# Patient Record
Sex: FEMALE | Race: WHITE | NOT HISPANIC OR LATINO | Employment: OTHER | URBAN - NONMETROPOLITAN AREA
[De-identification: names, ages, dates, MRNs, and addresses within clinical notes are randomized per-mention and may not be internally consistent; named-entity substitution may affect disease eponyms.]

---

## 2017-04-07 ENCOUNTER — APPOINTMENT (OUTPATIENT)
Dept: ULTRASOUND IMAGING | Facility: HOSPITAL | Age: 32
End: 2017-04-07

## 2017-04-07 ENCOUNTER — HOSPITAL ENCOUNTER (EMERGENCY)
Facility: HOSPITAL | Age: 32
Discharge: HOME | End: 2017-04-07
Attending: EMERGENCY MEDICINE | Admitting: EMERGENCY MEDICINE

## 2017-04-07 VITALS
OXYGEN SATURATION: 96 % | BODY MASS INDEX: 39.55 KG/M2 | WEIGHT: 252 LBS | HEIGHT: 67 IN | TEMPERATURE: 98.1 F | DIASTOLIC BLOOD PRESSURE: 81 MMHG | SYSTOLIC BLOOD PRESSURE: 123 MMHG | RESPIRATION RATE: 18 BRPM | HEART RATE: 59 BPM

## 2017-04-07 DIAGNOSIS — S80.11XA CONTUSION OF RIGHT LOWER LEG, INITIAL ENCOUNTER: Primary | ICD-10-CM

## 2017-04-07 PROCEDURE — 93971 EXTREMITY STUDY: CPT

## 2017-04-07 PROCEDURE — 99283 EMERGENCY DEPT VISIT LOW MDM: CPT

## 2017-04-07 RX ORDER — HYDROCHLOROTHIAZIDE 12.5 MG/1
25 TABLET ORAL DAILY
COMMUNITY
End: 2018-05-01 | Stop reason: SDUPTHER

## 2017-04-07 RX ORDER — SERTRALINE HYDROCHLORIDE 25 MG/1
25 TABLET, FILM COATED ORAL DAILY
COMMUNITY
End: 2018-05-01 | Stop reason: HOSPADM

## 2017-04-07 NOTE — ED PROVIDER NOTES
Subjective   History of Present Illness  TRIAGE CHIEF COMPLAINT:   Chief Complaint   Patient presents with   • Leg Pain         HPI: Johny Hearn   is a 31 y.o. female   who presents to the emergency department complaining of R calf pain.  Patient reports right calf pain and an area of bruising that looks strange to her.  She is concerned for possible DVT.  He has no history of clotting disorder nor does she have risk factor for DVT however insists upon having an ultrasound.  On exam pain is obviously focal over the contusion.  No posterior tenderness or palpable cords.  No unilateral edema.           Review of Systems   All other systems reviewed and are negative.      Past Medical History:   Diagnosis Date   • Cardiomyopathy    • Hyperlipidemia    • Hypertension    • Short-term memory loss        Allergies   Allergen Reactions   • Erythromycin Other (See Comments)     Pt unsure.   • Macrobid [Nitrofurantoin Monohyd Macro] Other (See Comments)     Pt does not know       Past Surgical History:   Procedure Laterality Date   •  SECTION     • TUBAL ABDOMINAL LIGATION         History reviewed. No pertinent family history.    Social History     Social History   • Marital status:      Spouse name: N/A   • Number of children: N/A   • Years of education: N/A     Social History Main Topics   • Smoking status: Never Smoker   • Smokeless tobacco: None   • Alcohol use No   • Drug use: No   • Sexual activity: Not Asked     Other Topics Concern   • None     Social History Narrative   • None           Objective   Physical Exam    CONSTITUTIONAL: Awake, oriented, appears non-toxic. Anxious. Obese.  HENT: Atraumatic, normocephalic, oral mucosa pink and moist, airway patent. Nares patent without drainage. External ears normal.   EYES: Conjunctiva clear, EOMI, PERRL   NECK: Trachea midline, non-tender, supple   CARDIOVASCULAR: Normal heart rate, Normal rhythm, No murmurs, rubs, gallops   PULMONARY/CHEST:  Clear to auscultation, no rhonchi, wheezes, or rales. Symmetrical breath sounds. Non-tender.   ABDOMINAL: Non-distended, soft, non-tender - no rebound or guarding. BS normal.   NEUROLOGIC: Non-focal, moving all four extremities, no gross sensory or motor deficits.   EXTREMITIES: No clubbing, cyanosis, or edema. 3x4cm contusion posteromedial proximal calf on RLE.   SKIN: Warm, Dry, No erythema. Facial rash.     US Venous Doppler Lower Extremity Right (duplex)   Final Result   No evidence of deep venous thrombosis.       This report was finalized on 4/7/2017 2:29 PM by Agustin Mckeon MD.              EKG:         Procedures         ED Course  ED Course          ED COURSE / MEDICAL DECISION MAKING:   No DVT on ultrasound.    DECISION TO DISCHARGE/ADMIT: see ED care timeline       Electronically signed by: Ok Vickers MD, 4/7/2017 2:42 PM              [place]    Final diagnoses:   Contusion of right lower leg, initial encounter            Ok Vickers MD  04/07/17 1446

## 2017-08-21 ENCOUNTER — TRANSCRIBE ORDERS (OUTPATIENT)
Dept: ADMINISTRATIVE | Facility: HOSPITAL | Age: 32
End: 2017-08-21

## 2017-08-21 DIAGNOSIS — R52 PAIN: Primary | ICD-10-CM

## 2017-09-11 ENCOUNTER — OFFICE VISIT (OUTPATIENT)
Dept: SURGERY | Facility: CLINIC | Age: 32
End: 2017-09-11

## 2017-09-11 VITALS
RESPIRATION RATE: 18 BRPM | OXYGEN SATURATION: 98 % | HEART RATE: 70 BPM | BODY MASS INDEX: 38.61 KG/M2 | HEIGHT: 67 IN | SYSTOLIC BLOOD PRESSURE: 138 MMHG | WEIGHT: 246 LBS | TEMPERATURE: 98 F | DIASTOLIC BLOOD PRESSURE: 92 MMHG

## 2017-09-11 DIAGNOSIS — R59.0 LYMPHADENOPATHY, INGUINAL: Primary | ICD-10-CM

## 2017-09-11 DIAGNOSIS — R59.0 LYMPHADENOPATHY OF HEAD AND NECK REGION: Primary | ICD-10-CM

## 2017-09-11 PROCEDURE — 99204 OFFICE O/P NEW MOD 45 MIN: CPT | Performed by: SURGERY

## 2017-09-11 PROCEDURE — 10022 PR FINE NEEDLE ASP;W/IMAGING GUIDANCE: CPT | Performed by: SURGERY

## 2017-09-11 RX ORDER — METRONIDAZOLE 7.5 MG/G
GEL TOPICAL
COMMUNITY
Start: 2017-07-26 | End: 2018-10-23 | Stop reason: HOSPADM

## 2017-09-11 RX ORDER — IBUPROFEN 800 MG/1
800 TABLET ORAL EVERY 6 HOURS PRN
COMMUNITY
Start: 2017-06-28 | End: 2018-10-23 | Stop reason: HOSPADM

## 2017-09-11 RX ORDER — HYDROCHLOROTHIAZIDE 25 MG/1
25 TABLET ORAL DAILY
COMMUNITY
Start: 2017-07-26 | End: 2018-10-23 | Stop reason: HOSPADM

## 2017-09-11 RX ORDER — AMITRIPTYLINE HYDROCHLORIDE 10 MG/1
10 TABLET, FILM COATED ORAL NIGHTLY
COMMUNITY
Start: 2015-03-10 | End: 2018-10-23 | Stop reason: HOSPADM

## 2017-09-11 RX ORDER — SERTRALINE HYDROCHLORIDE 100 MG/1
100 TABLET, FILM COATED ORAL NIGHTLY
COMMUNITY
Start: 2017-08-15 | End: 2022-09-28 | Stop reason: SDUPTHER

## 2017-09-11 RX ORDER — FLUTICASONE PROPIONATE 50 MCG
1 SPRAY, SUSPENSION (ML) NASAL DAILY PRN
COMMUNITY
Start: 2017-08-15 | End: 2022-04-18

## 2017-09-11 NOTE — PROGRESS NOTES
"Patient: Johny Hearn    YOB: 1985    Date: 09/11/2017    Primary Care Provider: Laurel Macias MD    Reason for Consultation: Abnormal CT scan of the abdomen and pelvis.    Chief complaint:   Chief Complaint   Patient presents with   • Mass     retroperitoneal per CT scan of abdomen and pelvis.       Subjective .     History of present illness:  Pt has \"short term memory loss\" per her and her , she is being seen today for evaluation of abnormal CT scan of the abdomen and pelvis which was done on 09/07/2017, it showed a soft tissue mass in the left retroperitoneum, it was also found on recent MRI, please see attached document for complete report  Pt's  stated that she had initial MRI done because of back pain.Lesion suspicious for lymphoma or possible metastatic disease.  Ultrasound indicates a lymph node in the left groin that has access for FNA.    Review of Systems   Constitutional: Negative for chills, fever and unexpected weight change.   HENT: Negative for hearing loss, trouble swallowing and voice change.    Eyes: Negative for visual disturbance.   Respiratory: Negative for apnea, cough, chest tightness, shortness of breath and wheezing.    Cardiovascular: Negative for chest pain, palpitations and leg swelling.   Gastrointestinal: Negative for abdominal distention, abdominal pain, anal bleeding, blood in stool, constipation, diarrhea, nausea, rectal pain and vomiting.   Endocrine: Negative for cold intolerance and heat intolerance.   Genitourinary: Negative for difficulty urinating, dysuria and flank pain.   Musculoskeletal: Negative for back pain and gait problem.   Skin: Negative for color change, rash and wound.   Neurological: Negative for dizziness, syncope, speech difficulty, weakness, light-headedness, numbness and headaches.   Hematological: Negative for adenopathy. Does not bruise/bleed easily.   Psychiatric/Behavioral: Negative for confusion. The " "patient is not nervous/anxious.        Allergies:  Allergies   Allergen Reactions   • Erythromycin Other (See Comments)     Pt unsure.   • Macrobid [Nitrofurantoin Monohyd Macro] Other (See Comments)     Pt does not know       Medications:    Current Outpatient Prescriptions:   •  amitriptyline (ELAVIL) 10 MG tablet, Take  by mouth., Disp: , Rfl:   •  fluticasone (FLONASE) 50 MCG/ACT nasal spray, , Disp: , Rfl:   •  hydrochlorothiazide (HYDRODIURIL) 12.5 MG tablet, Take 12.5 mg by mouth Daily., Disp: , Rfl:   •  hydrochlorothiazide (HYDRODIURIL) 25 MG tablet, , Disp: , Rfl:   •  ibuprofen (ADVIL,MOTRIN) 800 MG tablet, , Disp: , Rfl:   •  metroNIDAZOLE (METROGEL) 0.75 % gel, , Disp: , Rfl:   •  sertraline (ZOLOFT) 100 MG tablet, , Disp: , Rfl:   •  sertraline (ZOLOFT) 25 MG tablet, Take 25 mg by mouth Daily., Disp: , Rfl:     History\"  Past Medical History:   Diagnosis Date   • Cardiomyopathy    • Hyperlipidemia    • Hypertension    • Short-term memory loss        Past Surgical History:   Procedure Laterality Date   •  SECTION     • ENDOMETRIAL ABLATION     • TUBAL ABDOMINAL LIGATION         Family History   Problem Relation Age of Onset   • Hypertension Father    • Diabetes Father    • Diabetes Paternal Grandmother        Social History   Substance Use Topics   • Smoking status: Never Smoker   • Smokeless tobacco: None   • Alcohol use No        Objective     Vital Signs:   /92  Pulse 70  Temp 98 °F (36.7 °C) (Temporal Artery )   Resp 18  Ht 66.5\" (168.9 cm)  Wt 246 lb (112 kg)  SpO2 98%  BMI 39.11 kg/m2    Physical Exam:   General Appearance:    Alert, cooperative, in no acute distress   Head:    Normocephalic, without obvious abnormality, atraumatic   Eyes:            Lids and lashes normal, conjunctivae and sclerae normal, no   icterus, no pallor, corneas clear, PERRLA   Ears:    Ears appear intact with no abnormalities noted   Throat:   No oral lesions, no thrush, oral mucosa moist   Neck:   " No adenopathy, supple, trachea midline, no thyromegaly, no   carotid bruit, no JVD   Lungs:     Clear to auscultation,respirations regular, even and                  unlabored    Heart:    Regular rhythm and normal rate, normal S1 and S2, no            murmur, no gallop, no rub, no click   Chest Wall:    No abnormalities observed   Abdomen:     Normal bowel sounds, no masses, no organomegaly, soft        non-tender, non-distended, no guarding, no rebound                tenderness   Extremities:   Moves all extremities well, no edema, no cyanosis, no             redness   Pulses:   Pulses palpable and equal bilaterally   Skin:   No bleeding, bruising or rash   Lymph nodes:   No palpable adenopathy   Neurologic:   Cranial nerves 2 - 12 grossly intact, sensation intact, DTR       present and equal bilaterally     Results Review:   I reviewed the patient's new clinical results.    Assessment/Plan     1. Lymphadenopathy of head and neck region        Procedure: FNA left pelvic lymph node with ultrasound guidance    I recommend a FNA of the left pelvic lymph node lesion. The procedure and risks were clearly explained including bleeding, infection and requirement for re-biopsy and the patient understood these and wishes to proceed.    The patient was brought to the procedure room. Consent and time out were performed. The area was prepped and draped in the usual fashion. 1% lidocaine with epinephrine was infused locally. A 20G needle was used to biopsy the lesion with ultrasound guidance.  Minimal blood loss had occurred and hemostasis had been well controlled with pressure.  The patient tolerated the procedure and there were no complications. We will make a f/u appointment to discuss results.      I discussed the patients findings and my recommendations with patient.  Follow-up 4 days for results of pathology    Review of Systems was reviewed and confirmed as accurate today.    Electronically signed by Shelley Brock,  MD  09/11/17      .Scribed for Shelley Brock MD by Ileana Donald. 9/11/2017  3:09 PM

## 2017-09-15 ENCOUNTER — OFFICE VISIT (OUTPATIENT)
Dept: SURGERY | Facility: CLINIC | Age: 32
End: 2017-09-15

## 2017-09-15 VITALS
SYSTOLIC BLOOD PRESSURE: 136 MMHG | HEART RATE: 78 BPM | DIASTOLIC BLOOD PRESSURE: 88 MMHG | HEIGHT: 67 IN | WEIGHT: 264 LBS | TEMPERATURE: 98.8 F | BODY MASS INDEX: 41.44 KG/M2 | OXYGEN SATURATION: 98 %

## 2017-09-15 DIAGNOSIS — R10.32 PAIN IN THE GROIN, LEFT: Primary | ICD-10-CM

## 2017-09-15 DIAGNOSIS — R59.0 LYMPHADENOPATHY OF HEAD AND NECK REGION: ICD-10-CM

## 2017-09-15 DIAGNOSIS — R10.32 LEFT LOWER QUADRANT PAIN: ICD-10-CM

## 2017-09-15 DIAGNOSIS — R59.0 LYMPHADENOPATHY, RETROPERITONEAL: ICD-10-CM

## 2017-09-15 PROCEDURE — 99213 OFFICE O/P EST LOW 20 MIN: CPT | Performed by: SURGERY

## 2017-09-15 NOTE — PROGRESS NOTES
"Patient: Johny Hearn    YOB: 1985    Date: 09/15/2017    Primary Care Provider: Laurel Macias MD    Reason for Consultation: follow up FNA left groin    Chief Complaint:   Chief Complaint   Patient presents with   • Follow-up     follow up FNA @ left groin       History: Patient is in the office today to follow up on a FNA @ left groin, pathology was negative for malignancy. Patient complins of tenderness @ left groin. Patient still has left lower quadrant pain.  Still concerned about a large lymph node in the retroperitoneum.  It is not accessible to ultrasound-guided FNA.    Review of Systems   Constitutional: Negative for chills, fatigue and fever.   Respiratory: Negative for cough.    Cardiovascular: Negative for chest pain.   Gastrointestinal: Negative for abdominal pain, diarrhea, nausea and vomiting.       Vital Signs  /88  Pulse 78  Temp 98.8 °F (37.1 °C) (Temporal Artery )   Ht 66.5\" (168.9 cm)  Wt 264 lb (120 kg)  SpO2 98%  BMI 41.97 kg/m2    Allergies:  Allergies   Allergen Reactions   • Erythromycin Other (See Comments)     Pt unsure.   • Macrobid [Nitrofurantoin Monohyd Macro] Other (See Comments)     Pt does not know       Medications:    Current Outpatient Prescriptions:   •  amitriptyline (ELAVIL) 10 MG tablet, Take  by mouth., Disp: , Rfl:   •  fluticasone (FLONASE) 50 MCG/ACT nasal spray, , Disp: , Rfl:   •  hydrochlorothiazide (HYDRODIURIL) 12.5 MG tablet, Take 12.5 mg by mouth Daily., Disp: , Rfl:   •  hydrochlorothiazide (HYDRODIURIL) 25 MG tablet, , Disp: , Rfl:   •  ibuprofen (ADVIL,MOTRIN) 800 MG tablet, , Disp: , Rfl:   •  metroNIDAZOLE (METROGEL) 0.75 % gel, , Disp: , Rfl:   •  sertraline (ZOLOFT) 100 MG tablet, , Disp: , Rfl:   •  sertraline (ZOLOFT) 25 MG tablet, Take 25 mg by mouth Daily., Disp: , Rfl:     Physical Exam:   General Appearance:    Alert, cooperative, in no acute distress   Head:    Normocephalic, without obvious " abnormality, atraumatic   Lungs:     Clear to auscultation,respirations regular, even and                  unlabored    Heart:    Regular rhythm and normal rate, normal S1 and S2, no            murmur, no gallop, no rub, no click   Abdomen:     Normal bowel sounds, no masses, no organomegaly, soft        non-tender, non-distended, no guarding, no rebound                tenderness   Extremities:   Moves all extremities well, no edema, no cyanosis, no             redness   Pulses:   Pulses palpable and equal bilaterally   Skin:   No bleeding, bruising or rash      Assessment/Plan     1. Pain in the groin, left    2. Lymphadenopathy of head and neck region    3. Left lower quadrant pain      Patient scheduled for CT-guided biopsy of larger to peroneal lymph node.  Follow-up 2 weeks.    Electronically signed by Shelley Brock MD  09/15/17  Scribed for Shelley Brock MD by Caty Tracey. 9/15/2017  2:39 PM.3

## 2017-09-19 ENCOUNTER — TELEPHONE (OUTPATIENT)
Dept: SURGERY | Facility: CLINIC | Age: 32
End: 2017-09-19

## 2017-09-19 NOTE — TELEPHONE ENCOUNTER
Per [facility] @  Radiology, the patient needs to  the disk of the CT she had at [facility].  They need this before scheduling.   I asked the patient to  the disk & bring it to [facility] and make sure it gets to [facility].  She said she would do that today.

## 2017-09-26 ENCOUNTER — TELEPHONE (OUTPATIENT)
Dept: SURGERY | Facility: CLINIC | Age: 32
End: 2017-09-26

## 2017-09-26 DIAGNOSIS — R59.1 LYMPHADENOPATHY: Primary | ICD-10-CM

## 2017-09-26 NOTE — TELEPHONE ENCOUNTER
I let the patient know she is scheduled for a CT scan guided biopsy at [facility] on 09-29-17 arrival at 830am. I also gave her a return appt to see Dr. Brock the following Wedneday

## 2017-09-26 NOTE — TELEPHONE ENCOUNTER
Patient [facility] Burnham was unable to schedule CT guided bx did not have a long enough needle to reach the area.  I called [facility]NOL[facility] Pope to get CT scheduled.  They will return my call after lunch.I informed the patient.

## 2017-09-26 NOTE — TELEPHONE ENCOUNTER
----- Message from Lubna Bee sent at 9/19/2017 11:47 AM EDT -----  When talking to Johny about picking up the disk of her CT, she wanted Dr Brock to know that she also had swollen lymph nodes in her neck.  Thanks.

## 2017-09-29 ENCOUNTER — HOSPITAL ENCOUNTER (OUTPATIENT)
Dept: CT IMAGING | Facility: HOSPITAL | Age: 32
Discharge: HOME | End: 2017-09-29
Admitting: SURGERY

## 2017-09-29 VITALS
TEMPERATURE: 97.8 F | HEIGHT: 67 IN | DIASTOLIC BLOOD PRESSURE: 87 MMHG | OXYGEN SATURATION: 97 % | HEART RATE: 73 BPM | WEIGHT: 245 LBS | RESPIRATION RATE: 18 BRPM | BODY MASS INDEX: 38.45 KG/M2 | SYSTOLIC BLOOD PRESSURE: 133 MMHG

## 2017-09-29 DIAGNOSIS — R10.32 LEFT LOWER QUADRANT PAIN: ICD-10-CM

## 2017-09-29 LAB
APTT PPP: 28.8 SECONDS (ref 24–31)
INR PPP: 0.98
PLATELET # BLD AUTO: 222 10*3/MM3 (ref 150–450)
PROTHROMBIN TIME: 10.7 SECONDS (ref 9.6–11.5)

## 2017-09-29 PROCEDURE — 85730 THROMBOPLASTIN TIME PARTIAL: CPT | Performed by: RADIOLOGY

## 2017-09-29 PROCEDURE — 77012 CT SCAN FOR NEEDLE BIOPSY: CPT

## 2017-09-29 PROCEDURE — 88305 TISSUE EXAM BY PATHOLOGIST: CPT | Performed by: SURGERY

## 2017-09-29 PROCEDURE — 85049 AUTOMATED PLATELET COUNT: CPT | Performed by: RADIOLOGY

## 2017-09-29 PROCEDURE — 25010000002 PROMETHAZINE PER 50 MG: Performed by: RADIOLOGY

## 2017-09-29 PROCEDURE — 88173 CYTOPATH EVAL FNA REPORT: CPT | Performed by: SURGERY

## 2017-09-29 PROCEDURE — 85610 PROTHROMBIN TIME: CPT | Performed by: RADIOLOGY

## 2017-09-29 PROCEDURE — 88185 FLOWCYTOMETRY/TC ADD-ON: CPT

## 2017-09-29 PROCEDURE — 88184 FLOWCYTOMETRY/ TC 1 MARKER: CPT

## 2017-09-29 RX ORDER — ALPRAZOLAM 0.5 MG/1
0.5 TABLET ORAL
Status: ACTIVE | OUTPATIENT
Start: 2017-09-29 | End: 2017-09-29

## 2017-09-29 RX ORDER — MEPERIDINE HYDROCHLORIDE 50 MG/ML
50 INJECTION INTRAMUSCULAR; INTRAVENOUS; SUBCUTANEOUS ONCE
Status: COMPLETED | OUTPATIENT
Start: 2017-09-29 | End: 2017-09-29

## 2017-09-29 RX ORDER — PROMETHAZINE HYDROCHLORIDE 25 MG/ML
12.5 INJECTION, SOLUTION INTRAMUSCULAR; INTRAVENOUS ONCE
Status: COMPLETED | OUTPATIENT
Start: 2017-09-29 | End: 2017-09-29

## 2017-09-29 RX ORDER — SODIUM CHLORIDE 0.9 % (FLUSH) 0.9 %
1-10 SYRINGE (ML) INJECTION AS NEEDED
Status: DISCONTINUED | OUTPATIENT
Start: 2017-09-29 | End: 2017-09-30 | Stop reason: HOSPADM

## 2017-09-29 RX ORDER — LIDOCAINE HYDROCHLORIDE 10 MG/ML
20 INJECTION, SOLUTION INFILTRATION; PERINEURAL ONCE
Status: COMPLETED | OUTPATIENT
Start: 2017-09-29 | End: 2017-09-29

## 2017-09-29 RX ADMIN — LIDOCAINE HYDROCHLORIDE 20 ML: 10 INJECTION, SOLUTION INFILTRATION; PERINEURAL at 11:30

## 2017-09-29 RX ADMIN — MEPERIDINE HYDROCHLORIDE 50 MG: 50 INJECTION, SOLUTION INTRAMUSCULAR; INTRAVENOUS; SUBCUTANEOUS at 10:35

## 2017-09-29 RX ADMIN — PROMETHAZINE HYDROCHLORIDE 12.5 MG: 25 INJECTION INTRAMUSCULAR; INTRAVENOUS at 10:35

## 2017-09-29 NOTE — PLAN OF CARE
Problem: Patient Care Overview (Adult)  Goal: Discharge Needs Assessment  Outcome: Ongoing (interventions implemented as appropriate)    09/29/17 0900   Discharge Needs Assessment   Concerns To Be Addressed no discharge needs identified   Readmission Within The Last 30 Days no previous admission in last 30 days   Equipment Needed After Discharge none   Current Health   Anticipated Changes Related to Illness none   Self-Care   Equipment Currently Used at Home none   Living Environment   Transportation Available family or friend will provide

## 2017-09-29 NOTE — PROCEDURES
Radiology Procedure    Pre-procedure: Lymphadenopathy of the retroperitoneum and left iliac chains     Procedure Performed: CT guided needle biopsy left external iliac lymph node     IV Sedation and/or Anesthesia:  No    Complications: None    Preliminary Findings: Bulky Lymphadenopathy    Specimen Removed: FNA x 2 with aspirate     Estimated Blood Loss:  0ml    Post-Procedure Diagnosis: Lymphadenopathy    Post-Procedure Plan: Await cytology and pathology results, return to referring physician orders    Standard Discharge Instructions Given:yes     Micky Haddad DO  09/29/17  12:26 PM

## 2017-09-29 NOTE — PLAN OF CARE
Problem: Perioperative Period (Adult)  Goal: Signs and Symptoms of Listed Potential Problems Will be Absent or Manageable (Perioperative Period)  Outcome: Ongoing (interventions implemented as appropriate)    09/29/17 0900   Perioperative Period   Problems Assessed (Perioperative Period) all   Problems Present (Perioperative Period) physiologic stress response;situational response

## 2017-09-29 NOTE — PLAN OF CARE
Problem: Patient Care Overview (Adult)  Goal: Plan of Care Review  Outcome: Ongoing (interventions implemented as appropriate)    09/29/17 0926   Coping/Psychosocial Response Interventions   Plan Of Care Reviewed With patient;family   Patient Care Overview   Progress no change

## 2017-09-29 NOTE — NURSING NOTE
Pt states this started with resp infection has been on antibiotics,then scanned for back pain and noted abnl lymph nodes. In abd/pelvis

## 2017-10-02 ENCOUNTER — TELEPHONE (OUTPATIENT)
Dept: INTERVENTIONAL RADIOLOGY/VASCULAR | Facility: HOSPITAL | Age: 32
End: 2017-10-02

## 2017-10-02 NOTE — TELEPHONE ENCOUNTER
@[id number])@    Other Comments:

## 2017-10-03 LAB
CYTO UR: NORMAL
LAB AP CASE REPORT: NORMAL
LAB AP CLINICAL INFORMATION: NORMAL
LAB AP FLOW CYTOMETRY SUMMARY: NORMAL
Lab: NORMAL
Lab: NORMAL
PATH REPORT.FINAL DX SPEC: NORMAL
PATH REPORT.GROSS SPEC: NORMAL

## 2017-10-04 ENCOUNTER — OFFICE VISIT (OUTPATIENT)
Dept: SURGERY | Facility: CLINIC | Age: 32
End: 2017-10-04

## 2017-10-04 VITALS
HEART RATE: 92 BPM | BODY MASS INDEX: 38.45 KG/M2 | WEIGHT: 245 LBS | OXYGEN SATURATION: 97 % | DIASTOLIC BLOOD PRESSURE: 84 MMHG | HEIGHT: 67 IN | SYSTOLIC BLOOD PRESSURE: 130 MMHG | TEMPERATURE: 97.5 F

## 2017-10-04 DIAGNOSIS — R59.0 LYMPHADENOPATHY OF HEAD AND NECK REGION: Primary | ICD-10-CM

## 2017-10-04 PROCEDURE — 99213 OFFICE O/P EST LOW 20 MIN: CPT | Performed by: SURGERY

## 2017-10-04 RX ORDER — MONTELUKAST SODIUM 10 MG/1
TABLET ORAL
Status: ON HOLD | COMMUNITY
Start: 2017-10-01 | End: 2019-01-31

## 2017-10-04 NOTE — PROGRESS NOTES
"Patient: Johny Hearn    YOB: 1985    Date: 10/04/2017    Primary Care Provider: Laurel Macias MD    Chief Complaint:   Chief Complaint   Patient presents with   • Follow-up     Follow up from CT guided biopsy       History: The patient is in the office today for a follow up from her recent CT guided biopsy of her left iliac lymph node.  The pathology shows scan benign-appearing lymphoid cell groups.  Flow cytometry shows no clonal B-cell popular or aberrant T-cell population. Patient doing better, less pain in the left groin.  No weight loss or night sweats.    Review of Systems   Constitutional: Negative for chills, fatigue and fever.   Respiratory: Negative for cough.    Cardiovascular: Negative for chest pain.   Gastrointestinal: Negative for abdominal pain, diarrhea, nausea and vomiting.       Vital Signs  /84  Pulse 92  Temp 97.5 °F (36.4 °C) (Temporal Artery )   Ht 67\" (170.2 cm)  Wt 245 lb (111 kg)  SpO2 97%  BMI 38.37 kg/m2    Allergies:  Allergies   Allergen Reactions   • Erythromycin Other (See Comments)     Pt unsure.   • Macrobid [Nitrofurantoin Monohyd Macro] Other (See Comments)     Pt does not know       Medications:    Current Outpatient Prescriptions:   •  amitriptyline (ELAVIL) 10 MG tablet, Take  by mouth., Disp: , Rfl:   •  fluticasone (FLONASE) 50 MCG/ACT nasal spray, , Disp: , Rfl:   •  hydrochlorothiazide (HYDRODIURIL) 12.5 MG tablet, Take 12.5 mg by mouth Daily., Disp: , Rfl:   •  hydrochlorothiazide (HYDRODIURIL) 25 MG tablet, , Disp: , Rfl:   •  ibuprofen (ADVIL,MOTRIN) 800 MG tablet, , Disp: , Rfl:   •  metroNIDAZOLE (METROGEL) 0.75 % gel, , Disp: , Rfl:   •  montelukast (SINGULAIR) 10 MG tablet, , Disp: , Rfl:   •  sertraline (ZOLOFT) 100 MG tablet, , Disp: , Rfl:   •  sertraline (ZOLOFT) 25 MG tablet, Take 25 mg by mouth Daily., Disp: , Rfl:     Physical Exam:   General Appearance:    Alert, cooperative, in no acute distress   Head:   "  Normocephalic, without obvious abnormality, atraumatic   Lungs:     Clear to auscultation,respirations regular, even and                  unlabored    Heart:    Regular rhythm and normal rate, normal S1 and S2, no            murmur, no gallop, no rub, no click   Abdomen:     Normal bowel sounds, no masses, no organomegaly, soft        non-tender, non-distended, no guarding, no rebound                tenderness   Extremities:   Moves all extremities well, no edema, no cyanosis, no             redness   Pulses:   Pulses palpable and equal bilaterally   Skin:   No bleeding, bruising or rash      Assessment/Plan     1. Lymphadenopathy of head and neck region      Lymphadenopathy left retroperitoneal area, FNA was benign for lymphoma or cancer.  Patient reassured, we'll repeat CT scan in 6 months to ensure stability.  Patient agreeable and understands plan.    Electronically signed by Shelley Brock MD  10/04/17    Scribed for Shelley Brock MD by Michelle Milligan. 10/4/2017  9:10 AM

## 2017-10-06 PROBLEM — G62.9 PERIPHERAL NEUROPATHY: Status: ACTIVE | Noted: 2017-10-06

## 2017-10-06 PROBLEM — G47.33 OSA (OBSTRUCTIVE SLEEP APNEA): Status: ACTIVE | Noted: 2017-10-06

## 2018-02-15 ENCOUNTER — TRANSCRIBE ORDERS (OUTPATIENT)
Dept: GENERAL RADIOLOGY | Facility: HOSPITAL | Age: 33
End: 2018-02-15

## 2018-02-15 DIAGNOSIS — M25.551 BILATERAL HIP PAIN: Primary | ICD-10-CM

## 2018-02-15 DIAGNOSIS — M25.552 BILATERAL HIP PAIN: Primary | ICD-10-CM

## 2018-02-16 ENCOUNTER — HOSPITAL ENCOUNTER (OUTPATIENT)
Dept: GENERAL RADIOLOGY | Facility: HOSPITAL | Age: 33
Discharge: HOME | End: 2018-02-16
Admitting: NURSE PRACTITIONER

## 2018-02-16 DIAGNOSIS — M25.552 BILATERAL HIP PAIN: ICD-10-CM

## 2018-02-16 DIAGNOSIS — M25.551 BILATERAL HIP PAIN: ICD-10-CM

## 2018-02-16 PROCEDURE — 73521 X-RAY EXAM HIPS BI 2 VIEWS: CPT

## 2018-03-14 ENCOUNTER — TRANSCRIBE ORDERS (OUTPATIENT)
Dept: CT IMAGING | Facility: HOSPITAL | Age: 33
End: 2018-03-14

## 2018-03-14 DIAGNOSIS — R59.9 ADENOPATHY: Primary | ICD-10-CM

## 2018-03-15 ENCOUNTER — HOSPITAL ENCOUNTER (OUTPATIENT)
Dept: CT IMAGING | Facility: HOSPITAL | Age: 33
Discharge: HOME | End: 2018-03-15
Admitting: NURSE PRACTITIONER

## 2018-03-15 DIAGNOSIS — R59.9 ADENOPATHY: ICD-10-CM

## 2018-03-15 PROCEDURE — 74177 CT ABD & PELVIS W/CONTRAST: CPT

## 2018-03-15 PROCEDURE — 0 IOPAMIDOL 61 % SOLUTION: Performed by: NURSE PRACTITIONER

## 2018-03-15 PROCEDURE — 0 DIATRIZOATE MEGLUMINE & SODIUM PER 1 ML: Performed by: NURSE PRACTITIONER

## 2018-03-15 RX ADMIN — IOPAMIDOL 100 ML: 612 INJECTION, SOLUTION INTRAVENOUS at 11:15

## 2018-03-15 RX ADMIN — DIATRIZOATE MEGLUMINE AND DIATRIZOATE SODIUM 30 ML: 660; 100 LIQUID ORAL; RECTAL at 11:15

## 2018-04-04 ENCOUNTER — TELEPHONE (OUTPATIENT)
Dept: SURGERY | Facility: CLINIC | Age: 33
End: 2018-04-04

## 2018-04-04 NOTE — TELEPHONE ENCOUNTER
Patient on 6 month recall for enlarge lymph node.Called patient and she stated that she is under Dr Rice care for enlarge lymph .Called PCP spoke with Miguelina she said patient had another  CT and they  had referred her to Dr Rice.

## 2018-04-05 ENCOUNTER — LAB (OUTPATIENT)
Dept: LAB | Facility: HOSPITAL | Age: 33
End: 2018-04-05

## 2018-04-05 ENCOUNTER — TRANSCRIBE ORDERS (OUTPATIENT)
Dept: ADMINISTRATIVE | Facility: HOSPITAL | Age: 33
End: 2018-04-05

## 2018-04-05 DIAGNOSIS — Z00.00 ROUTINE GENERAL MEDICAL EXAMINATION AT A HEALTH CARE FACILITY: ICD-10-CM

## 2018-04-05 DIAGNOSIS — L83 ACANTHOSIS NIGRICANS: ICD-10-CM

## 2018-04-05 DIAGNOSIS — R93.89 ABNORMAL MRI: ICD-10-CM

## 2018-04-05 DIAGNOSIS — R59.9 ADENOPATHY: ICD-10-CM

## 2018-04-05 DIAGNOSIS — R93.89 ABNORMAL RADIOLOGICAL FINDINGS IN SKIN AND SUBCUTANEOUS TISSUE: ICD-10-CM

## 2018-04-05 DIAGNOSIS — R93.89 ABNORMAL RADIOLOGICAL FINDINGS IN SKIN AND SUBCUTANEOUS TISSUE: Primary | ICD-10-CM

## 2018-04-05 LAB
ALBUMIN SERPL-MCNC: 4.4 G/DL (ref 3.5–5)
ALBUMIN/GLOB SERPL: 0.9 G/DL (ref 1–2)
ALP SERPL-CCNC: 66 U/L (ref 38–126)
ALT SERPL W P-5'-P-CCNC: 30 U/L (ref 13–69)
ANION GAP SERPL CALCULATED.3IONS-SCNC: 14.6 MMOL/L (ref 10–20)
AST SERPL-CCNC: 24 U/L (ref 15–46)
BILIRUB SERPL-MCNC: 0.6 MG/DL (ref 0.2–1.3)
BUN BLD-MCNC: 19 MG/DL (ref 7–20)
BUN/CREAT SERPL: 31.7 (ref 7.1–23.5)
CALCIUM SPEC-SCNC: 8.8 MG/DL (ref 8.4–10.2)
CHLORIDE SERPL-SCNC: 104 MMOL/L (ref 98–107)
CHOLEST SERPL-MCNC: 146 MG/DL (ref 0–199)
CO2 SERPL-SCNC: 26 MMOL/L (ref 26–30)
CREAT BLD-MCNC: 0.6 MG/DL (ref 0.6–1.3)
GFR SERPL CREATININE-BSD FRML MDRD: 116 ML/MIN/1.73
GLOBULIN UR ELPH-MCNC: 4.9 GM/DL
GLUCOSE BLD-MCNC: 109 MG/DL (ref 74–98)
HDLC SERPL-MCNC: 23 MG/DL (ref 40–60)
LDLC SERPL CALC-MCNC: 94 MG/DL (ref 0–99)
LDLC/HDLC SERPL: 4.1 {RATIO}
POTASSIUM BLD-SCNC: 4.6 MMOL/L (ref 3.5–5.1)
PROT SERPL-MCNC: 9.3 G/DL (ref 6.3–8.2)
SODIUM BLD-SCNC: 140 MMOL/L (ref 137–145)
TRIGL SERPL-MCNC: 144 MG/DL
TSH SERPL DL<=0.05 MIU/L-ACNC: 3.73 MIU/ML (ref 0.47–4.68)
VLDLC SERPL-MCNC: 28.8 MG/DL

## 2018-04-05 PROCEDURE — 82570 ASSAY OF URINE CREATININE: CPT

## 2018-04-05 PROCEDURE — 82043 UR ALBUMIN QUANTITATIVE: CPT

## 2018-04-05 PROCEDURE — 80061 LIPID PANEL: CPT

## 2018-04-05 PROCEDURE — 36415 COLL VENOUS BLD VENIPUNCTURE: CPT

## 2018-04-05 PROCEDURE — 84443 ASSAY THYROID STIM HORMONE: CPT

## 2018-04-05 PROCEDURE — 80053 COMPREHEN METABOLIC PANEL: CPT

## 2018-04-06 LAB
CREAT 24H UR-MCNC: 70.9 MG/DL
MICROALBUMIN UR-MCNC: <3 UG/ML
MICROALBUMIN/CREAT UR: <4.2 MG/G CREAT (ref 0–30)

## 2018-04-08 ENCOUNTER — HOSPITAL ENCOUNTER (EMERGENCY)
Facility: HOSPITAL | Age: 33
Discharge: HOME | End: 2018-04-08
Attending: EMERGENCY MEDICINE | Admitting: EMERGENCY MEDICINE

## 2018-04-08 VITALS
HEIGHT: 67 IN | RESPIRATION RATE: 18 BRPM | DIASTOLIC BLOOD PRESSURE: 85 MMHG | SYSTOLIC BLOOD PRESSURE: 131 MMHG | TEMPERATURE: 98.1 F | BODY MASS INDEX: 37.67 KG/M2 | WEIGHT: 240 LBS | OXYGEN SATURATION: 96 % | HEART RATE: 80 BPM

## 2018-04-08 DIAGNOSIS — R10.32 LLQ ABDOMINAL PAIN: Primary | ICD-10-CM

## 2018-04-08 LAB
ALBUMIN SERPL-MCNC: 4.5 G/DL (ref 3.5–5)
ALBUMIN/GLOB SERPL: 0.8 G/DL (ref 1–2)
ALP SERPL-CCNC: 76 U/L (ref 38–126)
ALT SERPL W P-5'-P-CCNC: 27 U/L (ref 13–69)
ANION GAP SERPL CALCULATED.3IONS-SCNC: 15.4 MMOL/L (ref 10–20)
AST SERPL-CCNC: 25 U/L (ref 15–46)
BASOPHILS # BLD AUTO: 0.02 10*3/MM3 (ref 0–0.2)
BASOPHILS NFR BLD AUTO: 0.3 % (ref 0–2.5)
BILIRUB SERPL-MCNC: 0.7 MG/DL (ref 0.2–1.3)
BILIRUB UR QL STRIP: NEGATIVE
BUN BLD-MCNC: 11 MG/DL (ref 7–20)
BUN/CREAT SERPL: 18.3 (ref 7.1–23.5)
CALCIUM SPEC-SCNC: 9 MG/DL (ref 8.4–10.2)
CHLORIDE SERPL-SCNC: 101 MMOL/L (ref 98–107)
CLARITY UR: ABNORMAL
CO2 SERPL-SCNC: 25 MMOL/L (ref 26–30)
COLOR UR: YELLOW
CREAT BLD-MCNC: 0.6 MG/DL (ref 0.6–1.3)
DEPRECATED RDW RBC AUTO: 48.1 FL (ref 37–54)
EOSINOPHIL # BLD AUTO: 0.04 10*3/MM3 (ref 0–0.7)
EOSINOPHIL NFR BLD AUTO: 0.7 % (ref 0–7)
ERYTHROCYTE [DISTWIDTH] IN BLOOD BY AUTOMATED COUNT: 15.9 % (ref 11.5–14.5)
GFR SERPL CREATININE-BSD FRML MDRD: 116 ML/MIN/1.73
GLOBULIN UR ELPH-MCNC: 5.3 GM/DL
GLUCOSE BLD-MCNC: 137 MG/DL (ref 74–98)
GLUCOSE UR STRIP-MCNC: NEGATIVE MG/DL
HCT VFR BLD AUTO: 33.6 % (ref 37–47)
HGB BLD-MCNC: 10.8 G/DL (ref 12–16)
HGB UR QL STRIP.AUTO: NEGATIVE
HOLD SPECIMEN: NORMAL
HOLD SPECIMEN: NORMAL
IMM GRANULOCYTES # BLD: 0.04 10*3/MM3 (ref 0–0.06)
IMM GRANULOCYTES NFR BLD: 0.7 % (ref 0–0.6)
KETONES UR QL STRIP: NEGATIVE
LEUKOCYTE ESTERASE UR QL STRIP.AUTO: NEGATIVE
LIPASE SERPL-CCNC: 46 U/L (ref 23–300)
LYMPHOCYTES # BLD AUTO: 1.48 10*3/MM3 (ref 0.6–3.4)
LYMPHOCYTES NFR BLD AUTO: 25.4 % (ref 10–50)
MCH RBC QN AUTO: 27.3 PG (ref 27–31)
MCHC RBC AUTO-ENTMCNC: 32.1 G/DL (ref 30–37)
MCV RBC AUTO: 84.8 FL (ref 81–99)
MONOCYTES # BLD AUTO: 0.48 10*3/MM3 (ref 0–0.9)
MONOCYTES NFR BLD AUTO: 8.2 % (ref 0–12)
NEUTROPHILS # BLD AUTO: 3.76 10*3/MM3 (ref 2–6.9)
NEUTROPHILS NFR BLD AUTO: 64.7 % (ref 37–80)
NITRITE UR QL STRIP: NEGATIVE
NRBC BLD MANUAL-RTO: 0 /100 WBC (ref 0–0)
PH UR STRIP.AUTO: 6 [PH] (ref 5–8)
PLATELET # BLD AUTO: 287 10*3/MM3 (ref 130–400)
PMV BLD AUTO: 8.1 FL (ref 6–12)
POTASSIUM BLD-SCNC: 3.4 MMOL/L (ref 3.5–5.1)
PROT SERPL-MCNC: 9.8 G/DL (ref 6.3–8.2)
PROT UR QL STRIP: NEGATIVE
RBC # BLD AUTO: 3.96 10*6/MM3 (ref 4.2–5.4)
SODIUM BLD-SCNC: 138 MMOL/L (ref 137–145)
SP GR UR STRIP: 1.02 (ref 1–1.03)
TROPONIN I SERPL-MCNC: <0.012 NG/ML (ref 0–0.03)
UROBILINOGEN UR QL STRIP: ABNORMAL
WBC NRBC COR # BLD: 5.82 10*3/MM3 (ref 4.8–10.8)
WHOLE BLOOD HOLD SPECIMEN: NORMAL
WHOLE BLOOD HOLD SPECIMEN: NORMAL

## 2018-04-08 PROCEDURE — 83690 ASSAY OF LIPASE: CPT | Performed by: EMERGENCY MEDICINE

## 2018-04-08 PROCEDURE — 85025 COMPLETE CBC W/AUTO DIFF WBC: CPT | Performed by: EMERGENCY MEDICINE

## 2018-04-08 PROCEDURE — 96375 TX/PRO/DX INJ NEW DRUG ADDON: CPT

## 2018-04-08 PROCEDURE — 84484 ASSAY OF TROPONIN QUANT: CPT | Performed by: EMERGENCY MEDICINE

## 2018-04-08 PROCEDURE — 81003 URINALYSIS AUTO W/O SCOPE: CPT | Performed by: EMERGENCY MEDICINE

## 2018-04-08 PROCEDURE — 96361 HYDRATE IV INFUSION ADD-ON: CPT

## 2018-04-08 PROCEDURE — 25010000002 PROMETHAZINE PER 50 MG: Performed by: EMERGENCY MEDICINE

## 2018-04-08 PROCEDURE — 80053 COMPREHEN METABOLIC PANEL: CPT | Performed by: EMERGENCY MEDICINE

## 2018-04-08 PROCEDURE — 99284 EMERGENCY DEPT VISIT MOD MDM: CPT

## 2018-04-08 PROCEDURE — 25010000002 HYDROMORPHONE PER 4 MG: Performed by: EMERGENCY MEDICINE

## 2018-04-08 PROCEDURE — 96374 THER/PROPH/DIAG INJ IV PUSH: CPT

## 2018-04-08 RX ORDER — ONDANSETRON 4 MG/1
4 TABLET, ORALLY DISINTEGRATING ORAL EVERY 6 HOURS PRN
Qty: 10 TABLET | Refills: 0 | Status: ON HOLD | OUTPATIENT
Start: 2018-04-08 | End: 2018-10-23

## 2018-04-08 RX ORDER — PROMETHAZINE HYDROCHLORIDE 25 MG/ML
12.5 INJECTION, SOLUTION INTRAMUSCULAR; INTRAVENOUS ONCE
Status: COMPLETED | OUTPATIENT
Start: 2018-04-08 | End: 2018-04-08

## 2018-04-08 RX ORDER — HYDROCODONE BITARTRATE AND ACETAMINOPHEN 5; 325 MG/1; MG/1
1 TABLET ORAL EVERY 6 HOURS PRN
Qty: 10 TABLET | Refills: 0 | Status: SHIPPED | OUTPATIENT
Start: 2018-04-08 | End: 2018-10-23 | Stop reason: HOSPADM

## 2018-04-08 RX ORDER — FAMOTIDINE 10 MG/ML
20 INJECTION, SOLUTION INTRAVENOUS ONCE
Status: COMPLETED | OUTPATIENT
Start: 2018-04-08 | End: 2018-04-08

## 2018-04-08 RX ADMIN — SODIUM CHLORIDE 1000 ML: 9 INJECTION, SOLUTION INTRAVENOUS at 04:27

## 2018-04-08 RX ADMIN — FAMOTIDINE 20 MG: 10 INJECTION INTRAVENOUS at 04:27

## 2018-04-08 RX ADMIN — PROMETHAZINE HYDROCHLORIDE 12.5 MG: 25 INJECTION INTRAMUSCULAR; INTRAVENOUS at 04:28

## 2018-04-08 RX ADMIN — HYDROMORPHONE HYDROCHLORIDE 1 MG: 1 INJECTION, SOLUTION INTRAMUSCULAR; INTRAVENOUS; SUBCUTANEOUS at 04:28

## 2018-04-08 NOTE — ED PROVIDER NOTES
Subjective   History of Present Illness  TRIAGE CHIEF COMPLAINT:   Chief Complaint   Patient presents with   • Abdominal Pain         HPI: Johny Hearn   is a 32 y.o. female   who presents to the emergency department complaining of Left lower quadrant abdominal pain.  Patient with a history of hypertension, postpartum cardiomyopathy, tubal ligation, anxiety, depression.  Patient reports ongoing left lower quadrant pain for the past 6 months.  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  This evening patient was unable to control her pain.  She also had associated nausea and vomiting with approximately 4 episodes of nonbloody nonbilious emesis.  She drank some Pepto-Bismol prior to arrival however states it did not help.  Denies fever, chills, chest pain, shortness of breath, diarrhea, medication, abdominal distention, urinary symptoms.           Review of Systems   All other systems reviewed and are negative.      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   • Macrobid [Nitrofurantoin Monohyd Macro] Other (See Comments)     Pt does not know       Past Surgical History:   Procedure Laterality Date   •  SECTION     • ENDOMETRIAL ABLATION     • TUBAL ABDOMINAL LIGATION         Family History   Problem Relation Age of Onset   • Hypertension Father    • Diabetes Father    • Diabetes Paternal Grandmother        Social History     Social History   • Marital status:      Social History Main Topics   • Smoking status: Never Smoker   • Alcohol use No   • Drug use: No   • Sexual activity: Defer     Other Topics Concern    • Not on file           Objective   Physical Exam    CONSTITUTIONAL: Awake, oriented, appears comfortable and non-toxic   HENT: Atraumatic, normocephalic, oral mucosa pink and moist, airway patent. Nares patent without drainage. External ears normal.   EYES: Conjunctiva clear, EOMI, PERRL   NECK: Trachea midline, non-tender, supple   CARDIOVASCULAR: Normal heart rate, Normal rhythm, No murmurs, rubs, gallops   PULMONARY/CHEST: Clear to auscultation, no rhonchi, wheezes, or rales. Symmetrical breath sounds. Non-tender.   ABDOMINAL: Non-distended, soft, mild left lower quadrant tenderness - no rebound or guarding. BS normal.   NEUROLOGIC: Non-focal, moving all four extremities, no gross sensory or motor deficits.   EXTREMITIES: No clubbing, cyanosis, or edema   SKIN: Warm, Dry, No erythema, No rash     No orders to display           EKG:         Procedures         ED Course  ED Course          ED COURSE / MEDICAL DECISION MAKING:   Nursing notes reviewed.    Patient is well-appearing and improved on reevaluation.  Workup unremarkable.  Pain is been ongoing for 6 months and she has been diagnosed with left iliac sarcoma and is scheduled for upcoming biopsy.  Plan for pain and nausea control with close outpatient follow-up versus return if worse.    DECISION TO DISCHARGE/ADMIT: see ED care timeline       Electronically signed by: Ok Vickers MD, 4/8/2018 6:13 AM              [facility]    Final diagnoses:   LLQ abdominal pain            Ok Vickers MD  04/08/18 0619

## 2018-04-13 ENCOUNTER — CONSULT (OUTPATIENT)
Dept: ONCOLOGY | Facility: CLINIC | Age: 33
End: 2018-04-13

## 2018-04-13 VITALS
DIASTOLIC BLOOD PRESSURE: 86 MMHG | BODY MASS INDEX: 38.14 KG/M2 | SYSTOLIC BLOOD PRESSURE: 156 MMHG | RESPIRATION RATE: 16 BRPM | HEIGHT: 67 IN | HEART RATE: 104 BPM | WEIGHT: 243 LBS | TEMPERATURE: 98.5 F

## 2018-04-13 DIAGNOSIS — R59.0 LYMPHADENOPATHY, RETROPERITONEAL: Primary | ICD-10-CM

## 2018-04-13 DIAGNOSIS — R16.1 SPLENOMEGALY: ICD-10-CM

## 2018-04-13 PROCEDURE — 99205 OFFICE O/P NEW HI 60 MIN: CPT | Performed by: INTERNAL MEDICINE

## 2018-04-13 RX ORDER — TRAMADOL HYDROCHLORIDE 50 MG/1
TABLET ORAL EVERY 6 HOURS PRN
COMMUNITY
Start: 2018-02-01 | End: 2018-10-23 | Stop reason: HOSPADM

## 2018-04-13 RX ORDER — TRAZODONE HYDROCHLORIDE 50 MG/1
50 TABLET ORAL NIGHTLY PRN
Status: ON HOLD | COMMUNITY
Start: 2018-03-26 | End: 2019-01-31

## 2018-04-13 RX ORDER — GABAPENTIN 300 MG/1
CAPSULE ORAL
COMMUNITY
Start: 2018-02-01 | End: 2018-05-01 | Stop reason: HOSPADM

## 2018-04-13 RX ORDER — CYCLOBENZAPRINE HCL 10 MG
10 TABLET ORAL 2 TIMES DAILY
COMMUNITY
Start: 2018-04-11 | End: 2018-10-23 | Stop reason: HOSPADM

## 2018-04-13 NOTE — PROGRESS NOTES
DATE OF CONSULTATION: 4/13/2018    REFERRING PHYSICIAN: DREW Gamez    Dear DREW Clark  Thank you for asking for my medical advice on this patient. I saw her in the  [place] office on 4/13/2018    REASON FOR CONSULTATION: Left iliac adenopathy     HISTORY OF PRESENT ILLNESS: The patient is a very pleasant 32 y.o.  female   who was in her usual state of health until approximately September 2017. The patient had complaints of left sided hip and low back discomfort. She had MRI done 9/5/2017 with incidental finding of soft tissue mass in the left retroperitoneum along the iliac region. Follow up CT scan confirmed left iliac adenopathy with mild splenomegaly. The patient was referred to Dr. Brock who completed CT guided FNA on 9/29/2017. Final pathology revealed benign lymphoid groups. The patient continued to have complaints of worsening pain in her left hip and lower abdomen. Six month follow up CT scan done 3/15/2018 revealed worsening adenopathy with worsening splenomegaly, concerning for lymphoproliferative process. The patient had CBC that revealed mild anemia. She has a history of abnormal uterine bleeding, status post ablation, with normal pap smears to date. Based on the patient's worsening lymphadenopathy she was referred to our office for evaluation.      SUBJECTIVE: When I saw the patient today she continues to complain of left hip and lower abdominal pain. She is using a heating pad, Flexeril and Norco as prescribed by her pain management clinic. She has had no fevers, chills, or night sweats. She denies cough or shortness of breath. She has had some intermittent nausea, but is eating and drinking well. She is accompanied to her visit today by her .     Review of Systems   Constitutional: Negative for activity change, appetite change, chills, fatigue, fever and unexpected weight change.   HENT: Negative for hearing loss, mouth sores, nosebleeds, sore throat and  trouble swallowing.    Eyes: Negative for visual disturbance.   Respiratory: Negative for cough, chest tightness, shortness of breath and wheezing.    Cardiovascular: Negative for chest pain, palpitations and leg swelling.   Gastrointestinal: Positive for nausea. Negative for abdominal distention, abdominal pain, blood in stool, constipation, diarrhea, rectal pain and vomiting.   Endocrine: Negative for cold intolerance and heat intolerance.   Genitourinary: Negative for difficulty urinating, dysuria, frequency and urgency.   Musculoskeletal: Positive for arthralgias and back pain. Negative for gait problem, joint swelling and myalgias.        Left hip pain   Skin: Negative for rash.   Neurological: Negative for dizziness, tremors, syncope, weakness, light-headedness, numbness and headaches.        Short term memory loss   Hematological: Negative for adenopathy. Does not bruise/bleed easily.   Psychiatric/Behavioral: Negative for confusion, sleep disturbance and suicidal ideas. The patient is not nervous/anxious.        Past Medical History:   Diagnosis Date   • Anxiety and depression    • Arthritis    • Brain injury     due to cardiac arrest   • Cardiac arrest     dionisio partum cardiomyopathy   • Cardiomyopathy    • Dermatitis     chronic on face   • Hyperlipidemia    • Hypertension    • Short-term memory loss        Social History     Social History   • Marital status:      Spouse name: N/A   • Number of children: N/A   • Years of education: N/A     Occupational History   • Not on file.     Social History Main Topics   • Smoking status: Never Smoker   • Smokeless tobacco: Never Used   • Alcohol use No   • Drug use: No   • Sexual activity: Defer     Other Topics Concern   • Not on file     Social History Narrative   • No narrative on file       Family History   Problem Relation Age of Onset   • Hypertension Father    • Diabetes Father    • Diabetes Paternal Grandmother        Past Surgical History:   Procedure  "Laterality Date   •  SECTION     • ENDOMETRIAL ABLATION     • TUBAL ABDOMINAL LIGATION         Allergies   Allergen Reactions   • Erythromycin Other (See Comments)     Pt unsure.   • Macrobid [Nitrofurantoin Monohyd Macro] Other (See Comments)     Pt does not know          Current Outpatient Prescriptions:   •  amitriptyline (ELAVIL) 10 MG tablet, Take  by mouth., Disp: , Rfl:   •  cyclobenzaprine (FLEXERIL) 10 MG tablet, , Disp: , Rfl:   •  fluticasone (FLONASE) 50 MCG/ACT nasal spray, , Disp: , Rfl:   •  gabapentin (NEURONTIN) 300 MG capsule, , Disp: , Rfl:   •  hydrochlorothiazide (HYDRODIURIL) 12.5 MG tablet, Take 12.5 mg by mouth Daily., Disp: , Rfl:   •  hydrochlorothiazide (HYDRODIURIL) 25 MG tablet, , Disp: , Rfl:   •  HYDROcodone-acetaminophen (NORCO) 5-325 MG per tablet, Take 1 tablet by mouth Every 6 (Six) Hours As Needed for Moderate Pain  or Severe Pain ., Disp: 10 tablet, Rfl: 0  •  ibuprofen (ADVIL,MOTRIN) 800 MG tablet, , Disp: , Rfl:   •  metroNIDAZOLE (METROGEL) 0.75 % gel, , Disp: , Rfl:   •  montelukast (SINGULAIR) 10 MG tablet, , Disp: , Rfl:   •  ondansetron ODT (ZOFRAN-ODT) 4 MG disintegrating tablet, Take 1 tablet by mouth Every 6 (Six) Hours As Needed for Nausea or Vomiting., Disp: 10 tablet, Rfl: 0  •  sertraline (ZOLOFT) 100 MG tablet, , Disp: , Rfl:   •  sertraline (ZOLOFT) 25 MG tablet, Take 25 mg by mouth Daily., Disp: , Rfl:   •  traMADol (ULTRAM) 50 MG tablet, , Disp: , Rfl:   •  traZODone (DESYREL) 50 MG tablet, , Disp: , Rfl:     PHYSICAL EXAMINATION:   Ht 170.2 cm (67\")    ECOG Performance Status: 1 - Symptomatic but completely ambulatory  General Appearance:  alert, cooperative, no apparent distress, appears stated age and obese   Neurologic/Psychiatric: A&O x 3, gait steady, appropriate affect, strength 5/5 in all muscle groups   HEENT:  Normocephalic, without obvious abnormality, mucous membranes moist   Neck: Supple, symmetrical, trachea midline, no adenopathy;  No " thyromegaly, masses, or tenderness   Lungs:   Clear to auscultation bilaterally; respirations regular, even, and unlabored bilaterally   Heart:  Regular rate and rhythm, no murmurs appreciated   Abdomen:   Soft, non-tender, non-distended and no organomegaly   Lymph nodes: No cervical, supraclavicular, inguinal or axillary adenopathy noted   Extremities: Normal, atraumatic; no clubbing, cyanosis, or edema    Skin: No rashes, ulcers, or suspicious lesions noted       Admission on 04/08/2018, Discharged on 04/08/2018   Component Date Value Ref Range Status   • Glucose 04/08/2018 137* 74 - 98 mg/dL Final   • BUN 04/08/2018 11  7 - 20 mg/dL Final   • Creatinine 04/08/2018 0.60  0.60 - 1.30 mg/dL Final   • Sodium 04/08/2018 138  137 - 145 mmol/L Final   • Potassium 04/08/2018 3.4* 3.5 - 5.1 mmol/L Final   • Chloride 04/08/2018 101  98 - 107 mmol/L Final   • CO2 04/08/2018 25.0* 26.0 - 30.0 mmol/L Final   • Calcium 04/08/2018 9.0  8.4 - 10.2 mg/dL Final   • Total Protein 04/08/2018 9.8* 6.3 - 8.2 g/dL Final   • Albumin 04/08/2018 4.50  3.50 - 5.00 g/dL Final   • ALT (SGPT) 04/08/2018 27  13 - 69 U/L Final   • AST (SGOT) 04/08/2018 25  15 - 46 U/L Final   • Alkaline Phosphatase 04/08/2018 76  38 - 126 U/L Final   • Total Bilirubin 04/08/2018 0.7  0.2 - 1.3 mg/dL Final   • eGFR Non  Amer 04/08/2018 116  >60 mL/min/1.73 Final   • Globulin 04/08/2018 5.3  gm/dL Final   • A/G Ratio 04/08/2018 0.8* 1.0 - 2.0 g/dL Final   • BUN/Creatinine Ratio 04/08/2018 18.3  7.1 - 23.5 Final   • Anion Gap 04/08/2018 15.4  10.0 - 20.0 mmol/L Final   • Lipase 04/08/2018 46  23 - 300 U/L Final   • Troponin I 04/08/2018 <0.012  0.000 - 0.034 ng/mL Final   • Color, UA 04/08/2018 Yellow  Yellow, Straw Final   • Appearance, UA 04/08/2018 Slightly Cloudy* Clear Final   • pH, UA 04/08/2018 6.0  5.0 - 8.0 Final   • Specific Gravity, UA 04/08/2018 1.020  1.005 - 1.030 Final   • Glucose, UA 04/08/2018 Negative  Negative Final   • Ketones, UA  04/08/2018 Negative  Negative Final   • Bilirubin, UA 04/08/2018 Negative  Negative Final   • Blood, UA 04/08/2018 Negative  Negative Final   • Protein, UA 04/08/2018 Negative  Negative Final   • Leuk Esterase, UA 04/08/2018 Negative  Negative Final   • Nitrite, UA 04/08/2018 Negative  Negative Final   • Urobilinogen, UA 04/08/2018 0.2 E.U./dL  0.2 - 1.0 E.U./dL Final   • Extra Tube 04/08/2018 hold for add-on   Final   • Extra Tube 04/08/2018 hold for add-on   Final   • Extra Tube 04/08/2018 Hold for add-ons.   Final   • Extra Tube 04/08/2018 Hold for add-ons.   Final   • WBC 04/08/2018 5.82  4.80 - 10.80 10*3/mm3 Final   • RBC 04/08/2018 3.96* 4.20 - 5.40 10*6/mm3 Final   • Hemoglobin 04/08/2018 10.8* 12.0 - 16.0 g/dL Final   • Hematocrit 04/08/2018 33.6* 37.0 - 47.0 % Final   • MCV 04/08/2018 84.8  81.0 - 99.0 fL Final   • MCH 04/08/2018 27.3  27.0 - 31.0 pg Final   • MCHC 04/08/2018 32.1  30.0 - 37.0 g/dL Final   • RDW 04/08/2018 15.9* 11.5 - 14.5 % Final   • RDW-SD 04/08/2018 48.1  37.0 - 54.0 fl Final   • MPV 04/08/2018 8.1  6.0 - 12.0 fL Final   • Platelets 04/08/2018 287  130 - 400 10*3/mm3 Final   • Neutrophil % 04/08/2018 64.7  37.0 - 80.0 % Final   • Lymphocyte % 04/08/2018 25.4  10.0 - 50.0 % Final   • Monocyte % 04/08/2018 8.2  0.0 - 12.0 % Final   • Eosinophil % 04/08/2018 0.7  0.0 - 7.0 % Final   • Basophil % 04/08/2018 0.3  0.0 - 2.5 % Final   • Immature Grans % 04/08/2018 0.7* 0.0 - 0.6 % Final   • Neutrophils, Absolute 04/08/2018 3.76  2.00 - 6.90 10*3/mm3 Final   • Lymphocytes, Absolute 04/08/2018 1.48  0.60 - 3.40 10*3/mm3 Final   • Monocytes, Absolute 04/08/2018 0.48  0.00 - 0.90 10*3/mm3 Final   • Eosinophils, Absolute 04/08/2018 0.04  0.00 - 0.70 10*3/mm3 Final   • Basophils, Absolute 04/08/2018 0.02  0.00 - 0.20 10*3/mm3 Final   • Immature Grans, Absolute 04/08/2018 0.04  0.00 - 0.06 10*3/mm3 Final   • nRBC 04/08/2018 0.0  0.0 - 0.0 /100 WBC Final   Lab on 04/05/2018   Component Date Value Ref  Range Status   • Glucose 04/05/2018 109* 74 - 98 mg/dL Final   • BUN 04/05/2018 19  7 - 20 mg/dL Final   • Creatinine 04/05/2018 0.60  0.60 - 1.30 mg/dL Final   • Sodium 04/05/2018 140  137 - 145 mmol/L Final   • Potassium 04/05/2018 4.6  3.5 - 5.1 mmol/L Final   • Chloride 04/05/2018 104  98 - 107 mmol/L Final   • CO2 04/05/2018 26.0  26.0 - 30.0 mmol/L Final   • Calcium 04/05/2018 8.8  8.4 - 10.2 mg/dL Final   • Total Protein 04/05/2018 9.3* 6.3 - 8.2 g/dL Final   • Albumin 04/05/2018 4.40  3.50 - 5.00 g/dL Final   • ALT (SGPT) 04/05/2018 30  13 - 69 U/L Final   • AST (SGOT) 04/05/2018 24  15 - 46 U/L Final   • Alkaline Phosphatase 04/05/2018 66  38 - 126 U/L Final   • Total Bilirubin 04/05/2018 0.6  0.2 - 1.3 mg/dL Final   • eGFR Non African Amer 04/05/2018 116  >60 mL/min/1.73 Final   • Globulin 04/05/2018 4.9  gm/dL Final   • A/G Ratio 04/05/2018 0.9* 1.0 - 2.0 g/dL Final   • BUN/Creatinine Ratio 04/05/2018 31.7* 7.1 - 23.5 Final   • Anion Gap 04/05/2018 14.6  10.0 - 20.0 mmol/L Final   • TSH 04/05/2018 3.730  0.470 - 4.680 mIU/mL Final   • Total Cholesterol 04/05/2018 146  0 - 199 mg/dL Final   • Triglycerides 04/05/2018 144  <150 mg/dL Final   • HDL Cholesterol 04/05/2018 23* 40 - 60 mg/dL Final   • LDL Cholesterol  04/05/2018 94  0 - 99 mg/dL Final   • VLDL Cholesterol 04/05/2018 28.8  mg/dL Final   • LDL/HDL Ratio 04/05/2018 4.10   Final   • Creatinine, Urine 04/06/2018 70.9  Not Estab. mg/dL Final   • Microalbumin, Urine 04/06/2018 <3.0  Not Estab. ug/mL Final   • Microalbumin/Creatinine Ratio 04/06/2018 <4.2  0.0 - 30.0 mg/g creat Final        Ct Abdomen Pelvis With Contrast    Result Date: 3/15/2018  Narrative: CT ABDOMEN AND PELVIS WITH CONTRAST-  TECHNIQUE: Oral and IV contrast enhanced exam.  HISTORY: R59.1; R59.1-Generalized enlarged lymph nodes.  COMPARISON: Outside image dated 09/07/2017.  FINDINGS: ABDOMEN: Mild splenomegaly is present which has mildly increased from prior exam. Spleen measures up  to 13.5 cm in greatest dimension, previously 12.4 cm. There is subtle vague hypodensity in the central spleen which is nonspecific although infiltrative process including lymphoproliferative disorder could have a similar appearance.  Liver, pancreas and adrenal glands are normal. Kidneys show no obstruction. Gallbladder is unremarkable.  Mild retroperitoneal adenopathy is seen just below the level of the renal vessels. A left periaortic lymph node has enlarged measuring 1.9 cm, previously 1.3 cm as measured on image 46.  PELVIS: Left iliac chain adenopathy is present. This has progressed. Confluent adenopathy is seen partially encasing the left common iliac artery. Lymph nodes measure 3.5 x 2.7 cm, previously 2.1 x 1.5 cm. Adenopathy continues along the iliac chain adjacent to the iliopsoas muscle. Left internal iliac adenopathy and external iliac adenopathy is noted. The largest left external iliac node is 4.1 x 2.5 cm, previously 3.2 x 1.7 cm.  Uterus and ovaries are unremarkable.  Multiple small inguinal lymph nodes are present, stable in size and not enlarged by imaging criteria.      Impression: 1. Progression adenopathy left retroperitoneum but most pronounced in the left pelvis. 2. Mild splenomegaly worse from prior exam. 3. Constellation of findings is suspicious for lymphoproliferative disorder. If tissue diagnosis is needed, left external iliac lymph nodes are amenable to biopsy.   This study was performed with techniques to keep radiation doses as low as reasonably achievable (ALARA). Individualized dose reduction techniques using automated exposure control or adjustment of vA and/or kV according to the patient size were employed.  This report was finalized on 3/15/2018 12:32 PM by Kanu Washington MD.        DIAGNOSTIC DATA:   1. Radiology: 9/7/2017 CT abdomen pelvis: Left pelvic adenopathy extending from the distal aortic region through the left iliac region. Differentials included lymphoma or metastatic  disease.  9/11/2017 Left groin ultrasound with multiple enlarged lymph nodes left groin measuring 3.4x2.6 cm. 3/13/2018 MRI bilateral hip: unremarkable hips, questionable mild edema in the sacral ala bilaterally, extensive fluid, edema, and apparent masses throughout the left retroperitoneum extending into the left iliac region.   3/15/2018 CT abdomen/pelvis: 1. Progression adenopathy left retroperitoneum but most pronounced in the left pelvis. 2. Mild splenomegaly worse from prior exam. 3. Constellation of findings is suspicious for lymphoproliferative  disorder. If tissue diagnosis is needed, left external iliac lymph nodes  are amenable to biopsy.   2. Dr. Brock's note from 10/4/2017 reviewed by me and documented in the  chart.   3. Pathology report: 9/29/2017 left iliac lymph node fine needle aspirate with divine block with scant benign appearing lymphoid cell groups, left iliac lymph node with scant groups of small benign appearing lymphoid cells, flow cytometry showing no clonal B-cell population or aberrant T-cell population.   4. Laboratory data: 4/8/2018 WBC 5.82, Hgb 10.8, Hct 33.6, MCV 84.8, Plt 287, Crt 0.6, BUN 11, Na+ 138, K+ 3.4, AST 27, ALT 27, Alk Phos 76, lipase 46.      ASSESSMENT: The patient is a very pleasant 32 y.o.  female  with left iliac adenopathy and mild splenomegaly.     PROBLEM LIST:   1. Left iliac adenopathy.   A. Incidentally found on MRI done for low back and hip pain done 9/5/2017.   B. Follow up CAT scan confirmed pelvic adenopathy extending from the distal aortic region through the left iliac region.   C. Status post FNA to left iliac lymph node done 9/29/2017.   D. Final pathology revealed benign appearing lymphoid cell groups.   E. Repeat CT done 3/15/2018 revealed increased size of left iliac adenopathy with mild splenomegaly.   2. Mild splenomegaly   A. Incidentally found 9/5/2017.  B. Progressive size on repeat imaging done 3/15/2018.  3. Left hip and low back pain  A. Under  care of pain management with use of Norco.   4. History of anoxic brain injury following cardiac arrest post-partum.   5. Postpartum cardiomyopathy.   6. Anxiety and depression    PLAN:   1. I had a long discussion today with the patient and her  about her  new diagnosis of left iliac adenopathy. I did go over the final pathology report in  detail with both of them. I reviewed the patient's documents including refereing provider's notes, lab results, imaging, and path report.  I reviewed her CT scan films myself.  2. I recommended the patient proceed repeat biopsy to evaluate the underlying cause of her lymphadenopathy.   3. I have discussed the case with Dr. Rivera from radiology to coordinate patient's care who feels the area is amenable to CT guided biopsy, which we will schedule for prior to return.   4. The patient will follow up in 2 weeks to go over the pathology results.  Further recommendations regarding workup as well as treatment would be based on the biopsy result.  Patient will need to have CT chest as well as omental biopsy if she has indeed lymphoproliferative disorder.   5.  Low back pain: The patient will continue use of Norco and Flexeril as needed for pain.   6.  Cardiomyopathy: We'll have to do a cardiac echo prior to initiation of chemotherapy if she needs it in order to assure adequate ejection fraction.    Scribed for Carlo Rice MD by Lalitha Lomas, DREW. 4/13/2018  9:37 AM  Carlo Rice MD  4/13/2018   I, Carlo Rice MD, personally performed the services described in this documentation as scribed by the above named individual in my presence, and it is both accurate and complete.  4/13/2018  9:58 AM

## 2018-04-19 ENCOUNTER — HOSPITAL ENCOUNTER (OUTPATIENT)
Dept: CT IMAGING | Facility: HOSPITAL | Age: 33
Discharge: HOME | End: 2018-04-19
Admitting: NURSE PRACTITIONER

## 2018-04-19 VITALS
RESPIRATION RATE: 20 BRPM | HEIGHT: 66 IN | TEMPERATURE: 98.6 F | DIASTOLIC BLOOD PRESSURE: 80 MMHG | HEART RATE: 110 BPM | OXYGEN SATURATION: 96 % | BODY MASS INDEX: 38.22 KG/M2 | SYSTOLIC BLOOD PRESSURE: 133 MMHG | WEIGHT: 237.8 LBS

## 2018-04-19 DIAGNOSIS — R59.0 LYMPHADENOPATHY, RETROPERITONEAL: ICD-10-CM

## 2018-04-19 DIAGNOSIS — R16.1 SPLENOMEGALY: ICD-10-CM

## 2018-04-19 LAB
APTT PPP: 26.9 SECONDS (ref 24–31)
INR PPP: 1.09 (ref 0.91–1.09)
PLATELET # BLD AUTO: 250 10*3/MM3 (ref 150–450)
PROTHROMBIN TIME: 11.4 SECONDS (ref 9.6–11.5)

## 2018-04-19 PROCEDURE — 85730 THROMBOPLASTIN TIME PARTIAL: CPT | Performed by: RADIOLOGY

## 2018-04-19 PROCEDURE — 25010000002 HYDROMORPHONE PER 4 MG: Performed by: RADIOLOGY

## 2018-04-19 PROCEDURE — 88173 CYTOPATH EVAL FNA REPORT: CPT | Performed by: NURSE PRACTITIONER

## 2018-04-19 PROCEDURE — 85049 AUTOMATED PLATELET COUNT: CPT | Performed by: RADIOLOGY

## 2018-04-19 PROCEDURE — 88341 IMHCHEM/IMCYTCHM EA ADD ANTB: CPT | Performed by: NURSE PRACTITIONER

## 2018-04-19 PROCEDURE — 88185 FLOWCYTOMETRY/TC ADD-ON: CPT

## 2018-04-19 PROCEDURE — 25010000002 PROMETHAZINE PER 50 MG: Performed by: RADIOLOGY

## 2018-04-19 PROCEDURE — 77012 CT SCAN FOR NEEDLE BIOPSY: CPT

## 2018-04-19 PROCEDURE — 88184 FLOWCYTOMETRY/ TC 1 MARKER: CPT

## 2018-04-19 PROCEDURE — 88342 IMHCHEM/IMCYTCHM 1ST ANTB: CPT | Performed by: NURSE PRACTITIONER

## 2018-04-19 PROCEDURE — 85610 PROTHROMBIN TIME: CPT | Performed by: RADIOLOGY

## 2018-04-19 RX ORDER — HYDROCODONE BITARTRATE AND ACETAMINOPHEN 5; 325 MG/1; MG/1
1 TABLET ORAL EVERY 6 HOURS PRN
Status: DISCONTINUED | OUTPATIENT
Start: 2018-04-19 | End: 2018-04-20 | Stop reason: HOSPADM

## 2018-04-19 RX ORDER — MEPERIDINE HYDROCHLORIDE 50 MG/ML
50 INJECTION INTRAMUSCULAR; INTRAVENOUS; SUBCUTANEOUS ONCE
Status: COMPLETED | OUTPATIENT
Start: 2018-04-19 | End: 2018-04-19

## 2018-04-19 RX ORDER — HYDROMORPHONE HYDROCHLORIDE 1 MG/ML
0.5 INJECTION, SOLUTION INTRAMUSCULAR; INTRAVENOUS; SUBCUTANEOUS
Status: DISCONTINUED | OUTPATIENT
Start: 2018-04-19 | End: 2018-04-20 | Stop reason: HOSPADM

## 2018-04-19 RX ORDER — LIDOCAINE HYDROCHLORIDE 10 MG/ML
10 INJECTION, SOLUTION INFILTRATION; PERINEURAL ONCE
Status: COMPLETED | OUTPATIENT
Start: 2018-04-19 | End: 2018-04-19

## 2018-04-19 RX ORDER — SODIUM CHLORIDE 0.9 % (FLUSH) 0.9 %
1-10 SYRINGE (ML) INJECTION AS NEEDED
Status: DISCONTINUED | OUTPATIENT
Start: 2018-04-19 | End: 2018-04-20 | Stop reason: HOSPADM

## 2018-04-19 RX ORDER — PROMETHAZINE HYDROCHLORIDE 25 MG/ML
6.25 INJECTION, SOLUTION INTRAMUSCULAR; INTRAVENOUS ONCE
Status: COMPLETED | OUTPATIENT
Start: 2018-04-19 | End: 2018-04-19

## 2018-04-19 RX ADMIN — MEPERIDINE HYDROCHLORIDE 50 MG: 50 INJECTION, SOLUTION INTRAMUSCULAR; INTRAVENOUS; SUBCUTANEOUS at 12:30

## 2018-04-19 RX ADMIN — LIDOCAINE HYDROCHLORIDE 10 ML: 10 INJECTION, SOLUTION INFILTRATION; PERINEURAL at 12:57

## 2018-04-19 RX ADMIN — HYDROMORPHONE HYDROCHLORIDE 0.5 MG: 1 INJECTION, SOLUTION INTRAMUSCULAR; INTRAVENOUS; SUBCUTANEOUS at 13:50

## 2018-04-19 RX ADMIN — PROMETHAZINE HYDROCHLORIDE 6.25 MG: 25 INJECTION INTRAMUSCULAR; INTRAVENOUS at 12:30

## 2018-04-23 ENCOUNTER — TELEPHONE (OUTPATIENT)
Dept: INTERVENTIONAL RADIOLOGY/VASCULAR | Facility: HOSPITAL | Age: 33
End: 2018-04-23

## 2018-04-23 LAB
CYTO UR: NORMAL
LAB AP CASE REPORT: NORMAL
LAB AP CLINICAL INFORMATION: NORMAL
LAB AP DIAGNOSIS COMMENT: NORMAL
LAB AP FLOW CYTOMETRY SUMMARY: NORMAL
Lab: NORMAL
Lab: NORMAL
PATH REPORT.FINAL DX SPEC: NORMAL
PATH REPORT.GROSS SPEC: NORMAL

## 2018-04-23 NOTE — TELEPHONE ENCOUNTER
@[id number])@    Other Comments:

## 2018-04-25 ENCOUNTER — TELEPHONE (OUTPATIENT)
Dept: SURGERY | Facility: CLINIC | Age: 33
End: 2018-04-25

## 2018-04-25 ENCOUNTER — OFFICE VISIT (OUTPATIENT)
Dept: ONCOLOGY | Facility: CLINIC | Age: 33
End: 2018-04-25

## 2018-04-25 VITALS
BODY MASS INDEX: 37.45 KG/M2 | DIASTOLIC BLOOD PRESSURE: 80 MMHG | RESPIRATION RATE: 16 BRPM | HEART RATE: 110 BPM | SYSTOLIC BLOOD PRESSURE: 143 MMHG | WEIGHT: 233 LBS | TEMPERATURE: 97 F | HEIGHT: 66 IN

## 2018-04-25 DIAGNOSIS — C85.90 LYMPHOMA, UNSPECIFIED BODY REGION, UNSPECIFIED LYMPHOMA TYPE (HCC): ICD-10-CM

## 2018-04-25 DIAGNOSIS — Z51.11 CHEMOTHERAPY MANAGEMENT, ENCOUNTER FOR: Primary | ICD-10-CM

## 2018-04-25 PROCEDURE — 99215 OFFICE O/P EST HI 40 MIN: CPT | Performed by: INTERNAL MEDICINE

## 2018-04-25 RX ORDER — PREDNISONE 50 MG/1
100 TABLET ORAL DAILY
Qty: 10 TABLET | Refills: 5 | Status: SHIPPED | OUTPATIENT
Start: 2018-04-25 | End: 2018-04-30

## 2018-04-25 RX ORDER — ONDANSETRON HYDROCHLORIDE 8 MG/1
8 TABLET, FILM COATED ORAL 3 TIMES DAILY PRN
Qty: 30 TABLET | Refills: 5 | Status: SHIPPED | OUTPATIENT
Start: 2018-04-25 | End: 2018-10-23 | Stop reason: HOSPADM

## 2018-04-25 RX ORDER — LORAZEPAM 2 MG/ML
1 INJECTION INTRAMUSCULAR
Status: CANCELLED | OUTPATIENT
Start: 2018-04-25

## 2018-04-25 RX ORDER — DIPHENHYDRAMINE HYDROCHLORIDE 50 MG/ML
25 INJECTION INTRAMUSCULAR; INTRAVENOUS EVERY 4 HOURS PRN
Status: CANCELLED | OUTPATIENT
Start: 2018-04-25

## 2018-04-25 RX ORDER — LIDOCAINE AND PRILOCAINE 25; 25 MG/G; MG/G
CREAM TOPICAL AS NEEDED
Qty: 30 G | Refills: 3 | Status: ON HOLD | OUTPATIENT
Start: 2018-04-25 | End: 2019-01-31

## 2018-04-25 NOTE — TELEPHONE ENCOUNTER
Castro with Dr Rice office called saying patient needs port placement if possible this coming Monday.

## 2018-04-25 NOTE — PROGRESS NOTES
DATE OF VISIT: 4/25/2018    REASON FOR VISIT: Followup for anaplastic large T-cell lymphoma, ALK positive     HISTORY OF PRESENT ILLNESS: The patient is a very pleasant 32 y.o. female  with past medical history significant for anaplastic large T-cell lymphoma, ALK positive diagnosed in April 19, 2018 after CT-guided core biopsy of left iliac mass. The patient is here today for scheduled follow-up visit.    SUBJECTIVE: The patient is here today with her .  She is anxious about the biopsy result.  She continue complaining of night sweats.    PAST MEDICAL HISTORY/SOCIAL HISTORY/FAMILY HISTORY: Reviewed by me and unchanged from my documentation done on 04/25/18.    Review of Systems   Constitutional: Positive for chills. Negative for activity change, appetite change, fatigue, fever and unexpected weight change.   HENT: Negative for hearing loss, mouth sores, nosebleeds, sore throat and trouble swallowing.    Eyes: Negative for visual disturbance.   Respiratory: Negative for cough, chest tightness, shortness of breath and wheezing.    Cardiovascular: Negative for chest pain, palpitations and leg swelling.   Gastrointestinal: Negative for abdominal distention, abdominal pain, blood in stool, constipation, diarrhea, nausea, rectal pain and vomiting.   Endocrine: Negative for cold intolerance and heat intolerance.   Genitourinary: Negative for difficulty urinating, dysuria, frequency and urgency.   Musculoskeletal: Negative for arthralgias, back pain, gait problem, joint swelling and myalgias.   Skin: Negative for rash.   Neurological: Negative for dizziness, tremors, syncope, weakness, light-headedness, numbness and headaches.   Hematological: Positive for adenopathy. Does not bruise/bleed easily.   Psychiatric/Behavioral: Negative for confusion, sleep disturbance and suicidal ideas. The patient is not nervous/anxious.          Current Outpatient Prescriptions:   •  amitriptyline (ELAVIL) 10 MG tablet, Take  by  "mouth., Disp: , Rfl:   •  cyclobenzaprine (FLEXERIL) 10 MG tablet, 10 mg 2 (Two) Times a Day., Disp: , Rfl:   •  fluticasone (FLONASE) 50 MCG/ACT nasal spray, , Disp: , Rfl:   •  gabapentin (NEURONTIN) 300 MG capsule, , Disp: , Rfl:   •  hydrochlorothiazide (HYDRODIURIL) 12.5 MG tablet, Take 25 mg by mouth Daily., Disp: , Rfl:   •  hydrochlorothiazide (HYDRODIURIL) 25 MG tablet, , Disp: , Rfl:   •  HYDROcodone-acetaminophen (NORCO) 5-325 MG per tablet, Take 1 tablet by mouth Every 6 (Six) Hours As Needed for Moderate Pain  or Severe Pain . (Patient taking differently: Take 1 tablet by mouth Every 8 (Eight) Hours As Needed for Moderate Pain  or Severe Pain .), Disp: 10 tablet, Rfl: 0  •  ibuprofen (ADVIL,MOTRIN) 800 MG tablet, , Disp: , Rfl:   •  lidocaine-prilocaine (EMLA) 2.5-2.5 % cream, Apply  topically As Needed (45-60 minutes prior to port access.  Cover with saran/plastic wrap.)., Disp: 30 g, Rfl: 3  •  metroNIDAZOLE (METROGEL) 0.75 % gel, , Disp: , Rfl:   •  montelukast (SINGULAIR) 10 MG tablet, , Disp: , Rfl:   •  ondansetron (ZOFRAN) 8 MG tablet, Take 1 tablet by mouth 3 (Three) Times a Day As Needed for Nausea or Vomiting., Disp: 30 tablet, Rfl: 5  •  ondansetron ODT (ZOFRAN-ODT) 4 MG disintegrating tablet, Take 1 tablet by mouth Every 6 (Six) Hours As Needed for Nausea or Vomiting., Disp: 10 tablet, Rfl: 0  •  predniSONE (DELTASONE) 50 MG tablet, Take 2 tablets by mouth Daily for 5 doses. Take with food.  Bring the first dose to chemotherapy appointment., Disp: 10 tablet, Rfl: 5  •  sertraline (ZOLOFT) 100 MG tablet, , Disp: , Rfl:   •  sertraline (ZOLOFT) 25 MG tablet, Take 25 mg by mouth Daily. Takes 1tablet 100mg, Disp: , Rfl:   •  traMADol (ULTRAM) 50 MG tablet, Every 6 (Six) Hours As Needed., Disp: , Rfl:   •  traZODone (DESYREL) 50 MG tablet, , Disp: , Rfl:     PHYSICAL EXAMINATION:   /80   Pulse 110   Temp 97 °F (36.1 °C) (Temporal Artery )   Resp 16   Ht 167.6 cm (66\")   Wt 106 kg (233 " lb)   BMI 37.61 kg/m²    ECOG Performance Status: 1 - Symptomatic but completely ambulatory  General Appearance:  alert, cooperative, no apparent distress and appears stated age   Neurologic/Psychiatric: A&O x 3, gait steady, appropriate affect, strength 5/5 in all muscle groups   HEENT:  Normocephalic, without obvious abnormality, mucous membranes moist   Neck: Supple, symmetrical, trachea midline, no adenopathy;  No thyromegaly, masses, or tenderness   Lungs:   Clear to auscultation bilaterally; respirations regular, even, and unlabored bilaterally   Heart:  Regular rate and rhythm, no murmurs appreciated   Abdomen:   Soft, non-tender, non-distended and no organomegaly   Lymph nodes: No cervical, supraclavicular, inguinal or axillary adenopathy noted   Extremities: Normal, atraumatic; no clubbing, cyanosis, or edema    Skin: No rashes, ulcers, or suspicious lesions noted     Hospital Outpatient Visit on 04/19/2018   Component Date Value Ref Range Status   • Platelets 04/19/2018 250  150 - 450 10*3/mm3 Final   • Protime 04/19/2018 11.4  9.6 - 11.5 Seconds Final   • INR 04/19/2018 1.09  0.91 - 1.09 Final   • PTT 04/19/2018 26.9  24.0 - 31.0 seconds Final   • Case Report 04/23/2018    Final                    Value:Surgical Pathology Report                         Case: [id number]                                  Authorizing Provider:  DREW Kelly       Collected:           04/19/2018 01:04 PM          [facility]   Received:            04/19/2018 01:42 PM                                 CT                                                                           Pathologist:           Jose Albright MD                                                         Intraop:               Lucas Morales MD                                                            Specimen:    Lymph Node                                                                                • Clinical  Information 04/23/2018    Final                    Value:This result contains rich text formatting which cannot be displayed here.   • Final Diagnosis 04/23/2018    Final                    Value:This result contains rich text formatting which cannot be displayed here.   • Comment 04/23/2018    Final                    Value:This result contains rich text formatting which cannot be displayed here.   • Intraoperative Consultation 04/23/2018    Final                    Value:This result contains rich text formatting which cannot be displayed here.   • Gross Description 04/23/2018    Final                    Value:This result contains rich text formatting which cannot be displayed here.   • Microscopic Description 04/23/2018    Final                    Value:This result contains rich text formatting which cannot be displayed here.   • Flow Cytometry Summary 04/23/2018    Final                    Value:This result contains rich text formatting which cannot be displayed here.        Ct Needle Biopsy Lymph Node    Result Date: 4/22/2018  Narrative: EXAMINATION: CT NEEDLE BIOPSY LYMPH NODE- 04/19/2018  INDICATION: lymphadenopathy; R59.0-Localized enlarged lymph nodes; R16.1-Splenomegaly, not elsewhere classified , CT-guided needle biopsy left lower internal pelvic lymph node, nicolasa mass for diagnosis.  TECHNIQUE:  Patient has several allergies including Macrobid, erythromycin, but none of her allergies pertain to this examination.  Clotting profile is within normal limits. The platelets are 250,000, PTT 26.9, PT 11.4, INR 1.09.  The radiation dose reduction device was turned on for each scan per the ALARA (As Low as Reasonably Achievable) protocol.  COMPARISON: NONE  FINDINGS: 1. This patient has had 2 biopsies previously without diagnosis. Please note this. If this procedure is not successful in yielding a diagnosis, a surgical excision of her lymph node will be necessary and will be the next appropriate consideration. 2.  The procedure, risks, complications and side effects of CT-guided needle biopsy of the left internal iliac nicolasa mass was discussed and reviewed in detail with the patient including possible risks and complications which include bleeding, infection, internal injury to the bowel or vessels, laceration of the external iliac artery, and other possible risks and complications.  The patient understood and consented. 3. Light sedation was administered in the form of 75 mg of Demerol and 6.25 mg of Phenergan IV as well as oral Xanax 0.5 mg. Patient tolerated this well. 4. With the patient in the supine position, under sterile technique, local anesthesia and meticulous CT guidance, a number 22-gauge needle was placed intrinsic to the left lateral pelvic nicolasa mass. A double aspiration biopsy was performed placing some of the aspirate into flow cytometry media and with the other 2 aspiration yields, touch prep slides were performed and the majority of the aspirated material was placed in formalin for preparation and permanent sectioning by pathology. 5. Pathologist was in attendance and documented that the cellular yield was adequate. 6. Post biopsy images reveal no evidence of bleeding or complication. Patient tolerated the procedure well.      Impression: 1. A double needle aspiration has been performed left mid to lower pelvic nicolasa mass and tolerated well without immediate complication or bleeding. 2. The tissue was carefully prepared under the direction of the pathologist who was in attendance. 3. This most pleasant patient tolerated the procedure well. There were no immediate complications and she was taken back to her room in satisfactory and stable condition.  D:  04/20/2018 E:  04/20/2018   This report was finalized on 4/22/2018 9:43 AM by Dr. Jeramy Colorado MD.        ASSESSMENT: The patient is a very pleasant 32 y.o. female  with anaplastic large T-cell lymphoma, ALK positive    PROBLEM LIST:  1. Anaplastic large  T-cell lymphoma, ALK positive:  A. Incidentally found LAD on MRI done for low back and hip pain done 9/5/2017.   B. Follow up CAT scan confirmed pelvic adenopathy extending from the distal aortic region through the left iliac region.   C. Status post FNA to left iliac lymph node done 9/29/2017. Pathology revealed benign lymphoid cell groups.   D. Repeat CT done 3/15/2018 revealed increased size of left iliac adenopathy with mild splenomegaly.   E. CT-guided biopsy done on April 19, 2018 showed anaplastic large T-cell lymphoma ALK+  2. Mild splenomegaly   A. Incidentally found 9/5/2017.  B. Progressive size on repeat imaging done 3/15/2018.  3. Left hip and low back pain  A. Under care of pain management with use of Norco.   4. History of anoxic brain injury following cardiac arrest post-partum.   5. Postpartum cardiomyopathy.   6. Anxiety and depression    PLAN:  1.  I had long discussion today with the patient and her  about the biopsy result.  I discussed the case with Dr. Albright from pathology to coordinate patient's care.  2.  The patient returns staging workup including whole body PET scan as well as bone marrow biopsy, I will order both.    3.  I will order a cardiac echo in preparation for chemotherapy.  4.  I discussed the case with Dr. Linda from neurosurgery who kindly agreed to put a port in for chemotherapy.  5.  The patient would be treated with IV multiagent chemotherapy using cyclophosphamide, Adriamycin, vincristine, and prednisone CHOP.  Most likely 6 cycles.  The addition of radiation would be based on the disease stage.  This is in compliance with NCCN guidelines.  6.  We discussed the potential risks and side effects of CHOP chemotherapy including neutropenia, alopecia, nausea and vomiting, fatigue, neuropathy, cardiomyopathy, constipation, infusion reaction, risk of myelodysplasia, infertility, and potential death.  The patient is not interested in having more children.  7.  The patient  will be started on treatment next week if all the testing is done.  She'll follow-up with me prior to start to go over the results.    Carlo Rice MD  4/25/2018

## 2018-04-26 ENCOUNTER — EDUCATION (OUTPATIENT)
Dept: ONCOLOGY | Facility: HOSPITAL | Age: 33
End: 2018-04-26

## 2018-04-26 ENCOUNTER — PREP FOR SURGERY (OUTPATIENT)
Dept: OTHER | Facility: HOSPITAL | Age: 33
End: 2018-04-26

## 2018-04-26 DIAGNOSIS — C86.6 PRIMARY CUTANEOUS CD30 ANTIGEN POSITIVE LARGE T-CELL LYMPHOMA (HCC): Primary | ICD-10-CM

## 2018-04-26 PROBLEM — C85.90 MALIGNANT LYMPHOMA, NON-HODGKIN'S: Status: ACTIVE | Noted: 2018-04-26

## 2018-04-26 RX ORDER — CLINDAMYCIN PHOSPHATE 900 MG/50ML
900 INJECTION, SOLUTION INTRAVENOUS ONCE
Status: CANCELLED | OUTPATIENT
Start: 2018-04-26 | End: 2018-04-26

## 2018-04-26 NOTE — PLAN OF CARE
Outpatient Infusion • [address] [facility] • [address] • [place] [id number] • [phone]      CHEMOTHERAPY EDUCATION SHEET    NAME:  Johny Hearn      : 1985           DATE: 18    Booklets Given: Chemotherapy and You []  Eating Hints []    Sexuality/Fertility Books []     Chemotherapy/Biotherapy Education Sheets: (list all that apply)  Phone Call                                                                                                                                                                 Chemotherapy Regimen: Doxorubicin + Vincristine + Cyclophosphamide every 21 days x6 + Prednisone for first 5 days of each cycle    TOPICS EDUCATION PROVIDED EDUCATION REINFORCED COMMENTS   ANEMIA:  role of RBC, cause, s/s, ways to manage, role of transfusion [x] [] Counseled patient on the method of action of anemia caused by cytotoxic chemotherapy. Counseled patient on the symptoms resulting from anemia. Explained to patient the process of blood tests performed at treatment visits.    THROMBOCYTOPENIA:  role of platelet, cause, s/s, ways to prevent bleeding, things to avoid, when to seek help [x] [] Counseled patient on the mechanism of thrombocytopenia caused by cytotoxic chemotherapy. Counseled patient on the symptoms associated with low platelet counts. Recommended patient to avoid unnecessary activities with increased risk of bleeding. Encouraged patient to call MD with severe bleeding.    NEUTROPENIA:  role of WBC, cause, infection precautions, s/s of infection, when to call MD [x] [] Counseled patient on the mechanism of neutropenia resulting from cytotoxic chemotherapy. Counseled patient on the issues that can arise from decreased immune function. Recommended patient to contact MD with a fever >100.4. Encouraged patient to take reasonable precautions to avoid infection.    NUTRITION & APPETITE CHANGES:  importance of maintaining healthy diet & weight, ways to manage to improve  intake, dietary consult, exercise regimen [] []    DIARRHEA:  causes, s/s of dehydration, ways to manage, dietary changes, when to call MD [] []    CONSTIPATION:  causes, ways to manage, dietary changes, when to call MD [x] [] Counseled patient on risk of constipation and OTC treatment.    NAUSEA & VOMITING:  cause, use of antiemetics, dietary changes, when to call MD [x] [] Counseled patient on the likelihood of nausea and vomiting with this regimen. Explained the mechanism and role of the antiemetic medications, both at home and during infusion. Counseled patient on the use of at-home antiemetic agents at first sign of nausea.    MOUTH SORES:  causes, oral care, ways to manage [x] [] Counseled patient on the likelihood of mucositis occurring with this regimen. Counseled patient on prevention of mouth sores using baking soda/salt//water mixture. Counseled patient to contact MD if sores do form for prescription treatment.    ALOPECIA:  cause, ways to manage, resources [x] [] Counseled patient on the likelihood of hair loss with this regimen. Counseled patient on the ability of the clinic to provide them with a prescription for a wig.    INFERTILITY & SEXUALITY:  causes, fertility preservation options, sexuality changes, ways to manage, importance of birth control [x] [] Counseled patient on the importance of safe sex practices for at least the first 48 hours following administration of chemotherapy.    NERVOUS SYSTEM CHANGES:  causes, s/s, neuropathies, cognitive changes, ways to manage [x] [] Counseled patient on the likelihood of peripheral neuropathy with this regimen. Explained the mechanism of neuropathy and the likely symptoms. Recommended patient to contact the MD if the neuropathy begins to impact quality of life.    PAIN:  causes, ways to manage [] [] ????   SKIN & NAIL CHANGES:  cause, s/s, ways to manage [x] [] Counseled patient on risk of rash and changes in skin pigmentation    ORGAN TOXICITIES:  cause,  s/s, need for diagnostic tests, labs, when to notify MD [] [] Counseled patient on the likelihood of cardiac, renal, hepatic, and pulmonary toxicity with this chemotherapy regimen. Explained to patient the purpose of blood tests to monitor organ function. Explained the patient the possibility of monitoring for reduced urine output at home.    SURVIVORSHIP:  distress, distress assessment, secondary malignancies, early/late effects, follow-up, social issues, social support [] []    HOME CARE:  use of spill kits, storing of PO chemo, how to manage bodily fluids [x] [] Counseled patient on the disposal and cleaning of soiled sheets. Explained the reasoning in preventing others from being exposed to the chemotherapy agents.    MISCELLANEOUS:  drug interactions, administration, vesicant, et [x] [] Counseled patient on low risk of hemorrhagic cystitis, diplopia, and steroid side effects     Referrals:        Notes: Patient had drug interaction questions, answered. Encouraged patient to reach out to myself or a pharmacist with questions or concerns.     Thanks,  Kelby Ortiz, PharmD Candidate  Ext. [phone]

## 2018-04-26 NOTE — TELEPHONE ENCOUNTER
I set patient up at  for a port placement on Monday arrival at 9am I informed the patient and told Castro

## 2018-04-27 ENCOUNTER — ANESTHESIA (OUTPATIENT)
Dept: PERIOP | Facility: HOSPITAL | Age: 33
End: 2018-04-27

## 2018-04-27 ENCOUNTER — HOSPITAL ENCOUNTER (OUTPATIENT)
Facility: HOSPITAL | Age: 33
Setting detail: HOSPITAL OUTPATIENT SURGERY
Discharge: HOME | End: 2018-04-27
Attending: SURGERY | Admitting: SURGERY

## 2018-04-27 ENCOUNTER — APPOINTMENT (OUTPATIENT)
Dept: GENERAL RADIOLOGY | Facility: HOSPITAL | Age: 33
End: 2018-04-27

## 2018-04-27 ENCOUNTER — APPOINTMENT (OUTPATIENT)
Dept: CARDIOLOGY | Facility: HOSPITAL | Age: 33
End: 2018-04-27
Attending: INTERNAL MEDICINE

## 2018-04-27 ENCOUNTER — ANESTHESIA EVENT (OUTPATIENT)
Dept: PERIOP | Facility: HOSPITAL | Age: 33
End: 2018-04-27

## 2018-04-27 VITALS
OXYGEN SATURATION: 100 % | WEIGHT: 233 LBS | HEART RATE: 90 BPM | DIASTOLIC BLOOD PRESSURE: 72 MMHG | BODY MASS INDEX: 37.45 KG/M2 | SYSTOLIC BLOOD PRESSURE: 117 MMHG | HEIGHT: 66 IN | RESPIRATION RATE: 18 BRPM | TEMPERATURE: 97.6 F

## 2018-04-27 DIAGNOSIS — C86.6 PRIMARY CUTANEOUS CD30 ANTIGEN POSITIVE LARGE T-CELL LYMPHOMA (HCC): ICD-10-CM

## 2018-04-27 LAB — HCG SERPL QL: NEGATIVE

## 2018-04-27 PROCEDURE — 25010000002 HEPARIN (PORCINE) PER 1000 UNITS: Performed by: SURGERY

## 2018-04-27 PROCEDURE — 36561 INSERT TUNNELED CV CATH: CPT | Performed by: SURGERY

## 2018-04-27 PROCEDURE — 76000 FLUOROSCOPY <1 HR PHYS/QHP: CPT

## 2018-04-27 PROCEDURE — 84703 CHORIONIC GONADOTROPIN ASSAY: CPT | Performed by: SURGERY

## 2018-04-27 PROCEDURE — S0260 H&P FOR SURGERY: HCPCS | Performed by: SURGERY

## 2018-04-27 PROCEDURE — 25010000002 PROPOFOL 10 MG/ML EMULSION: Performed by: NURSE ANESTHETIST, CERTIFIED REGISTERED

## 2018-04-27 PROCEDURE — 71045 X-RAY EXAM CHEST 1 VIEW: CPT

## 2018-04-27 PROCEDURE — C1788 PORT, INDWELLING, IMP: HCPCS | Performed by: SURGERY

## 2018-04-27 DEVICE — POWERPORT CLEARVUE IMPLANTABLE PORT WITH ATTACHABLE 8F POLYURETHANE OPEN-ENDED SINGLE-LUMEN VENOUS CATHETER INTERMEDIATE KIT
Type: IMPLANTABLE DEVICE | Site: SUBCLAVIAN | Status: FUNCTIONAL
Brand: POWERPORT CLEARVUE

## 2018-04-27 RX ORDER — HEPARIN SODIUM 1000 [USP'U]/ML
INJECTION, SOLUTION INTRAVENOUS; SUBCUTANEOUS
Status: DISCONTINUED
Start: 2018-04-27 | End: 2018-04-27 | Stop reason: HOSPADM

## 2018-04-27 RX ORDER — LIDOCAINE HYDROCHLORIDE 20 MG/ML
INJECTION, SOLUTION INFILTRATION; PERINEURAL AS NEEDED
Status: DISCONTINUED | OUTPATIENT
Start: 2018-04-27 | End: 2018-04-27 | Stop reason: SURG

## 2018-04-27 RX ORDER — ONDANSETRON 2 MG/ML
4 INJECTION INTRAMUSCULAR; INTRAVENOUS ONCE AS NEEDED
Status: CANCELLED | OUTPATIENT
Start: 2018-04-27 | End: 2018-04-27

## 2018-04-27 RX ORDER — HYDROCODONE BITARTRATE AND ACETAMINOPHEN 5; 325 MG/1; MG/1
1-2 TABLET ORAL EVERY 4 HOURS PRN
Qty: 16 TABLET | Refills: 0 | Status: SHIPPED | OUTPATIENT
Start: 2018-04-27 | End: 2018-10-23 | Stop reason: HOSPADM

## 2018-04-27 RX ORDER — LIDOCAINE HYDROCHLORIDE 10 MG/ML
INJECTION, SOLUTION INFILTRATION; PERINEURAL AS NEEDED
Status: DISCONTINUED | OUTPATIENT
Start: 2018-04-27 | End: 2018-04-27 | Stop reason: HOSPADM

## 2018-04-27 RX ORDER — HEPARIN SODIUM 1000 [USP'U]/ML
INJECTION, SOLUTION INTRAVENOUS; SUBCUTANEOUS AS NEEDED
Status: DISCONTINUED | OUTPATIENT
Start: 2018-04-27 | End: 2018-04-27 | Stop reason: HOSPADM

## 2018-04-27 RX ORDER — CLINDAMYCIN PHOSPHATE 900 MG/50ML
900 INJECTION, SOLUTION INTRAVENOUS ONCE
Status: COMPLETED | OUTPATIENT
Start: 2018-04-27 | End: 2018-04-27

## 2018-04-27 RX ORDER — PROPOFOL 10 MG/ML
VIAL (ML) INTRAVENOUS AS NEEDED
Status: DISCONTINUED | OUTPATIENT
Start: 2018-04-27 | End: 2018-04-27 | Stop reason: SURG

## 2018-04-27 RX ORDER — HYDROCODONE BITARTRATE AND ACETAMINOPHEN 5; 325 MG/1; MG/1
1 TABLET ORAL ONCE AS NEEDED
Status: CANCELLED | OUTPATIENT
Start: 2018-04-27 | End: 2018-05-07

## 2018-04-27 RX ORDER — LIDOCAINE HYDROCHLORIDE 10 MG/ML
INJECTION, SOLUTION INFILTRATION; PERINEURAL
Status: DISCONTINUED
Start: 2018-04-27 | End: 2018-04-27 | Stop reason: HOSPADM

## 2018-04-27 RX ORDER — SODIUM CHLORIDE, SODIUM LACTATE, POTASSIUM CHLORIDE, CALCIUM CHLORIDE 600; 310; 30; 20 MG/100ML; MG/100ML; MG/100ML; MG/100ML
1000 INJECTION, SOLUTION INTRAVENOUS CONTINUOUS
Status: DISCONTINUED | OUTPATIENT
Start: 2018-04-27 | End: 2018-04-27 | Stop reason: HOSPADM

## 2018-04-27 RX ADMIN — LIDOCAINE HYDROCHLORIDE 100 MG: 20 INJECTION, SOLUTION INFILTRATION; PERINEURAL at 11:14

## 2018-04-27 RX ADMIN — CLINDAMYCIN PHOSPHATE 900 MG: 900 INJECTION, SOLUTION INTRAVENOUS at 11:10

## 2018-04-27 RX ADMIN — PROPOFOL 370 MG: 10 INJECTION, EMULSION INTRAVENOUS at 11:32

## 2018-04-27 RX ADMIN — SODIUM CHLORIDE, POTASSIUM CHLORIDE, SODIUM LACTATE AND CALCIUM CHLORIDE: 600; 310; 30; 20 INJECTION, SOLUTION INTRAVENOUS at 11:10

## 2018-04-27 NOTE — ANESTHESIA POSTPROCEDURE EVALUATION
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 [C86.6])    Surgeon:  Shelley Brock MD Provider:  Johnny Farris CRNA    Anesthesia Type:  MAC ASA Status:  3          Anesthesia Type: MAC  Last vitals  BP   106/68 (04/27/18 1151)   Temp   97.6 °F (36.4 °C) (04/27/18 1151)   Pulse   91 (04/27/18 1151)   Resp   16 (04/27/18 1151)     SpO2   98 % (04/27/18 1151)     Post Anesthesia Care and Evaluation    Patient location during evaluation: PHASE II  Patient participation: complete - patient participated  Level of consciousness: awake  Pain score: 3  Pain management: adequate  Airway patency: patent  Anesthetic complications: No anesthetic complications  PONV Status: controlled  Cardiovascular status: acceptable and stable  Respiratory status: acceptable and room air  Hydration status: acceptable

## 2018-04-27 NOTE — ANESTHESIA PREPROCEDURE EVALUATION
Anesthesia Evaluation     Patient summary reviewed and Nursing notes reviewed   no history of anesthetic complications:  NPO Solid Status: > 8 hours  NPO Liquid Status: > 8 hours           Airway   Mallampati: III  TM distance: >3 FB  Neck ROM: full  no difficulty expected  Dental - normal exam     Pulmonary - normal exam   (+) sleep apnea,   Cardiovascular - normal exam    Rhythm: regular  Rate: normal    (+) hypertension, hyperlipidemia,       Neuro/Psych  (+) headaches,     GI/Hepatic/Renal/Endo    (+) obesity, morbid obesity,  liver disease,     Musculoskeletal     Abdominal    Substance History - negative use     OB/GYN negative ob/gyn ROS         Other   (+) arthritis   history of cancer    ROS/Med Hx Other: Current Lymphoma  Hx of cardiac event post pregnancy.                  Anesthesia Plan    ASA 3     MAC   (Pt told that intravenous sedation will be used as the primary anesthetic along with local anesthesia if necessary. Every effort will be made to make sure the patient is comfortable.     The patient was told they may or may not have recall for the procedure. It was further explained that if the MAC was not adequate that a general anesthetic with either an LMA or endotracheal tube would be required.     Will proceed with the plan of care.)  intravenous induction   Anesthetic plan and risks discussed with patient.

## 2018-05-01 ENCOUNTER — HOSPITAL ENCOUNTER (OUTPATIENT)
Dept: PET IMAGING | Facility: HOSPITAL | Age: 33
Discharge: HOME | End: 2018-05-01
Attending: INTERNAL MEDICINE

## 2018-05-01 ENCOUNTER — CLINICAL SUPPORT (OUTPATIENT)
Dept: ONCOLOGY | Facility: HOSPITAL | Age: 33
End: 2018-05-01

## 2018-05-01 ENCOUNTER — OFFICE VISIT (OUTPATIENT)
Dept: ONCOLOGY | Facility: CLINIC | Age: 33
End: 2018-05-01

## 2018-05-01 ENCOUNTER — HOSPITAL ENCOUNTER (OUTPATIENT)
Dept: PET IMAGING | Facility: HOSPITAL | Age: 33
Discharge: HOME | End: 2018-05-01
Attending: INTERNAL MEDICINE | Admitting: INTERNAL MEDICINE

## 2018-05-01 VITALS
BODY MASS INDEX: 36.88 KG/M2 | SYSTOLIC BLOOD PRESSURE: 123 MMHG | HEIGHT: 67 IN | TEMPERATURE: 97 F | WEIGHT: 235 LBS | DIASTOLIC BLOOD PRESSURE: 60 MMHG | RESPIRATION RATE: 18 BRPM | HEART RATE: 99 BPM | OXYGEN SATURATION: 98 %

## 2018-05-01 DIAGNOSIS — C82.00 FOLLICULAR LYMPHOMA GRADE I, UNSPECIFIED BODY REGION (HCC): Primary | ICD-10-CM

## 2018-05-01 DIAGNOSIS — C85.90 LYMPHOMA, UNSPECIFIED BODY REGION, UNSPECIFIED LYMPHOMA TYPE (HCC): ICD-10-CM

## 2018-05-01 DIAGNOSIS — C84.60 ANAPLASTIC ALK-POSITIVE LARGE CELL LYMPHOMA, UNSPECIFIED BODY REGION (HCC): ICD-10-CM

## 2018-05-01 DIAGNOSIS — Z51.11 CHEMOTHERAPY MANAGEMENT, ENCOUNTER FOR: ICD-10-CM

## 2018-05-01 LAB
BASOPHILS # BLD AUTO: 0.02 10*3/MM3 (ref 0–0.2)
BASOPHILS NFR BLD AUTO: 0.4 % (ref 0–1)
DEPRECATED RDW RBC AUTO: 50.1 FL (ref 37–54)
EOSINOPHIL # BLD AUTO: 0.04 10*3/MM3 (ref 0–0.3)
EOSINOPHIL NFR BLD AUTO: 0.8 % (ref 0–3)
ERYTHROCYTE [DISTWIDTH] IN BLOOD BY AUTOMATED COUNT: 16.7 % (ref 11.3–14.5)
GLUCOSE BLDC GLUCOMTR-MCNC: 82 MG/DL (ref 70–130)
HCT VFR BLD AUTO: 30.5 % (ref 34.5–44)
HGB BLD-MCNC: 9.6 G/DL (ref 11.5–15.5)
IMM GRANULOCYTES # BLD: 0.01 10*3/MM3 (ref 0–0.03)
IMM GRANULOCYTES NFR BLD: 0.2 % (ref 0–0.6)
LYMPHOCYTES # BLD AUTO: 1.43 10*3/MM3 (ref 0.6–4.8)
LYMPHOCYTES NFR BLD AUTO: 30.1 % (ref 24–44)
MCH RBC QN AUTO: 25.7 PG (ref 27–31)
MCHC RBC AUTO-ENTMCNC: 31.5 G/DL (ref 32–36)
MCV RBC AUTO: 81.6 FL (ref 80–99)
MONOCYTES # BLD AUTO: 0.66 10*3/MM3 (ref 0–1)
MONOCYTES NFR BLD AUTO: 13.9 % (ref 0–12)
NEUTROPHILS # BLD AUTO: 2.6 10*3/MM3 (ref 1.5–8.3)
NEUTROPHILS NFR BLD AUTO: 54.8 % (ref 41–71)
PLATELET # BLD AUTO: 184 10*3/MM3 (ref 150–450)
PMV BLD AUTO: 9.5 FL (ref 6–12)
RBC # BLD AUTO: 3.74 10*6/MM3 (ref 3.89–5.14)
WBC NRBC COR # BLD: 4.75 10*3/MM3 (ref 3.5–10.8)

## 2018-05-01 PROCEDURE — 88342 IMHCHEM/IMCYTCHM 1ST ANTB: CPT | Performed by: INTERNAL MEDICINE

## 2018-05-01 PROCEDURE — 85097 BONE MARROW INTERPRETATION: CPT | Performed by: INTERNAL MEDICINE

## 2018-05-01 PROCEDURE — 88305 TISSUE EXAM BY PATHOLOGIST: CPT | Performed by: INTERNAL MEDICINE

## 2018-05-01 PROCEDURE — 25010000002 LORAZEPAM PER 2 MG: Performed by: INTERNAL MEDICINE

## 2018-05-01 PROCEDURE — 96375 TX/PRO/DX INJ NEW DRUG ADDON: CPT

## 2018-05-01 PROCEDURE — 88311 DECALCIFY TISSUE: CPT | Performed by: INTERNAL MEDICINE

## 2018-05-01 PROCEDURE — 25010000002 HEPARIN FLUSH (PORCINE) 100 UNIT/ML SOLUTION: Performed by: INTERNAL MEDICINE

## 2018-05-01 PROCEDURE — 88313 SPECIAL STAINS GROUP 2: CPT | Performed by: INTERNAL MEDICINE

## 2018-05-01 PROCEDURE — 78816 PET IMAGE W/CT FULL BODY: CPT

## 2018-05-01 PROCEDURE — 38222 DX BONE MARROW BX & ASPIR: CPT | Performed by: INTERNAL MEDICINE

## 2018-05-01 PROCEDURE — 25010000002 DIPHENHYDRAMINE PER 50 MG: Performed by: INTERNAL MEDICINE

## 2018-05-01 PROCEDURE — 0 FLUDEOXYGLUCOSE F18 SOLUTION: Performed by: INTERNAL MEDICINE

## 2018-05-01 PROCEDURE — A9552 F18 FDG: HCPCS | Performed by: INTERNAL MEDICINE

## 2018-05-01 PROCEDURE — 82962 GLUCOSE BLOOD TEST: CPT | Performed by: NURSE PRACTITIONER

## 2018-05-01 PROCEDURE — 85025 COMPLETE CBC W/AUTO DIFF WBC: CPT | Performed by: INTERNAL MEDICINE

## 2018-05-01 PROCEDURE — 96374 THER/PROPH/DIAG INJ IV PUSH: CPT

## 2018-05-01 RX ORDER — SODIUM CHLORIDE 0.9 % (FLUSH) 0.9 %
10 SYRINGE (ML) INJECTION AS NEEDED
Status: CANCELLED | OUTPATIENT
Start: 2018-05-01

## 2018-05-01 RX ORDER — SODIUM CHLORIDE 0.9 % (FLUSH) 0.9 %
10 SYRINGE (ML) INJECTION AS NEEDED
Status: DISCONTINUED | OUTPATIENT
Start: 2018-05-01 | End: 2018-05-01 | Stop reason: HOSPADM

## 2018-05-01 RX ORDER — LORAZEPAM 2 MG/ML
1 INJECTION INTRAMUSCULAR
Status: DISCONTINUED | OUTPATIENT
Start: 2018-05-01 | End: 2018-05-01 | Stop reason: HOSPADM

## 2018-05-01 RX ORDER — DIPHENHYDRAMINE HYDROCHLORIDE 50 MG/ML
25 INJECTION INTRAMUSCULAR; INTRAVENOUS EVERY 4 HOURS PRN
Status: DISCONTINUED | OUTPATIENT
Start: 2018-05-01 | End: 2018-05-01 | Stop reason: HOSPADM

## 2018-05-01 RX ADMIN — Medication 10 ML: at 09:18

## 2018-05-01 RX ADMIN — FLUDEOXYGLUCOSE F18 1 DOSE: 300 INJECTION INTRAVENOUS at 12:22

## 2018-05-01 RX ADMIN — LORAZEPAM 1 MG: 2 INJECTION INTRAMUSCULAR; INTRAVENOUS at 07:45

## 2018-05-01 RX ADMIN — DIPHENHYDRAMINE HYDROCHLORIDE 25 MG: 50 INJECTION INTRAMUSCULAR; INTRAVENOUS at 07:44

## 2018-05-01 RX ADMIN — HEPARIN 500 UNITS: 100 SYRINGE at 09:18

## 2018-05-01 NOTE — PROGRESS NOTES
============    BONE MARROW PROCEDURE      =========        INDICATIONS:  Staging lymphoma    PROCEDURE:  A time out was performed Premedications was given with 1 mg of ativan IV and Phenergan 25 mg IV.  After informed consent was obtained, the skin overlying the right  posterior superior iliac spine was prepped and draped in sterile fashion.     1% lidocaine was infiltrated into the skin, subcutaneous tissue and periosteum overlying the right posterior iliac spine.   A  Jamshidi needle was introduced into the marrow cavity and suction was applied with a 20 cc syringe.  Aspirate material was obtained.  The needle was then reinserted into the marrow space and a core biopsy was obtained. The patient tolerated the procedure well.  No immediate complications.    The patient was observed in the procedure room for 30 minutes following the procedure and then discharged to home.        Carlo Rice MD   5/1/2018

## 2018-05-02 ENCOUNTER — OFFICE VISIT (OUTPATIENT)
Dept: ONCOLOGY | Facility: CLINIC | Age: 33
End: 2018-05-02

## 2018-05-02 ENCOUNTER — HOSPITAL ENCOUNTER (OUTPATIENT)
Dept: CARDIOLOGY | Facility: HOSPITAL | Age: 33
Discharge: HOME | End: 2018-05-02
Attending: INTERNAL MEDICINE | Admitting: INTERNAL MEDICINE

## 2018-05-02 ENCOUNTER — APPOINTMENT (OUTPATIENT)
Dept: ONCOLOGY | Facility: HOSPITAL | Age: 33
End: 2018-05-02

## 2018-05-02 VITALS
HEART RATE: 110 BPM | BODY MASS INDEX: 37.04 KG/M2 | DIASTOLIC BLOOD PRESSURE: 63 MMHG | WEIGHT: 236 LBS | TEMPERATURE: 97.8 F | RESPIRATION RATE: 15 BRPM | HEIGHT: 67 IN | SYSTOLIC BLOOD PRESSURE: 133 MMHG

## 2018-05-02 DIAGNOSIS — C84.68 ANAPLASTIC ALK-POSITIVE LARGE CELL LYMPHOMA OF LYMPH NODES OF MULTIPLE REGIONS (HCC): ICD-10-CM

## 2018-05-02 DIAGNOSIS — C84.66 ANAPLASTIC ALK-POSITIVE LARGE CELL OF LYMPHOMA OF INTRAPELVIC LYMPH NODE (HCC): Primary | ICD-10-CM

## 2018-05-02 DIAGNOSIS — Z51.11 CHEMOTHERAPY MANAGEMENT, ENCOUNTER FOR: ICD-10-CM

## 2018-05-02 PROBLEM — C84.60: Status: ACTIVE | Noted: 2018-04-25

## 2018-05-02 LAB
BH CV ECHO MEAS - AO ROOT AREA (BSA CORRECTED): 1.1
BH CV ECHO MEAS - AO ROOT AREA: 4.2 CM^2
BH CV ECHO MEAS - AO ROOT DIAM: 2.3 CM
BH CV ECHO MEAS - BSA(HAYCOCK): 2.3 M^2
BH CV ECHO MEAS - BSA: 2.2 M^2
BH CV ECHO MEAS - BZI_BMI: 37 KILOGRAMS/M^2
BH CV ECHO MEAS - BZI_METRIC_HEIGHT: 170 CM
BH CV ECHO MEAS - BZI_METRIC_WEIGHT: 107 KG
BH CV ECHO MEAS - CONTRAST EF 4CH: 55.1 ML/M^2
BH CV ECHO MEAS - EDV(CUBED): 108.5 ML
BH CV ECHO MEAS - EDV(MOD-SP4): 118 ML
BH CV ECHO MEAS - EDV(TEICH): 106 ML
BH CV ECHO MEAS - EF(CUBED): 60.5 %
BH CV ECHO MEAS - EF(MOD-SP4): 55.1 %
BH CV ECHO MEAS - EF(TEICH): 52 %
BH CV ECHO MEAS - ESV(CUBED): 42.9 ML
BH CV ECHO MEAS - ESV(MOD-SP4): 53 ML
BH CV ECHO MEAS - ESV(TEICH): 50.9 ML
BH CV ECHO MEAS - FS: 26.6 %
BH CV ECHO MEAS - IVS/LVPW: 1.1
BH CV ECHO MEAS - IVSD: 0.86 CM
BH CV ECHO MEAS - LA DIMENSION: 3.8 CM
BH CV ECHO MEAS - LA/AO: 1.7
BH CV ECHO MEAS - LV DIASTOLIC VOL/BSA (35-75): 54.4 ML/M^2
BH CV ECHO MEAS - LV MASS(C)D: 131.6 GRAMS
BH CV ECHO MEAS - LV MASS(C)DI: 60.7 GRAMS/M^2
BH CV ECHO MEAS - LV MAX PG: 4.4 MMHG
BH CV ECHO MEAS - LV MEAN PG: 2 MMHG
BH CV ECHO MEAS - LV SYSTOLIC VOL/BSA (12-30): 24.5 ML/M^2
BH CV ECHO MEAS - LV V1 MAX: 105 CM/SEC
BH CV ECHO MEAS - LV V1 MEAN: 68 CM/SEC
BH CV ECHO MEAS - LV V1 VTI: 18.9 CM
BH CV ECHO MEAS - LVIDD: 4.8 CM
BH CV ECHO MEAS - LVIDS: 3.5 CM
BH CV ECHO MEAS - LVLD AP4: 7.7 CM
BH CV ECHO MEAS - LVLS AP4: 5.9 CM
BH CV ECHO MEAS - LVOT AREA (M): 2.8 CM^2
BH CV ECHO MEAS - LVOT AREA: 2.8 CM^2
BH CV ECHO MEAS - LVOT DIAM: 1.9 CM
BH CV ECHO MEAS - LVPWD: 0.8 CM
BH CV ECHO MEAS - PA ACC SLOPE: 1044 CM/SEC^2
BH CV ECHO MEAS - PA ACC TIME: 0.12 SEC
BH CV ECHO MEAS - PA PR(ACCEL): 23.2 MMHG
BH CV ECHO MEAS - RAP SYSTOLE: 8 MMHG
BH CV ECHO MEAS - RVDD: 2.7 CM
BH CV ECHO MEAS - RVSP: 16 MMHG
BH CV ECHO MEAS - SI(CUBED): 30.3 ML/M^2
BH CV ECHO MEAS - SI(LVOT): 24.7 ML/M^2
BH CV ECHO MEAS - SI(MOD-SP4): 30 ML/M^2
BH CV ECHO MEAS - SI(TEICH): 25.4 ML/M^2
BH CV ECHO MEAS - SV(CUBED): 65.7 ML
BH CV ECHO MEAS - SV(LVOT): 53.6 ML
BH CV ECHO MEAS - SV(MOD-SP4): 65 ML
BH CV ECHO MEAS - SV(TEICH): 55.1 ML
BH CV ECHO MEAS - TAPSE (>1.6): 2.4 CM2
BH CV ECHO MEAS - TR MAX VEL: 143 CM/SEC
BH CV VAS BP LEFT ARM: NORMAL MMHG
BH CV XLRA - RV BASE: 2.9 CM
BH CV XLRA - RV LENGTH: 5.4 CM
BH CV XLRA - RV MID: 2.8 CM
LEFT ATRIUM VOLUME INDEX: 22 ML/M2
LV EF 2D ECHO EST: 60 %

## 2018-05-02 PROCEDURE — 88184 FLOWCYTOMETRY/ TC 1 MARKER: CPT

## 2018-05-02 PROCEDURE — 93306 TTE W/DOPPLER COMPLETE: CPT | Performed by: INTERNAL MEDICINE

## 2018-05-02 PROCEDURE — 88264 CHROMOSOME ANALYSIS 20-25: CPT

## 2018-05-02 PROCEDURE — 88237 TISSUE CULTURE BONE MARROW: CPT

## 2018-05-02 PROCEDURE — 93306 TTE W/DOPPLER COMPLETE: CPT

## 2018-05-02 PROCEDURE — 88185 FLOWCYTOMETRY/TC ADD-ON: CPT

## 2018-05-02 PROCEDURE — 99215 OFFICE O/P EST HI 40 MIN: CPT | Performed by: INTERNAL MEDICINE

## 2018-05-02 RX ORDER — PALONOSETRON 0.05 MG/ML
0.25 INJECTION, SOLUTION INTRAVENOUS ONCE
Status: CANCELLED | OUTPATIENT
Start: 2018-05-04

## 2018-05-02 RX ORDER — DOXORUBICIN HYDROCHLORIDE 2 MG/ML
50 INJECTION, SOLUTION INTRAVENOUS ONCE
Status: CANCELLED | OUTPATIENT
Start: 2018-05-04

## 2018-05-02 RX ORDER — SODIUM CHLORIDE 9 MG/ML
250 INJECTION, SOLUTION INTRAVENOUS ONCE
Status: CANCELLED | OUTPATIENT
Start: 2018-05-04

## 2018-05-02 RX ORDER — PALONOSETRON 0.05 MG/ML
0.25 INJECTION, SOLUTION INTRAVENOUS ONCE
Status: CANCELLED | OUTPATIENT
Start: 2018-05-25

## 2018-05-02 RX ORDER — DOXORUBICIN HYDROCHLORIDE 2 MG/ML
50 INJECTION, SOLUTION INTRAVENOUS ONCE
Status: CANCELLED | OUTPATIENT
Start: 2018-05-25

## 2018-05-02 RX ORDER — SODIUM CHLORIDE 9 MG/ML
250 INJECTION, SOLUTION INTRAVENOUS ONCE
Status: CANCELLED | OUTPATIENT
Start: 2018-05-25

## 2018-05-02 NOTE — PROGRESS NOTES
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 female  with past medical history significant for anaplastic large T-cell lymphoma, ALK positive diagnosed in April 19, 2018 after CT-guided core biopsy of left iliac mass.  Whole body PET scan revealed disease above and below the diaphragm.  Bone marrow biopsy done on May 1, 2018, results still pending.  The patient was started on chemotherapy using CHOP May 2, 2018.  The patient is here today for scheduled follow-up visit with cycle #1.    SUBJECTIVE: The patient is here today with her sister-in-law.  She is anxious about starting treatment.  She continue complaining of night sweats.    PAST MEDICAL HISTORY/SOCIAL HISTORY/FAMILY HISTORY: Reviewed by me and unchanged from my documentation done on 05/02/18.    Review of Systems   Constitutional: Positive for chills. Negative for activity change, appetite change, fatigue, fever and unexpected weight change.   HENT: Negative for hearing loss, mouth sores, nosebleeds, sore throat and trouble swallowing.    Eyes: Negative for visual disturbance.   Respiratory: Negative for cough, chest tightness, shortness of breath and wheezing.    Cardiovascular: Negative for chest pain, palpitations and leg swelling.   Gastrointestinal: Negative for abdominal distention, abdominal pain, blood in stool, constipation, diarrhea, nausea, rectal pain and vomiting.   Endocrine: Negative for cold intolerance and heat intolerance.   Genitourinary: Negative for difficulty urinating, dysuria, frequency and urgency.   Musculoskeletal: Negative for arthralgias, back pain, gait problem, joint swelling and myalgias.   Skin: Negative for rash.   Neurological: Negative for dizziness, tremors, syncope, weakness, light-headedness, numbness and headaches.   Hematological: Positive for adenopathy. Does not bruise/bleed  easily.   Psychiatric/Behavioral: Negative for confusion, sleep disturbance and suicidal ideas. The patient is not nervous/anxious.          Current Outpatient Prescriptions:   •  amitriptyline (ELAVIL) 10 MG tablet, Take 10 mg by mouth Every Night., Disp: , Rfl:   •  cyclobenzaprine (FLEXERIL) 10 MG tablet, 10 mg 2 (Two) Times a Day., Disp: , Rfl:   •  fluticasone (FLONASE) 50 MCG/ACT nasal spray, As Needed., Disp: , Rfl:   •  hydrochlorothiazide (HYDRODIURIL) 25 MG tablet, , Disp: , Rfl:   •  HYDROcodone-acetaminophen (NORCO) 5-325 MG per tablet, Take 1 tablet by mouth Every 6 (Six) Hours As Needed for Moderate Pain  or Severe Pain . (Patient taking differently: Take 1 tablet by mouth Every 8 (Eight) Hours As Needed for Moderate Pain  or Severe Pain .), Disp: 10 tablet, Rfl: 0  •  HYDROcodone-acetaminophen (NORCO) 5-325 MG per tablet, Take 1-2 tablets by mouth Every 4 (Four) Hours As Needed, Disp: 16 tablet, Rfl: 0  •  ibuprofen (ADVIL,MOTRIN) 800 MG tablet, 800 mg Every 6 (Six) Hours As Needed., Disp: , Rfl:   •  lidocaine-prilocaine (EMLA) 2.5-2.5 % cream, Apply  topically As Needed (45-60 minutes prior to port access.  Cover with saran/plastic wrap.)., Disp: 30 g, Rfl: 3  •  metroNIDAZOLE (METROGEL) 0.75 % gel, , Disp: , Rfl:   •  montelukast (SINGULAIR) 10 MG tablet, , Disp: , Rfl:   •  ondansetron (ZOFRAN) 8 MG tablet, Take 1 tablet by mouth 3 (Three) Times a Day As Needed for Nausea or Vomiting., Disp: 30 tablet, Rfl: 5  •  ondansetron ODT (ZOFRAN-ODT) 4 MG disintegrating tablet, Take 1 tablet by mouth Every 6 (Six) Hours As Needed for Nausea or Vomiting., Disp: 10 tablet, Rfl: 0  •  sertraline (ZOLOFT) 100 MG tablet, Take 100 mg by mouth Daily., Disp: , Rfl:   •  traMADol (ULTRAM) 50 MG tablet, Every 6 (Six) Hours As Needed., Disp: , Rfl:   •  traZODone (DESYREL) 50 MG tablet, 50 mg At Night As Needed., Disp: , Rfl:   No current facility-administered medications for this visit.     PHYSICAL EXAMINATION:  "  /63   Pulse 110   Temp 97.8 °F (36.6 °C) (Temporal Artery )   Resp 15   Ht 170.2 cm (67\")   Wt 107 kg (236 lb)   BMI 36.96 kg/m²    ECOG Performance Status: 1 - Symptomatic but completely ambulatory  General Appearance:  alert, cooperative, no apparent distress and appears stated age   Neurologic/Psychiatric: A&O x 3, gait steady, appropriate affect, strength 5/5 in all muscle groups   HEENT:  Normocephalic, without obvious abnormality, mucous membranes moist   Neck: Supple, symmetrical, trachea midline, no adenopathy;  No thyromegaly, masses, or tenderness   Lungs:   Clear to auscultation bilaterally; respirations regular, even, and unlabored bilaterally   Heart:  Regular rate and rhythm, no murmurs appreciated   Abdomen:   Soft, non-tender, non-distended and no organomegaly   Lymph nodes: No cervical, supraclavicular, inguinal or axillary adenopathy noted   Extremities: Normal, atraumatic; no clubbing, cyanosis, or edema    Skin: No rashes, ulcers, or suspicious lesions noted     Clinical Support on 05/01/2018   Component Date Value Ref Range Status   • WBC 05/01/2018 4.75  3.50 - 10.80 10*3/mm3 Final   • RBC 05/01/2018 3.74* 3.89 - 5.14 10*6/mm3 Final   • Hemoglobin 05/01/2018 9.6* 11.5 - 15.5 g/dL Final   • Hematocrit 05/01/2018 30.5* 34.5 - 44.0 % Final   • MCV 05/01/2018 81.6  80.0 - 99.0 fL Final   • MCH 05/01/2018 25.7* 27.0 - 31.0 pg Final   • MCHC 05/01/2018 31.5* 32.0 - 36.0 g/dL Final   • RDW 05/01/2018 16.7* 11.3 - 14.5 % Final   • RDW-SD 05/01/2018 50.1  37.0 - 54.0 fl Final   • MPV 05/01/2018 9.5  6.0 - 12.0 fL Final   • Platelets 05/01/2018 184  150 - 450 10*3/mm3 Final   • Neutrophil % 05/01/2018 54.8  41.0 - 71.0 % Final   • Lymphocyte % 05/01/2018 30.1  24.0 - 44.0 % Final   • Monocyte % 05/01/2018 13.9* 0.0 - 12.0 % Final   • Eosinophil % 05/01/2018 0.8  0.0 - 3.0 % Final   • Basophil % 05/01/2018 0.4  0.0 - 1.0 % Final   • Immature Grans % 05/01/2018 0.2  0.0 - 0.6 % Final   • " Neutrophils, Absolute 05/01/2018 2.60  1.50 - 8.30 10*3/mm3 Final   • Lymphocytes, Absolute 05/01/2018 1.43  0.60 - 4.80 10*3/mm3 Final   • Monocytes, Absolute 05/01/2018 0.66  0.00 - 1.00 10*3/mm3 Final   • Eosinophils, Absolute 05/01/2018 0.04  0.00 - 0.30 10*3/mm3 Final   • Basophils, Absolute 05/01/2018 0.02  0.00 - 0.20 10*3/mm3 Final   • Immature Grans, Absolute 05/01/2018 0.01  0.00 - 0.03 10*3/mm3 Final   • Glucose 05/01/2018 82  70 - 130 mg/dL Final   Admission on 04/27/2018, Discharged on 04/27/2018   Component Date Value Ref Range Status   • HCG Qualitative 04/27/2018 Negative  Negative Final   Hospital Outpatient Visit on 04/19/2018   Component Date Value Ref Range Status   • Platelets 04/19/2018 250  150 - 450 10*3/mm3 Final   • Protime 04/19/2018 11.4  9.6 - 11.5 Seconds Final   • INR 04/19/2018 1.09  0.91 - 1.09 Final   • PTT 04/19/2018 26.9  24.0 - 31.0 seconds Final   • Case Report 04/23/2018    Final                    Value:Surgical Pathology Report                         Case: [id number]                                  Authorizing Provider:  DREW Kelly       Collected:           04/19/2018 01:04 PM          Ordering Location:     [facility]   Received:            04/19/2018 01:42 PM                                 CT                                                                           Pathologist:           Jose Albright MD                                                         Intraop:               Lucas Morales MD                                                            Specimen:    Lymph Node                                                                                • Clinical Information 04/23/2018    Final                    Value:This result contains rich text formatting which cannot be displayed here.   • Final Diagnosis 04/23/2018    Final                    Value:This result contains rich text formatting which cannot be displayed here.   •  Comment 04/23/2018    Final                    Value:This result contains rich text formatting which cannot be displayed here.   • Intraoperative Consultation 04/23/2018    Final                    Value:This result contains rich text formatting which cannot be displayed here.   • Gross Description 04/23/2018    Final                    Value:This result contains rich text formatting which cannot be displayed here.   • Microscopic Description 04/23/2018    Final                    Value:This result contains rich text formatting which cannot be displayed here.   • Flow Cytometry Summary 04/23/2018    Final                    Value:This result contains rich text formatting which cannot be displayed here.        Xr Chest 1 View    Result Date: 4/27/2018  Narrative: PORTABLE CHEST  INDICATION: Port-A-Cath placement.  FINDINGS: Single frontal portable chest without comparison demonstrates a right subclavian. Port-A-Cath which terminates in the mid superior vena cava. Heart size is normal. No pneumothorax. No distinct focal consolidation or effusion.      Impression: No pneumothorax following Port-A-Cath placement.  This report was finalized on 4/27/2018 12:28 PM by Devan Hill MD.    Ct Needle Biopsy Lymph Node    Result Date: 4/22/2018  Narrative: EXAMINATION: CT NEEDLE BIOPSY LYMPH NODE- 04/19/2018  INDICATION: lymphadenopathy; R59.0-Localized enlarged lymph nodes; R16.1-Splenomegaly, not elsewhere classified , CT-guided needle biopsy left lower internal pelvic lymph node, nicolasa mass for diagnosis.  TECHNIQUE:  Patient has several allergies including Macrobid, erythromycin, but none of her allergies pertain to this examination.  Clotting profile is within normal limits. The platelets are 250,000, PTT 26.9, PT 11.4, INR 1.09.  The radiation dose reduction device was turned on for each scan per the ALARA (As Low as Reasonably Achievable) protocol.  COMPARISON: NONE  FINDINGS: 1. This patient has had 2 biopsies previously  without diagnosis. Please note this. If this procedure is not successful in yielding a diagnosis, a surgical excision of her lymph node will be necessary and will be the next appropriate consideration. 2. The procedure, risks, complications and side effects of CT-guided needle biopsy of the left internal iliac nicolasa mass was discussed and reviewed in detail with the patient including possible risks and complications which include bleeding, infection, internal injury to the bowel or vessels, laceration of the external iliac artery, and other possible risks and complications.  The patient understood and consented. 3. Light sedation was administered in the form of 75 mg of Demerol and 6.25 mg of Phenergan IV as well as oral Xanax 0.5 mg. Patient tolerated this well. 4. With the patient in the supine position, under sterile technique, local anesthesia and meticulous CT guidance, a number 22-gauge needle was placed intrinsic to the left lateral pelvic nicolasa mass. A double aspiration biopsy was performed placing some of the aspirate into flow cytometry media and with the other 2 aspiration yields, touch prep slides were performed and the majority of the aspirated material was placed in formalin for preparation and permanent sectioning by pathology. 5. Pathologist was in attendance and documented that the cellular yield was adequate. 6. Post biopsy images reveal no evidence of bleeding or complication. Patient tolerated the procedure well.      Impression: 1. A double needle aspiration has been performed left mid to lower pelvic nicolasa mass and tolerated well without immediate complication or bleeding. 2. The tissue was carefully prepared under the direction of the pathologist who was in attendance. 3. This most pleasant patient tolerated the procedure well. There were no immediate complications and she was taken back to her room in satisfactory and stable condition.  D:  04/20/2018 E:  04/20/2018   This report was  finalized on 4/22/2018 9:43 AM by Dr. Jeramy Colorado MD.      Fl C Arm During Surgery    Result Date: 4/27/2018  Narrative: This procedure was auto-finalized with no dictation required.    Nm Pet Whole Body    Result Date: 5/1/2018  Narrative: EXAMINATION: NM PET, WHOLE BODY-05/01/2018:  INDICATION: Complete lymphoma staging; Z51.11-Encounter for antineoplastic chemotherapy.     TECHNIQUE: A vein in the right wrist was injected with 13.06 mCi of F-18 FDG. The serum glucose is 82 mg/dL.  The radiation dose reduction device was turned on as low as reasonably achievable for each scan per ALARA protocol.  COMPARISON: NONE.  FINDINGS: There are two pathological nodes in the left medial supraclavicular region, the larger of which measures 2 cm in the short axis. These have a maximum SUV of 10.63. Increased metabolic activity with a maximum SUV of 10.63. There is extensive retrocrural hypermetabolic lymphadenopathy, somewhat more prominent on the left than on the right (11.78), more inferiorly there is retroperitoneal periaortic lymphadenopathy which is fairly bulky and extends into the internal and external iliac chains, much more prominent on the left than on the right and also involving the left iliacus musculature (18.70), there is also asymmetrical hypermetabolic activity in the left psoas muscle (13.34) and there is also bulky left external iliac adenopathy (16.39). There is no inguinal lymphadenopathy.      Impression: 1.  There is pathological hypermetabolic lymphadenopathy on both sides of the diaphragm. Specifically, there are two enlarged nodes in the left supraclavicular region, the larger of which measures 2 cm in the short axis. 2.  Below the diaphragm there is hypermetabolic retrocrural adenopathy (11.78), bulky retroperitoneal lymphadenopathy (18.70), both internal and external iliac adenopathy, much more prominent on the left than on the right (16.39). Lastly, there is asymmetrical metabolic activity in  the left psoas and left iliacus muscles (13.34).  D:  05/01/2018 E:  05/01/2018     This report was finalized on 5/1/2018 2:52 PM by Dr. Adam Pandya MD.        ASSESSMENT: The patient is a very pleasant 32 y.o. female  with anaplastic large T-cell lymphoma, ALK positive    PROBLEM LIST:  1. Anaplastic large T-cell lymphoma, ALK positive:  A. Incidentally found LAD on MRI done for low back and hip pain done 9/5/2017.   B. Follow up CAT scan confirmed pelvic adenopathy extending from the distal aortic region through the left iliac region.   C. Status post FNA to left iliac lymph node done 9/29/2017. Pathology revealed benign lymphoid cell groups.   D. Repeat CT done 3/15/2018 revealed increased size of left iliac adenopathy with mild splenomegaly.   E. CT-guided biopsy done on April 19, 2018 showed anaplastic large T-cell lymphoma ALK+  F. whole body PET scan done on May 1, 2018 revealed disease above and below the diaphragm with hypermetabolic active lymph nodes in the supraclavicular area as well as retroperitoneum.  G. Started chemotherapy using CHOP May 2, 2018  2. Mild splenomegaly   A. Incidentally found 9/5/2017.  B. Progressive size on repeat imaging done 3/15/2018.  3. Left hip and low back pain  A. Under care of pain management with use of Norco.   4. History of anoxic brain injury following cardiac arrest post-partum.   5. Postpartum cardiomyopathy.   6. Anxiety and depression    PLAN:  1.  I will proceed with treatment today using CHOP.  2.  The patient will follow-up with me in 3 weeks for cycle #2 out of 6 planned.  3.  I will monitor patient blood work including blood counts kidney function liver function.  4.  I'll start patient on Zofran as needed for chemotherapy induced nausea.  5.  The patient will take prednisone 100 mg daily for 5 days each cycle of chemotherapy.  Patient was advised of this with food.  6.  We discussed the potential risks and side effects of CHOP chemotherapy including  neutropenia, alopecia, nausea and vomiting, fatigue, neuropathy, cardiomyopathy, constipation, infusion reaction, risk of myelodysplasia, infertility, and potential death.  The patient is not interested in having more children.  7.  We'll continue Port-A-Cath care.  Patient was given a prescription for EMLA cream.  8.  I will do repeat scans before cycle #4.  9.  The patient need to have a cardiac echo prior to doing chemotherapy since she does have history of cardiomyopathy.  We'll order it stat for today.    Carlo Rice MD  5/2/2018

## 2018-05-04 ENCOUNTER — INFUSION (OUTPATIENT)
Dept: ONCOLOGY | Facility: HOSPITAL | Age: 33
End: 2018-05-04

## 2018-05-04 VITALS
SYSTOLIC BLOOD PRESSURE: 127 MMHG | WEIGHT: 237 LBS | TEMPERATURE: 98 F | HEART RATE: 107 BPM | RESPIRATION RATE: 18 BRPM | DIASTOLIC BLOOD PRESSURE: 69 MMHG | BODY MASS INDEX: 37.12 KG/M2

## 2018-05-04 DIAGNOSIS — C82.00 FOLLICULAR LYMPHOMA GRADE I, UNSPECIFIED BODY REGION (HCC): ICD-10-CM

## 2018-05-04 DIAGNOSIS — C84.68 ANAPLASTIC ALK-POSITIVE LARGE CELL LYMPHOMA OF LYMPH NODES OF MULTIPLE REGIONS (HCC): Primary | ICD-10-CM

## 2018-05-04 LAB
ALBUMIN SERPL-MCNC: 3.8 G/DL (ref 3.5–5)
ALBUMIN/GLOB SERPL: 0.8 G/DL (ref 1–2)
ALP SERPL-CCNC: 70 U/L (ref 38–126)
ALT SERPL W P-5'-P-CCNC: 35 U/L (ref 13–69)
ANION GAP SERPL CALCULATED.3IONS-SCNC: 14.6 MMOL/L (ref 10–20)
AST SERPL-CCNC: 24 U/L (ref 15–46)
BASOPHILS # BLD AUTO: 0.04 10*3/MM3 (ref 0–0.2)
BASOPHILS NFR BLD AUTO: 1.2 % (ref 0–2.5)
BILIRUB SERPL-MCNC: 0.4 MG/DL (ref 0.2–1.3)
BUN BLD-MCNC: 6 MG/DL (ref 7–20)
BUN/CREAT SERPL: 12 (ref 7.1–23.5)
CALCIUM SPEC-SCNC: 8.4 MG/DL (ref 8.4–10.2)
CHLORIDE SERPL-SCNC: 105 MMOL/L (ref 98–107)
CO2 SERPL-SCNC: 26 MMOL/L (ref 26–30)
CREAT BLD-MCNC: 0.5 MG/DL (ref 0.6–1.3)
DEPRECATED RDW RBC AUTO: 48.4 FL (ref 37–54)
EOSINOPHIL # BLD AUTO: 0.07 10*3/MM3 (ref 0–0.7)
EOSINOPHIL NFR BLD AUTO: 2 % (ref 0–7)
ERYTHROCYTE [DISTWIDTH] IN BLOOD BY AUTOMATED COUNT: 16 % (ref 11.5–14.5)
GFR SERPL CREATININE-BSD FRML MDRD: 143 ML/MIN/1.73
GLOBULIN UR ELPH-MCNC: 4.8 GM/DL
GLUCOSE BLD-MCNC: 122 MG/DL (ref 74–98)
HCT VFR BLD AUTO: 29.9 % (ref 37–47)
HGB BLD-MCNC: 9.5 G/DL (ref 12–16)
IMM GRANULOCYTES # BLD: 0.02 10*3/MM3 (ref 0–0.06)
IMM GRANULOCYTES NFR BLD: 0.6 % (ref 0–0.6)
LYMPHOCYTES # BLD AUTO: 1.15 10*3/MM3 (ref 0.6–3.4)
LYMPHOCYTES NFR BLD AUTO: 33.2 % (ref 10–50)
MCH RBC QN AUTO: 26.2 PG (ref 27–31)
MCHC RBC AUTO-ENTMCNC: 31.8 G/DL (ref 30–37)
MCV RBC AUTO: 82.6 FL (ref 81–99)
MONOCYTES # BLD AUTO: 0.48 10*3/MM3 (ref 0–0.9)
MONOCYTES NFR BLD AUTO: 13.9 % (ref 0–12)
NEUTROPHILS # BLD AUTO: 1.7 10*3/MM3 (ref 2–6.9)
NEUTROPHILS NFR BLD AUTO: 49.1 % (ref 37–80)
NRBC BLD MANUAL-RTO: 0 /100 WBC (ref 0–0)
PLATELET # BLD AUTO: 218 10*3/MM3 (ref 130–400)
PMV BLD AUTO: 8.5 FL (ref 6–12)
POTASSIUM BLD-SCNC: 3.6 MMOL/L (ref 3.5–5.1)
PROT SERPL-MCNC: 8.6 G/DL (ref 6.3–8.2)
RBC # BLD AUTO: 3.62 10*6/MM3 (ref 4.2–5.4)
SODIUM BLD-SCNC: 142 MMOL/L (ref 137–145)
WBC NRBC COR # BLD: 3.46 10*3/MM3 (ref 4.8–10.8)

## 2018-05-04 PROCEDURE — 25010000002 CYCLOPHOSPHAMIDE PER 100 MG: Performed by: INTERNAL MEDICINE

## 2018-05-04 PROCEDURE — 25010000002 HEPARIN FLUSH (PORCINE) 100 UNIT/ML SOLUTION: Performed by: INTERNAL MEDICINE

## 2018-05-04 PROCEDURE — 25010000002 VINCRISTINE PER 1 MG: Performed by: INTERNAL MEDICINE

## 2018-05-04 PROCEDURE — 80053 COMPREHEN METABOLIC PANEL: CPT

## 2018-05-04 PROCEDURE — 25010000002 PALONOSETRON PER 25 MCG: Performed by: INTERNAL MEDICINE

## 2018-05-04 PROCEDURE — 85025 COMPLETE CBC W/AUTO DIFF WBC: CPT

## 2018-05-04 PROCEDURE — 25010000002 DEXAMETHASONE PER 1 MG: Performed by: INTERNAL MEDICINE

## 2018-05-04 PROCEDURE — 36415 COLL VENOUS BLD VENIPUNCTURE: CPT

## 2018-05-04 PROCEDURE — 96367 TX/PROPH/DG ADDL SEQ IV INF: CPT

## 2018-05-04 PROCEDURE — 96377 APPLICATON ON-BODY INJECTOR: CPT

## 2018-05-04 PROCEDURE — 25010000002 FOSAPREPITANT PER 1 MG: Performed by: INTERNAL MEDICINE

## 2018-05-04 PROCEDURE — 96411 CHEMO IV PUSH ADDL DRUG: CPT

## 2018-05-04 PROCEDURE — 96375 TX/PRO/DX INJ NEW DRUG ADDON: CPT

## 2018-05-04 PROCEDURE — 25010000002 PEGFILGRASTIM 6 MG/0.6ML PREFILLED SYRINGE KIT: Performed by: INTERNAL MEDICINE

## 2018-05-04 PROCEDURE — 25010000002 DOXORUBICIN PER 10 MG: Performed by: INTERNAL MEDICINE

## 2018-05-04 PROCEDURE — 96413 CHEMO IV INFUSION 1 HR: CPT

## 2018-05-04 RX ORDER — PALONOSETRON 0.05 MG/ML
0.25 INJECTION, SOLUTION INTRAVENOUS ONCE
Status: COMPLETED | OUTPATIENT
Start: 2018-05-04 | End: 2018-05-04

## 2018-05-04 RX ORDER — SODIUM CHLORIDE 0.9 % (FLUSH) 0.9 %
10 SYRINGE (ML) INJECTION AS NEEDED
Status: DISCONTINUED | OUTPATIENT
Start: 2018-05-04 | End: 2018-05-04 | Stop reason: HOSPADM

## 2018-05-04 RX ORDER — DOXORUBICIN HYDROCHLORIDE 2 MG/ML
50 INJECTION, SOLUTION INTRAVENOUS ONCE
Status: COMPLETED | OUTPATIENT
Start: 2018-05-04 | End: 2018-05-04

## 2018-05-04 RX ORDER — SODIUM CHLORIDE 0.9 % (FLUSH) 0.9 %
10 SYRINGE (ML) INJECTION AS NEEDED
Status: CANCELLED | OUTPATIENT
Start: 2018-05-04

## 2018-05-04 RX ORDER — SODIUM CHLORIDE 9 MG/ML
250 INJECTION, SOLUTION INTRAVENOUS ONCE
Status: COMPLETED | OUTPATIENT
Start: 2018-05-04 | End: 2018-05-04

## 2018-05-04 RX ADMIN — SODIUM CHLORIDE 250 ML: 9 INJECTION, SOLUTION INTRAVENOUS at 11:23

## 2018-05-04 RX ADMIN — SODIUM CHLORIDE 150 MG: 9 INJECTION, SOLUTION INTRAVENOUS at 11:22

## 2018-05-04 RX ADMIN — SODIUM CHLORIDE, PRESERVATIVE FREE 500 UNITS: 5 INJECTION INTRAVENOUS at 13:27

## 2018-05-04 RX ADMIN — PALONOSETRON HYDROCHLORIDE 0.25 MG: 0.25 INJECTION INTRAVENOUS at 11:20

## 2018-05-04 RX ADMIN — VINCRISTINE SULFATE 2 MG: 1 INJECTION, SOLUTION INTRAVENOUS at 12:31

## 2018-05-04 RX ADMIN — PEGFILGRASTIM 6 MG: KIT SUBCUTANEOUS at 13:30

## 2018-05-04 RX ADMIN — DOXORUBICIN HYDROCHLORIDE 108 MG: 2 INJECTION, SOLUTION INTRAVENOUS at 12:17

## 2018-05-04 RX ADMIN — CYCLOPHOSPHAMIDE 1610 MG: 1 INJECTION, POWDER, FOR SOLUTION INTRAVENOUS; ORAL at 12:47

## 2018-05-04 RX ADMIN — DEXAMETHASONE SODIUM PHOSPHATE 12 MG: 4 INJECTION, SOLUTION INTRA-ARTICULAR; INTRALESIONAL; INTRAMUSCULAR; INTRAVENOUS; SOFT TISSUE at 11:48

## 2018-05-04 RX ADMIN — Medication 10 ML: at 13:27

## 2018-05-08 ENCOUNTER — TELEPHONE (OUTPATIENT)
Dept: ONCOLOGY | Facility: CLINIC | Age: 33
End: 2018-05-08

## 2018-05-09 ENCOUNTER — HOSPITAL ENCOUNTER (OUTPATIENT)
Dept: NUTRITION | Facility: HOSPITAL | Age: 33
Discharge: HOME | End: 2018-05-09
Admitting: NURSE PRACTITIONER

## 2018-05-09 VITALS — BODY MASS INDEX: 35.97 KG/M2 | HEIGHT: 67 IN | WEIGHT: 229.2 LBS

## 2018-05-09 PROCEDURE — 97802 MEDICAL NUTRITION INDIV IN: CPT

## 2018-05-09 NOTE — PROGRESS NOTES
Adult Outpatient Nutrition  Assessment/PES    Patient Name:  Johny Hearn  YOB: 1985  MRN: [id number]    Assessment Date:  5/9/2018    Comments:  Pt seen today for oncology nutrition consult, accompanied by her niece  Pt's weight today was 229.2 lb. Pt was very eager to work on improving lifestyle choices to promote weight loss and be able to discontinue medications she is currently taking. RD and pt discussed the ChooseMyPlate method for meal planning as well as the importance of integrating 3 meals and multiple snacks throughout the day. RD and pt focused on the goal of 2200 kcals daily in order to maintain her weight during treatments. Pt was eager to lose weight, but RD encouraged the pt to focusing on nutrients and foods that nourish the body in efforts to promote the best health possible throughout her chemo treatments. RD conveyed that by making choices to cook more at home and integrate more fruits and vegetables rather than fast food can help improve her cholesterol and internal health even if she is not losing weight right now. Pt was more on board with 2200 kcal plan after this discussion. Pt asked several questions about specific foods that were beneficial such as types of fish, types of fruit, frozen vs canned fruits and vegetables and types of juices. RD and pt discussed these topics staying focused on nutrient density of foods and healthy ranges for salt/sugar content.    Pt set two goals today: 1. Walk everyday, 20 - 30 minutes. 2. Follow a 2200 kcal meal plan with myplate recommendations daily. Pt rated the feasibility of reaching these goals as 9/10. She states the only thing that would hinder her progress was her financial situation. RD provided pt with a SNAP resource booklet with a 2 week meal plan with recipes, which included budgeting information and nutrition facts for each recipe. Pt was interested in applying for financial assistance at the [facility] but could not get a  hold of the gym per pt. RD will attempt to contact [facility] and offer any information back to the pt about the program. RD left contact information with the pt and encouraged her to call with any comments/questions before her follow-up appointment on June 11th at 10:30 am. RD available PRN.          OP RD Assessment     Row Name 05/09/18 1133       Calculation Measurements    Weight Used For Calculations 108 kg (237 lb)       Estimated/Assessed Energy Needs    14 Kcal/Kg (kcal) 1505.03    15 Kcal/Kg (kcal) 1612.53    18 Kcal/Kg (kcal) 1935.04    20 Kcal/Kg (kcal) 2150.04    25 Kcal/Kg (kcal) 2687.55    30 Kcal/Kg (kcal) 3225.06    35 Kcal/Kg (kcal) 3762.57    40 Kcal/Kg (kcal) 4300.08    45 Kcal/Kg (kcal) 4837.59    50 Kcal/Kg (kcal) 5375.1    RMR ([place]-St. Jeor Equation) 1817.65    Row Name 05/09/18 1132       Calculation Measurements    Weight Used For Calculations 108 kg (237 lb)       Estimated/Assessed Energy Needs    14 Kcal/Kg (kcal) 1505.03    15 Kcal/Kg (kcal) 1612.53    18 Kcal/Kg (kcal) 1935.04    20 Kcal/Kg (kcal) 2150.04    25 Kcal/Kg (kcal) 2687.55    30 Kcal/Kg (kcal) 3225.06    35 Kcal/Kg (kcal) 3762.57    40 Kcal/Kg (kcal) 4300.08    45 Kcal/Kg (kcal) 4837.59    50 Kcal/Kg (kcal) 5375.1    RMR ([place]-St. Jeor Equation) 1817.65    Row Name 05/09/18 1126       Nutritional Information    Have you had weight changes? Yes    Describe weight changes appetite has changed    What is your desired body weight? 79.4 kg (175 lb)    Have you tried to lose weight before? Yes    List programs tried, date, and success Trying to watch what was eaten and increase exercise    What is your motivation to lose weight? Get off current medicines    Supplemental Drinks/Foods/Additives Daily MVI    History of eating disorder? No    Do you have difficulty chewing food? No    Functional Status ambulatory;not able to prepare meals   Pt has vision impairment, cannot cook safely alone    List any food cravings/trigger foods you  have Chocolate, pop, fast food    How often during the day do you find yourself snacking? not often    How often do you eat out and where? 4 times/month, [employer], [employer], [employer]    Do you use Food Assistance programs (WIC, food stamps, food bank)? yes    Do you need information about Food Assistance programs? no    How many times do you drink milk per day? 1    How many times do you eat fruit per day? 1    How many times do you eat vegetables per day? 0    How many times do you drink juice per day? 2    How many times do you eat candy/chocolates per day? 2    How many times do you eat baked goods per day? 0    How many times do you eat desserts per day? 0    How many times do you eat ice cream per day? 1    How many times do you eat snack foods per day? 2    How many diet sodas do you drink per day? 0    How many regular sodas do you drink per day? 2    How many times do you eat ethnic food per day? 0    How many times do you drink alcohol per day? 0    How many times do you have caffeine per day? 0    How many servings of artificial sweetner do you have per day? 0    How many meals do you eat each day? 2    How many snacks do you eat each day? 1    What is the biggest challenge you have with your diet? Poor choices   Decreasing sugar intake    What type of support do you currently use to help you with your health issues? Pt's doctor and     Enter everything you can remember eating in the last 24 hours (1 day) Daily MVI, 3 halo Oranges, [employer] beef and cheddar sandwich, curly fries and pop, Subway sandwich, footlong, ham and turkey with terrell, lettuce, pickles, tomatoes, and cheese, water       Eating Environment    Eating environment Family       Physical Activity    Are you currently involved in an activity/exercise program?  No    Reasons for Inactivity Other (comment)   Pt looking into financial assistance for [place] membership    How many minutes do you spend on exercise each day? 30    How would  "you rank exercise as an important health lifestyle practice? 8    Row Name 05/09/18 1125       Anthropometrics    Height Method Stated    Height 170.2 cm (67\")    Weight Method Standing scale    Weight 104 kg (229 lb 3.2 oz)       Ideal Body Weight (IBW)    Ideal Body Weight (IBW) (kg) 61.86    % Ideal Body Weight 168.06    Ideal Body Weight (IBW), Female 62.26    % Ideal Body Weight 167.33       Body Mass Index (BMI)    BMI (kg/m2) 35.97    BMI Grade 35 - 39.9 - obesity - grade II    Row Name 05/09/18 1124       Physical Findings    Overall Physical Appearance obese    Row Name 05/09/18 1121       Today's Session    Person(s) attending today's session Patient;Family   Pt's niece     Services Used Today? No       General Information    How Well Do You Speak English? very well    Do You Speak a Language Other Than English at Home? no    Are you able to read and write English? Yes   Pt visually impaired but does speak english    Lives With child(pop), dependent;spouse    Last grade of school completed 10th    Oncology patient? yes       Oncology Specific Assessment    Type of treatment Chemotherapy    Frequency of treatment Q3 weeks    Goal of treatment Currative          Problem/Interventions:        Problem 1     Row Name 05/09/18 1133       Nutrition Diagnoses Problem 1    Problem 1 Increased Nutrient Needs    Macronutrient Kcal;Fluid;Protein    Etiology (related to) Medical Diagnosis    Oncology Lung cancer    Signs/Symptoms (evidenced by) Report/Observation    Reported/Observed By MD            Problem 2     Row Name 05/09/18 1134       Nutrition Diagnoses Problem 2    Problem 2 Overweight/Obesity    Etiology (related to) Factors Affecting Nutrition    Food Habit/Preferences Fast Food    Signs/Symptoms (evidenced by) BMI    BMI 35 - 39.9                  Intervention Goal     Row Name 05/09/18 1134       Intervention Goal    General Meet nutritional needs for age/condition    PO Meet estimated needs    " Weight No significant weight loss              Electronically signed by:  Kamila Suresh RD  05/09/18 11:39 AM

## 2018-05-11 LAB
LAB AP ASPIRATE SMEAR: NORMAL
LAB AP CASE REPORT: NORMAL
LAB AP CBC AND DIFFERENTIAL: NORMAL
LAB AP CLINICAL INFORMATION: NORMAL
LAB AP CLOT SECTION: NORMAL
LAB AP CORE BIOPSY: NORMAL
LAB AP CYTOGENETICS REPORT,ADDENDUM: NORMAL
LAB AP FLOW CYTOMETRY SUMMARY: NORMAL
Lab: NORMAL
PATH REPORT.FINAL DX SPEC: NORMAL
PATH REPORT.GROSS SPEC: NORMAL

## 2018-05-23 ENCOUNTER — OFFICE VISIT (OUTPATIENT)
Dept: ONCOLOGY | Facility: CLINIC | Age: 33
End: 2018-05-23

## 2018-05-23 VITALS
SYSTOLIC BLOOD PRESSURE: 134 MMHG | RESPIRATION RATE: 17 BRPM | HEART RATE: 85 BPM | DIASTOLIC BLOOD PRESSURE: 63 MMHG | WEIGHT: 232 LBS | BODY MASS INDEX: 36.41 KG/M2 | TEMPERATURE: 97.2 F | HEIGHT: 67 IN

## 2018-05-23 DIAGNOSIS — C82.00 FOLLICULAR LYMPHOMA GRADE I, UNSPECIFIED BODY REGION (HCC): Primary | ICD-10-CM

## 2018-05-23 PROCEDURE — 99215 OFFICE O/P EST HI 40 MIN: CPT | Performed by: INTERNAL MEDICINE

## 2018-05-23 RX ORDER — DOCUSATE SODIUM 100 MG/1
100 CAPSULE, LIQUID FILLED ORAL DAILY
Qty: 30 CAPSULE | Refills: 3 | Status: ON HOLD | OUTPATIENT
Start: 2018-05-23 | End: 2019-01-31

## 2018-05-23 RX ORDER — LACTULOSE 10 G/15ML
20 SOLUTION ORAL 3 TIMES DAILY
Qty: 240 ML | Refills: 2 | Status: ON HOLD | OUTPATIENT
Start: 2018-05-23 | End: 2019-01-31

## 2018-05-23 NOTE — PROGRESS NOTES
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 female  with past medical history significant for anaplastic large T-cell lymphoma, ALK positive diagnosed in April 19, 2018 after CT-guided core biopsy of left iliac mass.  Whole body PET scan revealed disease above and below the diaphragm.  Bone marrow biopsy done on May 1, 2018, that failed to show any evidence of bone marrow involvement.  The patient was started on chemotherapy using CHOP May 2, 2018.  The patient is here today for scheduled follow-up visit with cycle #2.    SUBJECTIVE: The patient is here today by herself.  She was able to tolerate chemotherapy with multiple side effects.  She had constipation.  She had mild headaches.  Her abdominal pain is about the same.  Her energy had improved.    PAST MEDICAL HISTORY/SOCIAL HISTORY/FAMILY HISTORY: Reviewed by me and unchanged from my documentation done on 05/23/18.    Review of Systems   Constitutional: Positive for chills. Negative for activity change, appetite change, fatigue, fever and unexpected weight change.   HENT: Negative for hearing loss, mouth sores, nosebleeds, sore throat and trouble swallowing.    Eyes: Negative for visual disturbance.   Respiratory: Negative for cough, chest tightness, shortness of breath and wheezing.    Cardiovascular: Negative for chest pain, palpitations and leg swelling.   Gastrointestinal: Negative for abdominal distention, abdominal pain, blood in stool, constipation, diarrhea, nausea, rectal pain and vomiting.   Endocrine: Negative for cold intolerance and heat intolerance.   Genitourinary: Negative for difficulty urinating, dysuria, frequency and urgency.   Musculoskeletal: Negative for arthralgias, back pain, gait problem, joint swelling and myalgias.   Skin: Negative for rash.   Neurological: Negative for dizziness, tremors,  syncope, weakness, light-headedness, numbness and headaches.   Hematological: Positive for adenopathy. Does not bruise/bleed easily.   Psychiatric/Behavioral: Negative for confusion, sleep disturbance and suicidal ideas. The patient is not nervous/anxious.          Current Outpatient Prescriptions:   •  amitriptyline (ELAVIL) 10 MG tablet, Take 10 mg by mouth Every Night., Disp: , Rfl:   •  cyclobenzaprine (FLEXERIL) 10 MG tablet, 10 mg 2 (Two) Times a Day., Disp: , Rfl:   •  fluticasone (FLONASE) 50 MCG/ACT nasal spray, As Needed., Disp: , Rfl:   •  hydrochlorothiazide (HYDRODIURIL) 25 MG tablet, , Disp: , Rfl:   •  HYDROcodone-acetaminophen (NORCO) 5-325 MG per tablet, Take 1 tablet by mouth Every 6 (Six) Hours As Needed for Moderate Pain  or Severe Pain . (Patient taking differently: Take 1 tablet by mouth Every 8 (Eight) Hours As Needed for Moderate Pain  or Severe Pain .), Disp: 10 tablet, Rfl: 0  •  HYDROcodone-acetaminophen (NORCO) 5-325 MG per tablet, Take 1-2 tablets by mouth Every 4 (Four) Hours As Needed, Disp: 16 tablet, Rfl: 0  •  ibuprofen (ADVIL,MOTRIN) 800 MG tablet, 800 mg Every 6 (Six) Hours As Needed., Disp: , Rfl:   •  lidocaine-prilocaine (EMLA) 2.5-2.5 % cream, Apply  topically As Needed (45-60 minutes prior to port access.  Cover with saran/plastic wrap.)., Disp: 30 g, Rfl: 3  •  metroNIDAZOLE (METROGEL) 0.75 % gel, , Disp: , Rfl:   •  montelukast (SINGULAIR) 10 MG tablet, , Disp: , Rfl:   •  ondansetron (ZOFRAN) 8 MG tablet, Take 1 tablet by mouth 3 (Three) Times a Day As Needed for Nausea or Vomiting., Disp: 30 tablet, Rfl: 5  •  ondansetron ODT (ZOFRAN-ODT) 4 MG disintegrating tablet, Take 1 tablet by mouth Every 6 (Six) Hours As Needed for Nausea or Vomiting., Disp: 10 tablet, Rfl: 0  •  sertraline (ZOLOFT) 100 MG tablet, Take 100 mg by mouth Daily., Disp: , Rfl:   •  traMADol (ULTRAM) 50 MG tablet, Every 6 (Six) Hours As Needed., Disp: , Rfl:   •  traZODone (DESYREL) 50 MG tablet, 50 mg  "At Night As Needed., Disp: , Rfl:     PHYSICAL EXAMINATION:   /63   Pulse 85   Temp 97.2 °F (36.2 °C) (Temporal Artery )   Resp 17   Ht 170.2 cm (67\")   Wt 105 kg (232 lb)   BMI 36.34 kg/m²    ECOG Performance Status: 1 - Symptomatic but completely ambulatory  General Appearance:  alert, cooperative, no apparent distress and appears stated age   Neurologic/Psychiatric: A&O x 3, gait steady, appropriate affect, strength 5/5 in all muscle groups   HEENT:  Normocephalic, without obvious abnormality, mucous membranes moist   Neck: Supple, symmetrical, trachea midline, no adenopathy;  No thyromegaly, masses, or tenderness   Lungs:   Clear to auscultation bilaterally; respirations regular, even, and unlabored bilaterally   Heart:  Regular rate and rhythm, no murmurs appreciated   Abdomen:   Soft, non-tender, non-distended and no organomegaly   Lymph nodes: No cervical, supraclavicular, inguinal or axillary adenopathy noted   Extremities: Normal, atraumatic; no clubbing, cyanosis, or edema    Skin: No rashes, ulcers, or suspicious lesions noted     No visits with results within 2 Week(s) from this visit.   Latest known visit with results is:   Infusion on 05/04/2018   Component Date Value Ref Range Status   • Glucose 05/04/2018 122* 74 - 98 mg/dL Final   • BUN 05/04/2018 6* 7 - 20 mg/dL Final   • Creatinine 05/04/2018 0.50* 0.60 - 1.30 mg/dL Final   • Sodium 05/04/2018 142  137 - 145 mmol/L Final   • Potassium 05/04/2018 3.6  3.5 - 5.1 mmol/L Final   • Chloride 05/04/2018 105  98 - 107 mmol/L Final   • CO2 05/04/2018 26.0  26.0 - 30.0 mmol/L Final   • Calcium 05/04/2018 8.4  8.4 - 10.2 mg/dL Final   • Total Protein 05/04/2018 8.6* 6.3 - 8.2 g/dL Final   • Albumin 05/04/2018 3.80  3.50 - 5.00 g/dL Final   • ALT (SGPT) 05/04/2018 35  13 - 69 U/L Final   • AST (SGOT) 05/04/2018 24  15 - 46 U/L Final   • Alkaline Phosphatase 05/04/2018 70  38 - 126 U/L Final   • Total Bilirubin 05/04/2018 0.4  0.2 - 1.3 mg/dL Final "   • eGFR Non African Amer 05/04/2018 143  >60 mL/min/1.73 Final   • Globulin 05/04/2018 4.8  gm/dL Final   • A/G Ratio 05/04/2018 0.8* 1.0 - 2.0 g/dL Final   • BUN/Creatinine Ratio 05/04/2018 12.0  7.1 - 23.5 Final   • Anion Gap 05/04/2018 14.6  10.0 - 20.0 mmol/L Final   • WBC 05/04/2018 3.46* 4.80 - 10.80 10*3/mm3 Final   • RBC 05/04/2018 3.62* 4.20 - 5.40 10*6/mm3 Final   • Hemoglobin 05/04/2018 9.5* 12.0 - 16.0 g/dL Final   • Hematocrit 05/04/2018 29.9* 37.0 - 47.0 % Final   • MCV 05/04/2018 82.6  81.0 - 99.0 fL Final   • MCH 05/04/2018 26.2* 27.0 - 31.0 pg Final   • MCHC 05/04/2018 31.8  30.0 - 37.0 g/dL Final   • RDW 05/04/2018 16.0* 11.5 - 14.5 % Final   • RDW-SD 05/04/2018 48.4  37.0 - 54.0 fl Final   • MPV 05/04/2018 8.5  6.0 - 12.0 fL Final   • Platelets 05/04/2018 218  130 - 400 10*3/mm3 Final   • Neutrophil % 05/04/2018 49.1  37.0 - 80.0 % Final   • Lymphocyte % 05/04/2018 33.2  10.0 - 50.0 % Final   • Monocyte % 05/04/2018 13.9* 0.0 - 12.0 % Final   • Eosinophil % 05/04/2018 2.0  0.0 - 7.0 % Final   • Basophil % 05/04/2018 1.2  0.0 - 2.5 % Final   • Immature Grans % 05/04/2018 0.6  0.0 - 0.6 % Final   • Neutrophils, Absolute 05/04/2018 1.70* 2.00 - 6.90 10*3/mm3 Final   • Lymphocytes, Absolute 05/04/2018 1.15  0.60 - 3.40 10*3/mm3 Final   • Monocytes, Absolute 05/04/2018 0.48  0.00 - 0.90 10*3/mm3 Final   • Eosinophils, Absolute 05/04/2018 0.07  0.00 - 0.70 10*3/mm3 Final   • Basophils, Absolute 05/04/2018 0.04  0.00 - 0.20 10*3/mm3 Final   • Immature Grans, Absolute 05/04/2018 0.02  0.00 - 0.06 10*3/mm3 Final   • nRBC 05/04/2018 0.0  0.0 - 0.0 /100 WBC Final        Xr Chest 1 View    Result Date: 4/27/2018  Narrative: PORTABLE CHEST  INDICATION: Port-A-Cath placement.  FINDINGS: Single frontal portable chest without comparison demonstrates a right subclavian. Port-A-Cath which terminates in the mid superior vena cava. Heart size is normal. No pneumothorax. No distinct focal consolidation or effusion.       Impression: No pneumothorax following Port-A-Cath placement.  This report was finalized on 4/27/2018 12:28 PM by Devan Hill MD.    Fl C Arm During Surgery    Result Date: 4/27/2018  Narrative: This procedure was auto-finalized with no dictation required.    Nm Pet Whole Body    Result Date: 5/1/2018  Narrative: EXAMINATION: NM PET, WHOLE BODY-05/01/2018:  INDICATION: Complete lymphoma staging; Z51.11-Encounter for antineoplastic chemotherapy.     TECHNIQUE: A vein in the right wrist was injected with 13.06 mCi of F-18 FDG. The serum glucose is 82 mg/dL.  The radiation dose reduction device was turned on as low as reasonably achievable for each scan per ALARA protocol.  COMPARISON: NONE.  FINDINGS: There are two pathological nodes in the left medial supraclavicular region, the larger of which measures 2 cm in the short axis. These have a maximum SUV of 10.63. Increased metabolic activity with a maximum SUV of 10.63. There is extensive retrocrural hypermetabolic lymphadenopathy, somewhat more prominent on the left than on the right (11.78), more inferiorly there is retroperitoneal periaortic lymphadenopathy which is fairly bulky and extends into the internal and external iliac chains, much more prominent on the left than on the right and also involving the left iliacus musculature (18.70), there is also asymmetrical hypermetabolic activity in the left psoas muscle (13.34) and there is also bulky left external iliac adenopathy (16.39). There is no inguinal lymphadenopathy.      Impression: 1.  There is pathological hypermetabolic lymphadenopathy on both sides of the diaphragm. Specifically, there are two enlarged nodes in the left supraclavicular region, the larger of which measures 2 cm in the short axis. 2.  Below the diaphragm there is hypermetabolic retrocrural adenopathy (11.78), bulky retroperitoneal lymphadenopathy (18.70), both internal and external iliac adenopathy, much more prominent on the left than on  the right (16.39). Lastly, there is asymmetrical metabolic activity in the left psoas and left iliacus muscles (13.34).  D:  05/01/2018 E:  05/01/2018     This report was finalized on 5/1/2018 2:52 PM by Dr. Adam Pandya MD.        ASSESSMENT: The patient is a very pleasant 32 y.o. female  with anaplastic large T-cell lymphoma, ALK positive    PROBLEM LIST:  1. Anaplastic large T-cell lymphoma, ALK positive:  A. Incidentally found LAD on MRI done for low back and hip pain done 9/5/2017.   B. Follow up CAT scan confirmed pelvic adenopathy extending from the distal aortic region through the left iliac region.   C. Status post FNA to left iliac lymph node done 9/29/2017. Pathology revealed benign lymphoid cell groups.   D. Repeat CT done 3/15/2018 revealed increased size of left iliac adenopathy with mild splenomegaly.   E. CT-guided biopsy done on April 19, 2018 showed anaplastic large T-cell lymphoma ALK+  F. whole body PET scan done on May 1, 2018 revealed disease above and below the diaphragm with hypermetabolic active lymph nodes in the supraclavicular area as well as retroperitoneum.  G. Started chemotherapy using CHOP May 2, 2018, status post 1 cycle  2. Mild splenomegaly   A. Incidentally found 9/5/2017.  B. Progressive size on repeat imaging done 3/15/2018.  3. Left hip and low back pain  A. Under care of pain management with use of Norco.   4. History of anoxic brain injury following cardiac arrest post-partum.   5. Postpartum cardiomyopathy.   6. Anxiety and depression  7.  Constipation    PLAN:  1.  I will proceed with treatment tomorrow using CHOP, cycle #2.  2.  The patient will follow-up with me in 3 weeks for cycle #3 out of 6 planned.  3.  I will monitor patient blood work including blood counts kidney function liver function.  4.  I'll continue patient on Zofran as needed for chemotherapy induced nausea.  5.  The patient will take prednisone 100 mg daily for 5 days each cycle of chemotherapy.  Patient  was advised to take this with food.  6.  We discussed the potential risks and side effects of CHOP chemotherapy including neutropenia, alopecia, nausea and vomiting, fatigue, neuropathy, cardiomyopathy, constipation, infusion reaction, risk of myelodysplasia, infertility, and potential death.  The patient is not interested in having more children.  7.  We'll continue Port-A-Cath care.  Patient was given a prescription for EMLA cream.  8.  I will do repeat scans before cycle #4.  9.  I will start the patient on Colace daily as well as lactulose as needed for constipation.  10.  I did go over the cardiac echo with the patient reassured her that her ejection fraction is normal.  We'll consider doing a follow-up echo if she has any new symptoms.    Carlo Rice MD  5/23/2018

## 2018-05-24 ENCOUNTER — TELEPHONE (OUTPATIENT)
Dept: ONCOLOGY | Facility: CLINIC | Age: 33
End: 2018-05-24

## 2018-05-24 ENCOUNTER — INFUSION (OUTPATIENT)
Dept: ONCOLOGY | Facility: HOSPITAL | Age: 33
End: 2018-05-24

## 2018-05-24 VITALS
BODY MASS INDEX: 36.49 KG/M2 | HEART RATE: 94 BPM | SYSTOLIC BLOOD PRESSURE: 143 MMHG | DIASTOLIC BLOOD PRESSURE: 79 MMHG | WEIGHT: 233 LBS | TEMPERATURE: 97.2 F

## 2018-05-24 DIAGNOSIS — C82.00 FOLLICULAR LYMPHOMA GRADE I, UNSPECIFIED BODY REGION (HCC): ICD-10-CM

## 2018-05-24 DIAGNOSIS — C84.68 ANAPLASTIC ALK-POSITIVE LARGE CELL LYMPHOMA OF LYMPH NODES OF MULTIPLE REGIONS (HCC): Primary | ICD-10-CM

## 2018-05-24 LAB
ALBUMIN SERPL-MCNC: 3.9 G/DL (ref 3.5–5)
ALBUMIN/GLOB SERPL: 1 G/DL (ref 1–2)
ALP SERPL-CCNC: 59 U/L (ref 38–126)
ALT SERPL W P-5'-P-CCNC: 29 U/L (ref 13–69)
ANION GAP SERPL CALCULATED.3IONS-SCNC: 13.3 MMOL/L (ref 10–20)
AST SERPL-CCNC: 24 U/L (ref 15–46)
BASOPHILS # BLD AUTO: 0.07 10*3/MM3 (ref 0–0.2)
BASOPHILS NFR BLD AUTO: 1.1 % (ref 0–2.5)
BILIRUB SERPL-MCNC: 0.3 MG/DL (ref 0.2–1.3)
BUN BLD-MCNC: 11 MG/DL (ref 7–20)
BUN/CREAT SERPL: 22 (ref 7.1–23.5)
CALCIUM SPEC-SCNC: 8.9 MG/DL (ref 8.4–10.2)
CHLORIDE SERPL-SCNC: 106 MMOL/L (ref 98–107)
CO2 SERPL-SCNC: 27 MMOL/L (ref 26–30)
CREAT BLD-MCNC: 0.5 MG/DL (ref 0.6–1.3)
DEPRECATED RDW RBC AUTO: 49.5 FL (ref 37–54)
EOSINOPHIL # BLD AUTO: 0.07 10*3/MM3 (ref 0–0.7)
EOSINOPHIL NFR BLD AUTO: 1.1 % (ref 0–7)
ERYTHROCYTE [DISTWIDTH] IN BLOOD BY AUTOMATED COUNT: 17.1 % (ref 11.5–14.5)
GFR SERPL CREATININE-BSD FRML MDRD: 143 ML/MIN/1.73
GLOBULIN UR ELPH-MCNC: 4.1 GM/DL
GLUCOSE BLD-MCNC: 127 MG/DL (ref 74–98)
HCT VFR BLD AUTO: 31 % (ref 37–47)
HGB BLD-MCNC: 9.9 G/DL (ref 12–16)
IMM GRANULOCYTES # BLD: 0.1 10*3/MM3 (ref 0–0.06)
IMM GRANULOCYTES NFR BLD: 1.5 % (ref 0–0.6)
LYMPHOCYTES # BLD AUTO: 1.67 10*3/MM3 (ref 0.6–3.4)
LYMPHOCYTES NFR BLD AUTO: 25.7 % (ref 10–50)
MCH RBC QN AUTO: 26.3 PG (ref 27–31)
MCHC RBC AUTO-ENTMCNC: 31.9 G/DL (ref 30–37)
MCV RBC AUTO: 82.4 FL (ref 81–99)
MONOCYTES # BLD AUTO: 0.45 10*3/MM3 (ref 0–0.9)
MONOCYTES NFR BLD AUTO: 6.9 % (ref 0–12)
NEUTROPHILS # BLD AUTO: 4.15 10*3/MM3 (ref 2–6.9)
NEUTROPHILS NFR BLD AUTO: 63.7 % (ref 37–80)
NRBC BLD MANUAL-RTO: 0 /100 WBC (ref 0–0)
PLATELET # BLD AUTO: 358 10*3/MM3 (ref 130–400)
PMV BLD AUTO: 8 FL (ref 6–12)
POTASSIUM BLD-SCNC: 3.3 MMOL/L (ref 3.5–5.1)
PROT SERPL-MCNC: 8 G/DL (ref 6.3–8.2)
RBC # BLD AUTO: 3.76 10*6/MM3 (ref 4.2–5.4)
SODIUM BLD-SCNC: 143 MMOL/L (ref 137–145)
WBC NRBC COR # BLD: 6.51 10*3/MM3 (ref 4.8–10.8)

## 2018-05-24 PROCEDURE — 36415 COLL VENOUS BLD VENIPUNCTURE: CPT

## 2018-05-24 PROCEDURE — 96413 CHEMO IV INFUSION 1 HR: CPT

## 2018-05-24 PROCEDURE — 25010000002 PALONOSETRON PER 25 MCG: Performed by: INTERNAL MEDICINE

## 2018-05-24 PROCEDURE — 25010000002 PEGFILGRASTIM 6 MG/0.6ML PREFILLED SYRINGE KIT: Performed by: INTERNAL MEDICINE

## 2018-05-24 PROCEDURE — 96366 THER/PROPH/DIAG IV INF ADDON: CPT

## 2018-05-24 PROCEDURE — 25010000002 HEPARIN FLUSH (PORCINE) 100 UNIT/ML SOLUTION: Performed by: INTERNAL MEDICINE

## 2018-05-24 PROCEDURE — 96376 TX/PRO/DX INJ SAME DRUG ADON: CPT

## 2018-05-24 PROCEDURE — 25010000002 FOSAPREPITANT PER 1 MG: Performed by: INTERNAL MEDICINE

## 2018-05-24 PROCEDURE — 96367 TX/PROPH/DG ADDL SEQ IV INF: CPT

## 2018-05-24 PROCEDURE — 25010000002 VINCRISTINE PER 1 MG: Performed by: INTERNAL MEDICINE

## 2018-05-24 PROCEDURE — 85025 COMPLETE CBC W/AUTO DIFF WBC: CPT

## 2018-05-24 PROCEDURE — 25010000002 DOXORUBICIN PER 10 MG: Performed by: INTERNAL MEDICINE

## 2018-05-24 PROCEDURE — 80053 COMPREHEN METABOLIC PANEL: CPT

## 2018-05-24 PROCEDURE — 96377 APPLICATON ON-BODY INJECTOR: CPT

## 2018-05-24 PROCEDURE — 96411 CHEMO IV PUSH ADDL DRUG: CPT

## 2018-05-24 PROCEDURE — 25010000002 CYCLOPHOSPHAMIDE PER 100 MG: Performed by: INTERNAL MEDICINE

## 2018-05-24 PROCEDURE — 25010000002 DEXAMETHASONE PER 1 MG: Performed by: INTERNAL MEDICINE

## 2018-05-24 RX ORDER — PALONOSETRON 0.05 MG/ML
0.25 INJECTION, SOLUTION INTRAVENOUS ONCE
Status: COMPLETED | OUTPATIENT
Start: 2018-05-24 | End: 2018-05-24

## 2018-05-24 RX ORDER — DOXORUBICIN HYDROCHLORIDE 2 MG/ML
50 INJECTION, SOLUTION INTRAVENOUS ONCE
Status: COMPLETED | OUTPATIENT
Start: 2018-05-24 | End: 2018-05-24

## 2018-05-24 RX ORDER — SODIUM CHLORIDE 0.9 % (FLUSH) 0.9 %
10 SYRINGE (ML) INJECTION AS NEEDED
Status: CANCELLED | OUTPATIENT
Start: 2018-05-24

## 2018-05-24 RX ORDER — SODIUM CHLORIDE 0.9 % (FLUSH) 0.9 %
10 SYRINGE (ML) INJECTION AS NEEDED
Status: DISCONTINUED | OUTPATIENT
Start: 2018-05-24 | End: 2018-05-24 | Stop reason: HOSPADM

## 2018-05-24 RX ORDER — SODIUM CHLORIDE 9 MG/ML
250 INJECTION, SOLUTION INTRAVENOUS ONCE
Status: COMPLETED | OUTPATIENT
Start: 2018-05-24 | End: 2018-05-24

## 2018-05-24 RX ADMIN — Medication 10 ML: at 13:32

## 2018-05-24 RX ADMIN — DOXORUBICIN HYDROCHLORIDE 108 MG: 2 INJECTION, SOLUTION INTRAVENOUS at 12:21

## 2018-05-24 RX ADMIN — CYCLOPHOSPHAMIDE 1610 MG: 1 INJECTION, POWDER, FOR SOLUTION INTRAVENOUS; ORAL at 12:52

## 2018-05-24 RX ADMIN — VINCRISTINE SULFATE 2 MG: 1 INJECTION, SOLUTION INTRAVENOUS at 12:36

## 2018-05-24 RX ADMIN — SODIUM CHLORIDE, PRESERVATIVE FREE 500 UNITS: 5 INJECTION INTRAVENOUS at 13:33

## 2018-05-24 RX ADMIN — PEGFILGRASTIM 6 MG: KIT SUBCUTANEOUS at 13:27

## 2018-05-24 RX ADMIN — SODIUM CHLORIDE 150 MG: 9 INJECTION, SOLUTION INTRAVENOUS at 11:46

## 2018-05-24 RX ADMIN — PALONOSETRON HYDROCHLORIDE 0.25 MG: 0.25 INJECTION INTRAVENOUS at 11:07

## 2018-05-24 RX ADMIN — SODIUM CHLORIDE 250 ML: 9 INJECTION, SOLUTION INTRAVENOUS at 11:11

## 2018-05-24 RX ADMIN — DEXAMETHASONE SODIUM PHOSPHATE 12 MG: 4 INJECTION, SOLUTION INTRA-ARTICULAR; INTRALESIONAL; INTRAMUSCULAR; INTRAVENOUS; SOFT TISSUE at 11:11

## 2018-05-24 NOTE — TELEPHONE ENCOUNTER
----- Message from Anju Coely sent at 5/24/2018  3:39 PM EDT -----  Regarding: BADIN - BAD HEADACHE  Contact: [phone]  PATIENT HAD TREATMENT TODAY AND HAS A REALLY BAD HEADACHE.     IS SHE ABLE TO TAKE A 800 MG IBUPROFEN?

## 2018-06-11 ENCOUNTER — HOSPITAL ENCOUNTER (OUTPATIENT)
Dept: NUTRITION | Facility: HOSPITAL | Age: 33
Setting detail: RECURRING SERIES
Discharge: HOME | End: 2018-06-11

## 2018-06-11 PROCEDURE — 97803 MED NUTRITION INDIV SUBSEQ: CPT

## 2018-06-11 NOTE — PROGRESS NOTES
"Nutrition Services    Patient Name:  Johny Hearn  YOB: 1985  MRN: [id number]  Admit Date:  6/11/2018    RD met with pt today who was accompanied by her mother and her niece for an oncology nutrition follow-up appointment. Pt's mother stated that the pt has a memory problem where she has difficulty remembering information presented and that the pt probably did not remember what we had discussed at her last visit. RD reviewed briefly the materials covered during the last appointment. RD reviewed the goals the pt set during her last visit and provided the handouts that were discussed during her last visit for the pt to take home with her once again.  1. Walk every day 20 - 30 minutes. Pt states this did not happen very often. She has been tired and napping every day d/t her treatments.  2. Follow a 2200 kcal meal plan with myplate recommendations daily. Pt states this does not happen because of her difficulty obtaining healthy foods throughout the month. Pt receives EBT and by mid month or the end of the month she states she does not have enough resources to maintain three meals per day. RD offered to provide a list of food mendez and nutrition resources however pt states she is not interested in it at this time.     Pt has gained ~7.8 lbs since her last visit. Her weight was 237 lbs today. She states that she is on steroids with her oncology treatments and that they make her feel as though she \"is starving\". Pt's mother states that she has been continuing to drink pop a few times per week and she has not be exercising much. RD and pt brainstormed ways to purchase shelf stable/freezer stable foods that the pt could purchase when they are on sale at the store and keep for the middle and end of the months. Pt set 3 goals for this next month.    1. Decrease pop intake to 1 x weekly using half water half juice or fruit flavored water as alternatives.  2. Walk 10 - 20 minutes per day to the mailbox and " up and down the sidewalk.  3. Integrate frozen fruits into daily food routines.    KATRINA left contact information with the pt and encouraged her to reach out in the future if she has any questions or concerns before her follow-up appointment on July 9th at 10:30 am. RD available PRN.    Electronically signed by:  Kamila Suresh RD  06/11/18 1:55 PM

## 2018-06-13 ENCOUNTER — OFFICE VISIT (OUTPATIENT)
Dept: ONCOLOGY | Facility: CLINIC | Age: 33
End: 2018-06-13

## 2018-06-13 ENCOUNTER — INFUSION (OUTPATIENT)
Dept: ONCOLOGY | Facility: HOSPITAL | Age: 33
End: 2018-06-13

## 2018-06-13 VITALS
BODY MASS INDEX: 37.2 KG/M2 | HEART RATE: 90 BPM | DIASTOLIC BLOOD PRESSURE: 77 MMHG | HEIGHT: 67 IN | RESPIRATION RATE: 16 BRPM | TEMPERATURE: 97.1 F | SYSTOLIC BLOOD PRESSURE: 128 MMHG | WEIGHT: 237 LBS

## 2018-06-13 DIAGNOSIS — C82.00 FOLLICULAR LYMPHOMA GRADE I, UNSPECIFIED BODY REGION (HCC): ICD-10-CM

## 2018-06-13 DIAGNOSIS — C84.68 ANAPLASTIC ALK-POSITIVE LARGE CELL LYMPHOMA OF LYMPH NODES OF MULTIPLE REGIONS (HCC): ICD-10-CM

## 2018-06-13 DIAGNOSIS — C82.00 FOLLICULAR LYMPHOMA GRADE I, UNSPECIFIED BODY REGION (HCC): Primary | ICD-10-CM

## 2018-06-13 DIAGNOSIS — C84.68 ANAPLASTIC ALK-POSITIVE LARGE CELL LYMPHOMA OF LYMPH NODES OF MULTIPLE REGIONS (HCC): Primary | ICD-10-CM

## 2018-06-13 LAB
ALBUMIN SERPL-MCNC: 4.4 G/DL (ref 3.5–5)
ALBUMIN/GLOB SERPL: 1.3 G/DL (ref 1–2)
ALP SERPL-CCNC: 54 U/L (ref 38–126)
ALT SERPL W P-5'-P-CCNC: 26 U/L (ref 13–69)
ANION GAP SERPL CALCULATED.3IONS-SCNC: 12.4 MMOL/L (ref 10–20)
AST SERPL-CCNC: 25 U/L (ref 15–46)
BASOPHILS # BLD AUTO: 0.05 10*3/MM3 (ref 0–0.2)
BASOPHILS NFR BLD AUTO: 0.8 % (ref 0–2.5)
BILIRUB SERPL-MCNC: 0.3 MG/DL (ref 0.2–1.3)
BUN BLD-MCNC: 18 MG/DL (ref 7–20)
BUN/CREAT SERPL: 36 (ref 7.1–23.5)
CALCIUM SPEC-SCNC: 9.1 MG/DL (ref 8.4–10.2)
CHLORIDE SERPL-SCNC: 104 MMOL/L (ref 98–107)
CO2 SERPL-SCNC: 28 MMOL/L (ref 26–30)
CREAT BLD-MCNC: 0.5 MG/DL (ref 0.6–1.3)
DEPRECATED RDW RBC AUTO: 58.8 FL (ref 37–54)
EOSINOPHIL # BLD AUTO: 0.03 10*3/MM3 (ref 0–0.7)
EOSINOPHIL NFR BLD AUTO: 0.5 % (ref 0–7)
ERYTHROCYTE [DISTWIDTH] IN BLOOD BY AUTOMATED COUNT: 19.7 % (ref 11.5–14.5)
GFR SERPL CREATININE-BSD FRML MDRD: 143 ML/MIN/1.73
GLOBULIN UR ELPH-MCNC: 3.4 GM/DL
GLUCOSE BLD-MCNC: 133 MG/DL (ref 74–98)
HCT VFR BLD AUTO: 30.8 % (ref 37–47)
HGB BLD-MCNC: 10 G/DL (ref 12–16)
IMM GRANULOCYTES # BLD: 0.07 10*3/MM3 (ref 0–0.06)
IMM GRANULOCYTES NFR BLD: 1.1 % (ref 0–0.6)
LYMPHOCYTES # BLD AUTO: 1.31 10*3/MM3 (ref 0.6–3.4)
LYMPHOCYTES NFR BLD AUTO: 20.5 % (ref 10–50)
MCH RBC QN AUTO: 27 PG (ref 27–31)
MCHC RBC AUTO-ENTMCNC: 32.5 G/DL (ref 30–37)
MCV RBC AUTO: 83.2 FL (ref 81–99)
MONOCYTES # BLD AUTO: 0.59 10*3/MM3 (ref 0–0.9)
MONOCYTES NFR BLD AUTO: 9.2 % (ref 0–12)
NEUTROPHILS # BLD AUTO: 4.34 10*3/MM3 (ref 2–6.9)
NEUTROPHILS NFR BLD AUTO: 67.9 % (ref 37–80)
NRBC BLD MANUAL-RTO: 0 /100 WBC (ref 0–0)
PLATELET # BLD AUTO: 348 10*3/MM3 (ref 130–400)
PMV BLD AUTO: 8.1 FL (ref 6–12)
POTASSIUM BLD-SCNC: 3.4 MMOL/L (ref 3.5–5.1)
PROT SERPL-MCNC: 7.8 G/DL (ref 6.3–8.2)
RBC # BLD AUTO: 3.7 10*6/MM3 (ref 4.2–5.4)
SODIUM BLD-SCNC: 141 MMOL/L (ref 137–145)
WBC NRBC COR # BLD: 6.39 10*3/MM3 (ref 4.8–10.8)

## 2018-06-13 PROCEDURE — 80053 COMPREHEN METABOLIC PANEL: CPT

## 2018-06-13 PROCEDURE — 96413 CHEMO IV INFUSION 1 HR: CPT

## 2018-06-13 PROCEDURE — 99215 OFFICE O/P EST HI 40 MIN: CPT | Performed by: INTERNAL MEDICINE

## 2018-06-13 PROCEDURE — 96411 CHEMO IV PUSH ADDL DRUG: CPT

## 2018-06-13 PROCEDURE — 25010000002 CYCLOPHOSPHAMIDE PER 100 MG: Performed by: INTERNAL MEDICINE

## 2018-06-13 PROCEDURE — 96367 TX/PROPH/DG ADDL SEQ IV INF: CPT

## 2018-06-13 PROCEDURE — 25010000002 DEXAMETHASONE PER 1 MG: Performed by: INTERNAL MEDICINE

## 2018-06-13 PROCEDURE — 25010000002 PEGFILGRASTIM 6 MG/0.6ML PREFILLED SYRINGE KIT: Performed by: INTERNAL MEDICINE

## 2018-06-13 PROCEDURE — 96377 APPLICATON ON-BODY INJECTOR: CPT

## 2018-06-13 PROCEDURE — 96375 TX/PRO/DX INJ NEW DRUG ADDON: CPT

## 2018-06-13 PROCEDURE — 36415 COLL VENOUS BLD VENIPUNCTURE: CPT

## 2018-06-13 PROCEDURE — 25010000002 FOSAPREPITANT PER 1 MG: Performed by: INTERNAL MEDICINE

## 2018-06-13 PROCEDURE — 25010000002 VINCRISTINE PER 1 MG: Performed by: INTERNAL MEDICINE

## 2018-06-13 PROCEDURE — 25010000002 DOXORUBICIN PER 10 MG: Performed by: INTERNAL MEDICINE

## 2018-06-13 PROCEDURE — 85025 COMPLETE CBC W/AUTO DIFF WBC: CPT

## 2018-06-13 PROCEDURE — 25010000002 PALONOSETRON PER 25 MCG: Performed by: INTERNAL MEDICINE

## 2018-06-13 PROCEDURE — 96365 THER/PROPH/DIAG IV INF INIT: CPT

## 2018-06-13 PROCEDURE — 25010000002 HEPARIN FLUSH (PORCINE) 100 UNIT/ML SOLUTION: Performed by: INTERNAL MEDICINE

## 2018-06-13 RX ORDER — DOXORUBICIN HYDROCHLORIDE 2 MG/ML
50 INJECTION, SOLUTION INTRAVENOUS ONCE
Status: CANCELLED | OUTPATIENT
Start: 2018-07-06

## 2018-06-13 RX ORDER — SODIUM CHLORIDE 9 MG/ML
250 INJECTION, SOLUTION INTRAVENOUS ONCE
Status: DISCONTINUED | OUTPATIENT
Start: 2018-06-13 | End: 2018-06-13 | Stop reason: HOSPADM

## 2018-06-13 RX ORDER — SODIUM CHLORIDE 9 MG/ML
250 INJECTION, SOLUTION INTRAVENOUS ONCE
Status: CANCELLED | OUTPATIENT
Start: 2018-07-06

## 2018-06-13 RX ORDER — DOXORUBICIN HYDROCHLORIDE 2 MG/ML
50 INJECTION, SOLUTION INTRAVENOUS ONCE
Status: COMPLETED | OUTPATIENT
Start: 2018-06-13 | End: 2018-06-13

## 2018-06-13 RX ORDER — DOXORUBICIN HYDROCHLORIDE 2 MG/ML
50 INJECTION, SOLUTION INTRAVENOUS ONCE
Status: CANCELLED | OUTPATIENT
Start: 2018-06-15

## 2018-06-13 RX ORDER — PALONOSETRON 0.05 MG/ML
0.25 INJECTION, SOLUTION INTRAVENOUS ONCE
Status: COMPLETED | OUTPATIENT
Start: 2018-06-13 | End: 2018-06-13

## 2018-06-13 RX ORDER — PALONOSETRON 0.05 MG/ML
0.25 INJECTION, SOLUTION INTRAVENOUS ONCE
Status: CANCELLED | OUTPATIENT
Start: 2018-07-06

## 2018-06-13 RX ORDER — PALONOSETRON 0.05 MG/ML
0.25 INJECTION, SOLUTION INTRAVENOUS ONCE
Status: CANCELLED | OUTPATIENT
Start: 2018-06-15

## 2018-06-13 RX ORDER — SODIUM CHLORIDE 0.9 % (FLUSH) 0.9 %
10 SYRINGE (ML) INJECTION AS NEEDED
Status: CANCELLED | OUTPATIENT
Start: 2018-06-13

## 2018-06-13 RX ORDER — SODIUM CHLORIDE 9 MG/ML
250 INJECTION, SOLUTION INTRAVENOUS ONCE
Status: CANCELLED | OUTPATIENT
Start: 2018-06-15

## 2018-06-13 RX ORDER — SODIUM CHLORIDE 0.9 % (FLUSH) 0.9 %
10 SYRINGE (ML) INJECTION AS NEEDED
Status: DISCONTINUED | OUTPATIENT
Start: 2018-06-13 | End: 2018-06-13 | Stop reason: HOSPADM

## 2018-06-13 RX ADMIN — PEGFILGRASTIM 6 MG: KIT SUBCUTANEOUS at 14:13

## 2018-06-13 RX ADMIN — DOXORUBICIN HYDROCHLORIDE 108 MG: 2 INJECTION, SOLUTION INTRAVENOUS at 13:14

## 2018-06-13 RX ADMIN — PALONOSETRON HYDROCHLORIDE 0.25 MG: 0.25 INJECTION INTRAVENOUS at 12:37

## 2018-06-13 RX ADMIN — DEXAMETHASONE SODIUM PHOSPHATE 12 MG: 4 INJECTION, SOLUTION INTRA-ARTICULAR; INTRALESIONAL; INTRAMUSCULAR; INTRAVENOUS; SOFT TISSUE at 12:36

## 2018-06-13 RX ADMIN — Medication 10 ML: at 14:12

## 2018-06-13 RX ADMIN — SODIUM CHLORIDE, PRESERVATIVE FREE 500 UNITS: 5 INJECTION INTRAVENOUS at 14:12

## 2018-06-13 RX ADMIN — CYCLOPHOSPHAMIDE 1610 MG: 1 INJECTION, POWDER, FOR SOLUTION INTRAVENOUS; ORAL at 13:38

## 2018-06-13 RX ADMIN — SODIUM CHLORIDE 150 MG: 9 INJECTION, SOLUTION INTRAVENOUS at 12:54

## 2018-06-13 RX ADMIN — VINCRISTINE SULFATE 2 MG: 1 INJECTION, SOLUTION INTRAVENOUS at 13:22

## 2018-06-13 NOTE — PROGRESS NOTES
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 female  with past medical history significant for anaplastic large T-cell lymphoma, ALK positive diagnosed in April 19, 2018 after CT-guided core biopsy of left iliac mass.  Whole body PET scan revealed disease above and below the diaphragm.  Bone marrow biopsy done on May 1, 2018, that failed to show any evidence of bone marrow involvement.  The patient was started on chemotherapy using CHOP May 2, 2018.  The patient is here today for scheduled follow-up visit with cycle #3.    SUBJECTIVE: The patient is here today by herself.  She was able to tolerate chemotherapy with multiple side effects.  Colace has helped with constipation.  She had mild headaches.  Her abdominal pain is about the same.  She is coming of fatigue.  She is complaining of nausea.    PAST MEDICAL HISTORY/SOCIAL HISTORY/FAMILY HISTORY: Reviewed by me and unchanged from my documentation done on 06/13/18.    Review of Systems   Constitutional: Positive for fatigue. Negative for activity change, appetite change, fever and unexpected weight change.   HENT: Negative for hearing loss, mouth sores, nosebleeds, sore throat and trouble swallowing.    Eyes: Negative for visual disturbance.   Respiratory: Negative for cough, chest tightness, shortness of breath and wheezing.    Cardiovascular: Negative for chest pain, palpitations and leg swelling.   Gastrointestinal: Positive for abdominal pain and nausea. Negative for abdominal distention, blood in stool, constipation, diarrhea, rectal pain and vomiting.   Endocrine: Negative for cold intolerance and heat intolerance.   Genitourinary: Negative for difficulty urinating, dysuria, frequency and urgency.   Musculoskeletal: Negative for arthralgias, back pain, gait problem, joint swelling and myalgias.   Skin: Negative for  rash.   Neurological: Negative for dizziness, tremors, syncope, weakness, light-headedness, numbness and headaches.   Hematological: Positive for adenopathy. Does not bruise/bleed easily.   Psychiatric/Behavioral: Negative for confusion, sleep disturbance and suicidal ideas. The patient is not nervous/anxious.          Current Outpatient Prescriptions:   •  amitriptyline (ELAVIL) 10 MG tablet, Take 10 mg by mouth Every Night., Disp: , Rfl:   •  cyclobenzaprine (FLEXERIL) 10 MG tablet, 10 mg 2 (Two) Times a Day., Disp: , Rfl:   •  docusate sodium (COLACE) 100 MG capsule, Take 1 capsule by mouth Daily., Disp: 30 capsule, Rfl: 3  •  fluticasone (FLONASE) 50 MCG/ACT nasal spray, As Needed., Disp: , Rfl:   •  hydrochlorothiazide (HYDRODIURIL) 25 MG tablet, , Disp: , Rfl:   •  HYDROcodone-acetaminophen (NORCO) 5-325 MG per tablet, Take 1 tablet by mouth Every 6 (Six) Hours As Needed for Moderate Pain  or Severe Pain . (Patient taking differently: Take 1 tablet by mouth Every 8 (Eight) Hours As Needed for Moderate Pain  or Severe Pain .), Disp: 10 tablet, Rfl: 0  •  HYDROcodone-acetaminophen (NORCO) 5-325 MG per tablet, Take 1-2 tablets by mouth Every 4 (Four) Hours As Needed, Disp: 16 tablet, Rfl: 0  •  ibuprofen (ADVIL,MOTRIN) 800 MG tablet, 800 mg Every 6 (Six) Hours As Needed., Disp: , Rfl:   •  lactulose (CHRONULAC) 10 GM/15ML solution, Take 30 mL by mouth 3 (Three) Times a Day. PRN constipation, Disp: 240 mL, Rfl: 2  •  lidocaine-prilocaine (EMLA) 2.5-2.5 % cream, Apply  topically As Needed (45-60 minutes prior to port access.  Cover with saran/plastic wrap.)., Disp: 30 g, Rfl: 3  •  metroNIDAZOLE (METROGEL) 0.75 % gel, , Disp: , Rfl:   •  montelukast (SINGULAIR) 10 MG tablet, , Disp: , Rfl:   •  ondansetron (ZOFRAN) 8 MG tablet, Take 1 tablet by mouth 3 (Three) Times a Day As Needed for Nausea or Vomiting., Disp: 30 tablet, Rfl: 5  •  ondansetron ODT (ZOFRAN-ODT) 4 MG disintegrating tablet, Take 1 tablet by mouth  "Every 6 (Six) Hours As Needed for Nausea or Vomiting., Disp: 10 tablet, Rfl: 0  •  sertraline (ZOLOFT) 100 MG tablet, Take 100 mg by mouth Daily., Disp: , Rfl:   •  traMADol (ULTRAM) 50 MG tablet, Every 6 (Six) Hours As Needed., Disp: , Rfl:   •  traZODone (DESYREL) 50 MG tablet, 50 mg At Night As Needed., Disp: , Rfl:     PHYSICAL EXAMINATION:   /77   Pulse 90   Temp 97.1 °F (36.2 °C) (Temporal Artery )   Resp 16   Ht 170.2 cm (67\")   Wt 108 kg (237 lb)   BMI 37.12 kg/m²    ECOG Performance Status: 1 - Symptomatic but completely ambulatory  General Appearance:  alert, cooperative, no apparent distress and appears stated age   Neurologic/Psychiatric: A&O x 3, gait steady, appropriate affect, strength 5/5 in all muscle groups   HEENT:  Normocephalic, without obvious abnormality, mucous membranes moist   Neck: Supple, symmetrical, trachea midline, no adenopathy;  No thyromegaly, masses, or tenderness   Lungs:   Clear to auscultation bilaterally; respirations regular, even, and unlabored bilaterally   Heart:  Regular rate and rhythm, no murmurs appreciated   Abdomen:   Soft, non-tender, non-distended and no organomegaly   Lymph nodes: No cervical, supraclavicular, inguinal or axillary adenopathy noted   Extremities: Normal, atraumatic; no clubbing, cyanosis, or edema    Skin: No rashes, ulcers, or suspicious lesions noted     No visits with results within 2 Week(s) from this visit.   Latest known visit with results is:   Infusion on 05/24/2018   Component Date Value Ref Range Status   • Glucose 05/24/2018 127* 74 - 98 mg/dL Final   • BUN 05/24/2018 11  7 - 20 mg/dL Final   • Creatinine 05/24/2018 0.50* 0.60 - 1.30 mg/dL Final   • Sodium 05/24/2018 143  137 - 145 mmol/L Final   • Potassium 05/24/2018 3.3* 3.5 - 5.1 mmol/L Final   • Chloride 05/24/2018 106  98 - 107 mmol/L Final   • CO2 05/24/2018 27.0  26.0 - 30.0 mmol/L Final   • Calcium 05/24/2018 8.9  8.4 - 10.2 mg/dL Final   • Total Protein 05/24/2018 " 8.0  6.3 - 8.2 g/dL Final   • Albumin 05/24/2018 3.90  3.50 - 5.00 g/dL Final   • ALT (SGPT) 05/24/2018 29  13 - 69 U/L Final   • AST (SGOT) 05/24/2018 24  15 - 46 U/L Final   • Alkaline Phosphatase 05/24/2018 59  38 - 126 U/L Final   • Total Bilirubin 05/24/2018 0.3  0.2 - 1.3 mg/dL Final   • eGFR Non African Amer 05/24/2018 143  >60 mL/min/1.73 Final   • Globulin 05/24/2018 4.1  gm/dL Final   • A/G Ratio 05/24/2018 1.0  1.0 - 2.0 g/dL Final   • BUN/Creatinine Ratio 05/24/2018 22.0  7.1 - 23.5 Final   • Anion Gap 05/24/2018 13.3  10.0 - 20.0 mmol/L Final   • WBC 05/24/2018 6.51  4.80 - 10.80 10*3/mm3 Final   • RBC 05/24/2018 3.76* 4.20 - 5.40 10*6/mm3 Final   • Hemoglobin 05/24/2018 9.9* 12.0 - 16.0 g/dL Final   • Hematocrit 05/24/2018 31.0* 37.0 - 47.0 % Final   • MCV 05/24/2018 82.4  81.0 - 99.0 fL Final   • MCH 05/24/2018 26.3* 27.0 - 31.0 pg Final   • MCHC 05/24/2018 31.9  30.0 - 37.0 g/dL Final   • RDW 05/24/2018 17.1* 11.5 - 14.5 % Final   • RDW-SD 05/24/2018 49.5  37.0 - 54.0 fl Final   • MPV 05/24/2018 8.0  6.0 - 12.0 fL Final   • Platelets 05/24/2018 358  130 - 400 10*3/mm3 Final   • Neutrophil % 05/24/2018 63.7  37.0 - 80.0 % Final   • Lymphocyte % 05/24/2018 25.7  10.0 - 50.0 % Final   • Monocyte % 05/24/2018 6.9  0.0 - 12.0 % Final   • Eosinophil % 05/24/2018 1.1  0.0 - 7.0 % Final   • Basophil % 05/24/2018 1.1  0.0 - 2.5 % Final   • Immature Grans % 05/24/2018 1.5* 0.0 - 0.6 % Final   • Neutrophils, Absolute 05/24/2018 4.15  2.00 - 6.90 10*3/mm3 Final   • Lymphocytes, Absolute 05/24/2018 1.67  0.60 - 3.40 10*3/mm3 Final   • Monocytes, Absolute 05/24/2018 0.45  0.00 - 0.90 10*3/mm3 Final   • Eosinophils, Absolute 05/24/2018 0.07  0.00 - 0.70 10*3/mm3 Final   • Basophils, Absolute 05/24/2018 0.07  0.00 - 0.20 10*3/mm3 Final   • Immature Grans, Absolute 05/24/2018 0.10* 0.00 - 0.06 10*3/mm3 Final   • nRBC 05/24/2018 0.0  0.0 - 0.0 /100 WBC Final        No results found.    ASSESSMENT: The patient is a  very pleasant 32 y.o. female  with anaplastic large T-cell lymphoma, ALK positive    PROBLEM LIST:  1. Anaplastic large T-cell lymphoma, ALK positive:  A. Incidentally found LAD on MRI done for low back and hip pain done 9/5/2017.   B. Follow up CAT scan confirmed pelvic adenopathy extending from the distal aortic region through the left iliac region.   C. Status post FNA to left iliac lymph node done 9/29/2017. Pathology revealed benign lymphoid cell groups.   D. Repeat CT done 3/15/2018 revealed increased size of left iliac adenopathy with mild splenomegaly.   E. CT-guided biopsy done on April 19, 2018 showed anaplastic large T-cell lymphoma ALK+  F. whole body PET scan done on May 1, 2018 revealed disease above and below the diaphragm with hypermetabolic active lymph nodes in the supraclavicular area as well as retroperitoneum.  G. Started chemotherapy using CHOP May 2, 2018, status post 2 cycles  2. Mild splenomegaly   A. Incidentally found 9/5/2017.  B. Progressive size on repeat imaging done 3/15/2018.  3. Left hip and low back pain  A. Under care of pain management with use of Norco.   4. History of anoxic brain injury following cardiac arrest post-partum.   5. Postpartum cardiomyopathy.   6. Anxiety and depression  7.  Constipation  8.  Chemotherapy used nausea.  9.  Hypertension    PLAN:  1.  I will proceed with treatment as scheduled today CHOP, cycle #3.  2.  The patient will follow-up with me in 3 weeks for cycle #4 out of 6 planned.  3.  I will monitor patient blood work including blood counts kidney function liver function.  4.  I'll continue patient on Zofran as needed for chemotherapy induced nausea.  5.  The patient will take prednisone 100 mg daily for 5 days each cycle of chemotherapy.  Patient was advised to take this with food.  6.  We discussed the potential risks and side effects of CHOP chemotherapy including neutropenia, alopecia, nausea and vomiting, fatigue, neuropathy, cardiomyopathy,  constipation, infusion reaction, risk of myelodysplasia, infertility, and potential death.  The patient is not interested in having more children.  7.  We'll continue Port-A-Cath care.  Patient was given a prescription for EMLA cream.  8.  I will do repeat scans before cycle #4.  This will be ordered prior to return.  9.  I will continue the patient on Colace daily as well as lactulose as needed for constipation.  10.  I did go over the cardiac echo with the patient reassured her that her ejection fraction is normal.  We'll consider doing a follow-up echo if she has any new symptoms.  11.  We'll continue hydrochlorothiazide for hypertension.  I will monitor her blood pressrue since it might drop while she is on chemotherapy.  12.  I will continue Zoloft for depression.  Carlo Rice MD  6/13/2018

## 2018-07-09 ENCOUNTER — HOSPITAL ENCOUNTER (OUTPATIENT)
Dept: CT IMAGING | Facility: HOSPITAL | Age: 33
End: 2018-07-09
Attending: INTERNAL MEDICINE

## 2018-07-09 ENCOUNTER — APPOINTMENT (OUTPATIENT)
Dept: NUTRITION | Facility: HOSPITAL | Age: 33
End: 2018-07-09

## 2018-07-11 ENCOUNTER — INFUSION (OUTPATIENT)
Dept: ONCOLOGY | Facility: HOSPITAL | Age: 33
End: 2018-07-11

## 2018-07-11 ENCOUNTER — OFFICE VISIT (OUTPATIENT)
Dept: ONCOLOGY | Facility: CLINIC | Age: 33
End: 2018-07-11

## 2018-07-11 VITALS
HEART RATE: 87 BPM | SYSTOLIC BLOOD PRESSURE: 137 MMHG | TEMPERATURE: 97 F | WEIGHT: 244 LBS | DIASTOLIC BLOOD PRESSURE: 71 MMHG | RESPIRATION RATE: 20 BRPM | BODY MASS INDEX: 38.3 KG/M2 | HEIGHT: 67 IN

## 2018-07-11 DIAGNOSIS — C82.00 FOLLICULAR LYMPHOMA GRADE I, UNSPECIFIED BODY REGION (HCC): Primary | ICD-10-CM

## 2018-07-11 DIAGNOSIS — C82.00 FOLLICULAR LYMPHOMA GRADE I, UNSPECIFIED BODY REGION (HCC): ICD-10-CM

## 2018-07-11 DIAGNOSIS — C84.68 ANAPLASTIC ALK-POSITIVE LARGE CELL LYMPHOMA OF LYMPH NODES OF MULTIPLE REGIONS (HCC): Primary | ICD-10-CM

## 2018-07-11 LAB
ALBUMIN SERPL-MCNC: 4.1 G/DL (ref 3.5–5)
ALBUMIN/GLOB SERPL: 1.3 G/DL (ref 1–2)
ALP SERPL-CCNC: 51 U/L (ref 38–126)
ALT SERPL W P-5'-P-CCNC: 25 U/L (ref 13–69)
ANION GAP SERPL CALCULATED.3IONS-SCNC: 14.6 MMOL/L (ref 10–20)
AST SERPL-CCNC: 26 U/L (ref 15–46)
BASOPHILS # BLD AUTO: 0.06 10*3/MM3 (ref 0–0.2)
BASOPHILS NFR BLD AUTO: 1.2 % (ref 0–2.5)
BILIRUB SERPL-MCNC: 0.5 MG/DL (ref 0.2–1.3)
BUN BLD-MCNC: 10 MG/DL (ref 7–20)
BUN/CREAT SERPL: 16.7 (ref 7.1–23.5)
CALCIUM SPEC-SCNC: 8.9 MG/DL (ref 8.4–10.2)
CHLORIDE SERPL-SCNC: 106 MMOL/L (ref 98–107)
CO2 SERPL-SCNC: 25 MMOL/L (ref 26–30)
CREAT BLD-MCNC: 0.6 MG/DL (ref 0.6–1.3)
DEPRECATED RDW RBC AUTO: 59.7 FL (ref 37–54)
EOSINOPHIL # BLD AUTO: 0.18 10*3/MM3 (ref 0–0.7)
EOSINOPHIL NFR BLD AUTO: 3.5 % (ref 0–7)
ERYTHROCYTE [DISTWIDTH] IN BLOOD BY AUTOMATED COUNT: 19.3 % (ref 11.5–14.5)
GFR SERPL CREATININE-BSD FRML MDRD: 115 ML/MIN/1.73
GLOBULIN UR ELPH-MCNC: 3.2 GM/DL
GLUCOSE BLD-MCNC: 123 MG/DL (ref 74–98)
HCT VFR BLD AUTO: 30.5 % (ref 37–47)
HGB BLD-MCNC: 9.8 G/DL (ref 12–16)
IMM GRANULOCYTES # BLD: 0.06 10*3/MM3 (ref 0–0.06)
IMM GRANULOCYTES NFR BLD: 1.2 % (ref 0–0.6)
LYMPHOCYTES # BLD AUTO: 1.47 10*3/MM3 (ref 0.6–3.4)
LYMPHOCYTES NFR BLD AUTO: 28.3 % (ref 10–50)
MCH RBC QN AUTO: 27.1 PG (ref 27–31)
MCHC RBC AUTO-ENTMCNC: 32.1 G/DL (ref 30–37)
MCV RBC AUTO: 84.5 FL (ref 81–99)
MONOCYTES # BLD AUTO: 0.54 10*3/MM3 (ref 0–0.9)
MONOCYTES NFR BLD AUTO: 10.4 % (ref 0–12)
NEUTROPHILS # BLD AUTO: 2.88 10*3/MM3 (ref 2–6.9)
NEUTROPHILS NFR BLD AUTO: 55.4 % (ref 37–80)
NRBC BLD MANUAL-RTO: 0 /100 WBC (ref 0–0)
PLATELET # BLD AUTO: 257 10*3/MM3 (ref 130–400)
PMV BLD AUTO: 8.2 FL (ref 6–12)
POTASSIUM BLD-SCNC: 3.6 MMOL/L (ref 3.5–5.1)
PROT SERPL-MCNC: 7.3 G/DL (ref 6.3–8.2)
RBC # BLD AUTO: 3.61 10*6/MM3 (ref 4.2–5.4)
SODIUM BLD-SCNC: 142 MMOL/L (ref 137–145)
WBC NRBC COR # BLD: 5.19 10*3/MM3 (ref 4.8–10.8)

## 2018-07-11 PROCEDURE — 96367 TX/PROPH/DG ADDL SEQ IV INF: CPT

## 2018-07-11 PROCEDURE — 25010000002 FOSAPREPITANT PER 1 MG: Performed by: INTERNAL MEDICINE

## 2018-07-11 PROCEDURE — 96368 THER/DIAG CONCURRENT INF: CPT

## 2018-07-11 PROCEDURE — 25010000002 DEXAMETHASONE PER 1 MG: Performed by: INTERNAL MEDICINE

## 2018-07-11 PROCEDURE — 96377 APPLICATON ON-BODY INJECTOR: CPT

## 2018-07-11 PROCEDURE — 96413 CHEMO IV INFUSION 1 HR: CPT

## 2018-07-11 PROCEDURE — 96375 TX/PRO/DX INJ NEW DRUG ADDON: CPT

## 2018-07-11 PROCEDURE — 85025 COMPLETE CBC W/AUTO DIFF WBC: CPT

## 2018-07-11 PROCEDURE — 96411 CHEMO IV PUSH ADDL DRUG: CPT

## 2018-07-11 PROCEDURE — 99214 OFFICE O/P EST MOD 30 MIN: CPT | Performed by: INTERNAL MEDICINE

## 2018-07-11 PROCEDURE — 25010000002 PEGFILGRASTIM 6 MG/0.6ML PREFILLED SYRINGE KIT: Performed by: INTERNAL MEDICINE

## 2018-07-11 PROCEDURE — 25010000002 HEPARIN FLUSH (PORCINE) 100 UNIT/ML SOLUTION: Performed by: INTERNAL MEDICINE

## 2018-07-11 PROCEDURE — 25010000002 PALONOSETRON PER 25 MCG: Performed by: INTERNAL MEDICINE

## 2018-07-11 PROCEDURE — 25010000002 CYCLOPHOSPHAMIDE PER 100 MG: Performed by: INTERNAL MEDICINE

## 2018-07-11 PROCEDURE — 80053 COMPREHEN METABOLIC PANEL: CPT

## 2018-07-11 PROCEDURE — 96366 THER/PROPH/DIAG IV INF ADDON: CPT

## 2018-07-11 PROCEDURE — 25010000002 VINCRISTINE PER 1 MG: Performed by: INTERNAL MEDICINE

## 2018-07-11 PROCEDURE — 25010000002 DOXORUBICIN PER 10 MG: Performed by: INTERNAL MEDICINE

## 2018-07-11 PROCEDURE — 36415 COLL VENOUS BLD VENIPUNCTURE: CPT

## 2018-07-11 RX ORDER — SODIUM CHLORIDE 9 MG/ML
250 INJECTION, SOLUTION INTRAVENOUS ONCE
Status: DISCONTINUED | OUTPATIENT
Start: 2018-07-11 | End: 2018-07-11 | Stop reason: HOSPADM

## 2018-07-11 RX ORDER — SODIUM CHLORIDE 0.9 % (FLUSH) 0.9 %
10 SYRINGE (ML) INJECTION AS NEEDED
Status: DISCONTINUED | OUTPATIENT
Start: 2018-07-11 | End: 2018-07-11 | Stop reason: HOSPADM

## 2018-07-11 RX ORDER — DOXORUBICIN HYDROCHLORIDE 2 MG/ML
50 INJECTION, SOLUTION INTRAVENOUS ONCE
Status: COMPLETED | OUTPATIENT
Start: 2018-07-11 | End: 2018-07-11

## 2018-07-11 RX ORDER — PALONOSETRON 0.05 MG/ML
0.25 INJECTION, SOLUTION INTRAVENOUS ONCE
Status: COMPLETED | OUTPATIENT
Start: 2018-07-11 | End: 2018-07-11

## 2018-07-11 RX ORDER — SODIUM CHLORIDE 0.9 % (FLUSH) 0.9 %
10 SYRINGE (ML) INJECTION AS NEEDED
Status: CANCELLED | OUTPATIENT
Start: 2018-07-11

## 2018-07-11 RX ADMIN — PEGFILGRASTIM 6 MG: KIT SUBCUTANEOUS at 13:17

## 2018-07-11 RX ADMIN — SODIUM CHLORIDE 150 MG: 9 INJECTION, SOLUTION INTRAVENOUS at 11:32

## 2018-07-11 RX ADMIN — PALONOSETRON HYDROCHLORIDE 0.25 MG: 0.25 INJECTION INTRAVENOUS at 11:30

## 2018-07-11 RX ADMIN — Medication 10 ML: at 13:21

## 2018-07-11 RX ADMIN — DEXAMETHASONE SODIUM PHOSPHATE 12 MG: 4 INJECTION, SOLUTION INTRA-ARTICULAR; INTRALESIONAL; INTRAMUSCULAR; INTRAVENOUS; SOFT TISSUE at 11:59

## 2018-07-11 RX ADMIN — DOXORUBICIN HYDROCHLORIDE 108 MG: 2 INJECTION, SOLUTION INTRAVENOUS at 12:20

## 2018-07-11 RX ADMIN — SODIUM CHLORIDE, PRESERVATIVE FREE 500 UNITS: 5 INJECTION INTRAVENOUS at 13:21

## 2018-07-11 RX ADMIN — VINCRISTINE SULFATE 2 MG: 1 INJECTION, SOLUTION INTRAVENOUS at 12:31

## 2018-07-11 RX ADMIN — CYCLOPHOSPHAMIDE 1610 MG: 1 INJECTION, POWDER, FOR SOLUTION INTRAVENOUS; ORAL at 12:42

## 2018-07-11 NOTE — PROGRESS NOTES
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 female  with past medical history significant for anaplastic large T-cell lymphoma, ALK positive diagnosed in April 19, 2018 after CT-guided core biopsy of left iliac mass.  Whole body PET scan revealed disease above and below the diaphragm.  Bone marrow biopsy done on May 1, 2018, that failed to show any evidence of bone marrow involvement.  The patient was started on chemotherapy using CHOP May 2, 2018.  The patient is here today for scheduled follow-up visit with cycle #4.    SUBJECTIVE: The patient is here today by herself.  She was able to tolerate chemotherapy with multiple side effects.  Colace has helped with constipation.  She had mild headaches.  Her abdominal pain is better.  She is coming of fatigue.  She is complaining of nausea.    PAST MEDICAL HISTORY/SOCIAL HISTORY/FAMILY HISTORY: Reviewed by me and unchanged from my documentation done on 07/11/18.    Review of Systems   Constitutional: Positive for fatigue. Negative for activity change, appetite change, fever and unexpected weight change.   HENT: Negative for hearing loss, mouth sores, nosebleeds, sore throat and trouble swallowing.    Eyes: Negative for visual disturbance.   Respiratory: Negative for cough, chest tightness, shortness of breath and wheezing.    Cardiovascular: Negative for chest pain, palpitations and leg swelling.   Gastrointestinal: Positive for abdominal pain and nausea. Negative for abdominal distention, blood in stool, constipation, diarrhea, rectal pain and vomiting.   Endocrine: Negative for cold intolerance and heat intolerance.   Genitourinary: Negative for difficulty urinating, dysuria, frequency and urgency.   Musculoskeletal: Negative for arthralgias, back pain, gait problem, joint swelling and myalgias.   Skin: Negative for rash.    Neurological: Negative for dizziness, tremors, syncope, weakness, light-headedness, numbness and headaches.   Hematological: Positive for adenopathy. Does not bruise/bleed easily.   Psychiatric/Behavioral: Negative for confusion, sleep disturbance and suicidal ideas. The patient is not nervous/anxious.          Current Outpatient Prescriptions:   •  amitriptyline (ELAVIL) 10 MG tablet, Take 10 mg by mouth Every Night., Disp: , Rfl:   •  cyclobenzaprine (FLEXERIL) 10 MG tablet, 10 mg 2 (Two) Times a Day., Disp: , Rfl:   •  docusate sodium (COLACE) 100 MG capsule, Take 1 capsule by mouth Daily., Disp: 30 capsule, Rfl: 3  •  fluticasone (FLONASE) 50 MCG/ACT nasal spray, As Needed., Disp: , Rfl:   •  hydrochlorothiazide (HYDRODIURIL) 25 MG tablet, , Disp: , Rfl:   •  HYDROcodone-acetaminophen (NORCO) 5-325 MG per tablet, Take 1 tablet by mouth Every 6 (Six) Hours As Needed for Moderate Pain  or Severe Pain . (Patient taking differently: Take 1 tablet by mouth Every 8 (Eight) Hours As Needed for Moderate Pain  or Severe Pain .), Disp: 10 tablet, Rfl: 0  •  HYDROcodone-acetaminophen (NORCO) 5-325 MG per tablet, Take 1-2 tablets by mouth Every 4 (Four) Hours As Needed, Disp: 16 tablet, Rfl: 0  •  ibuprofen (ADVIL,MOTRIN) 800 MG tablet, 800 mg Every 6 (Six) Hours As Needed., Disp: , Rfl:   •  lactulose (CHRONULAC) 10 GM/15ML solution, Take 30 mL by mouth 3 (Three) Times a Day. PRN constipation, Disp: 240 mL, Rfl: 2  •  lidocaine-prilocaine (EMLA) 2.5-2.5 % cream, Apply  topically As Needed (45-60 minutes prior to port access.  Cover with saran/plastic wrap.)., Disp: 30 g, Rfl: 3  •  metroNIDAZOLE (METROGEL) 0.75 % gel, , Disp: , Rfl:   •  montelukast (SINGULAIR) 10 MG tablet, , Disp: , Rfl:   •  ondansetron (ZOFRAN) 8 MG tablet, Take 1 tablet by mouth 3 (Three) Times a Day As Needed for Nausea or Vomiting., Disp: 30 tablet, Rfl: 5  •  ondansetron ODT (ZOFRAN-ODT) 4 MG disintegrating tablet, Take 1 tablet by mouth Every 6  "(Six) Hours As Needed for Nausea or Vomiting., Disp: 10 tablet, Rfl: 0  •  sertraline (ZOLOFT) 100 MG tablet, Take 100 mg by mouth Daily., Disp: , Rfl:   •  traMADol (ULTRAM) 50 MG tablet, Every 6 (Six) Hours As Needed., Disp: , Rfl:   •  traZODone (DESYREL) 50 MG tablet, 50 mg At Night As Needed., Disp: , Rfl:     PHYSICAL EXAMINATION:   /71   Pulse 87   Temp 97 °F (36.1 °C) (Temporal Artery )   Resp 20   Ht 170.2 cm (67\")   Wt 111 kg (244 lb)   BMI 38.22 kg/m²    ECOG Performance Status: 1 - Symptomatic but completely ambulatory  General Appearance:  alert, cooperative, no apparent distress and appears stated age   Neurologic/Psychiatric: A&O x 3, gait steady, appropriate affect, strength 5/5 in all muscle groups   HEENT:  Normocephalic, without obvious abnormality, mucous membranes moist   Neck: Supple, symmetrical, trachea midline, no adenopathy;  No thyromegaly, masses, or tenderness   Lungs:   Clear to auscultation bilaterally; respirations regular, even, and unlabored bilaterally   Heart:  Regular rate and rhythm, no murmurs appreciated   Abdomen:   Soft, non-tender, non-distended and no organomegaly   Lymph nodes: No cervical, supraclavicular, inguinal or axillary adenopathy noted   Extremities: Normal, atraumatic; no clubbing, cyanosis, or edema    Skin: No rashes, ulcers, or suspicious lesions noted     No visits with results within 2 Week(s) from this visit.   Latest known visit with results is:   Infusion on 06/13/2018   Component Date Value Ref Range Status   • Glucose 06/13/2018 133* 74 - 98 mg/dL Final   • BUN 06/13/2018 18  7 - 20 mg/dL Final   • Creatinine 06/13/2018 0.50* 0.60 - 1.30 mg/dL Final   • Sodium 06/13/2018 141  137 - 145 mmol/L Final   • Potassium 06/13/2018 3.4* 3.5 - 5.1 mmol/L Final   • Chloride 06/13/2018 104  98 - 107 mmol/L Final   • CO2 06/13/2018 28.0  26.0 - 30.0 mmol/L Final   • Calcium 06/13/2018 9.1  8.4 - 10.2 mg/dL Final   • Total Protein 06/13/2018 7.8  6.3 - " 8.2 g/dL Final   • Albumin 06/13/2018 4.40  3.50 - 5.00 g/dL Final   • ALT (SGPT) 06/13/2018 26  13 - 69 U/L Final   • AST (SGOT) 06/13/2018 25  15 - 46 U/L Final   • Alkaline Phosphatase 06/13/2018 54  38 - 126 U/L Final   • Total Bilirubin 06/13/2018 0.3  0.2 - 1.3 mg/dL Final   • eGFR Non African Amer 06/13/2018 143  >60 mL/min/1.73 Final   • Globulin 06/13/2018 3.4  gm/dL Final   • A/G Ratio 06/13/2018 1.3  1.0 - 2.0 g/dL Final   • BUN/Creatinine Ratio 06/13/2018 36.0* 7.1 - 23.5 Final   • Anion Gap 06/13/2018 12.4  10.0 - 20.0 mmol/L Final   • WBC 06/13/2018 6.39  4.80 - 10.80 10*3/mm3 Final   • RBC 06/13/2018 3.70* 4.20 - 5.40 10*6/mm3 Final   • Hemoglobin 06/13/2018 10.0* 12.0 - 16.0 g/dL Final   • Hematocrit 06/13/2018 30.8* 37.0 - 47.0 % Final   • MCV 06/13/2018 83.2  81.0 - 99.0 fL Final   • MCH 06/13/2018 27.0  27.0 - 31.0 pg Final   • MCHC 06/13/2018 32.5  30.0 - 37.0 g/dL Final   • RDW 06/13/2018 19.7* 11.5 - 14.5 % Final   • RDW-SD 06/13/2018 58.8* 37.0 - 54.0 fl Final   • MPV 06/13/2018 8.1  6.0 - 12.0 fL Final   • Platelets 06/13/2018 348  130 - 400 10*3/mm3 Final   • Neutrophil % 06/13/2018 67.9  37.0 - 80.0 % Final   • Lymphocyte % 06/13/2018 20.5  10.0 - 50.0 % Final   • Monocyte % 06/13/2018 9.2  0.0 - 12.0 % Final   • Eosinophil % 06/13/2018 0.5  0.0 - 7.0 % Final   • Basophil % 06/13/2018 0.8  0.0 - 2.5 % Final   • Immature Grans % 06/13/2018 1.1* 0.0 - 0.6 % Final   • Neutrophils, Absolute 06/13/2018 4.34  2.00 - 6.90 10*3/mm3 Final   • Lymphocytes, Absolute 06/13/2018 1.31  0.60 - 3.40 10*3/mm3 Final   • Monocytes, Absolute 06/13/2018 0.59  0.00 - 0.90 10*3/mm3 Final   • Eosinophils, Absolute 06/13/2018 0.03  0.00 - 0.70 10*3/mm3 Final   • Basophils, Absolute 06/13/2018 0.05  0.00 - 0.20 10*3/mm3 Final   • Immature Grans, Absolute 06/13/2018 0.07* 0.00 - 0.06 10*3/mm3 Final   • nRBC 06/13/2018 0.0  0.0 - 0.0 /100 WBC Final      No results found.(  No results found.    ASSESSMENT: The patient  is a very pleasant 33 y.o. female  with anaplastic large T-cell lymphoma, ALK positive    PROBLEM LIST:  1. Anaplastic large T-cell lymphoma, ALK positive:  A. Incidentally found LAD on MRI done for low back and hip pain done 9/5/2017.   B. Follow up CAT scan confirmed pelvic adenopathy extending from the distal aortic region through the left iliac region.   C. Status post FNA to left iliac lymph node done 9/29/2017. Pathology revealed benign lymphoid cell groups.   D. Repeat CT done 3/15/2018 revealed increased size of left iliac adenopathy with mild splenomegaly.   E. CT-guided biopsy done on April 19, 2018 showed anaplastic large T-cell lymphoma ALK+  F. whole body PET scan done on May 1, 2018 revealed disease above and below the diaphragm with hypermetabolic active lymph nodes in the supraclavicular area as well as retroperitoneum.  G. Started chemotherapy using CHOP May 2, 2018, status post 3 cycles  2. Mild splenomegaly   A. Incidentally found 9/5/2017.  B. Progressive size on repeat imaging done 3/15/2018.  3. Left hip and low back pain  A. Under care of pain management with use of Norco.   4. History of anoxic brain injury following cardiac arrest post-partum.   5. Postpartum cardiomyopathy.   6. Anxiety and depression  7.  Constipation  8.  Chemotherapy used nausea.  9.  Hypertension    PLAN:  1.  I will proceed with treatment as scheduled today CHOP, cycle #4.  2.  The patient will follow-up with me in 3 weeks for cycle #5 out of 6 planned.  3.  I will monitor patient blood work including blood counts kidney function liver function.  4.  I'll continue patient on Zofran as needed for chemotherapy induced nausea.  5.  The patient will take prednisone 100 mg daily for 5 days each cycle of chemotherapy.  Patient was advised to take this with food.  6.  We discussed the potential risks and side effects of CHOP chemotherapy including neutropenia, alopecia, nausea and vomiting, fatigue, neuropathy,  cardiomyopathy, constipation, infusion reaction, risk of myelodysplasia, infertility, and potential death.  The patient is not interested in having more children.  7.  We'll continue Port-A-Cath care.  Patient was given a prescription for EMLA cream.  8.  Unfortunately patient did not get her scans done. This will be ordered prior to return.  9.  I will continue the patient on Colace daily as well as lactulose as needed for constipation.  10.  I did go over the cardiac echo with the patient reassured her that her ejection fraction is normal.  We'll consider doing a follow-up echo if she has any new symptoms.  11.  We'll continue hydrochlorothiazide for hypertension.  I will monitor her blood pressrue since it might drop while she is on chemotherapy.  12.  I will continue Zoloft for depression.  Carlo Rice MD  7/11/2018

## 2018-07-13 ENCOUNTER — APPOINTMENT (OUTPATIENT)
Dept: NUTRITION | Facility: HOSPITAL | Age: 33
End: 2018-07-13

## 2018-07-25 ENCOUNTER — HOSPITAL ENCOUNTER (OUTPATIENT)
Dept: NUTRITION | Facility: HOSPITAL | Age: 33
Setting detail: RECURRING SERIES
Discharge: HOME | End: 2018-07-25

## 2018-07-25 PROCEDURE — 97803 MED NUTRITION INDIV SUBSEQ: CPT

## 2018-07-25 NOTE — PROGRESS NOTES
Nutrition Services    Patient Name:  Johny Hearn  YOB: 1985  MRN: [id number]  Admit Date:  7/25/2018    Pt was seen today for her follow-up appointment. Pt's weight today was 237 lbs. Pt was pleased with her progress and maintenance. She expressed that her food stamps had been decreased to $55/month from $250 before. RD and pt called a few food mendez in the area to obtain information regarding food assistance. Pt stated they could maybe give an additional $80/month towards food to help RD with making a food plan based on their budget. Pt would like to speak with her  before giving a specific number to their food budget.     RD reviewed pt's previous goals:  1. Decrease pop intake- pt met this goal 100% of the time. She no longer drinks sugary drinks but only water and coffee.  2. Walk to the mailbox and up and down the sidewalk- pt is walking to her mailbox daily, but not the sidewalks.  3. Integrate frozen fruits into daily food routines- pt cannot do this because she does not have the money to purchase frozen fruit.    RD provided pt with material on how to eat on a budget, meal planning tips, how to shop when on a budget, an updated [facility]. Food Assistance handout, and supporting material.    Pt made 2 goals for this month:  1. Review food budget with  and provide a number for RD to help plan meals before next visit.  2. Increase walking to 2-3x to the mailbox/day.    Pt scheduled a follow-up appointment for August 27 @ 3:00. RD to follow pt. Consult KATRINA PRN.     Electronically signed by:  Miroslava Guan RD  07/25/18 3:26 PM

## 2018-07-30 ENCOUNTER — TELEPHONE (OUTPATIENT)
Dept: ONCOLOGY | Facility: CLINIC | Age: 33
End: 2018-07-30

## 2018-07-30 ENCOUNTER — HOSPITAL ENCOUNTER (OUTPATIENT)
Dept: CT IMAGING | Facility: HOSPITAL | Age: 33
Discharge: HOME | End: 2018-07-30
Attending: INTERNAL MEDICINE | Admitting: INTERNAL MEDICINE

## 2018-07-30 DIAGNOSIS — C82.00 FOLLICULAR LYMPHOMA GRADE I, UNSPECIFIED BODY REGION (HCC): ICD-10-CM

## 2018-07-30 DIAGNOSIS — C84.68 ANAPLASTIC ALK-POSITIVE LARGE CELL LYMPHOMA OF LYMPH NODES OF MULTIPLE REGIONS (HCC): ICD-10-CM

## 2018-07-30 PROCEDURE — 74177 CT ABD & PELVIS W/CONTRAST: CPT

## 2018-07-30 PROCEDURE — 71260 CT THORAX DX C+: CPT

## 2018-07-30 PROCEDURE — 25010000002 IOPAMIDOL 61 % SOLUTION: Performed by: INTERNAL MEDICINE

## 2018-07-30 RX ADMIN — IOPAMIDOL 99 ML: 612 INJECTION, SOLUTION INTRAVENOUS at 10:00

## 2018-07-30 NOTE — TELEPHONE ENCOUNTER
----- Message from Avtar Esteban sent at 7/30/2018  1:06 PM EDT -----  Regarding: WIG ORDER   PT IS WANTING AN ORDER FOR WIG.   THANKS   SKINNY

## 2018-07-31 DIAGNOSIS — C84.68 ANAPLASTIC ALK-POSITIVE LARGE CELL LYMPHOMA OF LYMPH NODES OF MULTIPLE REGIONS (HCC): ICD-10-CM

## 2018-08-01 ENCOUNTER — INFUSION (OUTPATIENT)
Dept: ONCOLOGY | Facility: HOSPITAL | Age: 33
End: 2018-08-01

## 2018-08-01 ENCOUNTER — OFFICE VISIT (OUTPATIENT)
Dept: ONCOLOGY | Facility: CLINIC | Age: 33
End: 2018-08-01

## 2018-08-01 VITALS
OXYGEN SATURATION: 99 % | TEMPERATURE: 97.2 F | BODY MASS INDEX: 37.53 KG/M2 | WEIGHT: 239.1 LBS | RESPIRATION RATE: 18 BRPM | SYSTOLIC BLOOD PRESSURE: 107 MMHG | DIASTOLIC BLOOD PRESSURE: 57 MMHG | HEIGHT: 67 IN | HEART RATE: 77 BPM

## 2018-08-01 DIAGNOSIS — C84.68 ANAPLASTIC ALK-POSITIVE LARGE CELL LYMPHOMA OF LYMPH NODES OF MULTIPLE REGIONS (HCC): ICD-10-CM

## 2018-08-01 DIAGNOSIS — C82.00 FOLLICULAR LYMPHOMA GRADE I, UNSPECIFIED BODY REGION (HCC): Primary | ICD-10-CM

## 2018-08-01 DIAGNOSIS — C82.00 FOLLICULAR LYMPHOMA GRADE I, UNSPECIFIED BODY REGION (HCC): ICD-10-CM

## 2018-08-01 DIAGNOSIS — C84.68 ANAPLASTIC ALK-POSITIVE LARGE CELL LYMPHOMA OF LYMPH NODES OF MULTIPLE REGIONS (HCC): Primary | ICD-10-CM

## 2018-08-01 LAB
ALBUMIN SERPL-MCNC: 4.2 G/DL (ref 3.5–5)
ALBUMIN/GLOB SERPL: 1.3 G/DL (ref 1–2)
ALP SERPL-CCNC: 55 U/L (ref 38–126)
ALT SERPL W P-5'-P-CCNC: 24 U/L (ref 13–69)
ANION GAP SERPL CALCULATED.3IONS-SCNC: 12.8 MMOL/L (ref 10–20)
AST SERPL-CCNC: 23 U/L (ref 15–46)
BASOPHILS # BLD AUTO: 0.06 10*3/MM3 (ref 0–0.2)
BASOPHILS NFR BLD AUTO: 1.3 % (ref 0–2.5)
BILIRUB SERPL-MCNC: 0.3 MG/DL (ref 0.2–1.3)
BUN BLD-MCNC: 22 MG/DL (ref 7–20)
BUN/CREAT SERPL: 36.7 (ref 7.1–23.5)
CALCIUM SPEC-SCNC: 9.1 MG/DL (ref 8.4–10.2)
CHLORIDE SERPL-SCNC: 105 MMOL/L (ref 98–107)
CO2 SERPL-SCNC: 27 MMOL/L (ref 26–30)
CREAT BLD-MCNC: 0.6 MG/DL (ref 0.6–1.3)
DEPRECATED RDW RBC AUTO: 54.3 FL (ref 37–54)
EOSINOPHIL # BLD AUTO: 0.04 10*3/MM3 (ref 0–0.7)
EOSINOPHIL NFR BLD AUTO: 0.8 % (ref 0–7)
ERYTHROCYTE [DISTWIDTH] IN BLOOD BY AUTOMATED COUNT: 17.6 % (ref 11.5–14.5)
GFR SERPL CREATININE-BSD FRML MDRD: 115 ML/MIN/1.73
GLOBULIN UR ELPH-MCNC: 3.2 GM/DL
GLUCOSE BLD-MCNC: 114 MG/DL (ref 74–98)
HCT VFR BLD AUTO: 31.2 % (ref 37–47)
HGB BLD-MCNC: 10.1 G/DL (ref 12–16)
IMM GRANULOCYTES # BLD: 0.09 10*3/MM3 (ref 0–0.06)
IMM GRANULOCYTES NFR BLD: 1.9 % (ref 0–0.6)
LYMPHOCYTES # BLD AUTO: 1.23 10*3/MM3 (ref 0.6–3.4)
LYMPHOCYTES NFR BLD AUTO: 25.8 % (ref 10–50)
MCH RBC QN AUTO: 27.2 PG (ref 27–31)
MCHC RBC AUTO-ENTMCNC: 32.4 G/DL (ref 30–37)
MCV RBC AUTO: 83.9 FL (ref 81–99)
MONOCYTES # BLD AUTO: 0.38 10*3/MM3 (ref 0–0.9)
MONOCYTES NFR BLD AUTO: 8 % (ref 0–12)
NEUTROPHILS # BLD AUTO: 2.96 10*3/MM3 (ref 2–6.9)
NEUTROPHILS NFR BLD AUTO: 62.2 % (ref 37–80)
NRBC BLD MANUAL-RTO: 0 /100 WBC (ref 0–0)
PLATELET # BLD AUTO: 346 10*3/MM3 (ref 130–400)
PMV BLD AUTO: 8.6 FL (ref 6–12)
POTASSIUM BLD-SCNC: 3.8 MMOL/L (ref 3.5–5.1)
PROT SERPL-MCNC: 7.4 G/DL (ref 6.3–8.2)
RBC # BLD AUTO: 3.72 10*6/MM3 (ref 4.2–5.4)
SODIUM BLD-SCNC: 141 MMOL/L (ref 137–145)
WBC NRBC COR # BLD: 4.76 10*3/MM3 (ref 4.8–10.8)

## 2018-08-01 PROCEDURE — 96377 APPLICATON ON-BODY INJECTOR: CPT

## 2018-08-01 PROCEDURE — 96409 CHEMO IV PUSH SNGL DRUG: CPT

## 2018-08-01 PROCEDURE — 25010000002 CYCLOPHOSPHAMIDE PER 100 MG: Performed by: INTERNAL MEDICINE

## 2018-08-01 PROCEDURE — 25010000002 PALONOSETRON PER 25 MCG: Performed by: INTERNAL MEDICINE

## 2018-08-01 PROCEDURE — 96413 CHEMO IV INFUSION 1 HR: CPT

## 2018-08-01 PROCEDURE — 85025 COMPLETE CBC W/AUTO DIFF WBC: CPT

## 2018-08-01 PROCEDURE — 25010000002 FOSAPREPITANT PER 1 MG: Performed by: INTERNAL MEDICINE

## 2018-08-01 PROCEDURE — 99215 OFFICE O/P EST HI 40 MIN: CPT | Performed by: INTERNAL MEDICINE

## 2018-08-01 PROCEDURE — 36415 COLL VENOUS BLD VENIPUNCTURE: CPT

## 2018-08-01 PROCEDURE — 96372 THER/PROPH/DIAG INJ SC/IM: CPT

## 2018-08-01 PROCEDURE — 96367 TX/PROPH/DG ADDL SEQ IV INF: CPT

## 2018-08-01 PROCEDURE — 25010000002 VINCRISTINE PER 1 MG: Performed by: INTERNAL MEDICINE

## 2018-08-01 PROCEDURE — 25010000002 HEPARIN FLUSH (PORCINE) 100 UNIT/ML SOLUTION: Performed by: INTERNAL MEDICINE

## 2018-08-01 PROCEDURE — 96368 THER/DIAG CONCURRENT INF: CPT

## 2018-08-01 PROCEDURE — 80053 COMPREHEN METABOLIC PANEL: CPT

## 2018-08-01 PROCEDURE — 25010000002 DOXORUBICIN PER 10 MG: Performed by: INTERNAL MEDICINE

## 2018-08-01 PROCEDURE — 96375 TX/PRO/DX INJ NEW DRUG ADDON: CPT

## 2018-08-01 PROCEDURE — 96411 CHEMO IV PUSH ADDL DRUG: CPT

## 2018-08-01 PROCEDURE — 25010000002 DEXAMETHASONE PER 1 MG: Performed by: INTERNAL MEDICINE

## 2018-08-01 PROCEDURE — 96366 THER/PROPH/DIAG IV INF ADDON: CPT

## 2018-08-01 PROCEDURE — 25010000002 PEGFILGRASTIM 6 MG/0.6ML PREFILLED SYRINGE KIT: Performed by: INTERNAL MEDICINE

## 2018-08-01 PROCEDURE — 96417 CHEMO IV INFUS EACH ADDL SEQ: CPT

## 2018-08-01 RX ORDER — SODIUM CHLORIDE 9 MG/ML
250 INJECTION, SOLUTION INTRAVENOUS ONCE
Status: DISCONTINUED | OUTPATIENT
Start: 2018-08-01 | End: 2018-08-01 | Stop reason: HOSPADM

## 2018-08-01 RX ORDER — PALONOSETRON 0.05 MG/ML
0.25 INJECTION, SOLUTION INTRAVENOUS ONCE
Status: CANCELLED | OUTPATIENT
Start: 2018-08-01

## 2018-08-01 RX ORDER — PALONOSETRON 0.05 MG/ML
0.25 INJECTION, SOLUTION INTRAVENOUS ONCE
Status: COMPLETED | OUTPATIENT
Start: 2018-08-01 | End: 2018-08-01

## 2018-08-01 RX ORDER — DOXORUBICIN HYDROCHLORIDE 2 MG/ML
50 INJECTION, SOLUTION INTRAVENOUS ONCE
Status: COMPLETED | OUTPATIENT
Start: 2018-08-01 | End: 2018-08-01

## 2018-08-01 RX ORDER — DOXORUBICIN HYDROCHLORIDE 2 MG/ML
50 INJECTION, SOLUTION INTRAVENOUS ONCE
Status: CANCELLED | OUTPATIENT
Start: 2018-08-01

## 2018-08-01 RX ORDER — SODIUM CHLORIDE 9 MG/ML
250 INJECTION, SOLUTION INTRAVENOUS ONCE
Status: CANCELLED | OUTPATIENT
Start: 2018-08-23

## 2018-08-01 RX ORDER — SODIUM CHLORIDE 0.9 % (FLUSH) 0.9 %
10 SYRINGE (ML) INJECTION AS NEEDED
Status: CANCELLED | OUTPATIENT
Start: 2018-08-01

## 2018-08-01 RX ORDER — DOXORUBICIN HYDROCHLORIDE 2 MG/ML
50 INJECTION, SOLUTION INTRAVENOUS ONCE
Status: CANCELLED | OUTPATIENT
Start: 2018-08-23

## 2018-08-01 RX ORDER — SODIUM CHLORIDE 9 MG/ML
250 INJECTION, SOLUTION INTRAVENOUS ONCE
Status: CANCELLED | OUTPATIENT
Start: 2018-08-01

## 2018-08-01 RX ORDER — SODIUM CHLORIDE 0.9 % (FLUSH) 0.9 %
10 SYRINGE (ML) INJECTION AS NEEDED
Status: DISCONTINUED | OUTPATIENT
Start: 2018-08-01 | End: 2018-08-01 | Stop reason: HOSPADM

## 2018-08-01 RX ORDER — PALONOSETRON 0.05 MG/ML
0.25 INJECTION, SOLUTION INTRAVENOUS ONCE
Status: CANCELLED | OUTPATIENT
Start: 2018-08-23

## 2018-08-01 RX ADMIN — Medication 10 ML: at 13:18

## 2018-08-01 RX ADMIN — SODIUM CHLORIDE, PRESERVATIVE FREE 500 UNITS: 5 INJECTION INTRAVENOUS at 13:18

## 2018-08-01 RX ADMIN — DEXAMETHASONE SODIUM PHOSPHATE 12 MG: 4 INJECTION, SOLUTION INTRA-ARTICULAR; INTRALESIONAL; INTRAMUSCULAR; INTRAVENOUS; SOFT TISSUE at 11:51

## 2018-08-01 RX ADMIN — PEGFILGRASTIM 6 MG: KIT SUBCUTANEOUS at 13:17

## 2018-08-01 RX ADMIN — PALONOSETRON HYDROCHLORIDE 0.25 MG: 0.25 INJECTION INTRAVENOUS at 11:51

## 2018-08-01 RX ADMIN — VINCRISTINE SULFATE 2 MG: 1 INJECTION, SOLUTION INTRAVENOUS at 12:24

## 2018-08-01 RX ADMIN — CYCLOPHOSPHAMIDE 1610 MG: 1 INJECTION, POWDER, FOR SOLUTION INTRAVENOUS; ORAL at 12:45

## 2018-08-01 RX ADMIN — SODIUM CHLORIDE 150 MG: 9 INJECTION, SOLUTION INTRAVENOUS at 11:51

## 2018-08-01 RX ADMIN — DOXORUBICIN HYDROCHLORIDE 108 MG: 2 INJECTION, SOLUTION INTRAVENOUS at 12:14

## 2018-08-01 NOTE — PROGRESS NOTES
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     HISTORY OF PRESENT ILLNESS: The patient is a very pleasant 33 y.o. female with past medical history significant for anaplastic large T-cell lymphoma, ALK positive diagnosed in April 19, 2018 after CT-guided core biopsy of left iliac mass.  Whole body PET scan revealed disease above and below the diaphragm.  Bone marrow biopsy done on May 1, 2018, that failed to show any evidence of bone marrow involvement.  The patient was started on chemotherapy using CHOP May 2, 2018.  The patient is here today for scheduled follow-up visit with cycle #5.      SUBJECTIVE: The patient is here today by herself. She complains of chronic fatigue, headaches, and nausea. Her nausea is controlled by Zofran. She is anxious for the results of her recent CT scan.     PAST MEDICAL HISTORY/SOCIAL HISTORY/FAMILY HISTORY: Reviewed by me and unchanged from my documentation done on 07/11/18.    Review of Systems   Constitutional: Positive for fatigue. Negative for activity change, appetite change, chills, fever and unexpected weight change.   HENT: Positive for ear pain. Negative for hearing loss, mouth sores, nosebleeds, sore throat and trouble swallowing.    Eyes: Negative for visual disturbance.   Respiratory: Negative for cough, chest tightness, shortness of breath and wheezing.    Cardiovascular: Negative for chest pain, palpitations and leg swelling.   Gastrointestinal: Positive for nausea. Negative for abdominal distention, abdominal pain, blood in stool, constipation, diarrhea, rectal pain and vomiting.   Endocrine: Negative for cold intolerance and heat intolerance.   Genitourinary: Negative for difficulty urinating, dysuria, frequency and urgency.   Musculoskeletal: Negative for arthralgias, back pain, gait problem, joint swelling and myalgias.   Skin: Negative for rash.   Neurological: Positive for headaches.  Negative for dizziness, tremors, syncope, weakness, light-headedness and numbness.   Hematological: Negative for adenopathy. Does not bruise/bleed easily.   Psychiatric/Behavioral: Negative for confusion, sleep disturbance and suicidal ideas. The patient is nervous/anxious.          Current Outpatient Prescriptions:   •  amitriptyline (ELAVIL) 10 MG tablet, Take 10 mg by mouth Every Night., Disp: , Rfl:   •  cyclobenzaprine (FLEXERIL) 10 MG tablet, 10 mg 2 (Two) Times a Day., Disp: , Rfl:   •  docusate sodium (COLACE) 100 MG capsule, Take 1 capsule by mouth Daily., Disp: 30 capsule, Rfl: 3  •  fluticasone (FLONASE) 50 MCG/ACT nasal spray, As Needed., Disp: , Rfl:   •  hydrochlorothiazide (HYDRODIURIL) 25 MG tablet, , Disp: , Rfl:   •  HYDROcodone-acetaminophen (NORCO) 5-325 MG per tablet, Take 1 tablet by mouth Every 6 (Six) Hours As Needed for Moderate Pain  or Severe Pain . (Patient taking differently: Take 1 tablet by mouth Every 8 (Eight) Hours As Needed for Moderate Pain  or Severe Pain .), Disp: 10 tablet, Rfl: 0  •  HYDROcodone-acetaminophen (NORCO) 5-325 MG per tablet, Take 1-2 tablets by mouth Every 4 (Four) Hours As Needed, Disp: 16 tablet, Rfl: 0  •  ibuprofen (ADVIL,MOTRIN) 800 MG tablet, 800 mg Every 6 (Six) Hours As Needed., Disp: , Rfl:   •  lactulose (CHRONULAC) 10 GM/15ML solution, Take 30 mL by mouth 3 (Three) Times a Day. PRN constipation, Disp: 240 mL, Rfl: 2  •  lidocaine-prilocaine (EMLA) 2.5-2.5 % cream, Apply  topically As Needed (45-60 minutes prior to port access.  Cover with saran/plastic wrap.)., Disp: 30 g, Rfl: 3  •  metroNIDAZOLE (METROGEL) 0.75 % gel, , Disp: , Rfl:   •  montelukast (SINGULAIR) 10 MG tablet, , Disp: , Rfl:   •  ondansetron (ZOFRAN) 8 MG tablet, Take 1 tablet by mouth 3 (Three) Times a Day As Needed for Nausea or Vomiting., Disp: 30 tablet, Rfl: 5  •  ondansetron ODT (ZOFRAN-ODT) 4 MG disintegrating tablet, Take 1 tablet by mouth Every 6 (Six) Hours As Needed for  "Nausea or Vomiting., Disp: 10 tablet, Rfl: 0  •  sertraline (ZOLOFT) 100 MG tablet, Take 100 mg by mouth Daily., Disp: , Rfl:   •  traMADol (ULTRAM) 50 MG tablet, Every 6 (Six) Hours As Needed., Disp: , Rfl:   •  traZODone (DESYREL) 50 MG tablet, 50 mg At Night As Needed., Disp: , Rfl:     PHYSICAL EXAMINATION:   /57   Pulse 77   Temp 97.2 °F (36.2 °C) (Temporal Artery )   Resp 18   Ht 170.2 cm (67.01\")   Wt 108 kg (239 lb 1.6 oz)   SpO2 99%   BMI 37.44 kg/m²    ECOG Performance Status: 1 - Symptomatic but completely ambulatory  General Appearance:  alert, cooperative, no apparent distress and appears stated age   Neurologic/Psychiatric: A&O x 3, gait steady, appropriate affect, strength 5/5 in all muscle groups   HEENT:  Normocephalic, without obvious abnormality, mucous membranes moist   Neck: Supple, symmetrical, trachea midline, no adenopathy;  No thyromegaly, masses, or tenderness   Lungs:   Clear to auscultation bilaterally; respirations regular, even, and unlabored bilaterally   Heart:  Regular rate and rhythm, no murmurs appreciated   Abdomen:   Soft, non-tender, non-distended and no organomegaly   Lymph nodes: No cervical, supraclavicular, inguinal or axillary adenopathy noted   Extremities: Normal, atraumatic; no clubbing, cyanosis, or edema    Skin: No rashes, ulcers, or suspicious lesions noted         Glucose 74 - 98 mg/dL 123   133   127   122      BUN 7 - 20 mg/dL 10  18  11  6      Creatinine 0.60 - 1.30 mg/dL 0.60  0.50   0.50   0.50      Sodium 137 - 145 mmol/L 142  141  143  142     Potassium 3.5 - 5.1 mmol/L 3.6  3.4   3.3   3.6     Chloride 98 - 107 mmol/L 106  104  106  105     CO2 26.0 - 30.0 mmol/L 25.0   28.0  27.0  26.0     Calcium 8.4 - 10.2 mg/dL 8.9  9.1  8.9  8.4     Total Protein 6.3 - 8.2 g/dL 7.3  7.8  8.0  8.6      Albumin 3.50 - 5.00 g/dL 4.10  4.40  3.90  3.80     ALT (SGPT) 13 - 69 U/L 25  26  29  35     AST (SGOT) 15 - 46 U/L 26  25  24  24     Alkaline Phosphatase " 38 - 126 U/L 51  54  59  70     Total Bilirubin 0.2 - 1.3 mg/dL 0.5  0.3  0.3  0.4     eGFR Non African Amer >60 mL/min/1.73 115  143  143  143     Globulin gm/dL 3.2  3.4  4.1  4.8     A/G Ratio 1.0 - 2.0 g/dL 1.3  1.3  1.0  0.8      BUN/Creatinine Ratio 7.1 - 23.5 16.7  36.0   22.0  12.0     Anion Gap 10.0 - 20.0 mmol/L 14.6  12.4  13.3  14.6    Resulting Agency   [facility]     CBC Auto Differential   Order: [id number] - Part of Panel Order [id number]   Status:  Final result   Visible to patient:  Yes (MyChart) Dx:  Anaplastic ALK-positive large cell ly...     Ref Range & Units 3wk ago  (7/11/18) 1mo ago  (6/13/18) 2mo ago  (5/24/18) 2mo ago  (5/4/18)    WBC 4.80 - 10.80 10*3/mm3 5.19  6.39  6.51  3.46      RBC 4.20 - 5.40 10*6/mm3 3.61   3.70   3.76   3.62      Hemoglobin 12.0 - 16.0 g/dL 9.8   10.0   9.9   9.5      Hematocrit 37.0 - 47.0 % 30.5   30.8   31.0   29.9      MCV 81.0 - 99.0 fL 84.5  83.2  82.4  82.6     MCH 27.0 - 31.0 pg 27.1  27.0  26.3   26.2      MCHC 30.0 - 37.0 g/dL 32.1  32.5  31.9  31.8     RDW 11.5 - 14.5 % 19.3   19.7   17.1   16.0      RDW-SD 37.0 - 54.0 fl 59.7   58.8   49.5  48.4     MPV 6.0 - 12.0 fL 8.2  8.1  8.0  8.5     Platelets 130 - 400 10*3/mm3 257  348  358  218     Neutrophil % 37.0 - 80.0 % 55.4  67.9  63.7  49.1     Lymphocyte % 10.0 - 50.0 % 28.3  20.5  25.7  33.2     Monocyte % 0.0 - 12.0 % 10.4  9.2  6.9  13.9      Eosinophil % 0.0 - 7.0 % 3.5  0.5  1.1  2.0     Basophil % 0.0 - 2.5 % 1.2  0.8  1.1  1.2     Immature Grans % 0.0 - 0.6 % 1.2   1.1   1.5   0.6     Neutrophils, Absolute 2.00 - 6.90 10*3/mm3 2.88  4.34  4.15  1.70      Lymphocytes, Absolute 0.60 - 3.40 10*3/mm3 1.47  1.31  1.67  1.15     Monocytes, Absolute 0.00 - 0.90 10*3/mm3 0.54  0.59  0.45  0.48     Eosinophils, Absolute 0.00 - 0.70 10*3/mm3 0.18  0.03  0.07  0.07     Basophils, Absolute 0.00 - 0.20 10*3/mm3 0.06  0.05  0.07  0.04     Immature Grans, Absolute 0.00 - 0.06  10*3/mm3 0.06  0.07   0.10   0.02     nRBC 0.0 - 0.0 /100 WBC 0.0  0.0  0.0  0.0    Resulting Agency  [facility]      Specimen Collected: 07/11/18 10:49 Last Resulted: 07/11/18 10:56                         Ct Chest With Contrast    Result Date: 7/31/2018  Narrative: EXAMINATION: CT CHEST W CONTRAST-, CT ABDOMEN PELVIS W CONTRAST- 07/30/2018  INDICATION: C82.00-Follicular lymphoma grade I, unspecified site; C84.68-Anaplastic large cell lymphoma, ALK-positive, lymph nodes of multiple sites follow-up lymphoma  TECHNIQUE: Multiple axial CT imaging was obtained of the chest, abdomen and pelvis following the administration of intravenous contrast.  The radiation dose reduction device was turned on for each scan per the ALARA (As Low as Reasonably Achievable) protocol.  COMPARISON: PET/CT scan dated 05/01/2018  FINDINGS: The thyroid is homogeneous in appearance. Port-A-Catheter identified on the right tip in the SVC. There is no supraclavicular adenopathy. No axillary adenopathy identified. No bulky hilar adenopathy. No mediastinal mass. Cardiac chambers within normal limits. No pericardial effusion. The lung parenchyma reveals no parenchymal consolidation, pulmonary mass or pleural effusion. Degenerative changes seen within the spine and pelvis.  Abdomen: The liver is homogeneous in appearance. The spleen is unremarkable. No stones in the gallbladder. The kidneys and adrenal glands are within normal limits. No abdominal or retroperitoneal lymphadenopathy. No free fluid or free air. The abdominal portion of the gastrointestinal tract are within normal limits.  Pelvis: Small lymph nodes identified within the left iliac region. The largest lymph node on today's examination is 2.1 cm. There is an additional 2.2 cm lymph node in the left iliac region. The pelvic portions of the gastrointestinal tract are within normal limits. No free fluid or free air. No pelvic adenopathy. No abnormal mass or  fluid collections identified.      Impression: Interval resolution of the retrocrural adenopathy, as well as the retroperitoneal adenopathy with only minimal small lymph nodes identified in the left iliac region on today's examination. There has been significant improvement seen in the lymphadenopathy in the interval.  D:  07/30/2018 E:  07/30/2018   This report was finalized on 7/31/2018 11:28 AM by Dr. Stephanie Jacobsen MD.      Ct Abdomen Pelvis With Contrast    Result Date: 7/31/2018  Narrative: EXAMINATION: CT CHEST W CONTRAST-, CT ABDOMEN PELVIS W CONTRAST- 07/30/2018  INDICATION: C82.00-Follicular lymphoma grade I, unspecified site; C84.68-Anaplastic large cell lymphoma, ALK-positive, lymph nodes of multiple sites follow-up lymphoma  TECHNIQUE: Multiple axial CT imaging was obtained of the chest, abdomen and pelvis following the administration of intravenous contrast.  The radiation dose reduction device was turned on for each scan per the ALARA (As Low as Reasonably Achievable) protocol.  COMPARISON: PET/CT scan dated 05/01/2018  FINDINGS: The thyroid is homogeneous in appearance. Port-A-Catheter identified on the right tip in the SVC. There is no supraclavicular adenopathy. No axillary adenopathy identified. No bulky hilar adenopathy. No mediastinal mass. Cardiac chambers within normal limits. No pericardial effusion. The lung parenchyma reveals no parenchymal consolidation, pulmonary mass or pleural effusion. Degenerative changes seen within the spine and pelvis.  Abdomen: The liver is homogeneous in appearance. The spleen is unremarkable. No stones in the gallbladder. The kidneys and adrenal glands are within normal limits. No abdominal or retroperitoneal lymphadenopathy. No free fluid or free air. The abdominal portion of the gastrointestinal tract are within normal limits.  Pelvis: Small lymph nodes identified within the left iliac region. The largest lymph node on today's examination is 2.1 cm. There  is an additional 2.2 cm lymph node in the left iliac region. The pelvic portions of the gastrointestinal tract are within normal limits. No free fluid or free air. No pelvic adenopathy. No abnormal mass or fluid collections identified.      Impression: Interval resolution of the retrocrural adenopathy, as well as the retroperitoneal adenopathy with only minimal small lymph nodes identified in the left iliac region on today's examination. There has been significant improvement seen in the lymphadenopathy in the interval.  D:  07/30/2018 E:  07/30/2018   This report was finalized on 7/31/2018 11:28 AM by Dr. Stephanie Jacobsen MD.        ASSESSMENT: The patient is a very pleasant 33 y.o. female  with anaplastic large T-cell lymphoma, ALK positive    PROBLEM LIST:  1. Anaplastic large T-cell lymphoma, ALK positive:  A. Incidentally found LAD on MRI done for low back and hip pain done 9/5/2017.   B. Follow up CAT scan confirmed pelvic adenopathy extending from the distal aortic region through the left iliac region.   C. Status post FNA to left iliac lymph node done 9/29/2017. Pathology revealed benign lymphoid cell groups.   D. Repeat CT done 3/15/2018 revealed increased size of left iliac adenopathy with mild splenomegaly.   E. CT-guided biopsy done on April 19, 2018 showed anaplastic large T-cell lymphoma ALK+  F. whole body PET scan done on May 1, 2018 revealed disease above and below the diaphragm with hypermetabolic active lymph nodes in the supraclavicular area as well as retroperitoneum.  G. Started chemotherapy using CHOP May 2, 2018, status post 3 cycles  2. Mild splenomegaly   A. Incidentally found 9/5/2017.  B. Progressive size on repeat imaging done 3/15/2018.  3. Left hip and low back pain  A. Under care of pain management with use of Norco.   4. History of anoxic brain injury following cardiac arrest post-partum.   5. Postpartum cardiomyopathy.   6. Anxiety and depression  7.  Constipation  8.   Chemotherapy used nausea.  9.  Hypertension    PLAN:  1.  I will proceed with treatment as scheduled today CHOP, cycle #5.  2.  The patient will follow-up with me in 3 weeks for cycle #5 out of 6 planned.  3.  I will monitor patient blood work including blood counts kidney function liver function.  4.  I'll continue patient on Zofran as needed for chemotherapy induced nausea.  5.  The patient will take prednisone 100 mg daily for 5 days each cycle of chemotherapy.  Patient was advised to take this with food.  6.  We discussed the potential risks and side effects of CHOP chemotherapy including neutropenia, alopecia, nausea and vomiting, fatigue, neuropathy, cardiomyopathy, constipation, infusion reaction, risk of myelodysplasia, infertility, and potential death.  The patient is not interested in having more children.  7.  We'll continue Port-A-Cath care.  Patient was given a prescription for EMLA cream.  8.   I did go over the scan results with the patient I reviewed the films myself.  I reassured there is no evidence of residual disease.  I will do a follow-up scan after completion of treatment.  9.  I will continue the patient on Colace daily as well as lactulose as needed for constipation.  10.  I did go over the cardiac echo with the patient reassured her that her ejection fraction is normal.  We'll consider doing a follow-up echo if she has any new symptoms.  11.  We'll continue hydrochlorothiazide for hypertension.  I will monitor her blood pressrue since it might drop while she is on chemotherapy.  12.  I will continue Zoloft for depression.      Carlo Rice MD  8/1/2018

## 2018-08-22 ENCOUNTER — OFFICE VISIT (OUTPATIENT)
Dept: ONCOLOGY | Facility: CLINIC | Age: 33
End: 2018-08-22

## 2018-08-22 VITALS
SYSTOLIC BLOOD PRESSURE: 130 MMHG | HEIGHT: 67 IN | HEART RATE: 82 BPM | RESPIRATION RATE: 17 BRPM | DIASTOLIC BLOOD PRESSURE: 80 MMHG | BODY MASS INDEX: 38.3 KG/M2 | TEMPERATURE: 97 F | WEIGHT: 244 LBS

## 2018-08-22 DIAGNOSIS — C84.68 ANAPLASTIC ALK-POSITIVE LARGE CELL LYMPHOMA OF LYMPH NODES OF MULTIPLE REGIONS (HCC): Primary | ICD-10-CM

## 2018-08-22 PROCEDURE — 99214 OFFICE O/P EST MOD 30 MIN: CPT | Performed by: INTERNAL MEDICINE

## 2018-08-22 NOTE — PROGRESS NOTES
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     HISTORY OF PRESENT ILLNESS: The patient is a very pleasant 33 y.o. female with past medical history significant for anaplastic large T-cell lymphoma, ALK positive diagnosed in April 19, 2018 after CT-guided core biopsy of left iliac mass.  Whole body PET scan revealed disease above and below the diaphragm.  Bone marrow biopsy done on May 1, 2018, that failed to show any evidence of bone marrow involvement.  The patient was started on chemotherapy using CHOP May 2, 2018.  The patient is here today for scheduled follow-up visit with cycle #6.      SUBJECTIVE: The patient is here today by herself. She has been able to tolerate treatment fairly well. She complains of chronic fatigue, headaches, and nausea. Her nausea is controlled by Zofran.     PAST MEDICAL HISTORY/SOCIAL HISTORY/FAMILY HISTORY: Reviewed by me and unchanged from my documentation done on 07/11/18.    Review of Systems   Constitutional: Positive for fatigue. Negative for activity change, appetite change, chills, fever and unexpected weight change.   HENT: Positive for ear pain. Negative for hearing loss, mouth sores, nosebleeds, sore throat and trouble swallowing.    Eyes: Negative for visual disturbance.   Respiratory: Negative for cough, chest tightness, shortness of breath and wheezing.    Cardiovascular: Negative for chest pain, palpitations and leg swelling.   Gastrointestinal: Positive for nausea. Negative for abdominal distention, abdominal pain, blood in stool, constipation, diarrhea, rectal pain and vomiting.   Endocrine: Negative for cold intolerance and heat intolerance.   Genitourinary: Negative for difficulty urinating, dysuria, frequency and urgency.   Musculoskeletal: Negative for arthralgias, back pain, gait problem, joint swelling and myalgias.   Skin: Negative for rash.   Neurological: Positive for headaches.  Negative for dizziness, tremors, syncope, weakness, light-headedness and numbness.   Hematological: Negative for adenopathy. Does not bruise/bleed easily.   Psychiatric/Behavioral: Negative for confusion, sleep disturbance and suicidal ideas. The patient is nervous/anxious.          Current Outpatient Prescriptions:   •  amitriptyline (ELAVIL) 10 MG tablet, Take 10 mg by mouth Every Night., Disp: , Rfl:   •  cyclobenzaprine (FLEXERIL) 10 MG tablet, 10 mg 2 (Two) Times a Day., Disp: , Rfl:   •  docusate sodium (COLACE) 100 MG capsule, Take 1 capsule by mouth Daily., Disp: 30 capsule, Rfl: 3  •  fluticasone (FLONASE) 50 MCG/ACT nasal spray, As Needed., Disp: , Rfl:   •  hydrochlorothiazide (HYDRODIURIL) 25 MG tablet, , Disp: , Rfl:   •  HYDROcodone-acetaminophen (NORCO) 5-325 MG per tablet, Take 1 tablet by mouth Every 6 (Six) Hours As Needed for Moderate Pain  or Severe Pain . (Patient taking differently: Take 1 tablet by mouth Every 8 (Eight) Hours As Needed for Moderate Pain  or Severe Pain .), Disp: 10 tablet, Rfl: 0  •  HYDROcodone-acetaminophen (NORCO) 5-325 MG per tablet, Take 1-2 tablets by mouth Every 4 (Four) Hours As Needed, Disp: 16 tablet, Rfl: 0  •  ibuprofen (ADVIL,MOTRIN) 800 MG tablet, 800 mg Every 6 (Six) Hours As Needed., Disp: , Rfl:   •  lactulose (CHRONULAC) 10 GM/15ML solution, Take 30 mL by mouth 3 (Three) Times a Day. PRN constipation, Disp: 240 mL, Rfl: 2  •  lidocaine-prilocaine (EMLA) 2.5-2.5 % cream, Apply  topically As Needed (45-60 minutes prior to port access.  Cover with saran/plastic wrap.)., Disp: 30 g, Rfl: 3  •  metroNIDAZOLE (METROGEL) 0.75 % gel, , Disp: , Rfl:   •  montelukast (SINGULAIR) 10 MG tablet, , Disp: , Rfl:   •  ondansetron (ZOFRAN) 8 MG tablet, Take 1 tablet by mouth 3 (Three) Times a Day As Needed for Nausea or Vomiting., Disp: 30 tablet, Rfl: 5  •  ondansetron ODT (ZOFRAN-ODT) 4 MG disintegrating tablet, Take 1 tablet by mouth Every 6 (Six) Hours As Needed for  "Nausea or Vomiting., Disp: 10 tablet, Rfl: 0  •  sertraline (ZOLOFT) 100 MG tablet, Take 100 mg by mouth Daily., Disp: , Rfl:   •  traMADol (ULTRAM) 50 MG tablet, Every 6 (Six) Hours As Needed., Disp: , Rfl:   •  traZODone (DESYREL) 50 MG tablet, 50 mg At Night As Needed., Disp: , Rfl:     PHYSICAL EXAMINATION:   /80   Pulse 82   Temp 97 °F (36.1 °C) (Temporal Artery )   Resp 17   Ht 170.2 cm (67.01\")   Wt 111 kg (244 lb)   BMI 38.20 kg/m²    ECOG Performance Status: 1 - Symptomatic but completely ambulatory  General Appearance:  alert, cooperative, no apparent distress and appears stated age   Neurologic/Psychiatric: A&O x 3, gait steady, appropriate affect, strength 5/5 in all muscle groups   HEENT:  Normocephalic, without obvious abnormality, mucous membranes moist   Neck: Supple, symmetrical, trachea midline, no adenopathy;  No thyromegaly, masses, or tenderness   Lungs:   Clear to auscultation bilaterally; respirations regular, even, and unlabored bilaterally   Heart:  Regular rate and rhythm, no murmurs appreciated   Abdomen:   Soft, non-tender, non-distended and no organomegaly   Lymph nodes: No cervical, supraclavicular, inguinal or axillary adenopathy noted   Extremities: Normal, atraumatic; no clubbing, cyanosis, or edema    Skin: No rashes, ulcers, or suspicious lesions noted         Glucose 74 - 98 mg/dL 123   133   127   122      BUN 7 - 20 mg/dL 10  18  11  6      Creatinine 0.60 - 1.30 mg/dL 0.60  0.50   0.50   0.50      Sodium 137 - 145 mmol/L 142  141  143  142     Potassium 3.5 - 5.1 mmol/L 3.6  3.4   3.3   3.6     Chloride 98 - 107 mmol/L 106  104  106  105     CO2 26.0 - 30.0 mmol/L 25.0   28.0  27.0  26.0     Calcium 8.4 - 10.2 mg/dL 8.9  9.1  8.9  8.4     Total Protein 6.3 - 8.2 g/dL 7.3  7.8  8.0  8.6      Albumin 3.50 - 5.00 g/dL 4.10  4.40  3.90  3.80     ALT (SGPT) 13 - 69 U/L 25  26  29  35     AST (SGOT) 15 - 46 U/L 26  25  24  24     Alkaline Phosphatase 38 - 126 U/L 51  54  " 59  70     Total Bilirubin 0.2 - 1.3 mg/dL 0.5  0.3  0.3  0.4     eGFR Non African Amer >60 mL/min/1.73 115  143  143  143     Globulin gm/dL 3.2  3.4  4.1  4.8     A/G Ratio 1.0 - 2.0 g/dL 1.3  1.3  1.0  0.8      BUN/Creatinine Ratio 7.1 - 23.5 16.7  36.0   22.0  12.0     Anion Gap 10.0 - 20.0 mmol/L 14.6  12.4  13.3  14.6    Resulting Agency   [facility]     CBC Auto Differential   Order: [id number] - Part of Panel Order [id number]   Status:  Final result   Visible to patient:  Yes (MyChart) Dx:  Anaplastic ALK-positive large cell ly...     Ref Range & Units 3wk ago  (7/11/18) 1mo ago  (6/13/18) 2mo ago  (5/24/18) 2mo ago  (5/4/18)    WBC 4.80 - 10.80 10*3/mm3 5.19  6.39  6.51  3.46      RBC 4.20 - 5.40 10*6/mm3 3.61   3.70   3.76   3.62      Hemoglobin 12.0 - 16.0 g/dL 9.8   10.0   9.9   9.5      Hematocrit 37.0 - 47.0 % 30.5   30.8   31.0   29.9      MCV 81.0 - 99.0 fL 84.5  83.2  82.4  82.6     MCH 27.0 - 31.0 pg 27.1  27.0  26.3   26.2      MCHC 30.0 - 37.0 g/dL 32.1  32.5  31.9  31.8     RDW 11.5 - 14.5 % 19.3   19.7   17.1   16.0      RDW-SD 37.0 - 54.0 fl 59.7   58.8   49.5  48.4     MPV 6.0 - 12.0 fL 8.2  8.1  8.0  8.5     Platelets 130 - 400 10*3/mm3 257  348  358  218     Neutrophil % 37.0 - 80.0 % 55.4  67.9  63.7  49.1     Lymphocyte % 10.0 - 50.0 % 28.3  20.5  25.7  33.2     Monocyte % 0.0 - 12.0 % 10.4  9.2  6.9  13.9      Eosinophil % 0.0 - 7.0 % 3.5  0.5  1.1  2.0     Basophil % 0.0 - 2.5 % 1.2  0.8  1.1  1.2     Immature Grans % 0.0 - 0.6 % 1.2   1.1   1.5   0.6     Neutrophils, Absolute 2.00 - 6.90 10*3/mm3 2.88  4.34  4.15  1.70      Lymphocytes, Absolute 0.60 - 3.40 10*3/mm3 1.47  1.31  1.67  1.15     Monocytes, Absolute 0.00 - 0.90 10*3/mm3 0.54  0.59  0.45  0.48     Eosinophils, Absolute 0.00 - 0.70 10*3/mm3 0.18  0.03  0.07  0.07     Basophils, Absolute 0.00 - 0.20 10*3/mm3 0.06  0.05  0.07  0.04     Immature Grans, Absolute 0.00 - 0.06 10*3/mm3 0.06  0.07    0.10   0.02     nRBC 0.0 - 0.0 /100 WBC 0.0  0.0  0.0  0.0    Resulting Agency   [facility]  [facility]  [facility]      Specimen Collected: 07/11/18 10:49 Last Resulted: 07/11/18 10:56                         Ct Chest With Contrast    Result Date: 7/31/2018  Narrative: EXAMINATION: CT CHEST W CONTRAST-, CT ABDOMEN PELVIS W CONTRAST- 07/30/2018  INDICATION: C82.00-Follicular lymphoma grade I, unspecified site; C84.68-Anaplastic large cell lymphoma, ALK-positive, lymph nodes of multiple sites follow-up lymphoma  TECHNIQUE: Multiple axial CT imaging was obtained of the chest, abdomen and pelvis following the administration of intravenous contrast.  The radiation dose reduction device was turned on for each scan per the ALARA (As Low as Reasonably Achievable) protocol.  COMPARISON: PET/CT scan dated 05/01/2018  FINDINGS: The thyroid is homogeneous in appearance. Port-A-Catheter identified on the right tip in the SVC. There is no supraclavicular adenopathy. No axillary adenopathy identified. No bulky hilar adenopathy. No mediastinal mass. Cardiac chambers within normal limits. No pericardial effusion. The lung parenchyma reveals no parenchymal consolidation, pulmonary mass or pleural effusion. Degenerative changes seen within the spine and pelvis.  Abdomen: The liver is homogeneous in appearance. The spleen is unremarkable. No stones in the gallbladder. The kidneys and adrenal glands are within normal limits. No abdominal or retroperitoneal lymphadenopathy. No free fluid or free air. The abdominal portion of the gastrointestinal tract are within normal limits.  Pelvis: Small lymph nodes identified within the left iliac region. The largest lymph node on today's examination is 2.1 cm. There is an additional 2.2 cm lymph node in the left iliac region. The pelvic portions of the gastrointestinal tract are within normal limits. No free fluid or free air. No pelvic adenopathy. No abnormal mass or fluid collections  identified.      Impression: Interval resolution of the retrocrural adenopathy, as well as the retroperitoneal adenopathy with only minimal small lymph nodes identified in the left iliac region on today's examination. There has been significant improvement seen in the lymphadenopathy in the interval.  D:  07/30/2018 E:  07/30/2018   This report was finalized on 7/31/2018 11:28 AM by Dr. Stephanie Jacobsen MD.      Ct Abdomen Pelvis With Contrast    Result Date: 7/31/2018  Narrative: EXAMINATION: CT CHEST W CONTRAST-, CT ABDOMEN PELVIS W CONTRAST- 07/30/2018  INDICATION: C82.00-Follicular lymphoma grade I, unspecified site; C84.68-Anaplastic large cell lymphoma, ALK-positive, lymph nodes of multiple sites follow-up lymphoma  TECHNIQUE: Multiple axial CT imaging was obtained of the chest, abdomen and pelvis following the administration of intravenous contrast.  The radiation dose reduction device was turned on for each scan per the ALARA (As Low as Reasonably Achievable) protocol.  COMPARISON: PET/CT scan dated 05/01/2018  FINDINGS: The thyroid is homogeneous in appearance. Port-A-Catheter identified on the right tip in the SVC. There is no supraclavicular adenopathy. No axillary adenopathy identified. No bulky hilar adenopathy. No mediastinal mass. Cardiac chambers within normal limits. No pericardial effusion. The lung parenchyma reveals no parenchymal consolidation, pulmonary mass or pleural effusion. Degenerative changes seen within the spine and pelvis.  Abdomen: The liver is homogeneous in appearance. The spleen is unremarkable. No stones in the gallbladder. The kidneys and adrenal glands are within normal limits. No abdominal or retroperitoneal lymphadenopathy. No free fluid or free air. The abdominal portion of the gastrointestinal tract are within normal limits.  Pelvis: Small lymph nodes identified within the left iliac region. The largest lymph node on today's examination is 2.1 cm. There is an additional  2.2 cm lymph node in the left iliac region. The pelvic portions of the gastrointestinal tract are within normal limits. No free fluid or free air. No pelvic adenopathy. No abnormal mass or fluid collections identified.      Impression: Interval resolution of the retrocrural adenopathy, as well as the retroperitoneal adenopathy with only minimal small lymph nodes identified in the left iliac region on today's examination. There has been significant improvement seen in the lymphadenopathy in the interval.  D:  07/30/2018 E:  07/30/2018   This report was finalized on 7/31/2018 11:28 AM by Dr. Stephanie Jacobsen MD.        ASSESSMENT: The patient is a very pleasant 33 y.o. female  with anaplastic large T-cell lymphoma, ALK positive    PROBLEM LIST:  1. Anaplastic large T-cell lymphoma, ALK positive:  A. Incidentally found LAD on MRI done for low back and hip pain done 9/5/2017.   B. Follow up CAT scan confirmed pelvic adenopathy extending from the distal aortic region through the left iliac region.   C. Status post FNA to left iliac lymph node done 9/29/2017. Pathology revealed benign lymphoid cell groups.   D. Repeat CT done 3/15/2018 revealed increased size of left iliac adenopathy with mild splenomegaly.   E. CT-guided biopsy done on April 19, 2018 showed anaplastic large T-cell lymphoma ALK+  F. whole body PET scan done on May 1, 2018 revealed disease above and below the diaphragm with hypermetabolic active lymph nodes in the supraclavicular area as well as retroperitoneum.  G. Started chemotherapy using CHOP May 2, 2018, status post 5 cycles  2. Mild splenomegaly   A. Incidentally found 9/5/2017.  B. Progressive size on repeat imaging done 3/15/2018.  3. Left hip and low back pain  A. Under care of pain management with use of Norco.   4. History of anoxic brain injury following cardiac arrest post-partum.   5. Postpartum cardiomyopathy.   6. Anxiety and depression  7.  Constipation  8.  Chemotherapy used  nausea.  9.  Hypertension    PLAN:  1.  I will proceed with treatment as scheduled today CHOP, cycle #6.  2.  The patient will follow-up with me in 2 month.  3.  I will monitor patient blood work including blood counts kidney function liver function.  4.  I'll continue patient on Zofran as needed for chemotherapy induced nausea.  5.  The patient will take prednisone 100 mg daily for 5 days each cycle of chemotherapy.  Patient was advised to take this with food.  6.  We discussed the potential risks and side effects of CHOP chemotherapy including neutropenia, alopecia, nausea and vomiting, fatigue, neuropathy, cardiomyopathy, constipation, infusion reaction, risk of myelodysplasia, infertility, and potential death.  The patient is not interested in having more children.  7.  We'll continue Port-A-Cath care.  Patient was given a prescription for EMLA cream.  The patient would like to maintain her port for 1 year after completion of treatment.  8.  I will do a follow-up scan prior to return.  9.  I will continue the patient on Colace daily as well as lactulose as needed for constipation.  10.  I did go over the cardiac echo with the patient reassured her that her ejection fraction is normal.  We'll consider doing a follow-up echo if she has any new symptoms.  11.  We'll continue hydrochlorothiazide for hypertension.  I will monitor her blood pressrue since it might drop while she is on chemotherapy.  12.  I will continue Zoloft for depression.      Carlo Rice MD  8/22/2018

## 2018-08-23 ENCOUNTER — INFUSION (OUTPATIENT)
Dept: ONCOLOGY | Facility: HOSPITAL | Age: 33
End: 2018-08-23

## 2018-08-23 VITALS
HEART RATE: 91 BPM | DIASTOLIC BLOOD PRESSURE: 76 MMHG | SYSTOLIC BLOOD PRESSURE: 125 MMHG | TEMPERATURE: 97.9 F | BODY MASS INDEX: 38.2 KG/M2 | WEIGHT: 244 LBS

## 2018-08-23 DIAGNOSIS — C82.00 FOLLICULAR LYMPHOMA GRADE I, UNSPECIFIED BODY REGION (HCC): ICD-10-CM

## 2018-08-23 DIAGNOSIS — C84.68 ANAPLASTIC ALK-POSITIVE LARGE CELL LYMPHOMA OF LYMPH NODES OF MULTIPLE REGIONS (HCC): Primary | ICD-10-CM

## 2018-08-23 LAB
ALBUMIN SERPL-MCNC: 4.1 G/DL (ref 3.5–5)
ALBUMIN/GLOB SERPL: 1.3 G/DL (ref 1–2)
ALP SERPL-CCNC: 47 U/L (ref 38–126)
ALT SERPL W P-5'-P-CCNC: 20 U/L (ref 13–69)
ANION GAP SERPL CALCULATED.3IONS-SCNC: 11.5 MMOL/L (ref 10–20)
AST SERPL-CCNC: 24 U/L (ref 15–46)
BASOPHILS # BLD AUTO: 0.07 10*3/MM3 (ref 0–0.2)
BASOPHILS NFR BLD AUTO: 1.3 % (ref 0–2.5)
BILIRUB SERPL-MCNC: 0.3 MG/DL (ref 0.2–1.3)
BUN BLD-MCNC: 12 MG/DL (ref 7–20)
BUN/CREAT SERPL: 20 (ref 7.1–23.5)
CALCIUM SPEC-SCNC: 9.4 MG/DL (ref 8.4–10.2)
CHLORIDE SERPL-SCNC: 105 MMOL/L (ref 98–107)
CO2 SERPL-SCNC: 27 MMOL/L (ref 26–30)
CREAT BLD-MCNC: 0.6 MG/DL (ref 0.6–1.3)
DEPRECATED RDW RBC AUTO: 52.3 FL (ref 37–54)
EOSINOPHIL # BLD AUTO: 0.03 10*3/MM3 (ref 0–0.7)
EOSINOPHIL NFR BLD AUTO: 0.5 % (ref 0–7)
ERYTHROCYTE [DISTWIDTH] IN BLOOD BY AUTOMATED COUNT: 16.6 % (ref 11.5–14.5)
GFR SERPL CREATININE-BSD FRML MDRD: 115 ML/MIN/1.73
GLOBULIN UR ELPH-MCNC: 3.2 GM/DL
GLUCOSE BLD-MCNC: 101 MG/DL (ref 74–98)
HCT VFR BLD AUTO: 30.8 % (ref 37–47)
HGB BLD-MCNC: 9.9 G/DL (ref 12–16)
IMM GRANULOCYTES # BLD: 0.05 10*3/MM3 (ref 0–0.06)
IMM GRANULOCYTES NFR BLD: 0.9 % (ref 0–0.6)
LYMPHOCYTES # BLD AUTO: 1.22 10*3/MM3 (ref 0.6–3.4)
LYMPHOCYTES NFR BLD AUTO: 22.3 % (ref 10–50)
MCH RBC QN AUTO: 27.8 PG (ref 27–31)
MCHC RBC AUTO-ENTMCNC: 32.1 G/DL (ref 30–37)
MCV RBC AUTO: 86.5 FL (ref 81–99)
MONOCYTES # BLD AUTO: 0.68 10*3/MM3 (ref 0–0.9)
MONOCYTES NFR BLD AUTO: 12.4 % (ref 0–12)
NEUTROPHILS # BLD AUTO: 3.43 10*3/MM3 (ref 2–6.9)
NEUTROPHILS NFR BLD AUTO: 62.6 % (ref 37–80)
NRBC BLD MANUAL-RTO: 0 /100 WBC (ref 0–0)
PLATELET # BLD AUTO: 329 10*3/MM3 (ref 130–400)
PMV BLD AUTO: 8.1 FL (ref 6–12)
POTASSIUM BLD-SCNC: 3.5 MMOL/L (ref 3.5–5.1)
PROT SERPL-MCNC: 7.3 G/DL (ref 6.3–8.2)
RBC # BLD AUTO: 3.56 10*6/MM3 (ref 4.2–5.4)
SODIUM BLD-SCNC: 140 MMOL/L (ref 137–145)
WBC NRBC COR # BLD: 5.48 10*3/MM3 (ref 4.8–10.8)

## 2018-08-23 PROCEDURE — 96375 TX/PRO/DX INJ NEW DRUG ADDON: CPT

## 2018-08-23 PROCEDURE — 96376 TX/PRO/DX INJ SAME DRUG ADON: CPT

## 2018-08-23 PROCEDURE — 25010000002 DOXORUBICIN PER 10 MG: Performed by: INTERNAL MEDICINE

## 2018-08-23 PROCEDURE — 96523 IRRIG DRUG DELIVERY DEVICE: CPT

## 2018-08-23 PROCEDURE — 96417 CHEMO IV INFUS EACH ADDL SEQ: CPT

## 2018-08-23 PROCEDURE — 96411 CHEMO IV PUSH ADDL DRUG: CPT

## 2018-08-23 PROCEDURE — 36591 DRAW BLOOD OFF VENOUS DEVICE: CPT

## 2018-08-23 PROCEDURE — 25010000002 HEPARIN FLUSH (PORCINE) 100 UNIT/ML SOLUTION: Performed by: INTERNAL MEDICINE

## 2018-08-23 PROCEDURE — 25010000002 FOSAPREPITANT PER 1 MG: Performed by: INTERNAL MEDICINE

## 2018-08-23 PROCEDURE — 25010000002 CYCLOPHOSPHAMIDE PER 100 MG: Performed by: INTERNAL MEDICINE

## 2018-08-23 PROCEDURE — 96367 TX/PROPH/DG ADDL SEQ IV INF: CPT

## 2018-08-23 PROCEDURE — 25010000002 PALONOSETRON PER 25 MCG: Performed by: INTERNAL MEDICINE

## 2018-08-23 PROCEDURE — 96377 APPLICATON ON-BODY INJECTOR: CPT

## 2018-08-23 PROCEDURE — 96368 THER/DIAG CONCURRENT INF: CPT

## 2018-08-23 PROCEDURE — 25010000002 VINCRISTINE PER 1 MG: Performed by: INTERNAL MEDICINE

## 2018-08-23 PROCEDURE — 96409 CHEMO IV PUSH SNGL DRUG: CPT

## 2018-08-23 PROCEDURE — 96413 CHEMO IV INFUSION 1 HR: CPT

## 2018-08-23 PROCEDURE — 25010000002 PEGFILGRASTIM 6 MG/0.6ML PREFILLED SYRINGE KIT: Performed by: INTERNAL MEDICINE

## 2018-08-23 PROCEDURE — 85025 COMPLETE CBC W/AUTO DIFF WBC: CPT

## 2018-08-23 PROCEDURE — 80053 COMPREHEN METABOLIC PANEL: CPT

## 2018-08-23 PROCEDURE — 25010000002 DEXAMETHASONE PER 1 MG: Performed by: INTERNAL MEDICINE

## 2018-08-23 PROCEDURE — 36415 COLL VENOUS BLD VENIPUNCTURE: CPT

## 2018-08-23 RX ORDER — DOXORUBICIN HYDROCHLORIDE 2 MG/ML
50 INJECTION, SOLUTION INTRAVENOUS ONCE
Status: COMPLETED | OUTPATIENT
Start: 2018-08-23 | End: 2018-08-23

## 2018-08-23 RX ORDER — SODIUM CHLORIDE 0.9 % (FLUSH) 0.9 %
10 SYRINGE (ML) INJECTION AS NEEDED
Status: CANCELLED | OUTPATIENT
Start: 2018-08-23

## 2018-08-23 RX ORDER — SODIUM CHLORIDE 0.9 % (FLUSH) 0.9 %
10 SYRINGE (ML) INJECTION AS NEEDED
Status: DISCONTINUED | OUTPATIENT
Start: 2018-08-23 | End: 2018-08-23 | Stop reason: HOSPADM

## 2018-08-23 RX ORDER — SODIUM CHLORIDE 9 MG/ML
250 INJECTION, SOLUTION INTRAVENOUS ONCE
Status: DISCONTINUED | OUTPATIENT
Start: 2018-08-23 | End: 2018-08-23 | Stop reason: HOSPADM

## 2018-08-23 RX ORDER — PALONOSETRON 0.05 MG/ML
0.25 INJECTION, SOLUTION INTRAVENOUS ONCE
Status: COMPLETED | OUTPATIENT
Start: 2018-08-23 | End: 2018-08-23

## 2018-08-23 RX ADMIN — DOXORUBICIN HYDROCHLORIDE 108 MG: 2 INJECTION, SOLUTION INTRAVENOUS at 09:43

## 2018-08-23 RX ADMIN — VINCRISTINE SULFATE 2 MG: 1 INJECTION, SOLUTION INTRAVENOUS at 09:54

## 2018-08-23 RX ADMIN — PEGFILGRASTIM 6 MG: KIT SUBCUTANEOUS at 10:46

## 2018-08-23 RX ADMIN — PALONOSETRON HYDROCHLORIDE 0.25 MG: 0.25 INJECTION INTRAVENOUS at 09:21

## 2018-08-23 RX ADMIN — SODIUM CHLORIDE 150 MG: 9 INJECTION, SOLUTION INTRAVENOUS at 09:21

## 2018-08-23 RX ADMIN — SODIUM CHLORIDE, PRESERVATIVE FREE 500 UNITS: 5 INJECTION INTRAVENOUS at 10:51

## 2018-08-23 RX ADMIN — DEXAMETHASONE SODIUM PHOSPHATE 12 MG: 4 INJECTION, SOLUTION INTRA-ARTICULAR; INTRALESIONAL; INTRAMUSCULAR; INTRAVENOUS; SOFT TISSUE at 09:20

## 2018-08-23 RX ADMIN — Medication 10 ML: at 10:45

## 2018-08-23 RX ADMIN — CYCLOPHOSPHAMIDE 1610 MG: 1 INJECTION, POWDER, FOR SOLUTION INTRAVENOUS; ORAL at 10:09

## 2018-10-19 ENCOUNTER — APPOINTMENT (OUTPATIENT)
Dept: CT IMAGING | Facility: HOSPITAL | Age: 33
End: 2018-10-19

## 2018-10-19 ENCOUNTER — HOSPITAL ENCOUNTER (INPATIENT)
Facility: HOSPITAL | Age: 33
LOS: 4 days | Discharge: HOME | End: 2018-10-23
Attending: EMERGENCY MEDICINE | Admitting: INTERNAL MEDICINE

## 2018-10-19 ENCOUNTER — APPOINTMENT (OUTPATIENT)
Dept: CARDIOLOGY | Facility: HOSPITAL | Age: 33
End: 2018-10-19
Attending: INTERNAL MEDICINE

## 2018-10-19 DIAGNOSIS — I21.4 ACUTE NON Q WAVE MI (MYOCARDIAL INFARCTION), INITIAL EPISODE OF CARE (HCC): ICD-10-CM

## 2018-10-19 DIAGNOSIS — J81.0 ACUTE PULMONARY EDEMA (HCC): ICD-10-CM

## 2018-10-19 DIAGNOSIS — J96.01 ACUTE RESPIRATORY FAILURE WITH HYPOXIA (HCC): Primary | ICD-10-CM

## 2018-10-19 DIAGNOSIS — G47.33 OSA (OBSTRUCTIVE SLEEP APNEA): ICD-10-CM

## 2018-10-19 PROBLEM — R00.0 SINUS TACHYCARDIA: Status: ACTIVE | Noted: 2018-10-19

## 2018-10-19 PROBLEM — R05.8 PRODUCTIVE COUGH: Status: ACTIVE | Noted: 2018-10-19

## 2018-10-19 LAB
ALBUMIN SERPL-MCNC: 4.5 G/DL (ref 3.5–5)
ALBUMIN/GLOB SERPL: 1.5 G/DL (ref 1–2)
ALP SERPL-CCNC: 56 U/L (ref 38–126)
ALT SERPL W P-5'-P-CCNC: 45 U/L (ref 13–69)
ANION GAP SERPL CALCULATED.3IONS-SCNC: 11.5 MMOL/L (ref 10–20)
ANION GAP SERPL CALCULATED.3IONS-SCNC: 14.3 MMOL/L (ref 10–20)
ARTERIAL PATENCY WRIST A: POSITIVE
AST SERPL-CCNC: 33 U/L (ref 15–46)
ATMOSPHERIC PRESS: 743 MMHG
BASE EXCESS BLDA CALC-SCNC: -2.4 MMOL/L (ref 0–2)
BASOPHILS # BLD AUTO: 0.08 10*3/MM3 (ref 0–0.2)
BASOPHILS NFR BLD AUTO: 0.9 % (ref 0–2.5)
BDY SITE: ABNORMAL
BILIRUB SERPL-MCNC: 0.5 MG/DL (ref 0.2–1.3)
BUN BLD-MCNC: 10 MG/DL (ref 7–20)
BUN BLD-MCNC: 11 MG/DL (ref 7–20)
BUN/CREAT SERPL: 14.3 (ref 7.1–23.5)
BUN/CREAT SERPL: 15.7 (ref 7.1–23.5)
CALCIUM SPEC-SCNC: 9.1 MG/DL (ref 8.4–10.2)
CALCIUM SPEC-SCNC: 9.5 MG/DL (ref 8.4–10.2)
CHLORIDE SERPL-SCNC: 102 MMOL/L (ref 98–107)
CHLORIDE SERPL-SCNC: 105 MMOL/L (ref 98–107)
CHOLEST SERPL-MCNC: 223 MG/DL (ref 0–199)
CO2 SERPL-SCNC: 24 MMOL/L (ref 26–30)
CO2 SERPL-SCNC: 27 MMOL/L (ref 26–30)
COHGB MFR BLD: 1.2 % (ref 0–2)
CREAT BLD-MCNC: 0.7 MG/DL (ref 0.6–1.3)
CREAT BLD-MCNC: 0.7 MG/DL (ref 0.6–1.3)
DEPRECATED RDW RBC AUTO: 45.1 FL (ref 37–54)
DEPRECATED RDW RBC AUTO: 47.2 FL (ref 37–54)
EOSINOPHIL # BLD AUTO: 0.29 10*3/MM3 (ref 0–0.7)
EOSINOPHIL NFR BLD AUTO: 3.2 % (ref 0–7)
ERYTHROCYTE [DISTWIDTH] IN BLOOD BY AUTOMATED COUNT: 14.6 % (ref 11.5–14.5)
ERYTHROCYTE [DISTWIDTH] IN BLOOD BY AUTOMATED COUNT: 14.9 % (ref 11.5–14.5)
FLUAV AG NPH QL: NEGATIVE
FLUBV AG NPH QL IA: NEGATIVE
GAS FLOW AIRWAY: 1 LPM
GFR SERPL CREATININE-BSD FRML MDRD: 96 ML/MIN/1.73
GFR SERPL CREATININE-BSD FRML MDRD: 96 ML/MIN/1.73
GLOBULIN UR ELPH-MCNC: 3.1 GM/DL
GLUCOSE BLD-MCNC: 118 MG/DL (ref 74–98)
GLUCOSE BLD-MCNC: 121 MG/DL (ref 74–98)
HBA1C MFR BLD: 5.3 % (ref 3–6)
HCG SERPL QL: NEGATIVE
HCO3 BLDA-SCNC: 21.4 MMOL/L (ref 22–28)
HCT VFR BLD AUTO: 36.3 % (ref 37–47)
HCT VFR BLD AUTO: 37 % (ref 37–47)
HCT VFR BLD CALC: 36.8 %
HDLC SERPL-MCNC: 36 MG/DL (ref 40–60)
HGB BLD-MCNC: 11.5 G/DL (ref 12–16)
HGB BLD-MCNC: 11.9 G/DL (ref 12–16)
HGB BLDA-MCNC: 12 G/DL (ref 12–18)
HOLD SPECIMEN: NORMAL
HOLD SPECIMEN: NORMAL
HOROWITZ INDEX BLD+IHG-RTO: 24 %
IMM GRANULOCYTES # BLD: 0.02 10*3/MM3 (ref 0–0.06)
IMM GRANULOCYTES NFR BLD: 0.2 % (ref 0–0.6)
LDLC SERPL CALC-MCNC: 114 MG/DL (ref 0–99)
LDLC/HDLC SERPL: 3.18 {RATIO}
LYMPHOCYTES # BLD AUTO: 2.87 10*3/MM3 (ref 0.6–3.4)
LYMPHOCYTES NFR BLD AUTO: 32.1 % (ref 10–50)
MAXIMAL PREDICTED HEART RATE: 187 BPM
MCH RBC QN AUTO: 27.2 PG (ref 27–31)
MCH RBC QN AUTO: 27.2 PG (ref 27–31)
MCHC RBC AUTO-ENTMCNC: 31.7 G/DL (ref 30–37)
MCHC RBC AUTO-ENTMCNC: 32.2 G/DL (ref 30–37)
MCV RBC AUTO: 84.5 FL (ref 81–99)
MCV RBC AUTO: 85.8 FL (ref 81–99)
METHGB BLD QL: 1.4 % (ref 0–1.5)
MODALITY: ABNORMAL
MONOCYTES # BLD AUTO: 0.61 10*3/MM3 (ref 0–0.9)
MONOCYTES NFR BLD AUTO: 6.8 % (ref 0–12)
NEUTROPHILS # BLD AUTO: 5.08 10*3/MM3 (ref 2–6.9)
NEUTROPHILS NFR BLD AUTO: 56.8 % (ref 37–80)
NOTE: ABNORMAL
NRBC BLD MANUAL-RTO: 0 /100 WBC (ref 0–0)
NT-PROBNP SERPL-MCNC: 9630 PG/ML (ref 0–125)
OXYHGB MFR BLDV: 90.3 % (ref 94–99)
PCO2 BLDA: 33 MM HG (ref 35–45)
PCO2 TEMP ADJ BLD: ABNORMAL MM HG (ref 35–45)
PH BLDA: 7.42 PH UNITS (ref 7.3–7.5)
PH, TEMP CORRECTED: ABNORMAL PH UNITS
PLATELET # BLD AUTO: 255 10*3/MM3 (ref 130–400)
PLATELET # BLD AUTO: 294 10*3/MM3 (ref 130–400)
PMV BLD AUTO: 8.3 FL (ref 6–12)
PMV BLD AUTO: 8.4 FL (ref 6–12)
PO2 BLDA: 63.6 MM HG (ref 75–100)
PO2 TEMP ADJ BLD: ABNORMAL MM HG (ref 83–108)
POTASSIUM BLD-SCNC: 3.3 MMOL/L (ref 3.5–5.1)
POTASSIUM BLD-SCNC: 3.5 MMOL/L (ref 3.5–5.1)
PROCALCITONIN SERPL-MCNC: <0.05 NG/ML
PROT SERPL-MCNC: 7.6 G/DL (ref 6.3–8.2)
RBC # BLD AUTO: 4.23 10*6/MM3 (ref 4.2–5.4)
RBC # BLD AUTO: 4.38 10*6/MM3 (ref 4.2–5.4)
SAO2 % BLDCOA: 92.7 % (ref 94–100)
SODIUM BLD-SCNC: 137 MMOL/L (ref 137–145)
SODIUM BLD-SCNC: 140 MMOL/L (ref 137–145)
STRESS TARGET HR: 159 BPM
T4 FREE SERPL-MCNC: 0.99 NG/DL (ref 0.78–2.19)
TRIGL SERPL-MCNC: 363 MG/DL
TROPONIN I SERPL-MCNC: 0.05 NG/ML (ref 0–0.03)
TROPONIN I SERPL-MCNC: 0.06 NG/ML (ref 0–0.03)
TROPONIN I SERPL-MCNC: 0.06 NG/ML (ref 0–0.03)
TROPONIN I SERPL-MCNC: 0.07 NG/ML (ref 0–0.03)
TSH SERPL DL<=0.05 MIU/L-ACNC: 1.9 MIU/ML (ref 0.47–4.68)
VENTILATOR MODE: ABNORMAL
VLDLC SERPL-MCNC: 72.6 MG/DL
WBC NRBC COR # BLD: 8.78 10*3/MM3 (ref 4.8–10.8)
WBC NRBC COR # BLD: 8.95 10*3/MM3 (ref 4.8–10.8)
WHOLE BLOOD HOLD SPECIMEN: NORMAL
WHOLE BLOOD HOLD SPECIMEN: NORMAL

## 2018-10-19 PROCEDURE — 25010000002 MAGNESIUM SULFATE 2 GM/50ML SOLUTION: Performed by: INTERNAL MEDICINE

## 2018-10-19 PROCEDURE — 99285 EMERGENCY DEPT VISIT HI MDM: CPT

## 2018-10-19 PROCEDURE — 82375 ASSAY CARBOXYHB QUANT: CPT

## 2018-10-19 PROCEDURE — 36600 WITHDRAWAL OF ARTERIAL BLOOD: CPT

## 2018-10-19 PROCEDURE — 84703 CHORIONIC GONADOTROPIN ASSAY: CPT

## 2018-10-19 PROCEDURE — 25010000002 ENOXAPARIN PER 10 MG: Performed by: INTERNAL MEDICINE

## 2018-10-19 PROCEDURE — 25010000002 IOPAMIDOL 61 % SOLUTION: Performed by: EMERGENCY MEDICINE

## 2018-10-19 PROCEDURE — 80061 LIPID PANEL: CPT | Performed by: INTERNAL MEDICINE

## 2018-10-19 PROCEDURE — 83050 HGB METHEMOGLOBIN QUAN: CPT

## 2018-10-19 PROCEDURE — 84145 PROCALCITONIN (PCT): CPT | Performed by: INTERNAL MEDICINE

## 2018-10-19 PROCEDURE — 94799 UNLISTED PULMONARY SVC/PX: CPT

## 2018-10-19 PROCEDURE — 85027 COMPLETE CBC AUTOMATED: CPT | Performed by: INTERNAL MEDICINE

## 2018-10-19 PROCEDURE — 87804 INFLUENZA ASSAY W/OPTIC: CPT | Performed by: INTERNAL MEDICINE

## 2018-10-19 PROCEDURE — 87581 M.PNEUMON DNA AMP PROBE: CPT | Performed by: HOSPITALIST

## 2018-10-19 PROCEDURE — 87633 RESP VIRUS 12-25 TARGETS: CPT | Performed by: HOSPITALIST

## 2018-10-19 PROCEDURE — 83036 HEMOGLOBIN GLYCOSYLATED A1C: CPT | Performed by: INTERNAL MEDICINE

## 2018-10-19 PROCEDURE — 25010000002 FUROSEMIDE PER 20 MG: Performed by: EMERGENCY MEDICINE

## 2018-10-19 PROCEDURE — 25010000002 MORPHINE PER 10 MG: Performed by: EMERGENCY MEDICINE

## 2018-10-19 PROCEDURE — 80050 GENERAL HEALTH PANEL: CPT

## 2018-10-19 PROCEDURE — 94640 AIRWAY INHALATION TREATMENT: CPT

## 2018-10-19 PROCEDURE — 84484 ASSAY OF TROPONIN QUANT: CPT

## 2018-10-19 PROCEDURE — 99222 1ST HOSP IP/OBS MODERATE 55: CPT | Performed by: INTERNAL MEDICINE

## 2018-10-19 PROCEDURE — 87486 CHLMYD PNEUM DNA AMP PROBE: CPT | Performed by: HOSPITALIST

## 2018-10-19 PROCEDURE — 93005 ELECTROCARDIOGRAM TRACING: CPT

## 2018-10-19 PROCEDURE — 25010000002 PROMETHAZINE PER 50 MG: Performed by: EMERGENCY MEDICINE

## 2018-10-19 PROCEDURE — 83880 ASSAY OF NATRIURETIC PEPTIDE: CPT

## 2018-10-19 PROCEDURE — 25010000002 FUROSEMIDE PER 20 MG: Performed by: INTERNAL MEDICINE

## 2018-10-19 PROCEDURE — 71275 CT ANGIOGRAPHY CHEST: CPT

## 2018-10-19 PROCEDURE — 80048 BASIC METABOLIC PNL TOTAL CA: CPT | Performed by: INTERNAL MEDICINE

## 2018-10-19 PROCEDURE — 84484 ASSAY OF TROPONIN QUANT: CPT | Performed by: INTERNAL MEDICINE

## 2018-10-19 PROCEDURE — 82805 BLOOD GASES W/O2 SATURATION: CPT

## 2018-10-19 PROCEDURE — 93306 TTE W/DOPPLER COMPLETE: CPT

## 2018-10-19 PROCEDURE — 84439 ASSAY OF FREE THYROXINE: CPT | Performed by: HOSPITALIST

## 2018-10-19 PROCEDURE — 87798 DETECT AGENT NOS DNA AMP: CPT | Performed by: HOSPITALIST

## 2018-10-19 RX ORDER — CETIRIZINE HYDROCHLORIDE 10 MG/1
10 TABLET ORAL DAILY
COMMUNITY
End: 2018-10-23 | Stop reason: HOSPADM

## 2018-10-19 RX ORDER — DOCUSATE SODIUM 100 MG/1
100 CAPSULE, LIQUID FILLED ORAL DAILY
Status: DISCONTINUED | OUTPATIENT
Start: 2018-10-19 | End: 2018-10-23 | Stop reason: HOSPADM

## 2018-10-19 RX ORDER — TRAZODONE HYDROCHLORIDE 50 MG/1
50 TABLET ORAL NIGHTLY PRN
Status: DISCONTINUED | OUTPATIENT
Start: 2018-10-19 | End: 2018-10-23 | Stop reason: HOSPADM

## 2018-10-19 RX ORDER — FUROSEMIDE 10 MG/ML
40 INJECTION INTRAMUSCULAR; INTRAVENOUS EVERY 12 HOURS
Status: DISCONTINUED | OUTPATIENT
Start: 2018-10-19 | End: 2018-10-19

## 2018-10-19 RX ORDER — MORPHINE SULFATE 4 MG/ML
4 INJECTION, SOLUTION INTRAMUSCULAR; INTRAVENOUS ONCE
Status: COMPLETED | OUTPATIENT
Start: 2018-10-19 | End: 2018-10-19

## 2018-10-19 RX ORDER — MONTELUKAST SODIUM 10 MG/1
10 TABLET ORAL NIGHTLY
Status: DISCONTINUED | OUTPATIENT
Start: 2018-10-19 | End: 2018-10-23 | Stop reason: HOSPADM

## 2018-10-19 RX ORDER — SODIUM CHLORIDE 0.9 % (FLUSH) 0.9 %
3 SYRINGE (ML) INJECTION EVERY 12 HOURS SCHEDULED
Status: DISCONTINUED | OUTPATIENT
Start: 2018-10-19 | End: 2018-10-23 | Stop reason: HOSPADM

## 2018-10-19 RX ORDER — ONDANSETRON 4 MG/1
4 TABLET, ORALLY DISINTEGRATING ORAL EVERY 6 HOURS PRN
Status: DISCONTINUED | OUTPATIENT
Start: 2018-10-19 | End: 2018-10-23 | Stop reason: HOSPADM

## 2018-10-19 RX ORDER — ONDANSETRON 2 MG/ML
4 INJECTION INTRAMUSCULAR; INTRAVENOUS EVERY 6 HOURS PRN
Status: DISCONTINUED | OUTPATIENT
Start: 2018-10-19 | End: 2018-10-23 | Stop reason: HOSPADM

## 2018-10-19 RX ORDER — MAGNESIUM SULFATE HEPTAHYDRATE 40 MG/ML
2 INJECTION, SOLUTION INTRAVENOUS ONCE
Status: COMPLETED | OUTPATIENT
Start: 2018-10-19 | End: 2018-10-19

## 2018-10-19 RX ORDER — LACTULOSE 10 G/15ML
20 SOLUTION ORAL 3 TIMES DAILY
Status: DISCONTINUED | OUTPATIENT
Start: 2018-10-19 | End: 2018-10-19

## 2018-10-19 RX ORDER — METOPROLOL SUCCINATE 25 MG/1
25 TABLET, EXTENDED RELEASE ORAL
Status: DISCONTINUED | OUTPATIENT
Start: 2018-10-19 | End: 2018-10-22

## 2018-10-19 RX ORDER — AMITRIPTYLINE HYDROCHLORIDE 10 MG/1
10 TABLET, FILM COATED ORAL NIGHTLY
Status: DISCONTINUED | OUTPATIENT
Start: 2018-10-19 | End: 2018-10-20

## 2018-10-19 RX ORDER — CYCLOBENZAPRINE HCL 10 MG
10 TABLET ORAL 2 TIMES DAILY
Status: DISCONTINUED | OUTPATIENT
Start: 2018-10-19 | End: 2018-10-21

## 2018-10-19 RX ORDER — BENZONATATE 100 MG/1
200 CAPSULE ORAL 3 TIMES DAILY PRN
Status: DISCONTINUED | OUTPATIENT
Start: 2018-10-19 | End: 2018-10-23 | Stop reason: HOSPADM

## 2018-10-19 RX ORDER — SODIUM CHLORIDE 0.9 % (FLUSH) 0.9 %
10 SYRINGE (ML) INJECTION AS NEEDED
Status: DISCONTINUED | OUTPATIENT
Start: 2018-10-19 | End: 2018-10-23 | Stop reason: HOSPADM

## 2018-10-19 RX ORDER — FLUTICASONE PROPIONATE 50 MCG
2 SPRAY, SUSPENSION (ML) NASAL AS NEEDED
Status: DISCONTINUED | OUTPATIENT
Start: 2018-10-19 | End: 2018-10-23 | Stop reason: HOSPADM

## 2018-10-19 RX ORDER — ACETAMINOPHEN 500 MG
1000 TABLET ORAL ONCE
Status: COMPLETED | OUTPATIENT
Start: 2018-10-19 | End: 2018-10-19

## 2018-10-19 RX ORDER — SODIUM CHLORIDE FOR INHALATION 3 %
4 VIAL, NEBULIZER (ML) INHALATION ONCE
Status: COMPLETED | OUTPATIENT
Start: 2018-10-19 | End: 2018-10-19

## 2018-10-19 RX ORDER — HYDROCODONE BITARTRATE AND ACETAMINOPHEN 5; 325 MG/1; MG/1
1 TABLET ORAL EVERY 8 HOURS PRN
Status: DISCONTINUED | OUTPATIENT
Start: 2018-10-19 | End: 2018-10-23 | Stop reason: SDUPTHER

## 2018-10-19 RX ORDER — POTASSIUM CHLORIDE 750 MG/1
40 CAPSULE, EXTENDED RELEASE ORAL ONCE
Status: COMPLETED | OUTPATIENT
Start: 2018-10-19 | End: 2018-10-19

## 2018-10-19 RX ORDER — METOPROLOL TARTRATE 5 MG/5ML
2.5 INJECTION INTRAVENOUS EVERY 4 HOURS
Status: DISCONTINUED | OUTPATIENT
Start: 2018-10-19 | End: 2018-10-19

## 2018-10-19 RX ORDER — BENZONATATE 100 MG/1
200 CAPSULE ORAL 3 TIMES DAILY PRN
Status: DISCONTINUED | OUTPATIENT
Start: 2018-10-19 | End: 2018-10-19

## 2018-10-19 RX ORDER — POTASSIUM CHLORIDE 20 MEQ/1
40 TABLET, EXTENDED RELEASE ORAL 2 TIMES DAILY WITH MEALS
Status: DISCONTINUED | OUTPATIENT
Start: 2018-10-19 | End: 2018-10-21

## 2018-10-19 RX ORDER — OXYCODONE AND ACETAMINOPHEN 7.5; 325 MG/1; MG/1
1 TABLET ORAL 2 TIMES DAILY PRN
COMMUNITY
End: 2019-02-20 | Stop reason: HOSPADM

## 2018-10-19 RX ORDER — IPRATROPIUM BROMIDE AND ALBUTEROL SULFATE 2.5; .5 MG/3ML; MG/3ML
3 SOLUTION RESPIRATORY (INHALATION) EVERY 6 HOURS PRN
Status: DISCONTINUED | OUTPATIENT
Start: 2018-10-19 | End: 2018-10-23 | Stop reason: HOSPADM

## 2018-10-19 RX ORDER — ONDANSETRON 4 MG/1
4 TABLET, FILM COATED ORAL EVERY 6 HOURS PRN
Status: DISCONTINUED | OUTPATIENT
Start: 2018-10-19 | End: 2018-10-23 | Stop reason: HOSPADM

## 2018-10-19 RX ORDER — BENZONATATE 200 MG/1
200 CAPSULE ORAL 3 TIMES DAILY PRN
Status: ON HOLD | COMMUNITY
End: 2019-01-31

## 2018-10-19 RX ORDER — FUROSEMIDE 10 MG/ML
40 INJECTION INTRAMUSCULAR; INTRAVENOUS ONCE
Status: COMPLETED | OUTPATIENT
Start: 2018-10-19 | End: 2018-10-19

## 2018-10-19 RX ORDER — CETIRIZINE HYDROCHLORIDE 10 MG/1
10 TABLET ORAL DAILY
Status: DISCONTINUED | OUTPATIENT
Start: 2018-10-20 | End: 2018-10-21

## 2018-10-19 RX ORDER — ENALAPRILAT 2.5 MG/2ML
1.25 INJECTION INTRAVENOUS EVERY 6 HOURS
Status: DISCONTINUED | OUTPATIENT
Start: 2018-10-19 | End: 2018-10-19

## 2018-10-19 RX ORDER — CALCIUM CARBONATE 200(500)MG
1 TABLET,CHEWABLE ORAL 3 TIMES DAILY PRN
Status: DISCONTINUED | OUTPATIENT
Start: 2018-10-19 | End: 2018-10-19

## 2018-10-19 RX ORDER — PROMETHAZINE HYDROCHLORIDE 25 MG/ML
12.5 INJECTION, SOLUTION INTRAMUSCULAR; INTRAVENOUS ONCE
Status: COMPLETED | OUTPATIENT
Start: 2018-10-19 | End: 2018-10-19

## 2018-10-19 RX ORDER — ERGOCALCIFEROL 1.25 MG/1
50000 CAPSULE ORAL WEEKLY
Status: ON HOLD | COMMUNITY
End: 2019-01-31

## 2018-10-19 RX ORDER — CALCIUM CARBONATE 200(500)MG
1 TABLET,CHEWABLE ORAL 3 TIMES DAILY PRN
Status: DISCONTINUED | OUTPATIENT
Start: 2018-10-19 | End: 2018-10-23 | Stop reason: HOSPADM

## 2018-10-19 RX ORDER — SPIRONOLACTONE 25 MG/1
25 TABLET ORAL DAILY
Status: DISCONTINUED | OUTPATIENT
Start: 2018-10-19 | End: 2018-10-23 | Stop reason: HOSPADM

## 2018-10-19 RX ORDER — ACETAMINOPHEN 325 MG/1
650 TABLET ORAL EVERY 4 HOURS PRN
Status: DISCONTINUED | OUTPATIENT
Start: 2018-10-19 | End: 2018-10-23 | Stop reason: HOSPADM

## 2018-10-19 RX ORDER — SODIUM CHLORIDE FOR INHALATION 3 %
4 VIAL, NEBULIZER (ML) INHALATION ONCE
Status: COMPLETED | OUTPATIENT
Start: 2018-10-19 | End: 2018-10-22

## 2018-10-19 RX ORDER — SODIUM CHLORIDE 0.9 % (FLUSH) 0.9 %
3-10 SYRINGE (ML) INJECTION AS NEEDED
Status: DISCONTINUED | OUTPATIENT
Start: 2018-10-19 | End: 2018-10-23 | Stop reason: HOSPADM

## 2018-10-19 RX ADMIN — Medication 3 ML: at 09:25

## 2018-10-19 RX ADMIN — Medication 10 ML: at 15:17

## 2018-10-19 RX ADMIN — SODIUM CHLORIDE 500 ML: 9 INJECTION, SOLUTION INTRAVENOUS at 00:51

## 2018-10-19 RX ADMIN — MORPHINE SULFATE 4 MG: 4 INJECTION INTRAVENOUS at 02:09

## 2018-10-19 RX ADMIN — Medication 3 ML: at 21:25

## 2018-10-19 RX ADMIN — PROMETHAZINE HYDROCHLORIDE 12.5 MG: 25 INJECTION INTRAMUSCULAR; INTRAVENOUS at 02:36

## 2018-10-19 RX ADMIN — METOPROLOL TARTRATE 2.5 MG: 5 INJECTION, SOLUTION INTRAVENOUS at 18:05

## 2018-10-19 RX ADMIN — METOPROLOL SUCCINATE 25 MG: 25 TABLET, EXTENDED RELEASE ORAL at 21:25

## 2018-10-19 RX ADMIN — FUROSEMIDE 40 MG: 10 INJECTION, SOLUTION INTRAMUSCULAR; INTRAVENOUS at 02:46

## 2018-10-19 RX ADMIN — SACUBITRIL AND VALSARTAN 1 TABLET: 24; 26 TABLET, FILM COATED ORAL at 21:25

## 2018-10-19 RX ADMIN — METOPROLOL TARTRATE 2.5 MG: 5 INJECTION, SOLUTION INTRAVENOUS at 12:18

## 2018-10-19 RX ADMIN — POTASSIUM CHLORIDE 40 MEQ: 1500 TABLET, EXTENDED RELEASE ORAL at 18:00

## 2018-10-19 RX ADMIN — FUROSEMIDE 40 MG: 10 INJECTION, SOLUTION INTRAMUSCULAR; INTRAVENOUS at 09:24

## 2018-10-19 RX ADMIN — HYDROCODONE POLISTIREX AND CHLORPHENIRAMINE POLISTIREX 5 ML: 10; 8 SUSPENSION, EXTENDED RELEASE ORAL at 00:50

## 2018-10-19 RX ADMIN — ACETAMINOPHEN 1000 MG: 500 TABLET, FILM COATED ORAL at 02:11

## 2018-10-19 RX ADMIN — MAGNESIUM SULFATE IN WATER 2 G: 40 INJECTION, SOLUTION INTRAVENOUS at 14:46

## 2018-10-19 RX ADMIN — CALCIUM CARBONATE (ANTACID) CHEW TAB 500 MG 1 TABLET: 500 CHEW TAB at 10:54

## 2018-10-19 RX ADMIN — DOCUSATE SODIUM 100 MG: 100 CAPSULE, LIQUID FILLED ORAL at 09:23

## 2018-10-19 RX ADMIN — SPIRONOLACTONE 25 MG: 25 TABLET ORAL at 12:25

## 2018-10-19 RX ADMIN — SERTRALINE HYDROCHLORIDE 100 MG: 50 TABLET ORAL at 09:23

## 2018-10-19 RX ADMIN — POTASSIUM CHLORIDE 40 MEQ: 750 CAPSULE, EXTENDED RELEASE ORAL at 03:14

## 2018-10-19 RX ADMIN — SACUBITRIL AND VALSARTAN 1 TABLET: 24; 26 TABLET, FILM COATED ORAL at 12:25

## 2018-10-19 RX ADMIN — IOPAMIDOL 100 ML: 612 INJECTION, SOLUTION INTRAVENOUS at 01:37

## 2018-10-19 RX ADMIN — ENOXAPARIN SODIUM 40 MG: 40 INJECTION SUBCUTANEOUS at 06:51

## 2018-10-19 RX ADMIN — AMITRIPTYLINE HYDROCHLORIDE 10 MG: 10 TABLET, FILM COATED ORAL at 21:25

## 2018-10-19 RX ADMIN — CYCLOBENZAPRINE HYDROCHLORIDE 10 MG: 10 TABLET, FILM COATED ORAL at 09:24

## 2018-10-19 RX ADMIN — METOPROLOL TARTRATE 2.5 MG: 5 INJECTION, SOLUTION INTRAVENOUS at 15:14

## 2018-10-19 RX ADMIN — SODIUM CHLORIDE SOLN NEBU 3% 4 ML: 3 NEBU SOLN at 11:35

## 2018-10-19 NOTE — CONSULTS
Referring Provider: Dr Pam NICHOLAS   Reason for Consultation:  CHF   Patient Care Team:  Malia Powers APRN as PCP - General (Family Medicine)  Shelley Brock MD as Consulting Physician (General Surgery)  Cirilo Deshpande II, MD (Pain Medicine)        History of present illness:  Middle aged lady who was being treated for T-cell lymphoma at this point with chemotherapy.  Has had 6 cycles of the same over the last 3-4 months time.  Comes in with history of 3 days of severe shortness of breath and flulike symptoms.  Thought she was having a lower respiratory tract infection.  While in the ER was noted to be in florid heart failure with tachycardia has been admitted subsequently.    Patient is not functionally very active.  Does have significant shortness of breath on activity and exertion.  She has not had any chest pains.  Has not felt her heart go fast at any time.      Review of Systems   Pertinent items are noted in HPI  Review of Systems      History  baseline EKG-sinus tachycardia voltage criteria for left ventricular hypertrophy diffuse ST segment changes.    -LV function assessment EF 60% echocardiac exam .    -CAD workup: Has had multiple stress tests according to the patient lasted about 3-4 years ago told to be normal.    -Obesity.    -Hypertension.    -Postpartum cardiomyopathy in  with complete recovery of LV systolic function.    -T-cell lymphoma on chemotherapy.      Personal history:    Nonsmoker does not drink alcohol function status the patient has been limited.      Family history: Noncontributory.    Review of symptoms:    Has had shortness of breath.  Has been coughing up yellow sputum.  Has had heaviness in the chest.  Has occasional swelling of the lower extremities has been having orthopnea as well as PND.  There is no stroke no weakness joint swelling respiratory symptoms are negative.      Past Surgical History:   Procedure Laterality Date   •  SECTION     •  ENDOMETRIAL ABLATION     • FEMORAL LYMPH NODE BIOPSY/EXCISON Left     lt [facility] 2017/2018   • PORTACATH PLACEMENT N/A 4/27/2018    Procedure: INSERTION OF PORTACATH right subclavian;  Surgeon: Shelley Brock MD;  Location: [facility];  Service: General   • TUBAL ABDOMINAL LIGATION     , Family History   Problem Relation Age of Onset   • Hypertension Father    • Diabetes Father    • Diabetes Paternal Grandmother    , Social History   Substance Use Topics   • Smoking status: Never Smoker   • Smokeless tobacco: Never Used   • Alcohol use Yes      Comment: OCCASIONALLY   , Prescriptions Prior to Admission   Medication Sig Dispense Refill Last Dose   • fluticasone (FLONASE) 50 MCG/ACT nasal spray As Needed.   10/18/2018 at Unknown time   • hydrochlorothiazide (HYDRODIURIL) 25 MG tablet    10/18/2018 at Unknown time   • HYDROcodone-acetaminophen (NORCO) 5-325 MG per tablet Take 1 tablet by mouth Every 6 (Six) Hours As Needed for Moderate Pain  or Severe Pain . (Patient taking differently: Take 1 tablet by mouth Every 8 (Eight) Hours As Needed for Moderate Pain  or Severe Pain .) 10 tablet 0 Past Week at Unknown time   • ibuprofen (ADVIL,MOTRIN) 800 MG tablet 800 mg Every 6 (Six) Hours As Needed.   10/18/2018 at Unknown time   • oxyCODONE-acetaminophen (PERCOCET) 7.5-325 MG per tablet Take 1 tablet by mouth 2 (Two) Times a Day As Needed for Severe Pain .   10/18/2018 at Unknown time   • sertraline (ZOLOFT) 100 MG tablet Take 100 mg by mouth Daily.   10/18/2018 at Unknown time   • amitriptyline (ELAVIL) 10 MG tablet Take 10 mg by mouth Every Night.   Unknown at Unknown time   • cyclobenzaprine (FLEXERIL) 10 MG tablet 10 mg 2 (Two) Times a Day.   Taking   • docusate sodium (COLACE) 100 MG capsule Take 1 capsule by mouth Daily. 30 capsule 3 Taking   • HYDROcodone-acetaminophen (NORCO) 5-325 MG per tablet Take 1-2 tablets by mouth Every 4 (Four) Hours As Needed 16 tablet 0 Taking   • lactulose (CHRONULAC) 10 GM/15ML solution  Take 30 mL by mouth 3 (Three) Times a Day. PRN constipation 240 mL 2 Taking   • lidocaine-prilocaine (EMLA) 2.5-2.5 % cream Apply  topically As Needed (45-60 minutes prior to port access.  Cover with saran/plastic wrap.). 30 g 3 Taking   • metroNIDAZOLE (METROGEL) 0.75 % gel    Taking   • montelukast (SINGULAIR) 10 MG tablet    Taking   • ondansetron (ZOFRAN) 8 MG tablet Take 1 tablet by mouth 3 (Three) Times a Day As Needed for Nausea or Vomiting. 30 tablet 5 Taking   • ondansetron ODT (ZOFRAN-ODT) 4 MG disintegrating tablet Take 1 tablet by mouth Every 6 (Six) Hours As Needed for Nausea or Vomiting. 10 tablet 0 Taking   • traMADol (ULTRAM) 50 MG tablet Every 6 (Six) Hours As Needed.   Taking   • traZODone (DESYREL) 50 MG tablet 50 mg At Night As Needed.   Taking   , Scheduled Meds:    amitriptyline 10 mg Oral Nightly   cyclobenzaprine 10 mg Oral BID   docusate sodium 100 mg Oral Daily   enoxaparin 40 mg Subcutaneous Q24H   furosemide 40 mg Intravenous Q12H   metoprolol tartrate 2.5 mg Intravenous Q4H   montelukast 10 mg Oral Nightly   sacubitril-valsartan 1 tablet Oral Q12H   sertraline 100 mg Oral Daily   sodium chloride 3 mL Intravenous Q12H   spironolactone 25 mg Oral Daily   , Continuous Infusions:   , PRN Meds:  •  acetaminophen  •  benzonatate  •  calcium carbonate  •  fluticasone  •  HYDROcod Polst-CPM Polst ER  •  HYDROcodone-acetaminophen  •  ipratropium-albuterol  •  ondansetron **OR** ondansetron ODT **OR** ondansetron  •  pneumococcal polysaccharide 23-valent  •  sodium chloride  •  sodium chloride  •  traZODone, Allergies:  Erythromycin and Macrobid [nitrofurantoin monohyd macro]     Objective     Vital Sign Min/Max for last 24 hours  Temp  Min: 97.8 °F (36.6 °C)  Max: 98.2 °F (36.8 °C)   BP  Min: 118/58  Max: 149/98   Pulse  Min: 102  Max: 134   Resp  Min: 16  Max: 27   SpO2  Min: 86 %  Max: 97 %   No Data Recorded   Weight  Min: 109 kg (240 lb)  Max: 110 kg (243 lb)     Flowsheet Rows      First  "Filed Value   Admission Height  170.2 cm (67\") Documented at 10/19/2018 0028   Admission Weight  109 kg (240 lb) Documented at 10/19/2018 0028             Lab Results   Component Value Date    ECHOEFEST 60 05/02/2018         Physical Exam:     General Appearance:    Alert, cooperative, in no acute distress   Head:    Normocephalic, without obvious abnormality, atraumatic   Eyes:            Lids and lashes normal, conjunctivae and sclerae normal, no   icterus, no pallor, corneas clear, PERRLA   Ears:    Ears appear intact with no abnormalities noted   Throat:   No oral lesions, no thrush, oral mucosa moist   Neck:   No adenopathy, supple, trachea midline, no thyromegaly, no     carotid bruit, no JVD   Back:     No kyphosis present, no scoliosis present, no skin lesions,       erythema or scars, no tenderness to percussion or                   palpation,   range of motion normal   Lungs:   Final basal crackles bilaterally.      Heart:    S1 S2 S3 heard.     Breast Exam:    Deferred   Abdomen:     Normal bowel sounds, no masses, no organomegaly, soft        non-tender, non-distended, no guarding, no rebound                 tenderness   Genitalia:    Deferred   Extremities:   Moves all extremities well, no edema, no cyanosis, no              redness   Pulses:   Pulses palpable and equal bilaterally   Skin:   No bleeding, bruising or rash   Lymph nodes:   No palpable adenopathy   Neurologic:   Cranial nerves 2 - 12 grossly intact, sensation intact, DTR        present and equal bilaterally       Results Review:   I reviewed the patient's new clinical results.    EKG: Sinus tachycardia voltage criteria for left central hypertrophy diffuse ST segment changes.  Telemetry: Average heart rate of 113 no arrhythmia noted.        LAB DATA :           WBC   Date Value Ref Range Status   10/19/2018 8.78 4.80 - 10.80 10*3/mm3 Final     RBC   Date Value Ref Range Status   10/19/2018 4.38 4.20 - 5.40 10*6/mm3 Final     Hemoglobin "   Date Value Ref Range Status   10/19/2018 11.9 (L) 12.0 - 16.0 g/dL Final     Hematocrit   Date Value Ref Range Status   10/19/2018 37.0 37.0 - 47.0 % Final     MCV   Date Value Ref Range Status   10/19/2018 84.5 81.0 - 99.0 fL Final     MCH   Date Value Ref Range Status   10/19/2018 27.2 27.0 - 31.0 pg Final     MCHC   Date Value Ref Range Status   10/19/2018 32.2 30.0 - 37.0 g/dL Final     RDW   Date Value Ref Range Status   10/19/2018 14.6 (H) 11.5 - 14.5 % Final     RDW-SD   Date Value Ref Range Status   10/19/2018 45.1 37.0 - 54.0 fl Final     MPV   Date Value Ref Range Status   10/19/2018 8.3 6.0 - 12.0 fL Final     Platelets   Date Value Ref Range Status   10/19/2018 255 130 - 400 10*3/mm3 Final     Neutrophil %   Date Value Ref Range Status   10/19/2018 56.8 37.0 - 80.0 % Final     Lymphocyte %   Date Value Ref Range Status   10/19/2018 32.1 10.0 - 50.0 % Final     Monocyte %   Date Value Ref Range Status   10/19/2018 6.8 0.0 - 12.0 % Final     Eosinophil %   Date Value Ref Range Status   10/19/2018 3.2 0.0 - 7.0 % Final     Basophil %   Date Value Ref Range Status   10/19/2018 0.9 0.0 - 2.5 % Final     Immature Grans %   Date Value Ref Range Status   10/19/2018 0.2 0.0 - 0.6 % Final     Neutrophils, Absolute   Date Value Ref Range Status   10/19/2018 5.08 2.00 - 6.90 10*3/mm3 Final     Lymphocytes, Absolute   Date Value Ref Range Status   10/19/2018 2.87 0.60 - 3.40 10*3/mm3 Final     Monocytes, Absolute   Date Value Ref Range Status   10/19/2018 0.61 0.00 - 0.90 10*3/mm3 Final     Eosinophils, Absolute   Date Value Ref Range Status   10/19/2018 0.29 0.00 - 0.70 10*3/mm3 Final     Basophils, Absolute   Date Value Ref Range Status   10/19/2018 0.08 0.00 - 0.20 10*3/mm3 Final     Immature Grans, Absolute   Date Value Ref Range Status   10/19/2018 0.02 0.00 - 0.06 10*3/mm3 Final     nRBC   Date Value Ref Range Status   10/19/2018 0.0 0.0 - 0.0 /100 WBC Final       Lab Results   Component Value Date    GLUCOSE  118 (H) 10/19/2018    BUN 10 10/19/2018    CREATININE 0.70 10/19/2018    EGFRIFNONA 96 10/19/2018    BCR 14.3 10/19/2018    CO2 27.0 10/19/2018    CALCIUM 9.1 10/19/2018    ALBUMIN 4.50 10/19/2018    LABIL2 1.4 01/27/2016    AST 33 10/19/2018    ALT 45 10/19/2018       Lab Results   Component Value Date    TROPONINI 0.071 (H) 10/19/2018       No results found for: DDIMER    Site   Date Value Ref Range Status   10/19/2018 Right Radial  Final     Rashad's Test   Date Value Ref Range Status   10/19/2018 Positive  Final     pH, Arterial   Date Value Ref Range Status   10/19/2018 7.421 7.300 - 7.500 pH units Final     pCO2, Arterial   Date Value Ref Range Status   10/19/2018 33.0 (L) 35.0 - 45.0 mm Hg Final     pO2, Arterial   Date Value Ref Range Status   10/19/2018 63.6 (L) 75.0 - 100.0 mm Hg Final     HCO3, Arterial   Date Value Ref Range Status   10/19/2018 21.4 (L) 22.0 - 28.0 mmol/L Final     Base Excess, Arterial   Date Value Ref Range Status   10/19/2018 -2.4 (L) 0.0 - 2.0 mmol/L Final     O2 Saturation, Arterial   Date Value Ref Range Status   10/19/2018 92.7 (L) 94.0 - 100.0 % Final     Hemoglobin, Blood Gas   Date Value Ref Range Status   10/19/2018 12.0 12 - 18 g/dL Final     Hematocrit, Blood Gas   Date Value Ref Range Status   10/19/2018 36.8 % Final     Oxyhemoglobin   Date Value Ref Range Status   10/19/2018 90.3 (L) 94 - 99 % Final     Methemoglobin   Date Value Ref Range Status   10/19/2018 1.40 0.00 - 1.50 % Final     Carboxyhemoglobin   Date Value Ref Range Status   10/19/2018 1.2 0 - 2 % Final     Barometric Pressure for Blood Gas   Date Value Ref Range Status   10/19/2018 743 mmHg Final     Modality   Date Value Ref Range Status   10/19/2018 Nasal Cannula  Final     FIO2   Date Value Ref Range Status   10/19/2018 24 % Final     Lab Results   Component Value Date    HGBA1C 5.5 01/27/2016       Results from last 7 days  Lab Units 10/19/18  0607   TSH mIU/mL 1.900   FREE T4 ng/dL 0.99     Lab Results    Component Value Date    LIPASE 46 04/08/2018       IMAGING DATA:     Ct Chest Pulmonary Embolism With Contrast    Result Date: 10/19/2018  Narrative: PROCEDURE: CT CHEST PULMONARY EMBOLISM W CONTRAST-  HISTORY: chest/back pain, sob, hypoxia, on chemo for lymphoma  COMPARISON: None .  TECHNIQUE: Multiple axial CT images were obtained from the thoracic inlet through the upper abdomen following the administration of Isovue 300 per the CT PE protocol. Coronal and oblique 3D MIP images were reconstructed from the original axial data set.  FINDINGS: There are no filling defects within the pulmonary arteries to suggest an embolus. The thoracic aorta is normal in caliber with no evidence of dissection. The heart is normal in size. There are small bilateral pleural effusions. There is no pericardial effusion. Enlarged lymph nodes are noted within the mediastinum, for example there is a 1.8 cm prevascular lymph node.. Lung windows reveal interlobular septal thickening with patchy bilateral opacities. The visualized upper abdomen is unremarkable. Bone windows reveal no acute osseous abnormalities.      Impression: 1. No evidence of pulmonary embolus or dissection. 2. Findings consistent with pulmonary edema with small bilateral pleural effusions. There is more confluent airspace disease bilaterally which may also reflect pulmonary edema, however a superimposed pneumonia is not excluded. 3. Nonspecific mediastinal adenopathy.      449.88 mGy.cm     This study was performed with techniques to keep radiation doses as low as reasonably achievable (ALARA). Individualized dose reduction techniques using automated exposure control or adjustment of mA and/or kV according to the patient size were employed.  This report was finalized on 10/19/2018 7:53 AM by Mukesh Hong M.D..        DIAGNOSIS   #1 congestive heart failure: Patient has systolic and diastolic dysfunction of the LV with moderate to severe mitral insufficiency.   This appears to be a significant change from her echocardiogram dated 5/18.  Etiology appears to be related either to chemotherapy or acute viral myocarditis with history of myopathy in the past.  Will get her on guideline medical therapy is really up titrate the medications and see how she responds.    #2.  Mitral insufficiency: Has at least moderate mitral insufficiency.  She is been on diuretic therapy.  We will continue the same and we have to be on long-term diuretic that he at least for the time being until her LV function improves.    #3 Hypertension: Blood Pressures Appear to Be Borderline.  With Initiation of the Medications This Should Improve.    #4 Tachycardia: Most Probably a Reflection of Her Diminished LV Systolic Function.  Will Start Beta Blocker Therapy.  If Blood Pressure Becomes an Issue Issue, Cornalnor Might Have To Be Added     #5 risk profile: We will try to obtain the blood work for the same to rule out any risk factors for traditional atherosclerotic vascular disease.  The possibility of this being atherosclerotic in origin is low.          Acute respiratory failure with hypoxia (CMS/HCC)    Productive cough    Sinus tachycardia          I discussed the patients findings and my recommendations with patient    Zheng Curtis MD  10/19/18  12:30 PM      Please note that portions of this note may have been completed with a voice recognition program. Efforts were made to edit the dictations, but occasionally words are mistranscribed.

## 2018-10-19 NOTE — PROGRESS NOTES
"[facility] FOLLOW-UP NOTE (non-billable note)    Name: Johny Hearn, 33 y.o. female  MRN: [id number], : 1985   Date of Admission: 10/19/2018   Date of Service: 10/19/18   PCP: Malia Powers APRN     [facility] Course:   Per review of H&P and recent oncology note:  Ms. 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 Rice), suspected RUFINA (outpatient sleep study to be scheduled), obesity (BMI 38), anxiety/depression, seasonal allergies who was admitted on 10/19/18 for shortness of breath, pleuritic chest pain, rhinorrhea, headache due to cough, worsening orthopnea, and cough (now productive yellow/green). +sick contacts. Did not get influenza vaccine this year.    Vitals on admission notable for , O2 sat 86-89% on RA --> normalized with nasal canula. Labs on admission notable for: procal negative; hgb 11.5; troponin 0.061 --> 0.071; BNP 9600; K 3.3; Glc 121; bicarb 24; AB.42/33/63/21 on 1LPM NC; beta-HCG: negative. Radiographs on admission notable for CT chest: \"1. No evidence of pulmonary embolus or dissection. 2. Findings consistent with pulmonary edema with small bilateral pleural effusions. There is more confluent airspace disease bilaterally which may also reflect pulmonary edema, however a superimposed pneumonia is not excluded. 3. Nonspecific mediastinal adenopathy.\". Patient was given tylenol, lasix, morphine, KCl, phenergan, NS 500ml in ER. Patient was admitted for acute respiratory failure with hypoxia due to pulmonary edema.    Consultants: Dr. Curtis (cardiology)  Procedures: none  Antibiotics: none   Micro:   10/19 influenza A/B: " "negative    -----------------------------------------------------------------------------------------------------------------------------------------------------------------------------------------------------------------------------------------------------  Subjective   Chief Complaint: f/u acute respiratory failure     Subjective:   Patient seen and examined this morning.  Tele reviewed sinus tach, artifact misread as arrhythmias. Events from last night noted. Discussed with GILBERTO Tam/Ghada. Patient has cough with deep inspiration. She confirms above history, denies any PND, endorses chronic 3 pillow orthopnea, chest tightness with breathing Recent sick contacts - her nieces/nephews, but she is not sure of what illness. She was on antibiotic in the last week (unsure of name) without improvement in symptoms.    Review of Systems:   Gen-no fevers, no chills  CV-no palpitations  GI-no N/V/D, no abd pain    Objective   Objective:     Intake/Output Summary (Last 24 hours) at 10/19/18 0703  Last data filed at 10/19/18 0326   Gross per 24 hour   Intake              500 ml   Output              500 ml   Net                0 ml      SpO2: 96 %     Physical Examination:   Vitals:    10/19/18 0441 10/19/18 0447 10/19/18 0502 10/19/18 0602   BP: 130/82 126/90 132/82 118/58   BP Location: Left arm      Patient Position: Lying      Pulse: 104 112 109 102   Resp: 22      Temp: 98.2 °F (36.8 °C)      TempSrc: Oral      SpO2: 94% 94% 94% 96%   Weight: 110 kg (243 lb)      Height: 170.2 cm (67\")         General:  Pleasant WDWN female; no acute distress  Heart:   Sinus tach; S1 S2 normal, no m/r/g  Lungs:   CTAB, no wheezes; notable cough with every deep inspiration  Abdomen:  Obese abdomen, soft, NT, ND, +BS  Extremities: no edema/cyanosis/clubbing  Neuro:  A&Ox3, no focal deficits  Psych:  appropriate mood  Skin:  No bleeding, bruising, or rash    Pertinent laboratory and radiology data were reviewed:  Microbiology Results (last " 10 days)     ** No results found for the last 240 hours. **          Medications Reviewed:     amitriptyline 10 mg Oral Nightly   cyclobenzaprine 10 mg Oral BID   docusate sodium 100 mg Oral Daily   enoxaparin 40 mg Subcutaneous Q24H   furosemide 40 mg Intravenous Q12H   lactulose 20 g Oral TID   montelukast 10 mg Oral Nightly   sertraline 100 mg Oral Daily   sodium chloride 3 mL Intravenous Q12H        Assessment /Plan   Assessment/Plan:   1. Acute respiratory failure with hypoxia due to acute pulmonary edema  · Requiring supplemental O2 via NC  · Continue IV diuresis  · ECHO pending  · Dr. Curtis consulted    2. Acute exacerbation of CHF, unclear if systolic or diastolic.   · EF 60% in 5/2018.   · Diuresis as above. Daily weights.   · ECHO pending.   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 Productive cough.   · Check sputum culture and respiratory panel  · Ipratropium nebs due to sinus tach  · procal negative. No leukocytosis or left shift, no indication for antibiotics at this time.   · Will f/u cultures and repeat procal/labs in AM.    4. Sinus tachycardia, unclear etiology  · Monitor on telemetry  · CT chest without PE  · Hemoglobin stable  · Check TSH/fT4  · Home meds that are listed currently have been reviewed, she is not on any rate controlling agents    5. Elevated troponin, mild, due to #1  6. 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 Rice  7. H/o post-partum cardiomyopathy  8. H/o cardiac arrest due to post-partum cardiomyopathy  9. Anxiety/depression  10. Suspected RUFINA, to have sleep study as outpatient scheduled  11. Pharmacist assisting with medication reconcilliation     FEN: cardiac 1500ml fluid restriction  DVT Prophylaxis: lovenox  PUD Prophylaxis: not indicated    Reason for continued hospitalization: above  Disposition: transfer to telemetry floor  Discussed  "with: patient and RN Anel Dela Cruz MD   7:03 AM on 10/19/2018       Addendum:  10/19: ECHO  \"Technically difficult study   1) Normal LV size with severe reduction in LV systolic function ( EF is about 30%)  2) Moderate left atrial enlargement with moderate elevation in LVedp   3) Thickened mitral leaflets with moderate MR   4) Mild TR with PAsp of 45 mm of hg   5) Normal size of the RV with borderline function   6) Global hypokinesis of the LV more marked along the anterior wall \"    Will hold transfer and monitor in ICU, she has been started on aldactone, entresto, lopressor per Dr. Curtis.    2:20 PM on 10/19/2018    "

## 2018-10-19 NOTE — PAYOR COMM NOTE
"TO:[facility]  FROM:PRINCESS VAZQUEZ, RN PHONE [phone]  INPT NOTIFICATION AND CLINICALS    Johny Hearn (33 y.o. Female)     Date of Birth Social Security Number Address Home Phone MRN    [id number]  [id number] [address] [phone] [id number]    Sikh Marital Status          Jewish        Admission Date Admission Type Admitting Provider Attending Provider Department, Room/Bed    10/19/18 Emergency Brenda Orozco MD Manivannan, Suganya, MD [facility] INTENSIVE CARE, I01/1    Discharge Date Discharge Disposition Discharge Destination                       Attending Provider:  Mary Dela Cruz MD    Allergies:  Erythromycin, Macrobid [Nitrofurantoin Monohyd Macro]    Isolation:  None   Infection:  None   Code Status:  CPR    Ht:  170.2 cm (67\")   Wt:  110 kg (243 lb)    Admission Cmt:  None   Principal Problem:  Acute respiratory failure with hypoxia (CMS/[facility]) [J96.01]                 Active Insurance as of 10/19/2018     Primary Coverage     Payor Plan Insurance Group Employer/Plan Group    [employer] [place] [employer]      Payor Plan Address Payor Plan Phone Number Effective From Effective To    [address] [address] [phone] 4/7/2017     [facility] [id number]       Subscriber Name Subscriber Birth Date Member ID       JOHNY HEARN 1985 [id number]                 Emergency Contacts      (Rel.) Home Phone Work Phone Mobile Phone    To Hearn (Spouse) -- -- [phone]    Trino Hearn (Other) [phone] -- --    Cleo Luna (Mother) [phone] -- --               History & Physical      Brenda Orozco MD at 10/19/2018  2:53 AM              [facility] Medicine Services  HISTORY AND PHYSICAL    Primary Care Physician: Malia Powers APRN    Subjective     Chief Complaint:    Chief Complaint   Patient presents with   • Shortness of Breath       History of Present Illness: "     I have reviewed labs/imaging/records from this hospitalization, including ER staff to establish a comprehensive understanding of this patient's clinical issues, as well as to establish plan of care appropriately.     Ms. 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 Rice), last chemo 2 months ago, suspected RUFINA (no sleep study as yet), seasonal allergies, anxiety and depression, who is presenting to the ER with complaints of shortness of breath and cough x 1 week. Her initial symptom was a mild cough which has now progressed to a continuous, hacking cough, initially dry, but now productive of thick yellow-greenish sputum. This is associated with shortness of breath and pleuritic chest pain. She's had a runny nose but no sore throat. Has had a headache due to constant coughing. She reports orthopnea which is chronic, but worse over the last 1 week. Denies any lower extremity swelling or weight gain. Denies any fever or chills. She has been around sick children recently but has not traveled anywhere. She did receive her flu vaccine this year.      On arrival to the ER, patient was noted to be tachycardic in the 120s, slightly tachypneic at 18, oxygen saturation 89% on RA and /98. Labs are notable for potassium 3.3, bicarb 24, BNP 9630, troponin 0.061, normal wbc and H&H 11.5/36. ABG 7.4/33/63 on 1L NC. A CT PE was obtained which ruled out a pulmonary embolism, but shows pulmonary edema with bilateral small pleural effusions. She was initially give 500cc of fluid bolus due to tachycardia and later given 40mg IV Lasix, tussionex, morphine, phenergan and tylenol. She's being admitted to the hospitalist service for further treatment and management of suspected acute on chronic congestive heart failure.     Review of Systems  "  1. Constitutional: Negative for fever. Negative for chills, diaphoresis, fatigue and unexpected weight change.   2. HENT: Negative for hearing loss. + congestion  3. Eyes: Negative for redness and visual disturbance.   4. Respiratory: + shortness of breath. Negative for chest pain . + cough and chest tightness.   5. Cardiovascular: Negative for chest pain and palpitations.   6. Gastrointestinal: Negative for abdominal distention, abdominal pain and blood in stool.   7. Endocrine: Negative for cold intolerance and heat intolerance.   8. Genitourinary: Negative for difficulty urinating, dysuria and frequency.   9. Musculoskeletal: Negative for arthralgias, back pain and myalgias.    10. Skin: Negative for color change, rash and wound.   11. Neurological: Negative for syncope, weakness and headaches.   12. Hematological: Negative for adenopathy. Does not bruise/bleed easily.   13. Psychiatric/Behavioral: Negative for confusion. The patient is not nervous/anxious.     Past Medical History:   Past Medical History:   Diagnosis Date   • Anesthesia     pt reports \"screamed for my  and wouldnt calm down when anesthesia gas used\".   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    Social History:  reports that she has never smoked. She has never used smokeless tobacco. She reports that she drinks alcohol. She reports that she does not use drugs.    Medications:  Prescriptions Prior to Admission   Medication Sig Dispense Refill Last Dose   • fluticasone (FLONASE) 50 MCG/ACT nasal spray As Needed.   10/18/2018 at Unknown time   • hydrochlorothiazide (HYDRODIURIL) 25 MG tablet    10/18/2018 at Unknown time   • HYDROcodone-acetaminophen (NORCO) 5-325 MG per tablet Take 1 tablet by mouth Every 6 (Six) Hours As Needed for Moderate Pain  or Severe Pain . (Patient taking differently: Take 1 tablet by mouth Every 8 (Eight) Hours As Needed for Moderate Pain  or Severe Pain .) 10 tablet 0 Past Week at Unknown time   • ibuprofen (ADVIL,MOTRIN) 800 MG tablet 800 mg Every 6 (Six) Hours As Needed.   10/18/2018 at Unknown time   • oxyCODONE-acetaminophen (PERCOCET) 7.5-325 MG per tablet Take 1 tablet by mouth 2 (Two) Times a Day As Needed for Severe Pain .   10/18/2018 at Unknown time   • sertraline (ZOLOFT) 100 MG tablet Take 100 mg by mouth Daily.   10/18/2018 at Unknown time   • amitriptyline (ELAVIL) 10 MG tablet Take 10 mg by mouth Every Night.   Unknown at Unknown time   • cyclobenzaprine (FLEXERIL) 10 MG tablet 10 mg 2 (Two) Times a Day.   Taking   • docusate sodium (COLACE) 100 MG capsule Take 1 capsule by mouth Daily. 30 capsule 3 Taking   • HYDROcodone-acetaminophen (NORCO) 5-325 MG per tablet Take 1-2 tablets by mouth Every 4 (Four) Hours As Needed 16 tablet 0 Taking   • lactulose (CHRONULAC) 10 GM/15ML solution Take 30 mL by mouth 3  "(Three) Times a Day. PRN constipation 240 mL 2 Taking   • lidocaine-prilocaine (EMLA) 2.5-2.5 % cream Apply  topically As Needed (45-60 minutes prior to port access.  Cover with saran/plastic wrap.). 30 g 3 Taking   • metroNIDAZOLE (METROGEL) 0.75 % gel    Taking   • montelukast (SINGULAIR) 10 MG tablet    Taking   • ondansetron (ZOFRAN) 8 MG tablet Take 1 tablet by mouth 3 (Three) Times a Day As Needed for Nausea or Vomiting. 30 tablet 5 Taking   • ondansetron ODT (ZOFRAN-ODT) 4 MG disintegrating tablet Take 1 tablet by mouth Every 6 (Six) Hours As Needed for Nausea or Vomiting. 10 tablet 0 Taking   • traMADol (ULTRAM) 50 MG tablet Every 6 (Six) Hours As Needed.   Taking   • traZODone (DESYREL) 50 MG tablet 50 mg At Night As Needed.   Taking       Allergies:  Allergies   Allergen Reactions   • Erythromycin Other (See Comments)     Pt unsure.   • Macrobid [Nitrofurantoin Monohyd Macro] Other (See Comments)     Pt does not know         Objective     Physical Exam:  Vital Signs: /82 (BP Location: Left arm, Patient Position: Lying)   Pulse 104   Temp 98.2 °F (36.8 °C) (Oral)   Resp 22   Ht 170.2 cm (67\")   Wt 110 kg (243 lb)   SpO2 94%   BMI 38.06 kg/m²       Physical Exam:     General Appearance:   Alert, cooperative, in no acute distress, very pleasant     Head:   Normocephalic, without obvious abnormality, atraumatic.     Eyes:       Normal, conjunctivae and sclerae, no icterus, no pallor, corneas clear, PERRLA        Throat:   Oral mucosa dry      Neck:  No adenopathy, supple, trachea midline, no thyromegaly, no carotid bruit, no JVD      Back:   No CVA tenderness on Percussion.     Lungs:   Fair air movement noted, crackles appreciated at bilateral lung bases      Heart:   Regular rhythm and tachycardic, normal S1 and S2.       Abdomen:   Obese. Normal bowel sounds, no masses, no organomegaly, soft non-tender, non-distended, no guarding, no rebound tenderness        Extremities:  Moves all " extremities, trace edema bilaterally, no cyanosis, no redness.     Pulses:  Pulses palpable and equal bilaterally but weak.     Skin:  No bleeding, bruising or rash        Neurologic:  Cranial nerves grossly intact, move all extremities         Results Review:  Lab Results (last 7 days)     Procedure Component Value Units Date/Time    Oakley Draw [[id number]] Collected:  10/19/18 0043    Specimen:  Blood Updated:  10/19/18 0145    Narrative:       The following orders were created for panel order [facility] Draw.  Procedure                               Abnormality         Status                     ---------                               -----------         ------                     Light Blue Top[[id number]]                                   Final result               Lavender Top[[id number]]                                     Final result               Gold Top - SST[[id number]]                                   Final result               Green Top (No Gel)[[id number]]                               Final result                 Please view results for these tests on the individual orders.    Light Blue Top [[id number]] Collected:  10/19/18 0043    Specimen:  Blood Updated:  10/19/18 0145     Extra Tube hold for add-on     Comment: Auto resulted       Lavender Top [[id number]] Collected:  10/19/18 0043    Specimen:  Blood Updated:  10/19/18 0145     Extra Tube hold for add-on     Comment: Auto resulted       Gold Top - SST [[id number]] Collected:  10/19/18 0043    Specimen:  Blood Updated:  10/19/18 0145     Extra Tube Hold for add-ons.     Comment: Auto resulted.       Green Top (No Gel) [[id number]] Collected:  10/19/18 0043    Specimen:  Blood Updated:  10/19/18 0145     Extra Tube Hold for add-ons.     Comment: Auto resulted.       hCG, Serum, Qualitative [[id number]]  (Normal) Collected:  10/19/18 0043    Specimen:  Blood Updated:  10/19/18 0134     HCG Qualitative Negative    BNP [[id number]]  (Abnormal) Collected:   10/19/18 0043    Specimen:  Blood Updated:  10/19/18 0117     proBNP 9,630.0 (C) pg/mL     Comprehensive Metabolic Panel [[id number]]  (Abnormal) Collected:  10/19/18 0043    Specimen:  Blood Updated:  10/19/18 0114     Glucose 121 (H) mg/dL      BUN 11 mg/dL      Creatinine 0.70 mg/dL      Sodium 137 mmol/L      Potassium 3.3 (L) mmol/L      Chloride 102 mmol/L      CO2 24.0 (L) mmol/L      Calcium 9.5 mg/dL      Total Protein 7.6 g/dL      Albumin 4.50 g/dL      ALT (SGPT) 45 U/L      AST (SGOT) 33 U/L      Alkaline Phosphatase 56 U/L      Total Bilirubin 0.5 mg/dL      eGFR Non African Amer 96 mL/min/1.73      Globulin 3.1 gm/dL      A/G Ratio 1.5 g/dL      BUN/Creatinine Ratio 15.7     Anion Gap 14.3 mmol/L     Narrative:       GFR Normal >60  Chronic Kidney Disease <60  Kidney Failure <15    Troponin [[id number]]  (Abnormal) Collected:  10/19/18 0043    Specimen:  Blood Updated:  10/19/18 0114     Troponin I 0.061 (H) ng/mL     Narrative:       Normal Patient Upper Reference Limit (URL) (99th Percentile)=0.03 ng/mL   Non-AMI Illness Reference Limit=0.03-0.11 ng/mL   AMI Confirmation=0.12 ng/mL and above    Blood Gas, Arterial With Co-Ox [[id number]]  (Abnormal) Collected:  10/19/18 0112    Specimen:  Arterial Blood Updated:  10/19/18 0112     Site Right Radial     Rashad's Test Positive     pH, Arterial 7.421 pH units      pCO2, Arterial 33.0 (L) mm Hg      pO2, Arterial 63.6 (L) mm Hg      HCO3, Arterial 21.4 (L) mmol/L      Base Excess, Arterial -2.4 (L) mmol/L      O2 Saturation, Arterial 92.7 (L) %      Hemoglobin, Blood Gas 12.0 g/dL      Hematocrit, Blood Gas 36.8 %      Oxyhemoglobin 90.3 (L) %      Methemoglobin 1.40 %      Carboxyhemoglobin 1.2 %      Barometric Pressure for Blood Gas 743 mmHg      Modality Nasal Cannula     FIO2 24 %      Flow Rate 1.0 lpm      Ventilator Mode NA     Note --     pH, Temp Corrected -- pH Units      pCO2, Temperature Corrected -- mm Hg      pO2, Temperature Corrected -- mm  Hg     CBC & Differential [[id number]] Collected:  10/19/18 0043    Specimen:  Blood Updated:  10/19/18 0050    Narrative:       The following orders were created for panel order CBC & Differential.  Procedure                               Abnormality         Status                     ---------                               -----------         ------                     CBC Auto Differential[[id number]]        Abnormal            Final result                 Please view results for these tests on the individual orders.    CBC Auto Differential [[id number]]  (Abnormal) Collected:  10/19/18 0043    Specimen:  Blood Updated:  10/19/18 0050     WBC 8.95 10*3/mm3      RBC 4.23 10*6/mm3      Hemoglobin 11.5 (L) g/dL      Hematocrit 36.3 (L) %      MCV 85.8 fL      MCH 27.2 pg      MCHC 31.7 g/dL      RDW 14.9 (H) %      RDW-SD 47.2 fl      MPV 8.4 fL      Platelets 294 10*3/mm3      Neutrophil % 56.8 %      Lymphocyte % 32.1 %      Monocyte % 6.8 %      Eosinophil % 3.2 %      Basophil % 0.9 %      Immature Grans % 0.2 %      Neutrophils, Absolute 5.08 10*3/mm3      Lymphocytes, Absolute 2.87 10*3/mm3      Monocytes, Absolute 0.61 10*3/mm3      Eosinophils, Absolute 0.29 10*3/mm3      Basophils, Absolute 0.08 10*3/mm3      Immature Grans, Absolute 0.02 10*3/mm3      nRBC 0.0 /100 WBC         Imaging Results (last 72 hours)     Procedure Component Value Units Date/Time    CT Chest Pulmonary Embolism With Contrast [[id number]] Updated:  10/19/18 0152        EKG: Sinus tachycardia, no significant acute ST-T changes  I have personally reviewed and interpreted available lab data, radiology studies and ECG obtained at time of admission.     Assessment / Plan     Assessment/Problem List:     Acute respiratory failure with hypoxia (CMS/HCC)    1. Acute respiratory failure with hypoxia, due to # 2  2. Acute pulmonary edema, likely due to # 3  3. Acute exacerbation of chronic congestive heart failure with possible worsening of  cardiomyopathy in the setting of CHOP therapy  4. Hypokalemia, replaced  5. Elevated troponin, likely due to # 2  6. Anaplastic Large T-cell Lymphoma s/p 6 cycles of CHOP therapy  7. History of post-partum cardiomyopathy, last EF 60%  8. History of cardiac arrest due to # 6  9. Anxiety and depression  10. Suspected sleep apnea, scheduled for polysomnography on outpatient basis later this month    Plan:  Admit to the ICU. Continue with supplemental oxygen to maintain saturation >92%, currently requiring 4L. Continue with IV Lasix 40mg twice daily with close monitoring of electrolytes. Tussionex and tessalon perls for cough suppression. Will check 2-D echo. Will check a procalcitonin and influenza swab to rule out underlying infectious process, suspicion low. Trend cardiac enzymes x 3 sets. Will consult Dr. Curtis (her cardiologist).     Potassium was replaced with 40meq KCl.   Lovenox for DVT ppx.     Details were discussed with the patient.   Further recommendations will depend on clinical course of the patient during the current hospitalization.    I also discussed the details with the nursing staff.    Anticipated stay is  greater than 2 midnights.    Brenda Orozco MD 10/19/18 5:07 AM    Dictated using Dragon.    Electronically signed by Brenda Orozco MD at 10/19/2018  5:15 AM          Emergency Department Notes      Jose G Tomlinson MD at 10/19/2018 12:49 AM          Subjective   33-year-old female presenting with shortness of breath.  She states that for the last week she has had nonproductive cough and increasing shortness of breath.  Was seen a few days ago by her primary doctor and told that nothing was wrong.  She's been taking over-the-counter medications.  She denies any fevers, chills, nausea, vomiting.  She does have some vague chest and back pain.  She does have a history of lymphoma, last chemotherapy was one month ago.  Also notes a history of postpartum cardiomyopathy, follows with   "[facility].            Review of Systems   Constitutional: Negative.    HENT: Negative.    Eyes: Negative.    Respiratory: Positive for cough and shortness of breath.    Cardiovascular: Positive for chest pain.   Gastrointestinal: Negative.    Genitourinary: Negative.    Musculoskeletal: Positive for back pain.   Skin: Negative.    Neurological: Negative.    Psychiatric/Behavioral: Negative.        Past Medical History:   Diagnosis Date   • Anesthesia     pt reports \"screamed for my  and wouldnt calm down when anesthesia gas used\".   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   • Macrobid [Nitrofurantoin Monohyd Macro] Other (See Comments)     Pt does not know       Past Surgical History:   Procedure Laterality Date   •  SECTION     • ENDOMETRIAL ABLATION     • FEMORAL LYMPH NODE BIOPSY/EXCISON Left     lt [facility]    • PORTACATH PLACEMENT N/A 2018    Procedure: INSERTION OF PORTACATH right subclavian;  Surgeon: Shelley Brock MD;  Location: [facility];  Service: General   • TUBAL ABDOMINAL LIGATION         Family History   Problem Relation Age of Onset   • Hypertension Father    • Diabetes Father    • Diabetes Paternal Grandmother        Social History     Social History   • Marital status:      Social History " Main Topics   • Smoking status: Never Smoker   • Smokeless tobacco: Never Used   • Alcohol use Yes      Comment: OCCASIONALLY   • Drug use: No   • Sexual activity: Defer     Other Topics Concern   • Not on file           Objective   Physical Exam   Constitutional: She is oriented to person, place, and time.   Chronically ill-appearing, actively coughing, mildly distressed   HENT:   Head: Normocephalic and atraumatic.   Right Ear: External ear normal.   Left Ear: External ear normal.   Nose: Nose normal.   Mouth/Throat: Oropharynx is clear and moist.   Eyes: Pupils are equal, round, and reactive to light. Conjunctivae and EOM are normal.   Neck: Normal range of motion. Neck supple.   Cardiovascular: Regular rhythm, normal heart sounds and intact distal pulses.    Tachycardic   Pulmonary/Chest: Effort normal and breath sounds normal. No respiratory distress. She has no wheezes. She has no rales.   Abdominal: Soft. Bowel sounds are normal. She exhibits no distension. There is no tenderness. There is no rebound and no guarding.   Musculoskeletal: Normal range of motion. She exhibits no edema, tenderness or deformity.   Neurological: She is alert and oriented to person, place, and time.   Skin: Skin is warm and dry. No rash noted.   Psychiatric: She has a normal mood and affect. Her behavior is normal.   Nursing note and vitals reviewed.      Procedures          ED Course                  MDM  Number of Diagnoses or Management Options  Acute pulmonary edema (CMS/HCC):   Acute respiratory failure with hypoxia (CMS/HCC):   Diagnosis management comments: 33-year-old female with shortness of breath.  Chronically ill-appearing young female in no distress with exam as above.  She is notably tachycardic and hypoxic.  Her lungs are clear.  Given her history concern for PE.  We'll check labs, CT scan, EKG.  We'll treat symptomatically.  Disposition pending workup though anticipate admission.    DDX: PE, pneumonia, viral illness,  "bronchitis, cardiomyopathy, CHF    EKG interpreted by me: Sinus tachycardia, normal axis/intervals, no acute ST changes, nonspecific T-wave changes, this is an abnormal EKG    Labs notable for elevated BNP, elevated troponin.  CT scan of the chest shows no evidence of PE, there is findings consistent with pulmonary edema and small bilateral pleural effusions.  She remains tachycardic.  Is continuing to require supplemental oxygen.  Discussed with Dr. Orozco  for admission.        Final diagnoses:   Acute respiratory failure with hypoxia (CMS/HCC)   Acute pulmonary edema (CMS/HCC)            Jose G Tomlinson MD  10/19/18 0244      Electronically signed by Jose G Tomlinson MD at 10/19/2018  2:44 AM             ICU Vital Signs     Row Name 10/19/18 1000 10/19/18 0900 10/19/18 0800 10/19/18 0700 10/19/18 0602       Vitals    Temp  --  -- 97.8 °F (36.6 °C)  --  --    Temp src  --  -- Oral  --  --    Pulse 118 110 118 114 102    Heart Rate Source  --  -- Monitor  --  --    Resp 18  -- 22  --  --    Resp Rate Source Visual  -- Visual  --  --    /75 134/83 132/93  -- 118/58    Noninvasive MAP (mmHg) 96 97 105  -- 93    BP Location Left arm  -- Left arm  --  --    BP Method Automatic  -- Automatic  --  --    Patient Position Sitting  -- Sitting  --  --       Oxygen Therapy    SpO2 93 % 96 % 91 % 95 % 96 %    Pulse Oximetry Type Continuous Continuous Continuous --  --    Device (Oxygen Therapy) room air nasal cannula nasal cannula  --  --    Flow (L/min)  -- 4 4  --  --    Row Name 10/19/18 0600 10/19/18 0515 10/19/18 0502 10/19/18 0447 10/19/18 0441       Height and Weight    Height  --  --  --  -- 170.2 cm (67\")    Height Method  --  --  --  -- Stated    Weight  --  --  --  -- 110 kg (243 lb)    Weight Method  --  --  --  -- Bed scale    Ideal Body Weight (IBW) (kg)  --  --  --  -- 61.86    BSA (Calculated - sq m)  --  --  --  -- 2.2 sq meters    BMI (Calculated)  --  --  --  -- 38.1    Weight in (lb) to " "have BMI = 25  --  --  --  -- 159.3       Vitals    Temp  --  --  --  -- 98.2 °F (36.8 °C)    Temp src  --  --  --  -- Oral    Pulse  --  -- 109 112 104    Heart Rate Source  --  --  --  -- Monitor    Resp  --  --  --  -- 22    Resp Rate Source  --  --  --  -- Visual    BP  --  -- 132/82 126/90 130/82    Noninvasive MAP (mmHg)  --  -- 98 105  --    BP Location  --  --  --  -- Left arm    BP Method  --  --  --  -- Automatic    Patient Position  --  --  --  -- Lying       Oxygen Therapy    SpO2  --  -- 94 % 94 % 94 %    Device (Oxygen Therapy) nasal cannula nasal cannula  --  -- nasal cannula    Flow (L/min) 4 4  --  -- 4    Row Name 10/19/18 0431 10/19/18 03:09:47 10/19/18 03:06:35 10/19/18 0305 10/19/18 0304       Vitals    Temp  -- 98.1 °F (36.7 °C) 98.1 °F (36.7 °C)  --  --    Temp src  -- Oral Oral  --  --    Pulse 103 115  -- (!)  122 120    Resp  -- 27  --  --  --    Resp Rate Source  -- Visual  --  --  --    /82 144/84  --  --  --    Noninvasive MAP (mmHg) 91  --  --  --  --       Oxygen Therapy    SpO2 94 % 95 %  -- 96 % 95 %    Row Name 10/19/18 0302 10/19/18 0256 10/19/18 0202 10/19/18 00:35:49 10/19/18 0028       Height and Weight    Height  --  --  --  -- 170.2 cm (67\")    Height Method  --  --  --  -- Stated    Weight  --  --  --  -- 109 kg (240 lb)    Weight Method  --  --  --  -- Stated    Ideal Body Weight (IBW) (kg)  --  --  --  -- 61.86    BSA (Calculated - sq m)  --  --  --  -- 2.18 sq meters    BMI (Calculated)  --  --  --  -- 37.6    Weight in (lb) to have BMI = 25  --  --  --  -- 159.3       Vitals    Temp  --  --  --  -- 98.1 °F (36.7 °C)    Temp src  --  --  --  -- Oral    Pulse (!)  125 (!)  128 (!)  134  -- (!)  126    Heart Rate Source  --  --  --  -- Monitor    Resp  --  --  --  -- 18    Resp Rate Source  --  --  --  -- Visual    /84 131/81 129/82  -- 149/98    Noninvasive MAP (mmHg) 105 98 97  --  --    BP Location  --  --  --  -- Right arm    BP Method  --  --  --  -- " Automatic    Patient Position  --  --  --  -- Sitting       Oxygen Therapy    SpO2 95 % 95 % (!)  86 %  -- (!)  89 %    Pulse Oximetry Type  --  --  --  -- Intermittent    Device (Oxygen Therapy)  --  --  -- nasal cannula  --    Flow (L/min)  --  --  -- 2  --        Hospital Medications (active)       Dose Frequency Start End    acetaminophen (TYLENOL) tablet 1,000 mg 1,000 mg Once 10/19/2018 10/19/2018    Sig - Route: Take 2 tablets by mouth 1 (One) Time. - Oral    acetaminophen (TYLENOL) tablet 650 mg 650 mg Every 4 Hours PRN 10/19/2018     Sig - Route: Take 2 tablets by mouth Every 4 (Four) Hours As Needed for Mild Pain . - Oral    amitriptyline (ELAVIL) tablet 10 mg 10 mg Nightly 10/19/2018     Sig - Route: Take 1 tablet by mouth Every Night. - Oral    benzonatate (TESSALON) capsule 200 mg 200 mg 3 Times Daily PRN 10/19/2018     Sig - Route: Take 2 capsules by mouth 3 (Three) Times a Day As Needed for Cough. - Oral    calcium carbonate (TUMS) chewable tablet 500 mg (200 mg elemental) 1 tablet 3 Times Daily PRN 10/19/2018     Sig - Route: Chew 500 mg 3 (Three) Times a Day As Needed for Indigestion or Heartburn. - Oral    cyclobenzaprine (FLEXERIL) tablet 10 mg 10 mg 2 Times Daily 10/19/2018     Sig - Route: Take 1 tablet by mouth 2 (Two) Times a Day. - Oral    docusate sodium (COLACE) capsule 100 mg 100 mg Daily 10/19/2018     Sig - Route: Take 1 capsule by mouth Daily. - Oral    enoxaparin (LOVENOX) syringe 40 mg 40 mg Every 24 Hours 10/19/2018     Sig - Route: Inject 0.4 mL under the skin into the appropriate area as directed Daily. - Subcutaneous    fluticasone (FLONASE) 50 MCG/ACT nasal spray 2 spray 2 spray As Needed 10/19/2018     Sig - Route: 2 sprays by Each Nare route As Needed for Rhinitis. - Each Nare    furosemide (LASIX) injection 40 mg 40 mg Once 10/19/2018 10/19/2018    Sig - Route: Infuse 4 mL into a venous catheter 1 (One) Time. - Intravenous    furosemide (LASIX) injection 40 mg 40 mg Every 12  "Hours 10/19/2018     Sig - Route: Infuse 4 mL into a venous catheter Every 12 (Twelve) Hours. - Intravenous    HYDROcod Polst-CPM Polst ER (TUSSIONEX PENNKINETIC) 10-8 MG/5ML ER suspension 5 mL 5 mL Once 10/19/2018 10/19/2018    Sig - Route: Take 5 mL by mouth 1 (One) Time. - Oral    HYDROcod Polst-CPM Polst ER (TUSSIONEX PENNKINETIC) 10-8 MG/5ML ER suspension 5 mL 5 mL Every 12 Hours PRN 10/19/2018 10/29/2018    Sig - Route: Take 5 mL by mouth Every 12 (Twelve) Hours As Needed for Cough. - Oral    HYDROcodone-acetaminophen (NORCO) 5-325 MG per tablet 1 tablet 1 tablet Every 8 Hours PRN 10/19/2018     Sig - Route: Take 1 tablet by mouth Every 8 (Eight) Hours As Needed for Moderate Pain  or Severe Pain . - Oral    iopamidol (ISOVUE-300) 61 % injection 100 mL 100 mL Once in Imaging 10/19/2018 10/19/2018    Sig - Route: Infuse 100 mL into a venous catheter Once. - Intravenous    ipratropium-albuterol (DUO-NEB) nebulizer solution 3 mL 3 mL Every 6 Hours PRN 10/19/2018     Sig - Route: Take 3 mL by nebulization Every 6 (Six) Hours As Needed for Shortness of Air. - Nebulization    metoprolol tartrate (LOPRESSOR) injection 2.5 mg 2.5 mg Every 4 Hours 10/19/2018     Sig - Route: Infuse 2.5 mL into a venous catheter Every 4 (Four) Hours. - Intravenous    montelukast (SINGULAIR) tablet 10 mg 10 mg Nightly 10/19/2018     Sig - Route: Take 1 tablet by mouth Every Night. - Oral    Morphine sulfate (PF) injection 4 mg 4 mg Once 10/19/2018 10/19/2018    Sig - Route: Infuse 1 mL into a venous catheter 1 (One) Time. - Intravenous    ondansetron (ZOFRAN) injection 4 mg 4 mg Every 6 Hours PRN 10/19/2018     Sig - Route: Infuse 2 mL into a venous catheter Every 6 (Six) Hours As Needed for Nausea or Vomiting. - Intravenous    Linked Group 1:  \"Or\" Linked Group Details        ondansetron (ZOFRAN) tablet 4 mg 4 mg Every 6 Hours PRN 10/19/2018     Sig - Route: Take 1 tablet by mouth Every 6 (Six) Hours As Needed for Nausea or Vomiting. - " "Oral    Linked Group 1:  \"Or\" Linked Group Details        ondansetron ODT (ZOFRAN-ODT) disintegrating tablet 4 mg 4 mg Every 6 Hours PRN 10/19/2018     Sig - Route: Take 1 tablet by mouth Every 6 (Six) Hours As Needed for Nausea or Vomiting. - Oral    Linked Group 1:  \"Or\" Linked Group Details        pneumococcal polysaccharide 23-valent (PNEUMOVAX-23) vaccine 0.5 mL 0.5 mL During Hospitalization 10/19/2018     Sig - Route: Inject 0.5 mL into the appropriate muscle as directed by prescriber During Hospitalization for Immunization. - Intramuscular    potassium chloride (MICRO-K) CR capsule 40 mEq 40 mEq Once 10/19/2018 10/19/2018    Sig - Route: Take 4 capsules by mouth 1 (One) Time. - Oral    promethazine (PHENERGAN) injection 12.5 mg 12.5 mg Once 10/19/2018 10/19/2018    Sig - Route: Infuse 0.5 mL into a venous catheter 1 (One) Time. - Intravenous    sacubitril-valsartan (ENTRESTO) 24-26 MG tablet 1 tablet 1 tablet Every 12 Hours Scheduled 10/19/2018     Sig - Route: Take 1 tablet by mouth Every 12 (Twelve) Hours. - Oral    sertraline (ZOLOFT) tablet 100 mg 100 mg Daily 10/19/2018     Sig - Route: Take 2 tablets by mouth Daily. - Oral    sodium chloride 0.9 % bolus 500 mL 500 mL Once 10/19/2018 10/19/2018    Sig - Route: Infuse 500 mL into a venous catheter 1 (One) Time. - Intravenous    sodium chloride 0.9 % flush 10 mL 10 mL As Needed 10/19/2018     Sig - Route: Infuse 10 mL into a venous catheter As Needed for Line Care. - Intravenous    Cosign for Ordering: Accepted by Jose G Tomlinson MD on 10/19/2018 12:47 AM    sodium chloride 0.9 % flush 3 mL 3 mL Every 12 Hours Scheduled 10/19/2018     Sig - Route: Infuse 3 mL into a venous catheter Every 12 (Twelve) Hours. - Intravenous    sodium chloride 0.9 % flush 3-10 mL 3-10 mL As Needed 10/19/2018     Sig - Route: Infuse 3-10 mL into a venous catheter As Needed for Line Care. - Intravenous    sodium chloride 3 % nebulizer solution 4 mL 4 mL Once 10/19/2018     " Sig - Route: Take 4 mL by nebulization 1 (One) Time. - Nebulization    spironolactone (ALDACTONE) tablet 25 mg 25 mg Daily 10/19/2018     Sig - Route: Take 1 tablet by mouth Daily. - Oral    traZODone (DESYREL) tablet 50 mg 50 mg Nightly PRN 10/19/2018     Sig - Route: Take 1 tablet by mouth At Night As Needed for Sleep. - Oral    enalaprilat (VASOTEC) injection 1.25 mg (Discontinued) 1.25 mg Every 6 Hours 10/19/2018 10/19/2018    Sig - Route: Infuse 1 mL into a venous catheter Every 6 (Six) Hours. - Intravenous    lactulose (CHRONULAC) 10 GM/15ML solution 20 g (Discontinued) 20 g 3 Times Daily 10/19/2018 10/19/2018    Sig - Route: Take 30 mL by mouth 3 (Three) Times a Day. - Oral            Lab Results (last 24 hours)     Procedure Component Value Units Date/Time    TSH [[id number]]  (Normal) Collected:  10/19/18 0607    Specimen:  Blood Updated:  10/19/18 1017     TSH 1.900 mIU/mL     T4, Free [[id number]]  (Normal) Collected:  10/19/18 0607    Specimen:  Blood Updated:  10/19/18 1017     Free T4 0.99 ng/dL     Influenza Antigen, Rapid - Swab, Nasopharynx [[id number]]  (Normal) Collected:  10/19/18 0832    Specimen:  Swab from Nasopharynx Updated:  10/19/18 0853     Influenza A Ag, EIA Negative     Influenza B Ag, EIA Negative    Respiratory Panel, PCR - Swab, Nasopharynx [[id number]] Collected:  10/19/18 0832    Specimen:  Swab from Nasopharynx Updated:  10/19/18 0836    Basic Metabolic Panel [[id number]]  (Abnormal) Collected:  10/19/18 0607    Specimen:  Blood Updated:  10/19/18 0642     Glucose 118 (H) mg/dL      BUN 10 mg/dL      Creatinine 0.70 mg/dL      Sodium 140 mmol/L      Potassium 3.5 mmol/L      Chloride 105 mmol/L      CO2 27.0 mmol/L      Calcium 9.1 mg/dL      eGFR Non African Amer 96 mL/min/1.73      BUN/Creatinine Ratio 14.3     Anion Gap 11.5 mmol/L     Narrative:       GFR Normal >60  Chronic Kidney Disease <60  Kidney Failure <15    Troponin [[id number]]  (Abnormal) Collected:  10/19/18 0607     Specimen:  Blood Updated:  10/19/18 0642     Troponin I 0.071 (H) ng/mL     Narrative:       Normal Patient Upper Reference Limit (URL) (99th Percentile)=0.03 ng/mL   Non-AMI Illness Reference Limit=0.03-0.11 ng/mL   AMI Confirmation=0.12 ng/mL and above    CBC (No Diff) [[id number]]  (Abnormal) Collected:  10/19/18 0607    Specimen:  Blood Updated:  10/19/18 0620     WBC 8.78 10*3/mm3      RBC 4.38 10*6/mm3      Hemoglobin 11.9 (L) g/dL      Hematocrit 37.0 %      MCV 84.5 fL      MCH 27.2 pg      MCHC 32.2 g/dL      RDW 14.6 (H) %      RDW-SD 45.1 fl      MPV 8.3 fL      Platelets 255 10*3/mm3     Procalcitonin [[id number]]  (Normal) Collected:  10/19/18 0043    Specimen:  Blood Updated:  10/19/18 0541     Procalcitonin <0.05 ng/mL     Narrative:       As a Marker for Sepsis (Non-Neonates):   1. <0.5 ng/mL represents a low risk of severe sepsis and/or septic shock.  2. >2 ng/mL represents a high risk of severe sepsis and/or septic shock.    As a Marker for Lower Respiratory Tract Infections that require antibiotic therapy:    PCT on Admission     Antibiotic Therapy       6-12 Hrs later  > 0.5                Strongly Recommended             >0.25 - <0.5         Recommended  0.1 - 0.25           Discouraged              Remeasure/reassess PCT  <0.1                 Strongly Discouraged     Remeasure/reassess PCT                     PCT values of < 0.5 ng/mL do not exclude an infection, because localized infections (without systemic signs) may be associated with such low concentrations, or a systemic infection in its initial stages (< 6 hours). Furthermore, increased PCT can occur without infection. PCT concentrations between 0.5 and 2.0 ng/mL should be interpreted taking into account the patient's history. It is recommended to retest PCT within 6-24 hours if any concentrations < 2 ng/mL are obtained.    [facility] Draw [[id number]] Collected:  10/19/18 0043    Specimen:  Blood Updated:  10/19/18 0145    Narrative:        The following orders were created for panel order Avis Draw.  Procedure                               Abnormality         Status                     ---------                               -----------         ------                     Light Blue Top[[id number]]                                   Final result               Lavender Top[[id number]]                                     Final result               Gold Top - SST[[id number]]                                   Final result               Green Top (No Gel)[[id number]]                               Final result                 Please view results for these tests on the individual orders.    Light Blue Top [[id number]] Collected:  10/19/18 0043    Specimen:  Blood Updated:  10/19/18 0145     Extra Tube hold for add-on     Comment: Auto resulted       Lavender Top [[id number]] Collected:  10/19/18 0043    Specimen:  Blood Updated:  10/19/18 0145     Extra Tube hold for add-on     Comment: Auto resulted       Gold Top - SST [[id number]] Collected:  10/19/18 0043    Specimen:  Blood Updated:  10/19/18 0145     Extra Tube Hold for add-ons.     Comment: Auto resulted.       Green Top (No Gel) [[id number]] Collected:  10/19/18 0043    Specimen:  Blood Updated:  10/19/18 0145     Extra Tube Hold for add-ons.     Comment: Auto resulted.       hCG, Serum, Qualitative [[id number]]  (Normal) Collected:  10/19/18 0043    Specimen:  Blood Updated:  10/19/18 0134     HCG Qualitative Negative    BNP [[id number]]  (Abnormal) Collected:  10/19/18 0043    Specimen:  Blood Updated:  10/19/18 0117     proBNP 9,630.0 (C) pg/mL     Comprehensive Metabolic Panel [[id number]]  (Abnormal) Collected:  10/19/18 0043    Specimen:  Blood Updated:  10/19/18 0114     Glucose 121 (H) mg/dL      BUN 11 mg/dL      Creatinine 0.70 mg/dL      Sodium 137 mmol/L      Potassium 3.3 (L) mmol/L      Chloride 102 mmol/L      CO2 24.0 (L) mmol/L      Calcium 9.5 mg/dL      Total Protein 7.6 g/dL      Albumin  4.50 g/dL      ALT (SGPT) 45 U/L      AST (SGOT) 33 U/L      Alkaline Phosphatase 56 U/L      Total Bilirubin 0.5 mg/dL      eGFR Non African Amer 96 mL/min/1.73      Globulin 3.1 gm/dL      A/G Ratio 1.5 g/dL      BUN/Creatinine Ratio 15.7     Anion Gap 14.3 mmol/L     Narrative:       GFR Normal >60  Chronic Kidney Disease <60  Kidney Failure <15    Troponin [[id number]]  (Abnormal) Collected:  10/19/18 0043    Specimen:  Blood Updated:  10/19/18 0114     Troponin I 0.061 (H) ng/mL     Narrative:       Normal Patient Upper Reference Limit (URL) (99th Percentile)=0.03 ng/mL   Non-AMI Illness Reference Limit=0.03-0.11 ng/mL   AMI Confirmation=0.12 ng/mL and above    Blood Gas, Arterial With Co-Ox [[id number]]  (Abnormal) Collected:  10/19/18 0112    Specimen:  Arterial Blood Updated:  10/19/18 0112     Site Right Radial     Rashad's Test Positive     pH, Arterial 7.421 pH units      pCO2, Arterial 33.0 (L) mm Hg      pO2, Arterial 63.6 (L) mm Hg      HCO3, Arterial 21.4 (L) mmol/L      Base Excess, Arterial -2.4 (L) mmol/L      O2 Saturation, Arterial 92.7 (L) %      Hemoglobin, Blood Gas 12.0 g/dL      Hematocrit, Blood Gas 36.8 %      Oxyhemoglobin 90.3 (L) %      Methemoglobin 1.40 %      Carboxyhemoglobin 1.2 %      Barometric Pressure for Blood Gas 743 mmHg      Modality Nasal Cannula     FIO2 24 %      Flow Rate 1.0 lpm      Ventilator Mode NA     Note --     pH, Temp Corrected -- pH Units      pCO2, Temperature Corrected -- mm Hg      pO2, Temperature Corrected -- mm Hg     CBC & Differential [[id number]] Collected:  10/19/18 0043    Specimen:  Blood Updated:  10/19/18 0050    Narrative:       The following orders were created for panel order CBC & Differential.  Procedure                               Abnormality         Status                     ---------                               -----------         ------                     CBC Auto Differential[[id number]]        Abnormal            Final result                  Please view results for these tests on the individual orders.    CBC Auto Differential [[id number]]  (Abnormal) Collected:  10/19/18 0043    Specimen:  Blood Updated:  10/19/18 0050     WBC 8.95 10*3/mm3      RBC 4.23 10*6/mm3      Hemoglobin 11.5 (L) g/dL      Hematocrit 36.3 (L) %      MCV 85.8 fL      MCH 27.2 pg      MCHC 31.7 g/dL      RDW 14.9 (H) %      RDW-SD 47.2 fl      MPV 8.4 fL      Platelets 294 10*3/mm3      Neutrophil % 56.8 %      Lymphocyte % 32.1 %      Monocyte % 6.8 %      Eosinophil % 3.2 %      Basophil % 0.9 %      Immature Grans % 0.2 %      Neutrophils, Absolute 5.08 10*3/mm3      Lymphocytes, Absolute 2.87 10*3/mm3      Monocytes, Absolute 0.61 10*3/mm3      Eosinophils, Absolute 0.29 10*3/mm3      Basophils, Absolute 0.08 10*3/mm3      Immature Grans, Absolute 0.02 10*3/mm3      nRBC 0.0 /100 WBC         Imaging Results (last 24 hours)     Procedure Component Value Units Date/Time    CT Chest Pulmonary Embolism With Contrast [[id number]] Collected:  10/19/18 0750     Updated:  10/19/18 0755    Narrative:       PROCEDURE: CT CHEST PULMONARY EMBOLISM W CONTRAST-     HISTORY: chest/back pain, sob, hypoxia, on chemo for lymphoma     COMPARISON: None .     TECHNIQUE: Multiple axial CT images were obtained from the thoracic  inlet through the upper abdomen following the administration of Isovue  300 per the CT PE protocol. Coronal and oblique 3D MIP images were  reconstructed from the original axial data set.      FINDINGS: There are no filling defects within the pulmonary arteries to  suggest an embolus. The thoracic aorta is normal in caliber with no  evidence of dissection. The heart is normal in size. There are small  bilateral pleural effusions. There is no pericardial effusion. Enlarged  lymph nodes are noted within the mediastinum, for example there is a 1.8  cm prevascular lymph node.. Lung windows reveal interlobular septal  thickening with patchy bilateral opacities. The  visualized upper abdomen  is unremarkable. Bone windows reveal no acute osseous abnormalities.       Impression:       1. No evidence of pulmonary embolus or dissection.   2. Findings consistent with pulmonary edema with small bilateral pleural  effusions. There is more confluent airspace disease bilaterally which  may also reflect pulmonary edema, however a superimposed pneumonia is  not excluded.  3. Nonspecific mediastinal adenopathy.             449.88 mGy.cm          This study was performed with techniques to keep radiation doses as low  as reasonably achievable (ALARA). Individualized dose reduction  techniques using automated exposure control or adjustment of mA and/or  kV according to the patient size were employed.      This report was finalized on 10/19/2018 7:53 AM by Mukesh Hong M.D..           Physician Progress Notes (last 24 hours) (Notes from 10/18/2018 10:57 AM through 10/19/2018 10:57 AM)      Mary Dela Cruz MD at 10/19/2018  7:03 AM          [facility] FOLLOW-UP NOTE (non-billable note)    Name: Johny Hearn, 33 y.o. female  MRN: [id number], : 1985   Date of Admission: 10/19/2018   Date of Service: 10/19/18   PCP: Malia Powers APRN     [facility] Course:   Per review of H&P and recent oncology note:  Ms. 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 Rice), suspected RUFINA (outpatient sleep study to be scheduled), obesity (BMI 38), anxiety/depression, seasonal allergies who was admitted on 10/19/18 for shortness of breath, pleuritic chest pain, rhinorrhea, headache due to cough, worsening orthopnea, and cough (now productive yellow/green). +sick contacts. Did not get influenza vaccine this year.    Vitals on admission  "notable for , O2 sat 86-89% on RA --> normalized with nasal canula. Labs on admission notable for: procal negative; hgb 11.5; troponin 0.061 --> 0.071; BNP 9600; K 3.3; Glc 121; bicarb 24; AB.42/33/63/21 on 1LPM NC; beta-HCG: negative. Radiographs on admission notable for CT chest: \"1. No evidence of pulmonary embolus or dissection. 2. Findings consistent with pulmonary edema with small bilateral pleural effusions. There is more confluent airspace disease bilaterally which may also reflect pulmonary edema, however a superimposed pneumonia is not excluded. 3. Nonspecific mediastinal adenopathy.\". Patient was given tylenol, lasix, morphine, KCl, phenergan, NS 500ml in ER. Patient was admitted for acute respiratory failure with hypoxia due to pulmonary edema.    Consultants: Dr. Curtis (cardiology)  Procedures: none  Antibiotics: none   Micro:   10/19 influenza A/B: negative    -----------------------------------------------------------------------------------------------------------------------------------------------------------------------------------------------------------------------------------------------------  Subjective   Chief Complaint: f/u acute respiratory failure     Subjective:   Patient seen and examined this morning.  Tele reviewed sinus tach, artifact misread as arrhythmias. Events from last night noted. Discussed with GILBERTO Tam/Ghada. Patient has cough with deep inspiration. She confirms above history, denies any PND, endorses chronic 3 pillow orthopnea, chest tightness with breathing Recent sick contacts - her nieces/nephews, but she is not sure of what illness. She was on antibiotic in the last week (unsure of name) without improvement in symptoms.    Review of Systems:   Gen-no fevers, no chills  CV-no palpitations  GI-no N/V/D, no abd pain    Objective   Objective:     Intake/Output Summary (Last 24 hours) at 10/19/18 7451  Last data filed at 10/19/18 0326   Gross per 24 hour   Intake " "             500 ml   Output              500 ml   Net                0 ml      SpO2: 96 %     Physical Examination:   Vitals:    10/19/18 0441 10/19/18 0447 10/19/18 0502 10/19/18 0602   BP: 130/82 126/90 132/82 118/58   BP Location: Left arm      Patient Position: Lying      Pulse: 104 112 109 102   Resp: 22      Temp: 98.2 °F (36.8 °C)      TempSrc: Oral      SpO2: 94% 94% 94% 96%   Weight: 110 kg (243 lb)      Height: 170.2 cm (67\")         General:  Pleasant WDWN female; no acute distress  Heart:   Sinus tach; S1 S2 normal, no m/r/g  Lungs:   CTAB, no wheezes; notable cough with every deep inspiration  Abdomen:  Obese abdomen, soft, NT, ND, +BS  Extremities: no edema/cyanosis/clubbing  Neuro:  A&Ox3, no focal deficits  Psych:  appropriate mood  Skin:  No bleeding, bruising, or rash    Pertinent laboratory and radiology data were reviewed:  Microbiology Results (last 10 days)     ** No results found for the last 240 hours. **          Medications Reviewed:     amitriptyline 10 mg Oral Nightly   cyclobenzaprine 10 mg Oral BID   docusate sodium 100 mg Oral Daily   enoxaparin 40 mg Subcutaneous Q24H   furosemide 40 mg Intravenous Q12H   lactulose 20 g Oral TID   montelukast 10 mg Oral Nightly   sertraline 100 mg Oral Daily   sodium chloride 3 mL Intravenous Q12H        Assessment /Plan   Assessment/Plan:   1. Acute respiratory failure with hypoxia due to acute pulmonary edema  · Requiring supplemental O2 via NC  · Continue IV diuresis  · ECHO pending  · Dr. Curtis consulted    2. Acute exacerbation of CHF, unclear if systolic or diastolic.   · EF 60% in 5/2018.   · Diuresis as above. Daily weights.   · ECHO pending.   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 Productive cough.   · Check sputum culture and respiratory panel  · Ipratropium nebs due to sinus tach  · procal negative. No leukocytosis or left shift, no indication for antibiotics at this time.   · Will f/u cultures and repeat procal/labs in " AM.    4. Sinus tachycardia, unclear etiology  · Monitor on telemetry  · CT chest without PE  · Hemoglobin stable  · Check TSH/fT4  · Home meds that are listed currently have been reviewed, she is not on any rate controlling agents    5. Elevated troponin, mild, due to #1  6. 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 Rice  7. H/o post-partum cardiomyopathy  8. H/o cardiac arrest due to post-partum cardiomyopathy  9. Anxiety/depression  10. Suspected RUFINA, to have sleep study as outpatient scheduled  11. Pharmacist assisting with medication reconcilliation     FEN: cardiac 1500ml fluid restriction  DVT Prophylaxis: lovenox  PUD Prophylaxis: not indicated    Reason for continued hospitalization: above  Disposition: transfer to telemetry floor  Discussed with: patient and RN Anel Dela Cruz MD   7:03 AM on 10/19/2018    Electronically signed by Mary Dela Cruz MD at 10/19/2018  9:28 AM       Consult Notes (last 24 hours) (Notes from 10/18/2018 10:57 AM through 10/19/2018 10:57 AM)     No notes of this type exist for this encounter.

## 2018-10-19 NOTE — PROGRESS NOTES
Continued Stay Note  ARNOLDO Burnham     Patient Name: Johny Hearn  MRN: [id number]  Today's Date: 10/19/2018    Admit Date: 10/19/2018          Discharge Plan     Row Name 10/19/18 1428       Plan    Plan NISREEN talked to pt. and parents. Lives with , no guardian, no home health. Requesting home health. Provided bus schedule, waiver programs, adult day programs, community resource book and disability resource book.. Provided living will information.     Plan Comments home with , requests home health.              Discharge Codes    No documentation.       Expected Discharge Date and Time     Expected Discharge Date Expected Discharge Time    Oct 22, 2018             Nataly Roth

## 2018-10-19 NOTE — H&P
[facility] Medicine Services  HISTORY AND PHYSICAL    Primary Care Physician: Malia Powers APRN    Subjective     Chief Complaint:    Chief Complaint   Patient presents with   • Shortness of Breath       History of Present Illness:     I have reviewed labs/imaging/records from this hospitalization, including ER staff to establish a comprehensive understanding of this patient's clinical issues, as well as to establish plan of care appropriately.     Ms. 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 Rice), last chemo 2 months ago, suspected RUFINA (no sleep study as yet), seasonal allergies, anxiety and depression, who is presenting to the ER with complaints of shortness of breath and cough x 1 week. Her initial symptom was a mild cough which has now progressed to a continuous, hacking cough, initially dry, but now productive of thick yellow-greenish sputum. This is associated with shortness of breath and pleuritic chest pain. She's had a runny nose but no sore throat. Has had a headache due to constant coughing. She reports orthopnea which is chronic, but worse over the last 1 week. Denies any lower extremity swelling or weight gain. Denies any fever or chills. She has been around sick children recently but has not traveled anywhere. She did receive her flu vaccine this year.      On arrival to the ER, patient was noted to be tachycardic in the 120s, slightly tachypneic at 18, oxygen saturation 89% on RA and /98. Labs are notable for potassium 3.3, bicarb 24, BNP 9630, troponin 0.061, normal wbc and H&H 11.5/36. ABG 7.4/33/63 on 1L NC. A CT PE was obtained which ruled out a pulmonary embolism, but shows pulmonary edema with bilateral small pleural effusions. She was initially give 500cc of fluid  "bolus due to tachycardia and later given 40mg IV Lasix, tussionex, morphine, phenergan and tylenol. She's being admitted to the hospitalist service for further treatment and management of suspected acute on chronic congestive heart failure.     Review of Systems   1. Constitutional: Negative for fever. Negative for chills, diaphoresis, fatigue and unexpected weight change.   2. HENT: Negative for hearing loss. + congestion  3. Eyes: Negative for redness and visual disturbance.   4. Respiratory: + shortness of breath. Negative for chest pain . + cough and chest tightness.   5. Cardiovascular: Negative for chest pain and palpitations.   6. Gastrointestinal: Negative for abdominal distention, abdominal pain and blood in stool.   7. Endocrine: Negative for cold intolerance and heat intolerance.   8. Genitourinary: Negative for difficulty urinating, dysuria and frequency.   9. Musculoskeletal: Negative for arthralgias, back pain and myalgias.    10. Skin: Negative for color change, rash and wound.   11. Neurological: Negative for syncope, weakness and headaches.   12. Hematological: Negative for adenopathy. Does not bruise/bleed easily.   13. Psychiatric/Behavioral: Negative for confusion. The patient is not nervous/anxious.     Past Medical History:   Past Medical History:   Diagnosis Date   • Anesthesia     pt reports \"screamed for my  and wouldnt calm down when anesthesia gas used\".   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    Social History:  reports that she has never smoked. She has never used smokeless tobacco. She reports that she drinks alcohol. She reports that she does not use drugs.    Medications:  Prescriptions Prior to Admission   Medication Sig Dispense Refill Last Dose   • fluticasone (FLONASE) 50 MCG/ACT nasal spray As Needed.   10/18/2018 at Unknown time   • hydrochlorothiazide (HYDRODIURIL) 25 MG tablet    10/18/2018 at Unknown time   • HYDROcodone-acetaminophen (NORCO) 5-325 MG per tablet Take 1 tablet by mouth Every 6 (Six) Hours As Needed for Moderate Pain  or Severe Pain . (Patient taking differently: Take 1 tablet by mouth Every 8 (Eight) Hours As Needed for Moderate Pain  or Severe Pain .) 10 tablet 0 Past Week at Unknown time   • ibuprofen (ADVIL,MOTRIN) 800 MG tablet 800 mg Every 6 (Six) Hours As Needed.   10/18/2018 at Unknown time   • oxyCODONE-acetaminophen (PERCOCET) 7.5-325 MG per tablet Take 1 tablet by mouth 2 (Two) Times a Day As Needed for Severe Pain .   10/18/2018 at Unknown time   • sertraline (ZOLOFT) 100 MG tablet Take 100 mg by mouth Daily.   10/18/2018 at Unknown time   • amitriptyline (ELAVIL) 10 MG tablet Take 10 mg by mouth Every Night.   Unknown at Unknown time   • cyclobenzaprine (FLEXERIL) 10 MG tablet 10 mg 2 (Two) Times a Day.   Taking   • docusate sodium (COLACE) 100 MG capsule  "Take 1 capsule by mouth Daily. 30 capsule 3 Taking   • HYDROcodone-acetaminophen (NORCO) 5-325 MG per tablet Take 1-2 tablets by mouth Every 4 (Four) Hours As Needed 16 tablet 0 Taking   • lactulose (CHRONULAC) 10 GM/15ML solution Take 30 mL by mouth 3 (Three) Times a Day. PRN constipation 240 mL 2 Taking   • lidocaine-prilocaine (EMLA) 2.5-2.5 % cream Apply  topically As Needed (45-60 minutes prior to port access.  Cover with saran/plastic wrap.). 30 g 3 Taking   • metroNIDAZOLE (METROGEL) 0.75 % gel    Taking   • montelukast (SINGULAIR) 10 MG tablet    Taking   • ondansetron (ZOFRAN) 8 MG tablet Take 1 tablet by mouth 3 (Three) Times a Day As Needed for Nausea or Vomiting. 30 tablet 5 Taking   • ondansetron ODT (ZOFRAN-ODT) 4 MG disintegrating tablet Take 1 tablet by mouth Every 6 (Six) Hours As Needed for Nausea or Vomiting. 10 tablet 0 Taking   • traMADol (ULTRAM) 50 MG tablet Every 6 (Six) Hours As Needed.   Taking   • traZODone (DESYREL) 50 MG tablet 50 mg At Night As Needed.   Taking       Allergies:  Allergies   Allergen Reactions   • Erythromycin Other (See Comments)     Pt unsure.   • Macrobid [Nitrofurantoin Monohyd Macro] Other (See Comments)     Pt does not know         Objective     Physical Exam:  Vital Signs: /82 (BP Location: Left arm, Patient Position: Lying)   Pulse 104   Temp 98.2 °F (36.8 °C) (Oral)   Resp 22   Ht 170.2 cm (67\")   Wt 110 kg (243 lb)   SpO2 94%   BMI 38.06 kg/m²      Physical Exam:     General Appearance:   Alert, cooperative, in no acute distress, very pleasant     Head:   Normocephalic, without obvious abnormality, atraumatic.     Eyes:       Normal, conjunctivae and sclerae, no icterus, no pallor, corneas clear, PERRLA        Throat:   Oral mucosa dry      Neck:  No adenopathy, supple, trachea midline, no thyromegaly, no carotid bruit, no JVD      Back:   No CVA tenderness on Percussion.     Lungs:   Fair air movement noted, crackles appreciated at bilateral lung " bases      Heart:   Regular rhythm and tachycardic, normal S1 and S2.       Abdomen:   Obese. Normal bowel sounds, no masses, no organomegaly, soft non-tender, non-distended, no guarding, no rebound tenderness        Extremities:  Moves all extremities, trace edema bilaterally, no cyanosis, no redness.     Pulses:  Pulses palpable and equal bilaterally but weak.     Skin:  No bleeding, bruising or rash        Neurologic:  Cranial nerves grossly intact, move all extremities         Results Review:  Lab Results (last 7 days)     Procedure Component Value Units Date/Time    [place] Draw [[id number]] Collected:  10/19/18 0043    Specimen:  Blood Updated:  10/19/18 0145    Narrative:       The following orders were created for panel order [place] Draw.  Procedure                               Abnormality         Status                     ---------                               -----------         ------                     Light Blue Top[[id number]]                                   Final result               Lavender Top[[id number]]                                     Final result               Gold Top - SST[[id number]]                                   Final result               Green Top (No Gel)[[id number]]                               Final result                 Please view results for these tests on the individual orders.    Light Blue Top [[id number]] Collected:  10/19/18 0043    Specimen:  Blood Updated:  10/19/18 0145     Extra Tube hold for add-on     Comment: Auto resulted       [place] Top [[id number]] Collected:  10/19/18 0043    Specimen:  Blood Updated:  10/19/18 0145     Extra Tube hold for add-on     Comment: Auto resulted       Gold Top - SST [[id number]] Collected:  10/19/18 0043    Specimen:  Blood Updated:  10/19/18 0145     Extra Tube Hold for add-ons.     Comment: Auto resulted.       Green Top (No Gel) [[id number]] Collected:  10/19/18 0043    Specimen:  Blood Updated:  10/19/18 0145     Extra Tube  Hold for add-ons.     Comment: Auto resulted.       hCG, Serum, Qualitative [[id number]]  (Normal) Collected:  10/19/18 0043    Specimen:  Blood Updated:  10/19/18 0134     HCG Qualitative Negative    BNP [[id number]]  (Abnormal) Collected:  10/19/18 0043    Specimen:  Blood Updated:  10/19/18 0117     proBNP 9,630.0 (C) pg/mL     Comprehensive Metabolic Panel [[id number]]  (Abnormal) Collected:  10/19/18 0043    Specimen:  Blood Updated:  10/19/18 0114     Glucose 121 (H) mg/dL      BUN 11 mg/dL      Creatinine 0.70 mg/dL      Sodium 137 mmol/L      Potassium 3.3 (L) mmol/L      Chloride 102 mmol/L      CO2 24.0 (L) mmol/L      Calcium 9.5 mg/dL      Total Protein 7.6 g/dL      Albumin 4.50 g/dL      ALT (SGPT) 45 U/L      AST (SGOT) 33 U/L      Alkaline Phosphatase 56 U/L      Total Bilirubin 0.5 mg/dL      eGFR Non African Amer 96 mL/min/1.73      Globulin 3.1 gm/dL      A/G Ratio 1.5 g/dL      BUN/Creatinine Ratio 15.7     Anion Gap 14.3 mmol/L     Narrative:       GFR Normal >60  Chronic Kidney Disease <60  Kidney Failure <15    Troponin [[id number]]  (Abnormal) Collected:  10/19/18 0043    Specimen:  Blood Updated:  10/19/18 0114     Troponin I 0.061 (H) ng/mL     Narrative:       Normal Patient Upper Reference Limit (URL) (99th Percentile)=0.03 ng/mL   Non-AMI Illness Reference Limit=0.03-0.11 ng/mL   AMI Confirmation=0.12 ng/mL and above    Blood Gas, Arterial With Co-Ox [[id number]]  (Abnormal) Collected:  10/19/18 0112    Specimen:  Arterial Blood Updated:  10/19/18 0112     Site Right Radial     Rashad's Test Positive     pH, Arterial 7.421 pH units      pCO2, Arterial 33.0 (L) mm Hg      pO2, Arterial 63.6 (L) mm Hg      HCO3, Arterial 21.4 (L) mmol/L      Base Excess, Arterial -2.4 (L) mmol/L      O2 Saturation, Arterial 92.7 (L) %      Hemoglobin, Blood Gas 12.0 g/dL      Hematocrit, Blood Gas 36.8 %      Oxyhemoglobin 90.3 (L) %      Methemoglobin 1.40 %      Carboxyhemoglobin 1.2 %      Barometric Pressure  for Blood Gas 743 mmHg      Modality Nasal Cannula     FIO2 24 %      Flow Rate 1.0 lpm      Ventilator Mode NA     Note --     pH, Temp Corrected -- pH Units      pCO2, Temperature Corrected -- mm Hg      pO2, Temperature Corrected -- mm Hg     CBC & Differential [[id number]] Collected:  10/19/18 0043    Specimen:  Blood Updated:  10/19/18 0050    Narrative:       The following orders were created for panel order CBC & Differential.  Procedure                               Abnormality         Status                     ---------                               -----------         ------                     CBC Auto Differential[[id number]]        Abnormal            Final result                 Please view results for these tests on the individual orders.    CBC Auto Differential [[id number]]  (Abnormal) Collected:  10/19/18 0043    Specimen:  Blood Updated:  10/19/18 0050     WBC 8.95 10*3/mm3      RBC 4.23 10*6/mm3      Hemoglobin 11.5 (L) g/dL      Hematocrit 36.3 (L) %      MCV 85.8 fL      MCH 27.2 pg      MCHC 31.7 g/dL      RDW 14.9 (H) %      RDW-SD 47.2 fl      MPV 8.4 fL      Platelets 294 10*3/mm3      Neutrophil % 56.8 %      Lymphocyte % 32.1 %      Monocyte % 6.8 %      Eosinophil % 3.2 %      Basophil % 0.9 %      Immature Grans % 0.2 %      Neutrophils, Absolute 5.08 10*3/mm3      Lymphocytes, Absolute 2.87 10*3/mm3      Monocytes, Absolute 0.61 10*3/mm3      Eosinophils, Absolute 0.29 10*3/mm3      Basophils, Absolute 0.08 10*3/mm3      Immature Grans, Absolute 0.02 10*3/mm3      nRBC 0.0 /100 WBC         Imaging Results (last 72 hours)     Procedure Component Value Units Date/Time    CT Chest Pulmonary Embolism With Contrast [[id number]] Updated:  10/19/18 0152        EKG: Sinus tachycardia, no significant acute ST-T changes  I have personally reviewed and interpreted available lab data, radiology studies and ECG obtained at time of admission.     Assessment / Plan     Assessment/Problem List:     Acute  respiratory failure with hypoxia (CMS/HCC)    1. Acute respiratory failure with hypoxia, due to # 2  2. Acute pulmonary edema, likely due to # 3  3. Acute exacerbation of chronic congestive heart failure with possible worsening of cardiomyopathy in the setting of CHOP therapy  4. Hypokalemia, replaced  5. Elevated troponin, likely due to # 2  6. Anaplastic Large T-cell Lymphoma s/p 6 cycles of CHOP therapy  7. History of post-partum cardiomyopathy, last EF 60%  8. History of cardiac arrest due to # 6  9. Anxiety and depression  10. Suspected sleep apnea, scheduled for polysomnography on outpatient basis later this month    Plan:  Admit to the ICU. Continue with supplemental oxygen to maintain saturation >92%, currently requiring 4L. Continue with IV Lasix 40mg twice daily with close monitoring of electrolytes. Tussionex and tessalon perls for cough suppression. Will check 2-D echo. Will check a procalcitonin and influenza swab to rule out underlying infectious process, suspicion low. Trend cardiac enzymes x 3 sets. Will consult Dr. Curtis (her cardiologist).     Potassium was replaced with 40meq KCl.   Lovenox for DVT ppx.     Details were discussed with the patient.   Further recommendations will depend on clinical course of the patient during the current hospitalization.    I also discussed the details with the nursing staff.    Anticipated stay is  greater than 2 midnights.    Brenda Orozco MD 10/19/18 5:07 AM    Dictated using Dragon.

## 2018-10-19 NOTE — DISCHARGE INSTR - OTHER ORDERS
If you have any questions about your recovery, please call the [facility] Nurse Call Center at [phone]. A registered nurse is available 24 hours a day 7 days a week to assist you.

## 2018-10-19 NOTE — ED PROVIDER NOTES
"Subjective   33-year-old female presenting with shortness of breath.  She states that for the last week she has had nonproductive cough and increasing shortness of breath.  Was seen a few days ago by her primary doctor and told that nothing was wrong.  She's been taking over-the-counter medications.  She denies any fevers, chills, nausea, vomiting.  She does have some vague chest and back pain.  She does have a history of lymphoma, last chemotherapy was one month ago.  Also notes a history of postpartum cardiomyopathy, follows with Dr. Curtis.            Review of Systems   Constitutional: Negative.    HENT: Negative.    Eyes: Negative.    Respiratory: Positive for cough and shortness of breath.    Cardiovascular: Positive for chest pain.   Gastrointestinal: Negative.    Genitourinary: Negative.    Musculoskeletal: Positive for back pain.   Skin: Negative.    Neurological: Negative.    Psychiatric/Behavioral: Negative.        Past Medical History:   Diagnosis Date   • Anesthesia     pt reports \"screamed for my  and wouldnt calm down when anesthesia gas used\".   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   • Macrobid [Nitrofurantoin Monohyd Macro] Other (See Comments)     Pt does not know       Past Surgical History:   Procedure " Laterality Date   •  SECTION     • ENDOMETRIAL ABLATION     • FEMORAL LYMPH NODE BIOPSY/EXCISON Left     lt [facility]    • PORTACATH PLACEMENT N/A 2018    Procedure: INSERTION OF PORTACATH right subclavian;  Surgeon: Shelley Brock MD;  Location: [facility] OR;  Service: General   • TUBAL ABDOMINAL LIGATION         Family History   Problem Relation Age of Onset   • Hypertension Father    • Diabetes Father    • Diabetes Paternal Grandmother        Social History     Social History   • Marital status:      Social History Main Topics   • Smoking status: Never Smoker   • Smokeless tobacco: Never Used   • Alcohol use Yes      Comment: OCCASIONALLY   • Drug use: No   • Sexual activity: Defer     Other Topics Concern   • Not on file           Objective   Physical Exam   Constitutional: She is oriented to person, place, and time.   Chronically ill-appearing, actively coughing, mildly distressed   HENT:   Head: Normocephalic and atraumatic.   Right Ear: External ear normal.   Left Ear: External ear normal.   Nose: Nose normal.   Mouth/Throat: Oropharynx is clear and moist.   Eyes: Pupils are equal, round, and reactive to light. Conjunctivae and EOM are normal.   Neck: Normal range of motion. Neck supple.   Cardiovascular: Regular rhythm, normal heart sounds and intact distal pulses.    Tachycardic   Pulmonary/Chest: Effort normal and breath sounds normal. No respiratory distress. She has no wheezes. She has no rales.   Abdominal: Soft. Bowel sounds are normal. She exhibits no distension. There is no tenderness. There is no rebound and no guarding.   Musculoskeletal: Normal range of motion. She exhibits no edema, tenderness or deformity.   Neurological: She is alert and oriented to person, place, and time.   Skin: Skin is warm and dry. No rash noted.   Psychiatric: She has a normal mood and affect. Her behavior is normal.   Nursing note and vitals reviewed.      Procedures           ED Course                   MDM  Number of Diagnoses or Management Options  Acute pulmonary edema (CMS/HCC):   Acute respiratory failure with hypoxia (CMS/HCC):   Diagnosis management comments: 33-year-old female with shortness of breath.  Chronically ill-appearing young female in no distress with exam as above.  She is notably tachycardic and hypoxic.  Her lungs are clear.  Given her history concern for PE.  We'll check labs, CT scan, EKG.  We'll treat symptomatically.  Disposition pending workup though anticipate admission.    DDX: PE, pneumonia, viral illness, bronchitis, cardiomyopathy, CHF    EKG interpreted by me: Sinus tachycardia, normal axis/intervals, no acute ST changes, nonspecific T-wave changes, this is an abnormal EKG    Labs notable for elevated BNP, elevated troponin.  CT scan of the chest shows no evidence of PE, there is findings consistent with pulmonary edema and small bilateral pleural effusions.  She remains tachycardic.  Is continuing to require supplemental oxygen.  Discussed with Dr. rOozco  for admission.        Final diagnoses:   Acute respiratory failure with hypoxia (CMS/HCC)   Acute pulmonary edema (CMS/HCC)            Jose G Tomlinson MD  10/19/18 5939

## 2018-10-19 NOTE — PROGRESS NOTES
Adult Nutrition  Assessment/PES    Patient Name:  Johny Hearn  YOB: 1985  MRN: [id number]  Admit Date:  10/19/2018    Assessment Date:  10/19/2018    Comments:  Rec #1: Continue current diet order; Encouraging intake. Pt receiving 100% PO intake over 1 meal. Rec #2: Continue to monitor/replace electrolytes PRN. RD to follow pt. Consult RD PRN.             Reason for Assessment     Row Name 10/19/18 1420          Reason for Assessment    Reason For Assessment diagnosis/disease state;identified at risk by screening criteria     Diagnosis pulmonary disease;cardiac disease;cancer diagnosis/related complications     Identified At Risk by Screening Criteria BMI                 Labs/Tests/Procedures/Meds     Row Name 10/19/18 1439          Labs/Procedures/Meds    Lab Results Reviewed reviewed, pertinent     Lab Results Comments High: Glu, tot Chol, Tg        Medications    Pertinent Medications Reviewed reviewed               Estimated/Assessed Needs     Row Name 10/19/18 1442          Calculation Measurements    Weight Used For Calculations 61.9 kg (136 lb 6 oz)   IBW        Estimated/Assessed Needs    Additional Documentation Protein Requirements (Group);Calorie Requirements (Group);Millard-St. Jeor Equation (Group);Fluid Requirements (Group)        Calorie Requirements    Estimated Calorie Need Method Millard-St Jeor     Estimated Calorie Requirement Comment ~2875-2671        KCAL/KG    14 Kcal/Kg (kcal) 866.04     15 Kcal/Kg (kcal) 927.9     18 Kcal/Kg (kcal) 1113.48     20 Kcal/Kg (kcal) 1237.2     25 Kcal/Kg (kcal) 1546.5     30 Kcal/Kg (kcal) 1855.8     35 Kcal/Kg (kcal) 2165.1     40 Kcal/Kg (kcal) 2474.4     45 Kcal/Kg (kcal) 2783.7     50 Kcal/Kg (kcal) 3093        Millard-St. Jeor Equation    RMR (Millard-St. Jeor Equation) 1356.23        Protein Requirements    Est Protein Requirement Amount (gms/kg) 1.4 gm protein   74-87 gm     Estimated Protein Requirements (gms/day) 86.6        Fluid  Requirements    Estimated Fluid Requirement Method [place]-Segar Formula     [place]-Segar Method (over 20 kg) 2737.2             Nutrition Prescription Ordered     Row Name 10/19/18 1444          Nutrition Prescription PO    Current PO Diet Regular     Common Modifiers Cardiac;Fluid Restriction     Fluid Restriction mL per Day 1500 mL             Evaluation of Received Nutrient/Fluid Intake     Row Name 10/19/18 1444 10/19/18 1442       Calculation Measurements    Weight Used For Calculations  -- 61.9 kg (136 lb 6 oz)   IBW       PO Evaluation    Number of Days PO Intake Evaluated 1 day  --    Number of Meals 1  --    % PO Intake 100  --            Evaluation of Prescribed Nutrient/Fluid Intake     Row Name 10/19/18 1442          Calculation Measurements    Weight Used For Calculations 61.9 kg (136 lb 6 oz)   IBW           Problem/Interventions:        Problem 1     Row Name 10/19/18 1445          Nutrition Diagnoses Problem 1    Problem 1 Overweight/Obesity     Etiology (related to) Factors Affecting Nutrition     Food Habit/Preferences Large Meals     Signs/Symptoms (evidenced by) BMI     BMI 35 - 39.9             Problem 2     Row Name 10/19/18 1445          Nutrition Diagnoses Problem 2    Problem 2 Impaired Nutrient Utilization     Etiology (related to) Medical Diagnosis     Cardiac CHF;Dyslipidemia;Hypercholesterolemia;Hypertriglyceridemia     Signs/Symptoms (evidenced by) Biochemical     Specific Labs Noted Glucose;Triglyceride;Cholesterol                   Intervention Goal     Row Name 10/19/18 1446          Intervention Goal    General Meet nutritional needs for age/condition     PO Meet estimated needs;Maintain intake;PO intake (%)     PO Intake % 100 %     Weight No significant weight loss             Nutrition Intervention     Row Name 10/19/18 1446          Nutrition Intervention    RD/Tech Action Follow Tx progress;Care plan reviewd             Nutrition Prescription     Row Name 10/19/18 1442           Nutrition Prescription PO    PO Prescription Other (comment)   Continue current diet order     New PO Prescription Ordered? No, recommended        Other Orders    Obtain Weight Daily     Obtain Weight Ordered? No, recommended     Supplement Vitamin mineral supplement     Supplement Ordered? No, recommended             Education/Evaluation     Row Name 10/19/18 1447          Education    Education Will Instruct as appropriate        Monitor/Evaluation    Monitor Per protocol;I&O;PO intake;Weight;Pertinent labs         Electronically signed by:  Miroslava Guan RD  10/19/18 2:48 PM

## 2018-10-19 NOTE — PLAN OF CARE
Problem: Patient Care Overview  Goal: Plan of Care Review  Outcome: Ongoing (interventions implemented as appropriate)   10/19/18 1201   Coping/Psychosocial   Plan of Care Reviewed With patient;spouse;father;mother   OTHER   Outcome Summary Decreased shortness of breath and less oxygen requirement   Plan of Care Review   Progress improving     Goal: Individualization and Mutuality  Outcome: Ongoing (interventions implemented as appropriate)      Problem: Skin Injury Risk (Adult)  Goal: Identify Related Risk Factors and Signs and Symptoms  Outcome: Outcome(s) achieved Date Met: 10/19/18    Goal: Skin Health and Integrity  Outcome: Ongoing (interventions implemented as appropriate)         98

## 2018-10-20 LAB
ANION GAP SERPL CALCULATED.3IONS-SCNC: 6.6 MMOL/L (ref 10–20)
BASOPHILS # BLD AUTO: 0.06 10*3/MM3 (ref 0–0.2)
BASOPHILS NFR BLD AUTO: 0.9 % (ref 0–2.5)
BUN BLD-MCNC: 16 MG/DL (ref 7–20)
BUN/CREAT SERPL: 22.9 (ref 7.1–23.5)
CALCIUM SPEC-SCNC: 8.8 MG/DL (ref 8.4–10.2)
CHLORIDE SERPL-SCNC: 105 MMOL/L (ref 98–107)
CO2 SERPL-SCNC: 28 MMOL/L (ref 26–30)
CREAT BLD-MCNC: 0.7 MG/DL (ref 0.6–1.3)
DEPRECATED RDW RBC AUTO: 47.5 FL (ref 37–54)
EOSINOPHIL # BLD AUTO: 0.27 10*3/MM3 (ref 0–0.7)
EOSINOPHIL NFR BLD AUTO: 4.1 % (ref 0–7)
ERYTHROCYTE [DISTWIDTH] IN BLOOD BY AUTOMATED COUNT: 15.1 % (ref 11.5–14.5)
GFR SERPL CREATININE-BSD FRML MDRD: 96 ML/MIN/1.73
GLUCOSE BLD-MCNC: 113 MG/DL (ref 74–98)
HCT VFR BLD AUTO: 35.9 % (ref 37–47)
HGB BLD-MCNC: 11.3 G/DL (ref 12–16)
IMM GRANULOCYTES # BLD: 0.02 10*3/MM3 (ref 0–0.06)
IMM GRANULOCYTES NFR BLD: 0.3 % (ref 0–0.6)
LYMPHOCYTES # BLD AUTO: 2.04 10*3/MM3 (ref 0.6–3.4)
LYMPHOCYTES NFR BLD AUTO: 30.8 % (ref 10–50)
MAGNESIUM SERPL-MCNC: 2.6 MG/DL (ref 1.6–2.3)
MCH RBC QN AUTO: 27 PG (ref 27–31)
MCHC RBC AUTO-ENTMCNC: 31.5 G/DL (ref 30–37)
MCV RBC AUTO: 85.9 FL (ref 81–99)
MONOCYTES # BLD AUTO: 0.47 10*3/MM3 (ref 0–0.9)
MONOCYTES NFR BLD AUTO: 7.1 % (ref 0–12)
NEUTROPHILS # BLD AUTO: 3.76 10*3/MM3 (ref 2–6.9)
NEUTROPHILS NFR BLD AUTO: 56.8 % (ref 37–80)
NRBC BLD MANUAL-RTO: 0 /100 WBC (ref 0–0)
PLATELET # BLD AUTO: 277 10*3/MM3 (ref 130–400)
PMV BLD AUTO: 8.6 FL (ref 6–12)
POTASSIUM BLD-SCNC: 3.6 MMOL/L (ref 3.5–5.1)
RBC # BLD AUTO: 4.18 10*6/MM3 (ref 4.2–5.4)
SODIUM BLD-SCNC: 136 MMOL/L (ref 137–145)
WBC NRBC COR # BLD: 6.62 10*3/MM3 (ref 4.8–10.8)

## 2018-10-20 PROCEDURE — 80048 BASIC METABOLIC PNL TOTAL CA: CPT | Performed by: HOSPITALIST

## 2018-10-20 PROCEDURE — 99231 SBSQ HOSP IP/OBS SF/LOW 25: CPT | Performed by: HOSPITALIST

## 2018-10-20 PROCEDURE — 25010000002 ENOXAPARIN PER 10 MG: Performed by: INTERNAL MEDICINE

## 2018-10-20 PROCEDURE — 85025 COMPLETE CBC W/AUTO DIFF WBC: CPT | Performed by: HOSPITALIST

## 2018-10-20 PROCEDURE — 25010000002 MAGNESIUM SULFATE 2 GM/50ML SOLUTION: Performed by: INTERNAL MEDICINE

## 2018-10-20 PROCEDURE — 93005 ELECTROCARDIOGRAM TRACING: CPT | Performed by: INTERNAL MEDICINE

## 2018-10-20 PROCEDURE — 83735 ASSAY OF MAGNESIUM: CPT | Performed by: HOSPITALIST

## 2018-10-20 RX ORDER — MAGNESIUM SULFATE HEPTAHYDRATE 40 MG/ML
2 INJECTION, SOLUTION INTRAVENOUS ONCE
Status: COMPLETED | OUTPATIENT
Start: 2018-10-20 | End: 2018-10-20

## 2018-10-20 RX ORDER — ATORVASTATIN CALCIUM 20 MG/1
20 TABLET, FILM COATED ORAL NIGHTLY
Status: DISCONTINUED | OUTPATIENT
Start: 2018-10-20 | End: 2018-10-23 | Stop reason: HOSPADM

## 2018-10-20 RX ORDER — ASPIRIN 81 MG/1
81 TABLET ORAL DAILY
Status: DISCONTINUED | OUTPATIENT
Start: 2018-10-20 | End: 2018-10-23 | Stop reason: HOSPADM

## 2018-10-20 RX ADMIN — MONTELUKAST SODIUM 10 MG: 10 TABLET, FILM COATED ORAL at 20:40

## 2018-10-20 RX ADMIN — ASPIRIN 81 MG: 81 TABLET, COATED ORAL at 09:14

## 2018-10-20 RX ADMIN — POTASSIUM CHLORIDE 40 MEQ: 1500 TABLET, EXTENDED RELEASE ORAL at 17:29

## 2018-10-20 RX ADMIN — SPIRONOLACTONE 25 MG: 25 TABLET ORAL at 09:14

## 2018-10-20 RX ADMIN — SACUBITRIL AND VALSARTAN 1 TABLET: 24; 26 TABLET, FILM COATED ORAL at 09:14

## 2018-10-20 RX ADMIN — DOCUSATE SODIUM 100 MG: 100 CAPSULE, LIQUID FILLED ORAL at 09:14

## 2018-10-20 RX ADMIN — CYCLOBENZAPRINE HYDROCHLORIDE 10 MG: 10 TABLET, FILM COATED ORAL at 09:14

## 2018-10-20 RX ADMIN — MAGNESIUM SULFATE IN WATER 2 G: 40 INJECTION, SOLUTION INTRAVENOUS at 10:47

## 2018-10-20 RX ADMIN — Medication 3 ML: at 20:41

## 2018-10-20 RX ADMIN — Medication 1 SPRAY: at 00:31

## 2018-10-20 RX ADMIN — ATORVASTATIN CALCIUM 20 MG: 20 TABLET, FILM COATED ORAL at 20:40

## 2018-10-20 RX ADMIN — ENOXAPARIN SODIUM 40 MG: 40 INJECTION SUBCUTANEOUS at 09:15

## 2018-10-20 RX ADMIN — SACUBITRIL AND VALSARTAN 1 TABLET: 24; 26 TABLET, FILM COATED ORAL at 20:40

## 2018-10-20 RX ADMIN — Medication 10 ML: at 10:47

## 2018-10-20 RX ADMIN — CYCLOBENZAPRINE HYDROCHLORIDE 10 MG: 10 TABLET, FILM COATED ORAL at 20:40

## 2018-10-20 RX ADMIN — POTASSIUM CHLORIDE 40 MEQ: 1500 TABLET, EXTENDED RELEASE ORAL at 09:13

## 2018-10-20 RX ADMIN — SERTRALINE HYDROCHLORIDE 100 MG: 50 TABLET ORAL at 20:40

## 2018-10-20 RX ADMIN — METOPROLOL SUCCINATE 25 MG: 25 TABLET, EXTENDED RELEASE ORAL at 09:14

## 2018-10-20 RX ADMIN — CETIRIZINE HYDROCHLORIDE 10 MG: 10 TABLET, FILM COATED ORAL at 09:14

## 2018-10-20 RX ADMIN — SERTRALINE HYDROCHLORIDE 50 MG: 50 TABLET ORAL at 20:40

## 2018-10-20 RX ADMIN — Medication 3 ML: at 09:22

## 2018-10-20 NOTE — PLAN OF CARE
Problem: Patient Care Overview  Goal: Plan of Care Review   10/20/18 1823   OTHER   Outcome Summary FROM ICU. PT DENIES C/O SOA. TELE UNCHANGED. PT ENCOURAGED TO AMB, TOLERATING WELL.

## 2018-10-20 NOTE — PROGRESS NOTES
[facility]    Name: Johny Hearn, 33 y.o. female  MRN: [id number], : 1985   Date of Admission: 10/19/2018   Date of Service: 10/20/18   PCP: Malia Powers APRSHEILA     [facility] Course:   Per review of H&P and recent oncology note:  Ms. 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 Rice), suspected RUFINA (outpatient sleep study to be scheduled), obesity (BMI 38), anxiety/depression, seasonal allergies who was admitted on 10/19/18 for shortness of breath, pleuritic chest pain, rhinorrhea, headache due to cough, worsening orthopnea, and cough (now productive yellow/green). +sick contacts. Did not get influenza vaccine this year.    Vitals on admission notable for , O2 sat 86-89% on RA --> normalized with nasal canula. Labs on admission notable for: procal negative; hgb 11.5; troponin 0.061 --> 0.071; BNP 9600; K 3.3; Glc 121; bicarb 24; AB.42/33/63/21 on 1LPM NC; beta-HCG: negative. Radiographs on admission notable for CT chest: \"1. No evidence of pulmonary embolus or dissection. 2. Findings consistent with pulmonary edema with small bilateral pleural effusions. There is more confluent airspace disease bilaterally which may also reflect pulmonary edema, however a superimposed pneumonia is not excluded. 3. Nonspecific mediastinal adenopathy.\". Patient was given tylenol, lasix, morphine, KCl, phenergan, NS 500ml in ER. Patient was admitted for acute respiratory failure with hypoxia due to pulmonary edema.    Consultants: Dr. Curtis (cardiology)  Procedures: none  Antibiotics: none   Micro:   10/19 influenza A/B: negative  10/19 Respiratory panel: in " process    -----------------------------------------------------------------------------------------------------------------------------------------------------------------------------------------------------------------------------------------------------  Subjective   Chief Complaint: f/u acute respiratory failure     Subjective:   Patient seen and examined this morning.  Tele reviewed: sinus rhythm with deep TWI . Events from last night noted. Discussed with GILBERTO Urrutia and Dr. Curtis. Patient reports cough has improved. She is off supplemental O2. BP was 80s systolic earlier this morning, now improved to 100s. Denies chest pain or pressure. Chronic neuropathy in feet bilaterally. Has not had bowel movement in 2 days, states she will let us know if needs stool softener.     Review of Systems:   Gen-no fevers, no chills  CV-no palpitations  GI-no N/V/D, no abd pain    Objective   Objective:     Intake/Output Summary (Last 24 hours) at 10/20/18 0843  Last data filed at 10/20/18 0630   Gross per 24 hour   Intake             1257 ml   Output             2725 ml   Net            -1468 ml      SpO2: 98 %     Physical Examination:   Vitals:    10/20/18 0600 10/20/18 0700 10/20/18 0713 10/20/18 0724   BP:   (!) 87/44 92/58   BP Location:    Left arm   Patient Position:    Lying   Pulse: 75 73 85 81   Resp:    18   Temp:    97.7 °F (36.5 °C)   TempSrc:    Oral   SpO2: 95% 96% 96% 98%   Weight:       Height:          General:  Pleasant WDWN female; no acute distress  Heart:   RRR; S1 S2 normal, no m/r/g  Lungs:   CTAB, no wheezes or rhonchi  Abdomen:  Obese abdomen, soft, NT, ND, +BS  Extremities: no edema/cyanosis/clubbing  Neuro:  A&Ox3, no focal deficits  Psych:  appropriate mood  Skin:  No bleeding, bruising, or rash; right chest port without erythema or pain    Pertinent laboratory and radiology data were reviewed:  Microbiology Results (last 10 days)     Procedure Component Value - Date/Time    Influenza Antigen, Rapid  "- Swab, Nasopharynx [[id number]]  (Normal) Collected:  10/19/18 0832    Lab Status:  Final result Specimen:  Swab from Nasopharynx Updated:  10/19/18 0853     Influenza A Ag, EIA Negative     Influenza B Ag, EIA Negative          Medications Reviewed:     amitriptyline 10 mg Oral Nightly   aspirin 81 mg Oral Daily   atorvastatin 20 mg Oral Nightly   cetirizine 10 mg Oral Daily   cyclobenzaprine 10 mg Oral BID   docusate sodium 100 mg Oral Daily   enoxaparin 40 mg Subcutaneous Q24H   metoprolol succinate XL 25 mg Oral Q24H   montelukast 10 mg Oral Nightly   potassium chloride 40 mEq Oral BID With Meals   sacubitril-valsartan 1 tablet Oral Q12H   sertraline 100 mg Oral Daily   sertraline 50 mg Oral Daily   sodium chloride 3 mL Intravenous Q12H   sodium chloride 4 mL Nebulization Once   spironolactone 25 mg Oral Daily      10/19: ECHO  \"Technically difficult study   1) Normal LV size with severe reduction in LV systolic function ( EF is about 30%)  2) Moderate left atrial enlargement with moderate elevation in LVedp   3) Thickened mitral leaflets with moderate MR   4) Mild TR with PAsp of 45 mm of hg   5) Normal size of the RV with borderline function   6) Global hypokinesis of the LV more marked along the anterior wall \"    Assessment /Plan   Assessment/Plan:   1. Acute respiratory failure with hypoxia due to acute pulmonary edema  · Requiring supplemental O2 via NC  · No further diuresis per Dr. Curtis, continue entresto, toprol XL, aldactone    2. Acute exacerbation of CHF, combined systolic/diastolic  · improved  · EF 30%, see rest if report above  · No further diuresis per Dr. Curtis, continue entresto, toprol XL, aldactone  · Repeat EKG today to eval TWI seen on telemetry  · Will consult PT/OT to work with patient, if ambulates without hemodynamic changes will consider transfer to telemetry today    3. Productive cough  · Resolved, respiratory panel pending  · procal negative    4. Sinus tachycardia, unclear " etiology, controlled with initiation of bb  · Monitor on telemetry, TSH/fT4 wnl  · CT chest without PE    5. Elevated troponin, mild, due to #1  6. 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 Rice  7. H/o post-partum cardiomyopathy  8. H/o cardiac arrest due to post-partum cardiomyopathy  9. Anxiety/depression  10. Suspected RUFINA, to have sleep study as outpatient scheduled  11. Pharmacist assisting with medication reconcilliation     FEN: cardiac 1500ml fluid restriction  DVT Prophylaxis: lovenox  PUD Prophylaxis: not indicated    Reason for continued hospitalization: above  Disposition: transfer to telemetry floor  Discussed with: patient, MD Ever Guevara MD   8:43 AM on 10/20/2018

## 2018-10-20 NOTE — SIGNIFICANT NOTE
10/20/18 1205   PT Deferred Reason   PT Deferred Reason (Per ICU nursing, patient has been walking in unit and does not require skilled inpatient PT at this time. )

## 2018-10-20 NOTE — PLAN OF CARE
Problem: Patient Care Overview  Goal: Plan of Care Review  Outcome: Ongoing (interventions implemented as appropriate)   10/20/18 9472   Coping/Psychosocial   Plan of Care Reviewed With patient   OTHER   Outcome Summary FROM ICU. PT DENIES C/O SOA. TELE UNCHANGED. PT ENCOURAGED TO AMB, TOLERATING WELL.   Plan of Care Review   Progress improving

## 2018-10-20 NOTE — PROGRESS NOTES
"   LOS: 1 day   Patient Care Team:  Malia Powers APRN as PCP - General (Family Medicine)  Shelley Brock MD as Consulting Physician (General Surgery)  Cirilo Deshpande II, MD (Pain Medicine)      Interval History: Patient feels significantly better.  The breathing has improved.  Eager to go home  Review of Systems:   Pertinent items are noted in HPI.      Objective     Vital Sign Min/Max for last 24 hours  Temp  Min: 97.7 °F (36.5 °C)  Max: 98.1 °F (36.7 °C)   BP  Min: 87/44  Max: 134/83   Pulse  Min: 73  Max: 123   Resp  Min: 15  Max: 24   SpO2  Min: 87 %  Max: 98 %   Flow (L/min)  Min: 2  Max: 4   Weight  Min: 109 kg (240 lb 11.2 oz)  Max: 109 kg (240 lb 11.2 oz)     Flowsheet Rows      First Filed Value   Admission Height  170.2 cm (67\") Documented at 10/19/2018 0028   Admission Weight  109 kg (240 lb) Documented at 10/19/2018 0028          Physical Exam:     General Appearance:    Alert, cooperative, in no acute distress   Head:    Normocephalic, without obvious abnormality, atraumatic   Eyes:            Lids and lashes normal, conjunctivae and sclerae normal, no   icterus, no pallor, corneas clear, PERRLA   Ears:    Ears appear intact with no abnormalities noted   Throat:   No oral lesions, no thrush, oral mucosa moist   Neck:   No adenopathy, supple, trachea midline, no thyromegaly, no     carotid bruit, no JVD   Back:     No kyphosis present, no scoliosis present, no skin lesions,       erythema or scars, no tenderness to percussion or                   palpation,   range of motion normal   Lungs:   Basal crackles present.      Heart:    Regular rhythm and normal rate, normal S1 and S2, no            murmur, no gallop, no rub, no click   Breast Exam:    Deferred   Abdomen:     Normal bowel sounds, no masses, no organomegaly, soft        non-tender, non-distended, no guarding, no rebound                 tenderness   Genitalia:    Deferred   Extremities:   Moves all extremities well, no edema, no " cyanosis, no              redness   Pulses:   Pulses palpable and equal bilaterally   Skin:   No bleeding, bruising or rash   Lymph nodes:   No palpable adenopathy   Neurologic:   Cranial nerves 2 - 12 grossly intact, sensation intact, DTR        present and equal bilaterally        Results Review:     I reviewed the patient's new clinical results.    Results Review:   I reviewed the patient's new clinical results.    EKG     Telemetry: Average heart rate in the 90s.  No dysrhythmias seen.    LAB DATA :       Results from last 7 days  Lab Units 10/19/18  1153   CHOLESTEROL mg/dL 223*   TRIGLYCERIDES mg/dL 363*   HDL CHOL mg/dL 36*   LDL CHOL mg/dL 114*       Laboratory results:      Results from last 7 days  Lab Units 10/20/18  0406 10/19/18  0607 10/19/18  0043   SODIUM mmol/L 136* 140 137   POTASSIUM mmol/L 3.6 3.5 3.3*   CHLORIDE mmol/L 105 105 102   CO2 mmol/L 28.0 27.0 24.0*   BUN mg/dL 16 10 11   CREATININE mg/dL 0.70 0.70 0.70   CALCIUM mg/dL 8.8 9.1 9.5   BILIRUBIN mg/dL  --   --  0.5   ALK PHOS U/L  --   --  56   ALT (SGPT) U/L  --   --  45   AST (SGOT) U/L  --   --  33   GLUCOSE mg/dL 113* 118* 121*       Results from last 7 days  Lab Units 10/20/18  0406 10/19/18  0607 10/19/18  0043   WBC 10*3/mm3 6.62 8.78 8.95   HEMOGLOBIN g/dL 11.3* 11.9* 11.5*   HEMATOCRIT % 35.9* 37.0 36.3*   PLATELETS 10*3/mm3 277 255 294                   Results from last 7 days  Lab Units 10/19/18  1836   TROPONIN I ng/mL 0.058*               Results from last 7 days  Lab Units 10/19/18  0112   PH, ARTERIAL pH units 7.421   PCO2, ARTERIAL mm Hg 33.0*   PO2 ART mm Hg 63.6*   HCO3 ART mmol/L 21.4*   BASE EXCESS ART mmol/L -2.4*   O2 SATURATION ART % 92.7*   HEMOGLOBIN, ARTERIAL g/dL 12.0   HEMATOCRIT, ARTERIAL % 36.8   OXYHEMOGLOBIN % 90.3*   METHEMOGLOBIN % 1.40   CARBOXYHEMOGLOBIN % 1.2   BAROMETRIC PRESSURE FOR BLOOD GAS mmHg 743   MODALITY  Nasal Cannula   FIO2 % 24     Lab Results   Component Value Date    HGBA1C 5.3  10/19/2018       Results from last 7 days  Lab Units 10/19/18  0607   TSH mIU/mL 1.900   FREE T4 ng/dL 0.99           IMAGING DATA:     Ct Chest Pulmonary Embolism With Contrast    Result Date: 10/19/2018  Narrative: PROCEDURE: CT CHEST PULMONARY EMBOLISM W CONTRAST-  HISTORY: chest/back pain, sob, hypoxia, on chemo for lymphoma  COMPARISON: None .  TECHNIQUE: Multiple axial CT images were obtained from the thoracic inlet through the upper abdomen following the administration of Isovue 300 per the CT PE protocol. Coronal and oblique 3D MIP images were reconstructed from the original axial data set.  FINDINGS: There are no filling defects within the pulmonary arteries to suggest an embolus. The thoracic aorta is normal in caliber with no evidence of dissection. The heart is normal in size. There are small bilateral pleural effusions. There is no pericardial effusion. Enlarged lymph nodes are noted within the mediastinum, for example there is a 1.8 cm prevascular lymph node.. Lung windows reveal interlobular septal thickening with patchy bilateral opacities. The visualized upper abdomen is unremarkable. Bone windows reveal no acute osseous abnormalities.      Impression: 1. No evidence of pulmonary embolus or dissection. 2. Findings consistent with pulmonary edema with small bilateral pleural effusions. There is more confluent airspace disease bilaterally which may also reflect pulmonary edema, however a superimposed pneumonia is not excluded. 3. Nonspecific mediastinal adenopathy.      449.88 mGy.cm     This study was performed with techniques to keep radiation doses as low as reasonably achievable (ALARA). Individualized dose reduction techniques using automated exposure control or adjustment of mA and/or kV according to the patient size were employed.  This report was finalized on 10/19/2018 7:53 AM by Mukesh Hong M.D..            Assessment/Plan    #1 Combined Systolic and Diastolic Dysfunction of the LV:  Patient Is Clinically Stable.  Has Tolerated the Medications.  Low-Dose Beta Blockers Have Been Initiated.  We Will Continue with the Present Medications Ambulate and If Doing Well Transferred out to Telemetry Bed 3 Bed for Another 24 Hours.    #2 Hypotension: Appears to Be Multifactorial.  More Likely Secondary to Aggressive Diuresis.  Will Hold off on the Lasix Continue Guideline Directed Medical Therapy.  Her saturations are 95+ on room air at this time.  Do not find need for aggressive diuresis at this minute.    #3 abnormal troponin: Given her age she's still a low-risk candidate for pressure atherosclerotic coronary artery disease.  This appears to be more so secondary to stress-induced cardiomyopathy.  We will continue to monitor for now.  Currently    #4 mitral insufficiency: We will continue to increase the afterload reduction with gentle diuresis.    #5 electrolyte abnormalities: Correction is in progress.    #6 B cell lymphoma: Undergoing chemotherapy.        Acute respiratory failure with hypoxia (CMS/HCC)    Productive cough    Sinus tachycardia            Zheng Curtis MD  10/20/18  8:13 AM

## 2018-10-21 LAB
ANION GAP SERPL CALCULATED.3IONS-SCNC: 10.6 MMOL/L (ref 10–20)
BASOPHILS # BLD AUTO: 0.06 10*3/MM3 (ref 0–0.2)
BASOPHILS NFR BLD AUTO: 0.9 % (ref 0–2.5)
BUN BLD-MCNC: 17 MG/DL (ref 7–20)
BUN/CREAT SERPL: 24.3 (ref 7.1–23.5)
CALCIUM SPEC-SCNC: 9.1 MG/DL (ref 8.4–10.2)
CHLORIDE SERPL-SCNC: 108 MMOL/L (ref 98–107)
CO2 SERPL-SCNC: 23 MMOL/L (ref 26–30)
CREAT BLD-MCNC: 0.7 MG/DL (ref 0.6–1.3)
DEPRECATED RDW RBC AUTO: 47.6 FL (ref 37–54)
EOSINOPHIL # BLD AUTO: 0.26 10*3/MM3 (ref 0–0.7)
EOSINOPHIL NFR BLD AUTO: 4 % (ref 0–7)
ERYTHROCYTE [DISTWIDTH] IN BLOOD BY AUTOMATED COUNT: 15.1 % (ref 11.5–14.5)
GFR SERPL CREATININE-BSD FRML MDRD: 96 ML/MIN/1.73
GLUCOSE BLD-MCNC: 113 MG/DL (ref 74–98)
HCT VFR BLD AUTO: 35.5 % (ref 37–47)
HGB BLD-MCNC: 11 G/DL (ref 12–16)
IMM GRANULOCYTES # BLD: 0.02 10*3/MM3 (ref 0–0.06)
IMM GRANULOCYTES NFR BLD: 0.3 % (ref 0–0.6)
LYMPHOCYTES # BLD AUTO: 1.93 10*3/MM3 (ref 0.6–3.4)
LYMPHOCYTES NFR BLD AUTO: 29.6 % (ref 10–50)
MCH RBC QN AUTO: 26.7 PG (ref 27–31)
MCHC RBC AUTO-ENTMCNC: 31 G/DL (ref 30–37)
MCV RBC AUTO: 86.2 FL (ref 81–99)
MONOCYTES # BLD AUTO: 0.39 10*3/MM3 (ref 0–0.9)
MONOCYTES NFR BLD AUTO: 6 % (ref 0–12)
NEUTROPHILS # BLD AUTO: 3.85 10*3/MM3 (ref 2–6.9)
NEUTROPHILS NFR BLD AUTO: 59.2 % (ref 37–80)
NRBC BLD MANUAL-RTO: 0 /100 WBC (ref 0–0)
PLATELET # BLD AUTO: 258 10*3/MM3 (ref 130–400)
PMV BLD AUTO: 8.6 FL (ref 6–12)
POTASSIUM BLD-SCNC: 4.6 MMOL/L (ref 3.5–5.1)
RBC # BLD AUTO: 4.12 10*6/MM3 (ref 4.2–5.4)
SODIUM BLD-SCNC: 137 MMOL/L (ref 137–145)
TROPONIN I SERPL-MCNC: 0.03 NG/ML (ref 0–0.03)
WBC NRBC COR # BLD: 6.51 10*3/MM3 (ref 4.8–10.8)

## 2018-10-21 PROCEDURE — 85025 COMPLETE CBC W/AUTO DIFF WBC: CPT | Performed by: INTERNAL MEDICINE

## 2018-10-21 PROCEDURE — 80048 BASIC METABOLIC PNL TOTAL CA: CPT | Performed by: INTERNAL MEDICINE

## 2018-10-21 PROCEDURE — 84484 ASSAY OF TROPONIN QUANT: CPT | Performed by: INTERNAL MEDICINE

## 2018-10-21 PROCEDURE — 25010000002 ENOXAPARIN PER 10 MG: Performed by: INTERNAL MEDICINE

## 2018-10-21 PROCEDURE — 99231 SBSQ HOSP IP/OBS SF/LOW 25: CPT | Performed by: HOSPITALIST

## 2018-10-21 PROCEDURE — 93005 ELECTROCARDIOGRAM TRACING: CPT | Performed by: INTERNAL MEDICINE

## 2018-10-21 RX ORDER — METOPROLOL SUCCINATE 25 MG/1
25 TABLET, EXTENDED RELEASE ORAL ONCE
Status: COMPLETED | OUTPATIENT
Start: 2018-10-21 | End: 2018-10-21

## 2018-10-21 RX ADMIN — POTASSIUM CHLORIDE 40 MEQ: 1500 TABLET, EXTENDED RELEASE ORAL at 08:25

## 2018-10-21 RX ADMIN — CYCLOBENZAPRINE HYDROCHLORIDE 10 MG: 10 TABLET, FILM COATED ORAL at 08:25

## 2018-10-21 RX ADMIN — CETIRIZINE HYDROCHLORIDE 10 MG: 10 TABLET, FILM COATED ORAL at 08:25

## 2018-10-21 RX ADMIN — SACUBITRIL AND VALSARTAN 1 TABLET: 24; 26 TABLET, FILM COATED ORAL at 21:24

## 2018-10-21 RX ADMIN — SACUBITRIL AND VALSARTAN 1 TABLET: 24; 26 TABLET, FILM COATED ORAL at 08:25

## 2018-10-21 RX ADMIN — SPIRONOLACTONE 25 MG: 25 TABLET ORAL at 08:25

## 2018-10-21 RX ADMIN — METOPROLOL SUCCINATE 25 MG: 25 TABLET, EXTENDED RELEASE ORAL at 10:49

## 2018-10-21 RX ADMIN — ATORVASTATIN CALCIUM 20 MG: 20 TABLET, FILM COATED ORAL at 21:24

## 2018-10-21 RX ADMIN — SERTRALINE HYDROCHLORIDE 50 MG: 50 TABLET ORAL at 21:26

## 2018-10-21 RX ADMIN — MONTELUKAST SODIUM 10 MG: 10 TABLET, FILM COATED ORAL at 21:24

## 2018-10-21 RX ADMIN — ASPIRIN 81 MG: 81 TABLET, COATED ORAL at 08:25

## 2018-10-21 RX ADMIN — ENOXAPARIN SODIUM 40 MG: 40 INJECTION SUBCUTANEOUS at 05:58

## 2018-10-21 RX ADMIN — SERTRALINE HYDROCHLORIDE 100 MG: 50 TABLET ORAL at 21:24

## 2018-10-21 RX ADMIN — HYDROCODONE POLISTIREX AND CHLORPHENIRAMINE POLISTIREX 5 ML: 10; 8 SUSPENSION, EXTENDED RELEASE ORAL at 21:25

## 2018-10-21 RX ADMIN — Medication 3 ML: at 21:25

## 2018-10-21 RX ADMIN — METOPROLOL SUCCINATE 25 MG: 25 TABLET, EXTENDED RELEASE ORAL at 08:25

## 2018-10-21 RX ADMIN — DOCUSATE SODIUM 100 MG: 100 CAPSULE, LIQUID FILLED ORAL at 08:25

## 2018-10-21 NOTE — PROGRESS NOTES
"[facility]    Name: Johny Hearn, 33 y.o. female  MRN: [id number], : 1985   Date of Admission: 10/19/2018   Date of Service: 10/21/18   PCP: Malia Powers APRSHEILA     [facility] Course:   Per review of H&P and recent oncology note:  Ms. 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 Rice), suspected RUFINA (outpatient sleep study to be scheduled), obesity (BMI 38), anxiety/depression, seasonal allergies who was admitted on 10/19/18 for shortness of breath, pleuritic chest pain, rhinorrhea, headache due to cough, worsening orthopnea, and cough (now productive yellow/green). +sick contacts. Did not get influenza vaccine this year.    Vitals on admission notable for , O2 sat 86-89% on RA --> normalized with nasal canula. Labs on admission notable for: procal negative; hgb 11.5; troponin 0.061 --> 0.071; BNP 9600; K 3.3; Glc 121; bicarb 24; AB.42/33/63/21 on 1LPM NC; beta-HCG: negative. Radiographs on admission notable for CT chest: \"1. No evidence of pulmonary embolus or dissection. 2. Findings consistent with pulmonary edema with small bilateral pleural effusions. There is more confluent airspace disease bilaterally which may also reflect pulmonary edema, however a superimposed pneumonia is not excluded. 3. Nonspecific mediastinal adenopathy.\". Patient was given tylenol, lasix, morphine, KCl, phenergan, NS 500ml in ER. Patient was admitted for acute respiratory failure with hypoxia due to pulmonary edema. Dr. Curtis was consulted. Patient had ECHO showing reduced EF, systolic and diastolic dysfunction. Started on toprol XL, entresto, and aldactone. Tolerating these new medications well. Had QT prolongation, discontinued elavil and " flexeril. Repeat EKG in AM, if QT >500, will need heart catheterization per Dr. Curtis.    Consultants: Dr. Curtis (cardiology)  Procedures: 10/19 ECHO  Antibiotics: none   Micro:   10/19 influenza A/B: negative  10/19 Respiratory panel: in process    -----------------------------------------------------------------------------------------------------------------------------------------------------------------------------------------------------------------------------------------------------  Subjective   Chief Complaint: f/u acute respiratory failure     Subjective:   Patient seen and examined this morning.  Tele reviewed. Events from last night noted. Discussed with GILBERTO Hooper and Dr. Curtis. Low BP overnight, denies chest pain, shortness of breath, dizziness, lightheadedness.  Tolerating by mouth.  Ambulating without difficulty.    Review of Systems:   Gen-no fevers, no chills  CV-no palpitations  GI-no N/V/D, no abd pain    Objective   Objective:     Intake/Output Summary (Last 24 hours) at 10/21/18 1116  Last data filed at 10/21/18 0900   Gross per 24 hour   Intake             2100 ml   Output                0 ml   Net             2100 ml      SpO2: 96 %     Physical Examination:   Vitals:    10/20/18 2023 10/21/18 0100 10/21/18 0445 10/21/18 0900   BP: 110/60 100/56 93/56 90/75   BP Location: Left arm Left arm Left arm Left arm   Patient Position: Lying Lying Lying Lying   Pulse:  92  82   Resp: 18 16 16 18   Temp: 98 °F (36.7 °C) 97.9 °F (36.6 °C) 97.8 °F (36.6 °C) 98.1 °F (36.7 °C)   TempSrc: Oral Oral Oral Oral   SpO2:       Weight:       Height:          General:  Pleasant WDWN female; no acute distress; eating breakfast  Heart:   RRR; S1 S2 normal, no m/r/g  Lungs:   CTAB, no wheezes or rhonchi  Abdomen:  Obese abdomen, soft, NT, ND, +BS  Extremities: no edema/cyanosis/clubbing  Neuro:  A&Ox3, no focal deficits  Psych:  appropriate mood  Skin:  No bleeding, bruising, or rash; right chest port without erythema  "or pain    Pertinent laboratory and radiology data were reviewed:  Microbiology Results (last 10 days)     Procedure Component Value - Date/Time    Influenza Antigen, Rapid - Swab, Nasopharynx [[id number]]  (Normal) Collected:  10/19/18 0832    Lab Status:  Final result Specimen:  Swab from Nasopharynx Updated:  10/19/18 0853     Influenza A Ag, EIA Negative     Influenza B Ag, EIA Negative          Medications Reviewed:     aspirin 81 mg Oral Daily   atorvastatin 20 mg Oral Nightly   docusate sodium 100 mg Oral Daily   enoxaparin 40 mg Subcutaneous Q24H   metoprolol succinate XL 25 mg Oral Q24H   montelukast 10 mg Oral Nightly   sacubitril-valsartan 1 tablet Oral Q12H   sertraline 100 mg Oral Nightly   sertraline 50 mg Oral Nightly   sodium chloride 3 mL Intravenous Q12H   sodium chloride 4 mL Nebulization Once   spironolactone 25 mg Oral Daily      10/19: ECHO  \"Technically difficult study   1) Normal LV size with severe reduction in LV systolic function ( EF is about 30%)  2) Moderate left atrial enlargement with moderate elevation in LVedp   3) Thickened mitral leaflets with moderate MR   4) Mild TR with PAsp of 45 mm of hg   5) Normal size of the RV with borderline function   6) Global hypokinesis of the LV more marked along the anterior wall \"    Assessment /Plan   Assessment/Plan:   1. Acute respiratory failure with hypoxia due to acute pulmonary edema  · Requiring supplemental O2 via NC  · continue entresto, toprol XL (additional dose today per Dr. Curtis), aldactone  · Dr. Curtis recommended discontinuing Flexeril and Elavil as QT prolonged and repeating EKG in the morning, if QT is greater than 500 will likely need heart catheterization    2. Acute exacerbation of CHF, combined systolic/diastolic  · resolved  · EF 30%, see rest if report above  · continue entresto, toprol XL (additional dose today per Dr. Curtis), aldactone  · Dr. Curtis recommended discontinuing Flexeril and Elavil as QT prolonged and " repeating EKG in the morning, if QT is greater than 500 will likely need heart catheterization    3. Productive cough  · Resolved, respiratory panel pending  · procal negative    4. Elevated troponin, mild, due to #1  5. 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 Rice  6. H/o post-partum cardiomyopathy  7. H/o cardiac arrest due to post-partum cardiomyopathy  8. Anxiety/depression  9. Suspected RUFINA, to have sleep study as outpatient scheduled  10. Pharmacist assisting with medication reconcilliation     FEN: cardiac 1500ml fluid restriction  DVT Prophylaxis: lovenox  PUD Prophylaxis: not indicated    Reason for continued hospitalization: repeat EKG in AM, if QT > 500 will need heart cath per Dr. Curtis  Disposition: hospitalization  Discussed with: patient, MD Ever Garay MD   11:16 AM on 10/21/2018

## 2018-10-21 NOTE — PROGRESS NOTES
"   LOS: 2 days   Patient Care Team:  Malia Powers APRN as PCP - General (Family Medicine)  Shelley Brock MD as Consulting Physician (General Surgery)  Cirilo Deshpande II, MD (Pain Medicine)      Interval History: Patient feels okay is eager to go home.  Has not had any chest pains has ambulated and the breathing has improved some.        Review of Systems:   Pertinent items are noted in HPI.      Objective     Vital Sign Min/Max for last 24 hours  Temp  Min: 97.5 °F (36.4 °C)  Max: 98.2 °F (36.8 °C)   BP  Min: 93/56  Max: 110/60   Pulse  Min: 87  Max: 104   Resp  Min: 16  Max: 18   SpO2  Min: 91 %  Max: 96 %   No Data Recorded   Weight  Min: 109 kg (239 lb 9 oz)  Max: 109 kg (239 lb 9 oz)     Flowsheet Rows      First Filed Value   Admission Height  170.2 cm (67\") Documented at 10/19/2018 0028   Admission Weight  109 kg (240 lb) Documented at 10/19/2018 0028          Physical Exam:     General Appearance:    Alert, cooperative, in no acute distress   Head:    Normocephalic, without obvious abnormality, atraumatic   Eyes:            Lids and lashes normal, conjunctivae and sclerae normal, no   icterus, no pallor, corneas clear, PERRLA   Ears:    Ears appear intact with no abnormalities noted   Throat:   No oral lesions, no thrush, oral mucosa moist   Neck:   No adenopathy, supple, trachea midline, no thyromegaly, no     carotid bruit, no JVD   Back:     No kyphosis present, no scoliosis present, no skin lesions,       erythema or scars, no tenderness to percussion or                   palpation,   range of motion normal   Lungs:     Clear to auscultation,respirations regular, even and                   unlabored    Heart:    Regular rhythm and normal rate, normal S1 and S2, no            murmur, no gallop, no rub, no click   Breast Exam:    Deferred   Abdomen:     Normal bowel sounds, no masses, no organomegaly, soft        non-tender, non-distended, no guarding, no rebound                 tenderness "   Genitalia:    Deferred   Extremities:   Moves all extremities well, no edema, no cyanosis, no              redness   Pulses:   Pulses palpable and equal bilaterally   Skin:   No bleeding, bruising or rash   Lymph nodes:   No palpable adenopathy   Neurologic:   Cranial nerves 2 - 12 grossly intact, sensation intact, DTR        present and equal bilaterally        Results Review:     I reviewed the patient's new clinical results.      EKG: Sinus rhythm QTc interval is more than 400 ms has diffuse ST flattening with T-wave inversion in the lateral and the high lateral leads.    LAB DATA :       Results from last 7 days  Lab Units 10/19/18  1153   CHOLESTEROL mg/dL 223*   TRIGLYCERIDES mg/dL 363*   HDL CHOL mg/dL 36*   LDL CHOL mg/dL 114*       Laboratory results:      Results from last 7 days  Lab Units 10/21/18  0611 10/20/18  0406 10/19/18  0607 10/19/18  0043   SODIUM mmol/L 137 136* 140 137   POTASSIUM mmol/L 4.6 3.6 3.5 3.3*   CHLORIDE mmol/L 108* 105 105 102   CO2 mmol/L 23.0* 28.0 27.0 24.0*   BUN mg/dL 17 16 10 11   CREATININE mg/dL 0.70 0.70 0.70 0.70   CALCIUM mg/dL 9.1 8.8 9.1 9.5   BILIRUBIN mg/dL  --   --   --  0.5   ALK PHOS U/L  --   --   --  56   ALT (SGPT) U/L  --   --   --  45   AST (SGOT) U/L  --   --   --  33   GLUCOSE mg/dL 113* 113* 118* 121*       Results from last 7 days  Lab Units 10/21/18  0611 10/20/18  0406 10/19/18  0607 10/19/18  0043   WBC 10*3/mm3 6.51 6.62 8.78 8.95   HEMOGLOBIN g/dL 11.0* 11.3* 11.9* 11.5*   HEMATOCRIT % 35.5* 35.9* 37.0 36.3*   PLATELETS 10*3/mm3 258 277 255 294                   Results from last 7 days  Lab Units 10/21/18  0611   TROPONIN I ng/mL 0.033               Results from last 7 days  Lab Units 10/19/18  0112   PH, ARTERIAL pH units 7.421   PCO2, ARTERIAL mm Hg 33.0*   PO2 ART mm Hg 63.6*   HCO3 ART mmol/L 21.4*   BASE EXCESS ART mmol/L -2.4*   O2 SATURATION ART % 92.7*   HEMOGLOBIN, ARTERIAL g/dL 12.0   HEMATOCRIT, ARTERIAL % 36.8   OXYHEMOGLOBIN % 90.3*    METHEMOGLOBIN % 1.40   CARBOXYHEMOGLOBIN % 1.2   BAROMETRIC PRESSURE FOR BLOOD GAS mmHg 743   MODALITY  Nasal Cannula   FIO2 % 24     Lab Results   Component Value Date    HGBA1C 5.3 10/19/2018       Results from last 7 days  Lab Units 10/19/18  0607   TSH mIU/mL 1.900   FREE T4 ng/dL 0.99           IMAGING DATA:     Ct Chest Pulmonary Embolism With Contrast    Result Date: 10/19/2018  Narrative: PROCEDURE: CT CHEST PULMONARY EMBOLISM W CONTRAST-  HISTORY: chest/back pain, sob, hypoxia, on chemo for lymphoma  COMPARISON: None .  TECHNIQUE: Multiple axial CT images were obtained from the thoracic inlet through the upper abdomen following the administration of Isovue 300 per the CT PE protocol. Coronal and oblique 3D MIP images were reconstructed from the original axial data set.  FINDINGS: There are no filling defects within the pulmonary arteries to suggest an embolus. The thoracic aorta is normal in caliber with no evidence of dissection. The heart is normal in size. There are small bilateral pleural effusions. There is no pericardial effusion. Enlarged lymph nodes are noted within the mediastinum, for example there is a 1.8 cm prevascular lymph node.. Lung windows reveal interlobular septal thickening with patchy bilateral opacities. The visualized upper abdomen is unremarkable. Bone windows reveal no acute osseous abnormalities.      Impression: 1. No evidence of pulmonary embolus or dissection. 2. Findings consistent with pulmonary edema with small bilateral pleural effusions. There is more confluent airspace disease bilaterally which may also reflect pulmonary edema, however a superimposed pneumonia is not excluded. 3. Nonspecific mediastinal adenopathy.      449.88 mGy.cm     This study was performed with techniques to keep radiation doses as low as reasonably achievable (ALARA). Individualized dose reduction techniques using automated exposure control or adjustment of mA and/or kV according to the patient  size were employed.  This report was finalized on 10/19/2018 7:53 AM by Mukesh Hong M.D..            Assessment/Plan    #1 CHF: Patient has had new onset congestive heart failure either secondary to chemotherapy or stress-induced current myopathy.  Appropriate medical therapy has been initiated she does feel better.  Continue the Same.    #2 Prolonged QT Interval: This Is Been Perplexing.  In Spite Of Appropriate Repletion of electrolytes patient continues to have QTCs there are more than 500 ms.  She has history of 7 cardiac death in the past which makes things a little more concerning.  She also has had positive troponins with ST segment changes on the EKG.  Suggest that we hold her discharge for today increase the dose of the beta blocker and discontinue her muscle relaxant and her allergy medications and repeat the EKG in a.m.  If the EKG continues to show prolonged QTc interval with ST segment changes would consider a cardiac catheterization.  Otherwise should be able to go home at the present medications.    #3 Mitral Insufficiency: This Again Appears to Be Related to Her Acute Decompensation of the LV Systolic Function Will Need Follow-Up on the Same in A Few Months Once Appropriate Medical Therapy Has Been Pursued.    #4 B-Cell lymphoma: Therapy Is Ongoing She Is on Chemotherapy.    #5 Coronary Artery Disease: Her Risk Profile Should Be Low Given Her Age.  Nevertheless Does Have EKG Changes in the Lateral and the High Lateral Leads Which Appear Ischemic with a Prolonged QTc Interval.        Acute respiratory failure with hypoxia (CMS/HCC)    Productive cough    Sinus tachycardia            Zheng Curtis MD  10/21/18  10:45 AM

## 2018-10-21 NOTE — PLAN OF CARE
Problem: Patient Care Overview  Goal: Plan of Care Review  Outcome: Ongoing (interventions implemented as appropriate)   10/21/18 9281   Coping/Psychosocial   Plan of Care Reviewed With patient   Plan of Care Review   Progress improving       Problem: Skin Injury Risk (Adult)  Goal: Skin Health and Integrity  Outcome: Ongoing (interventions implemented as appropriate)      Problem: Cardiac: Heart Failure (Adult)  Goal: Signs and Symptoms of Listed Potential Problems Will be Absent, Minimized or Managed (Cardiac: Heart Failure)  Outcome: Ongoing (interventions implemented as appropriate)

## 2018-10-22 ENCOUNTER — HOSPITAL ENCOUNTER (OUTPATIENT)
Dept: CT IMAGING | Facility: HOSPITAL | Age: 33
Discharge: HOME | End: 2018-10-22
Attending: INTERNAL MEDICINE

## 2018-10-22 PROBLEM — I21.4 ACUTE NON Q WAVE MI (MYOCARDIAL INFARCTION), INITIAL EPISODE OF CARE (HCC): Status: ACTIVE | Noted: 2018-10-19

## 2018-10-22 LAB
ANION GAP SERPL CALCULATED.3IONS-SCNC: 11 MMOL/L (ref 10–20)
B PERT.PT PRMT NPH QL NAA+NON-PROBE: NOT DETECTED
BUN BLD-MCNC: 17 MG/DL (ref 7–20)
BUN/CREAT SERPL: 24.3 (ref 7.1–23.5)
C PNEUM DNA NPH QL NAA+NON-PROBE: NOT DETECTED
CALCIUM SPEC-SCNC: 9.4 MG/DL (ref 8.4–10.2)
CHLORIDE SERPL-SCNC: 108 MMOL/L (ref 98–107)
CO2 SERPL-SCNC: 24 MMOL/L (ref 26–30)
CREAT BLD-MCNC: 0.7 MG/DL (ref 0.6–1.3)
FLUAV H1 RNA NPH QL NAA+NON-PROBE: NOT DETECTED
FLUAV H3 RNA NPH QL NAA+NON-PROBE: NOT DETECTED
FLUAV RNA SPEC QL NAA+PROBE: NOT DETECTED
FLUBV RNA SPEC QL NAA+PROBE: NOT DETECTED
GFR SERPL CREATININE-BSD FRML MDRD: 96 ML/MIN/1.73
GLUCOSE BLD-MCNC: 108 MG/DL (ref 74–98)
HADV DNA SPEC QL NAA+PROBE: NOT DETECTED
HCOV 229E RNA NPH QL NAA+NON-PROBE: NOT DETECTED
HCOV HKU1 RNA NPH QL NAA+NON-PROBE: NOT DETECTED
HCOV NL63 RNA NPH QL NAA+NON-PROBE: NOT DETECTED
HCOV OC43 RNA NPH QL NAA+NON-PROBE: NOT DETECTED
HMPV RNA SPEC QL NAA+PROBE: NOT DETECTED
HPIV1 RNA SPEC QL NAA+PROBE: NOT DETECTED
HPIV2 SPEC QL CULT: NOT DETECTED
HPIV3 SPEC QL CULT: NOT DETECTED
HPIV4 RNA NPH QL NAA+NON-PROBE: NOT DETECTED
INR PPP: NOT DETECTED
M PNEUMO DNA SPEC QL NAA+PROBE: NOT DETECTED
POTASSIUM BLD-SCNC: 5 MMOL/L (ref 3.5–5.1)
RHINOVIRUS RNA SPEC QL NAA+PROBE: NOT DETECTED
RSV AG SPEC QL: NOT DETECTED
SODIUM BLD-SCNC: 138 MMOL/L (ref 137–145)

## 2018-10-22 PROCEDURE — 4A023N7 MEASUREMENT OF CARDIAC SAMPLING AND PRESSURE, LEFT HEART, PERCUTANEOUS APPROACH: ICD-10-PCS | Performed by: INTERNAL MEDICINE

## 2018-10-22 PROCEDURE — 25010000002 HYDROCORTISONE SODIUM SUCCINATE 100 MG RECONSTITUTED SOLUTION: Performed by: INTERNAL MEDICINE

## 2018-10-22 PROCEDURE — 0 IOPAMIDOL PER 1 ML: Performed by: INTERNAL MEDICINE

## 2018-10-22 PROCEDURE — 99232 SBSQ HOSP IP/OBS MODERATE 35: CPT | Performed by: INTERNAL MEDICINE

## 2018-10-22 PROCEDURE — B2111ZZ FLUOROSCOPY OF MULTIPLE CORONARY ARTERIES USING LOW OSMOLAR CONTRAST: ICD-10-PCS | Performed by: INTERNAL MEDICINE

## 2018-10-22 PROCEDURE — 25010000002 HEPARIN (PORCINE) PER 1000 UNITS: Performed by: INTERNAL MEDICINE

## 2018-10-22 PROCEDURE — 94799 UNLISTED PULMONARY SVC/PX: CPT

## 2018-10-22 PROCEDURE — 25010000002 DIPHENHYDRAMINE PER 50 MG: Performed by: INTERNAL MEDICINE

## 2018-10-22 PROCEDURE — B2151ZZ FLUOROSCOPY OF LEFT HEART USING LOW OSMOLAR CONTRAST: ICD-10-PCS | Performed by: INTERNAL MEDICINE

## 2018-10-22 PROCEDURE — 93005 ELECTROCARDIOGRAM TRACING: CPT | Performed by: INTERNAL MEDICINE

## 2018-10-22 PROCEDURE — 93458 L HRT ARTERY/VENTRICLE ANGIO: CPT | Performed by: INTERNAL MEDICINE

## 2018-10-22 PROCEDURE — C1769 GUIDE WIRE: HCPCS | Performed by: INTERNAL MEDICINE

## 2018-10-22 PROCEDURE — 25010000002 ENOXAPARIN PER 10 MG: Performed by: INTERNAL MEDICINE

## 2018-10-22 PROCEDURE — 25010000002 MIDAZOLAM PER 1 MG: Performed by: INTERNAL MEDICINE

## 2018-10-22 PROCEDURE — 80048 BASIC METABOLIC PNL TOTAL CA: CPT | Performed by: INTERNAL MEDICINE

## 2018-10-22 PROCEDURE — 25010000002 FENTANYL CITRATE (PF) 100 MCG/2ML SOLUTION: Performed by: INTERNAL MEDICINE

## 2018-10-22 PROCEDURE — C1894 INTRO/SHEATH, NON-LASER: HCPCS | Performed by: INTERNAL MEDICINE

## 2018-10-22 RX ORDER — ALPRAZOLAM 0.5 MG/1
0.5 TABLET ORAL 3 TIMES DAILY PRN
Status: DISCONTINUED | OUTPATIENT
Start: 2018-10-22 | End: 2018-10-23 | Stop reason: HOSPADM

## 2018-10-22 RX ORDER — HYDROCODONE BITARTRATE AND ACETAMINOPHEN 5; 325 MG/1; MG/1
1 TABLET ORAL EVERY 4 HOURS PRN
Status: DISCONTINUED | OUTPATIENT
Start: 2018-10-22 | End: 2018-10-23 | Stop reason: HOSPADM

## 2018-10-22 RX ORDER — CLOPIDOGREL BISULFATE 75 MG/1
75 TABLET ORAL DAILY
Status: DISCONTINUED | OUTPATIENT
Start: 2018-10-23 | End: 2018-10-23

## 2018-10-22 RX ORDER — FENTANYL CITRATE 50 UG/ML
INJECTION, SOLUTION INTRAMUSCULAR; INTRAVENOUS AS NEEDED
Status: DISCONTINUED | OUTPATIENT
Start: 2018-10-22 | End: 2018-10-22 | Stop reason: HOSPADM

## 2018-10-22 RX ORDER — LIDOCAINE HYDROCHLORIDE 10 MG/ML
INJECTION, SOLUTION INFILTRATION; PERINEURAL AS NEEDED
Status: DISCONTINUED | OUTPATIENT
Start: 2018-10-22 | End: 2018-10-22 | Stop reason: HOSPADM

## 2018-10-22 RX ORDER — ACETAMINOPHEN 325 MG/1
650 TABLET ORAL EVERY 4 HOURS PRN
Status: DISCONTINUED | OUTPATIENT
Start: 2018-10-22 | End: 2018-10-23 | Stop reason: HOSPADM

## 2018-10-22 RX ORDER — METOPROLOL SUCCINATE 25 MG/1
25 TABLET, EXTENDED RELEASE ORAL EVERY 12 HOURS SCHEDULED
Status: DISCONTINUED | OUTPATIENT
Start: 2018-10-22 | End: 2018-10-23

## 2018-10-22 RX ORDER — SODIUM CHLORIDE 9 MG/ML
50 INJECTION, SOLUTION INTRAVENOUS CONTINUOUS
Status: DISCONTINUED | OUTPATIENT
Start: 2018-10-22 | End: 2018-10-22

## 2018-10-22 RX ORDER — FAMOTIDINE 10 MG/ML
INJECTION, SOLUTION INTRAVENOUS
Status: COMPLETED
Start: 2018-10-22 | End: 2018-10-22

## 2018-10-22 RX ORDER — DIPHENHYDRAMINE HYDROCHLORIDE 50 MG/ML
50 INJECTION INTRAMUSCULAR; INTRAVENOUS ONCE
Status: COMPLETED | OUTPATIENT
Start: 2018-10-22 | End: 2018-10-22

## 2018-10-22 RX ORDER — MIDAZOLAM HYDROCHLORIDE 1 MG/ML
INJECTION INTRAMUSCULAR; INTRAVENOUS AS NEEDED
Status: DISCONTINUED | OUTPATIENT
Start: 2018-10-22 | End: 2018-10-22 | Stop reason: HOSPADM

## 2018-10-22 RX ORDER — SODIUM CHLORIDE FOR INHALATION 3 %
4 VIAL, NEBULIZER (ML) INHALATION ONCE
Status: COMPLETED | OUTPATIENT
Start: 2018-10-23 | End: 2018-10-23

## 2018-10-22 RX ADMIN — MONTELUKAST SODIUM 10 MG: 10 TABLET, FILM COATED ORAL at 20:57

## 2018-10-22 RX ADMIN — METOPROLOL SUCCINATE 25 MG: 25 TABLET, EXTENDED RELEASE ORAL at 20:57

## 2018-10-22 RX ADMIN — IPRATROPIUM BROMIDE AND ALBUTEROL SULFATE 3 ML: .5; 3 SOLUTION RESPIRATORY (INHALATION) at 17:43

## 2018-10-22 RX ADMIN — FAMOTIDINE 20 MG: 10 INJECTION, SOLUTION INTRAVENOUS at 13:10

## 2018-10-22 RX ADMIN — ENOXAPARIN SODIUM 40 MG: 40 INJECTION SUBCUTANEOUS at 06:45

## 2018-10-22 RX ADMIN — SPIRONOLACTONE 25 MG: 25 TABLET ORAL at 09:18

## 2018-10-22 RX ADMIN — SERTRALINE HYDROCHLORIDE 100 MG: 50 TABLET ORAL at 20:57

## 2018-10-22 RX ADMIN — DIPHENHYDRAMINE HYDROCHLORIDE 50 MG: 50 INJECTION INTRAMUSCULAR; INTRAVENOUS at 13:10

## 2018-10-22 RX ADMIN — ASPIRIN 81 MG: 81 TABLET, COATED ORAL at 09:19

## 2018-10-22 RX ADMIN — SERTRALINE HYDROCHLORIDE 50 MG: 50 TABLET ORAL at 20:58

## 2018-10-22 RX ADMIN — HYDROCORTISONE SODIUM SUCCINATE 100 MG: 100 INJECTION, POWDER, FOR SOLUTION INTRAMUSCULAR; INTRAVENOUS at 13:10

## 2018-10-22 RX ADMIN — SODIUM CHLORIDE SOLN NEBU 3% 4 ML: 3 NEBU SOLN at 14:35

## 2018-10-22 RX ADMIN — DOCUSATE SODIUM 100 MG: 100 CAPSULE, LIQUID FILLED ORAL at 09:17

## 2018-10-22 RX ADMIN — METOPROLOL SUCCINATE 25 MG: 25 TABLET, EXTENDED RELEASE ORAL at 09:19

## 2018-10-22 RX ADMIN — Medication 3 ML: at 20:58

## 2018-10-22 RX ADMIN — SACUBITRIL AND VALSARTAN 1 TABLET: 24; 26 TABLET, FILM COATED ORAL at 09:18

## 2018-10-22 RX ADMIN — ATORVASTATIN CALCIUM 20 MG: 20 TABLET, FILM COATED ORAL at 20:57

## 2018-10-22 RX ADMIN — SACUBITRIL AND VALSARTAN 1 TABLET: 24; 26 TABLET, FILM COATED ORAL at 20:57

## 2018-10-22 RX ADMIN — SODIUM CHLORIDE 50 ML/HR: 9 INJECTION, SOLUTION INTRAVENOUS at 11:18

## 2018-10-22 NOTE — PROGRESS NOTES
"Hospitalist Progress Note.    LOS: 3 days    Patient Care Team:  Malia Powers APRN as PCP - General (Family Medicine)  Shelley Brock MD as Consulting Physician (General Surgery)  Cirilo Deshpande II, MD (Pain Medicine)    Chief Complaint:    F/u Acute worsening of chronic congestive heart failure     Subjective   Patient seen and examined this morning and again later in the afternoon. Patient reported feeling well but slightly anxious about her procedure later this afternoon ([facility]), but otherwise, had no complaints of SOA, chest pain, headache or dizziness. She's been ambulating in the hallway without any difficulty. This morning her EKG showed consistently increased QTc at 519, so Dr. Curtis did take her for a LHC in the afternoon which showed normal coronaries. However, after the procedure, patient complained of severe itching with mild shortness of breath. Given hydrocortisone and benadryl with resolution of symptoms. I saw her back in her room soon after returning from her procedure and she complained of mild itching but denies any tongue or throat swelling, she was not short of breath and denies any chest pain/pressure.     Review of Systems:    The pertinent  ROS was done and it is noted above, rest  was negative.    Objective     Vital Signs  BP 95/48   Pulse 89   Temp 98 °F (36.7 °C) (Oral)   Resp 18   Ht 170.2 cm (67\")   Wt 109 kg (239 lb 9 oz)   SpO2 93%   BMI 37.52 kg/m²       No intake/output data recorded.    Intake/Output Summary (Last 24 hours) at 10/22/18 1551  Last data filed at 10/22/18 0671   Gross per 24 hour   Intake              240 ml   Output              300 ml   Net              -60 ml       Physical Exam:    General Appearance: alert, oriented x 3, no acute distress, very pleasant  HEENT: pupils round and reactive to light, oral mucosa dry, extraocular movements intact.  Neck: supple, no JVD, trachea midline  Lungs: Clear to Auscultation, unlabored breathing effort  Heart: " RRR, normal S1 and S2, no S3, no rub  Abdomen: soft, non-tender, no palpable bladder, present bowel sounds to auscultation  Extremities: no edema, cyanosis or clubbing. + wrist with TR band in place  Neuro: normal speech and mental status, grossly non focal.     Results Review:      Results from last 7 days  Lab Units 10/22/18  0605 10/21/18  0611 10/20/18  0406  10/19/18  0043   SODIUM mmol/L 138 137 136*  < > 137   POTASSIUM mmol/L 5.0 4.6 3.6  < > 3.3*   CHLORIDE mmol/L 108* 108* 105  < > 102   CO2 mmol/L 24.0* 23.0* 28.0  < > 24.0*   BUN mg/dL 17 17 16  < > 11   CREATININE mg/dL 0.70 0.70 0.70  < > 0.70   CALCIUM mg/dL 9.4 9.1 8.8  < > 9.5   BILIRUBIN mg/dL  --   --   --   --  0.5   ALK PHOS U/L  --   --   --   --  56   ALT (SGPT) U/L  --   --   --   --  45   AST (SGOT) U/L  --   --   --   --  33   GLUCOSE mg/dL 108* 113* 113*  < > 121*   < > = values in this interval not displayed.    Estimated Creatinine Clearance: 145.4 mL/min (by C-G formula based on SCr of 0.7 mg/dL).      Results from last 7 days  Lab Units 10/20/18  0406   MAGNESIUM mg/dL 2.6*               Results from last 7 days  Lab Units 10/21/18  0611 10/20/18  0406 10/19/18  0607 10/19/18  0043   WBC 10*3/mm3 6.51 6.62 8.78 8.95   HEMOGLOBIN g/dL 11.0* 11.3* 11.9* 11.5*   PLATELETS 10*3/mm3 258 277 255 294               Imaging Results (last 24 hours)     ** No results found for the last 24 hours. **          aspirin 81 mg Oral Daily   atorvastatin 20 mg Oral Nightly   [START ON 10/23/2018] clopidogrel 75 mg Oral Daily   docusate sodium 100 mg Oral Daily   enoxaparin 40 mg Subcutaneous Q24H   metoprolol succinate XL 25 mg Oral Q12H   montelukast 10 mg Oral Nightly   sacubitril-valsartan 1 tablet Oral Q12H   sertraline 100 mg Oral Nightly   sertraline 50 mg Oral Nightly   sodium chloride 3 mL Intravenous Q12H   spironolactone 25 mg Oral Daily       sodium chloride 50 mL/hr Last Rate: 50 mL/hr (10/22/18 1118)       Medication Review:   Current  Facility-Administered Medications   Medication Dose Route Frequency Provider Last Rate Last Dose   • acetaminophen (TYLENOL) tablet 650 mg  650 mg Oral Q4H PRN Brenda Orozco MD       • acetaminophen (TYLENOL) tablet 650 mg  650 mg Oral Q4H PRN Zheng Curtis MD       • ALPRAZolam (XANAX) tablet 0.5 mg  0.5 mg Oral TID PRN Zheng Curtis MD       • aspirin EC tablet 81 mg  81 mg Oral Daily Zheng Curtis MD   81 mg at 10/22/18 0919   • atorvastatin (LIPITOR) tablet 20 mg  20 mg Oral Nightly Zheng Curtis MD   20 mg at 10/21/18 2124   • benzonatate (TESSALON) capsule 200 mg  200 mg Oral TID PRN Brenda Orozco MD       • calcium carbonate (TUMS) chewable tablet 500 mg (200 mg elemental)  1 tablet Oral TID PRN Mary Dela Cruz MD       • [START ON 10/23/2018] clopidogrel (PLAVIX) tablet 75 mg  75 mg Oral Daily Zheng Curtis MD       • docusate sodium (COLACE) capsule 100 mg  100 mg Oral Daily Brenda Orozco MD   100 mg at 10/22/18 0917   • enoxaparin (LOVENOX) syringe 40 mg  40 mg Subcutaneous Q24H Brenda Orozco MD   40 mg at 10/22/18 0645   • fluticasone (FLONASE) 50 MCG/ACT nasal spray 2 spray  2 spray Each Nare PRN Brenda Orozco MD       • glycerin 35 % liquid 35% 1 spray  1 spray Mouth/Throat Q2H PRN Brenda Orozco MD   1 spray at 10/20/18 0031   • HYDROcod Polst-CPM Polst ER (TUSSIONEX PENNKINETIC) 10-8 MG/5ML ER suspension 5 mL  5 mL Oral Q12H PRN Brenda Orozco MD   5 mL at 10/21/18 2125   • HYDROcodone-acetaminophen (NORCO) 5-325 MG per tablet 1 tablet  1 tablet Oral Q8H PRN Brenda Orozco MD       • HYDROcodone-acetaminophen (NORCO) 5-325 MG per tablet 1 tablet  1 tablet Oral Q4H PRN Zheng Curtis MD       • ipratropium-albuterol (DUO-NEB) nebulizer solution 3 mL  3 mL Nebulization Q6H PRN Brenda Orozco MD       • metoprolol succinate XL (TOPROL-XL) 24 hr tablet 25 mg  25 mg Oral Q12H Zheng Curtis MD        • montelukast (SINGULAIR) tablet 10 mg  10 mg Oral Nightly Brenda Orozco MD   10 mg at 10/21/18 2124   • ondansetron (ZOFRAN) tablet 4 mg  4 mg Oral Q6H PRN Brenda Orozco MD        Or   • ondansetron ODT (ZOFRAN-ODT) disintegrating tablet 4 mg  4 mg Oral Q6H PRN Brenda Orozco MD        Or   • ondansetron (ZOFRAN) injection 4 mg  4 mg Intravenous Q6H PRN Brenda Orozco MD       • pneumococcal polysaccharide 23-valent (PNEUMOVAX-23) vaccine 0.5 mL  0.5 mL Intramuscular During Hospitalization Brenda Orozco MD       • sacubitril-valsartan (ENTRESTO) 24-26 MG tablet 1 tablet  1 tablet Oral Q12H Zheng Curtis MD   1 tablet at 10/22/18 0918   • sertraline (ZOLOFT) tablet 100 mg  100 mg Oral Nightly Mary Dela Cruz MD   100 mg at 10/21/18 2124   • sertraline (ZOLOFT) tablet 50 mg  50 mg Oral Nightly Mary Dela Cruz MD   50 mg at 10/21/18 2126   • sodium chloride 0.9 % flush 10 mL  10 mL Intravenous PRN Emergency, Triage Protocol, MD   10 mL at 10/20/18 1047   • sodium chloride 0.9 % flush 3 mL  3 mL Intravenous Q12H Brenda Orozco MD   3 mL at 10/21/18 2125   • sodium chloride 0.9 % flush 3-10 mL  3-10 mL Intravenous PRN Brenda Orozco MD       • sodium chloride 0.9 % infusion  50 mL/hr Intravenous Continuous Zheng Curtis MD 50 mL/hr at 10/22/18 1118 50 mL/hr at 10/22/18 1118   • spironolactone (ALDACTONE) tablet 25 mg  25 mg Oral Daily Zheng Curtis MD   25 mg at 10/22/18 0918   • traZODone (DESYREL) tablet 50 mg  50 mg Oral Nightly PRN Brenda Orozco MD           Acute respiratory failure with hypoxia (CMS/HCC)    Productive cough    Sinus tachycardia    Acute non Q wave MI (myocardial infarction), initial episode of care (CMS/[facility])    Assessment/Plan   1. Acute respiratory failure with hypoxia due to acute pulmonary edema, due to # 2  2. Acute exacerbation of CHF due to cardiomyopathy, new EF 30%  3. Prolonged QTc, likely due to medications  (Flexeril+Elavil)  4. Elevated troponin, due to # 2, stable, s/p C with normal coronaries  5. Stage IIIB anaplastic large T-cell lymphoma, s/p 6 cycles of CHOP  6. H/o of post partum cardiomyopathy  7. H/o cardiac arrest due to # 6  8. Anxiety/depression  9. Suspected RUFINA    Patient is now s/p LHC which showed normal coronaries. Continues to have an increased QTc, will need to be monitored on an outpatient basis. Will avoid QT prolonging medications if possible.   Patient is currently on small dose of Entresto, Toprol XL and aldactone, with intermittently low BP. Will monitor overnight and if stable likely DC in the morning.     Details were discussed with the patient as well as family in the room.   I also discussed the details with the nursing staff.    Rest as ordered.    Brenda Orozco MD  10/22/18  3:51 PM

## 2018-10-22 NOTE — PLAN OF CARE
Problem: Patient Care Overview  Goal: Plan of Care Review  Outcome: Ongoing (interventions implemented as appropriate)   10/22/18 1959   Coping/Psychosocial   Plan of Care Reviewed With patient   Plan of Care Review   Progress improving       Problem: Skin Injury Risk (Adult)  Goal: Skin Health and Integrity  Outcome: Ongoing (interventions implemented as appropriate)      Problem: Cardiac: Heart Failure (Adult)  Goal: Signs and Symptoms of Listed Potential Problems Will be Absent, Minimized or Managed (Cardiac: Heart Failure)  Outcome: Ongoing (interventions implemented as appropriate)

## 2018-10-22 NOTE — SIGNIFICANT NOTE
10/22/18 0946   Rehab Time/Intention   Evaluation Not Performed other (see comments)  (Patient states ind with functional mobility and ADLs, has been walking in halls. No IP OT needs at this time. )   Rehab Treatment   Discipline occupational therapist

## 2018-10-23 VITALS
OXYGEN SATURATION: 94 % | SYSTOLIC BLOOD PRESSURE: 100 MMHG | DIASTOLIC BLOOD PRESSURE: 78 MMHG | HEIGHT: 67 IN | BODY MASS INDEX: 37.68 KG/M2 | TEMPERATURE: 98.1 F | WEIGHT: 240.08 LBS | HEART RATE: 81 BPM | RESPIRATION RATE: 18 BRPM

## 2018-10-23 LAB
ANION GAP SERPL CALCULATED.3IONS-SCNC: 10.5 MMOL/L (ref 10–20)
BUN BLD-MCNC: 16 MG/DL (ref 7–20)
BUN/CREAT SERPL: 20 (ref 7.1–23.5)
CALCIUM SPEC-SCNC: 9.3 MG/DL (ref 8.4–10.2)
CHLORIDE SERPL-SCNC: 106 MMOL/L (ref 98–107)
CO2 SERPL-SCNC: 26 MMOL/L (ref 26–30)
CREAT BLD-MCNC: 0.8 MG/DL (ref 0.6–1.3)
GFR SERPL CREATININE-BSD FRML MDRD: 83 ML/MIN/1.73
GLUCOSE BLD-MCNC: 110 MG/DL (ref 74–98)
POTASSIUM BLD-SCNC: 4.5 MMOL/L (ref 3.5–5.1)
SODIUM BLD-SCNC: 138 MMOL/L (ref 137–145)

## 2018-10-23 PROCEDURE — 93005 ELECTROCARDIOGRAM TRACING: CPT | Performed by: INTERNAL MEDICINE

## 2018-10-23 PROCEDURE — 94640 AIRWAY INHALATION TREATMENT: CPT

## 2018-10-23 PROCEDURE — 25010000002 ENOXAPARIN PER 10 MG: Performed by: INTERNAL MEDICINE

## 2018-10-23 PROCEDURE — 80048 BASIC METABOLIC PNL TOTAL CA: CPT | Performed by: INTERNAL MEDICINE

## 2018-10-23 PROCEDURE — 99239 HOSP IP/OBS DSCHRG MGMT >30: CPT | Performed by: INTERNAL MEDICINE

## 2018-10-23 RX ORDER — ATORVASTATIN CALCIUM 20 MG/1
20 TABLET, FILM COATED ORAL NIGHTLY
Qty: 30 TABLET | Refills: 0 | Status: SHIPPED | OUTPATIENT
Start: 2018-10-23 | End: 2021-10-06 | Stop reason: ALTCHOICE

## 2018-10-23 RX ORDER — METOPROLOL SUCCINATE 50 MG/1
50 TABLET, EXTENDED RELEASE ORAL
Qty: 30 TABLET | Refills: 0 | Status: ON HOLD | OUTPATIENT
Start: 2018-10-23 | End: 2019-10-14 | Stop reason: SDUPTHER

## 2018-10-23 RX ORDER — ASPIRIN 81 MG/1
81 TABLET ORAL DAILY
Qty: 30 TABLET | Refills: 0 | Status: SHIPPED | OUTPATIENT
Start: 2018-10-23 | End: 2019-02-04 | Stop reason: HOSPADM

## 2018-10-23 RX ORDER — SPIRONOLACTONE 25 MG/1
25 TABLET ORAL DAILY
Qty: 30 TABLET | Refills: 0 | Status: SHIPPED | OUTPATIENT
Start: 2018-10-23 | End: 2021-10-06 | Stop reason: ALTCHOICE

## 2018-10-23 RX ORDER — METOPROLOL SUCCINATE 50 MG/1
50 TABLET, EXTENDED RELEASE ORAL
Status: DISCONTINUED | OUTPATIENT
Start: 2018-10-23 | End: 2018-10-23 | Stop reason: HOSPADM

## 2018-10-23 RX ORDER — ONDANSETRON 4 MG/1
4 TABLET, ORALLY DISINTEGRATING ORAL EVERY 12 HOURS PRN
Qty: 10 TABLET | Refills: 0
Start: 2018-10-23 | End: 2022-04-18

## 2018-10-23 RX ADMIN — ASPIRIN 81 MG: 81 TABLET, COATED ORAL at 09:00

## 2018-10-23 RX ADMIN — SODIUM CHLORIDE SOLN NEBU 3% 4 ML: 3 NEBU SOLN at 02:32

## 2018-10-23 RX ADMIN — SPIRONOLACTONE 25 MG: 25 TABLET ORAL at 09:00

## 2018-10-23 RX ADMIN — SODIUM CHLORIDE, PRESERVATIVE FREE 500 UNITS: 5 INJECTION INTRAVENOUS at 12:30

## 2018-10-23 RX ADMIN — METOPROLOL SUCCINATE 50 MG: 50 TABLET, EXTENDED RELEASE ORAL at 09:10

## 2018-10-23 RX ADMIN — SACUBITRIL AND VALSARTAN 1 TABLET: 24; 26 TABLET, FILM COATED ORAL at 09:10

## 2018-10-23 RX ADMIN — CLOPIDOGREL BISULFATE 75 MG: 75 TABLET ORAL at 09:00

## 2018-10-23 RX ADMIN — ENOXAPARIN SODIUM 40 MG: 40 INJECTION SUBCUTANEOUS at 06:31

## 2018-10-23 NOTE — PROGRESS NOTES
Continued Stay Note   [facility]     Patient Name: Johny Hearn  MRN: [id number]  Today's Date: 10/23/2018    Admit Date: 10/19/2018          Discharge Plan     Row Name 10/23/18 1029       Plan    Plan Received prior auth request on entresto. Provided [employer] with 30 day free coupon. Will contact [facility] regarding prior auth.    Row Name 10/23/18 0959       Plan    Final Note Discharged home     Row Name 10/23/18 0958       Plan    Final Discharge Disposition Code 01 - home or self-care    Row Name 10/23/18 0956       Plan    Plan Comments Spoke to pt she is discahrging home with Family spoke to her she is denying any needs  to                Discharge Codes    No documentation.       Expected Discharge Date and Time     Expected Discharge Date Expected Discharge Time    Oct 23, 2018             Nataly Roth

## 2018-10-23 NOTE — PROGRESS NOTES
Case Management Discharge Note    Final Note: Discharged home     Destination     No service has been selected for the patient.      Durable Medical Equipment     No service has been selected for the patient.      Dialysis/Infusion     No service has been selected for the patient.      Home Medical Care     No service has been selected for the patient.      Social Care     No service has been selected for the patient.        Other:  (private car )    Final Discharge Disposition Code: 01 - home or self-care

## 2018-10-23 NOTE — DISCHARGE SUMMARY
[facility]   DISCHARGE SUMMARY      Name:  Johny Hearn   Age:  33 y.o.  Sex:  female  :  1985  MRN:  [id number]   Visit Number:  [id number]    Admission Date:  10/19/2018  Date of Discharge:  10/23/2018  Primary Care Physician:  Malia Powers APRN    Discharge Diagnoses:   1. Acute respiratory failure with hypoxia due to acute pulmonary edema, due to # 2  2. Acute exacerbation of CHF due to cardiomyopathy, new EF 30%  3. Prolonged QTc, likely due to medications (Flexeril+Elavil)  4. Elevated troponin, due to # 2, stable, s/p LHC with normal coronaries  5. Stage IIIB anaplastic large T-cell lymphoma, s/p 6 cycles of CHOP  6. H/o of post partum cardiomyopathy  7. H/o cardiac arrest due to # 6  8. Anxiety/depression  9. Suspected RUFINA      Acute respiratory failure with hypoxia (CMS/HCC)    Productive cough    Sinus tachycardia    Acute non Q wave MI (myocardial infarction), initial episode of care (CMS/[facility])      Presenting Problem:    Acute respiratory failure with hypoxia (CMS/[facility]) [J96.01]     Consults:     Consults     No orders found from 2018 to 10/20/2018.        Consulting Physician(s)     Provider Relationship Specialty    Zheng Curtis MD Consulting Physician Interventional Cardiology          Procedures Performed:    Procedure(s):  Left Heart Cath  Coronary angiography  Left ventriculography       History of presenting illness/Hospital Course:  Patient is a 33-year-old female with medical history of postpartum cardiomyopathy, history of cardiac arrest as a result of postpartum cardiomyopathy, stage IIIB anaplastic large T-cell lymphoma status post CHOP therapy (followed by Dr. Rice), suspected obstructive sleep apnea, obesity, anxiety/depression and seasonal allergies who was admitted on 10/19 for shortness of breath, pleuritic chest pain, rhinorrhea and headache due to a cough that was productive of yellow and green sputum.  She was noted  to be hypoxic on room air in the ER with an elevated proBNP and slightly elevated troponin levels.  A CT of the chest was done in the ER which ruled out a pulmonary embolism however she was found to have dilation of all 4 chambers of her heart.  She was started on IV Lasix and subsequently admitted to the hospital for further evaluation.    Dr. Curtis was consulted and he did evaluate the patient.  A 2-D echocardiogram was done which showed significantly reduced ejection fraction with systolic and diastolic dysfunction.  Her ejection fraction was noted to be 30% which had previously been 60% prior to initiation of chemotherapy.  Patient was placed on medical therapy with Entresto, Aldactone and Toprol-XL.  However, she was noted to have an increased QTc on EKG despite discontinuation of medications that can prolonged the QT interval.  She's not had any arrhythmias on telemetry. She then underwent LHC on 10/22 which revealed normal coronaries. Post procedure, patient had mild reaction with some itching and hives, suspected to be from the contrast dye, this resolved with one dose of steroids and benadryl.    Patient seen and examined at the bedside this morning. She feels well. Denies any soa, chest pain/pressure or palpitations. Denies feeling dizzy. Her BP was noted to be slightly low at 85/56, from which she was not symptomatic. She was given her morning medications and maintained her BP at 100/78. I did speak to Dr. Curtis regarding her case. He recommends continuation of aspirin, plavix was discontinued. She will be continued on aspirin, statin, entresto, aldactone and Toprol-XL. She will need to follow up with Dr. Curtis in 2 weeks and her PCP in 1 week. She was advised to consult with her physician and her pharmacist prior to taking any prescribed or over the counter medications due to her prolonged QT. Patient verbalized understanding of this. She was also advised to keep a 2L fluid restriction diet.     Vital  Signs:    Temp:  [98 °F (36.7 °C)-98.7 °F (37.1 °C)] 98.1 °F (36.7 °C)  Heart Rate:  [78-96] 81  Resp:  [16-19] 18  BP: ()/(48-79) 100/78    Physical Exam:    General Appearance:  Alert and cooperative, not in any acute distress.   Head:  Atraumatic and normocephalic, without obvious abnormality.   Eyes:          PERRLA, conjunctivae and sclerae normal, no Icterus. No pallor. Extra-occular movements are within normal limits.   Ears:  Ears appear intact with no abnormalities noted.   Throat: No oral lesions, no thrush, oral mucosa moist.   Neck: Supple, trachea midline, no thyromegaly, no carotid bruit.   Back:   No kyphoscoliosis present. No tenderness to palpation,   range of motion normal.   Lungs:   Chest shape is normal. Breath sounds heard bilaterally equally.  No crackles or wheezing. No Pleural rub or bronchial breathing.   Heart:  Normal S1 and S2, no murmur, no gallop, no rub. No JVD.   Abdomen:   Normal bowel sounds, no masses, no organomegaly. Soft, non-tender, non-distended, no guarding, no rebound tenderness.   Extremities: Moves all extremities well, no edema, no cyanosis, no clubbing.   Pulses: Pulses palpable and equal bilaterally.   Skin: No bleeding, bruising or rash.   Lymph nodes: No palpable adenopathy.   Neurologic: Alert and oriented x 3. Moves all four limbs equally. No tremors. No facial asymetry.     Pertinent Lab Results:       Results from last 7 days  Lab Units 10/23/18  0606 10/22/18  0605 10/21/18  0611  10/19/18  0043   SODIUM mmol/L 138 138 137  < > 137   POTASSIUM mmol/L 4.5 5.0 4.6  < > 3.3*   CHLORIDE mmol/L 106 108* 108*  < > 102   CO2 mmol/L 26.0 24.0* 23.0*  < > 24.0*   BUN mg/dL 16 17 17  < > 11   CREATININE mg/dL 0.80 0.70 0.70  < > 0.70   CALCIUM mg/dL 9.3 9.4 9.1  < > 9.5   BILIRUBIN mg/dL  --   --   --   --  0.5   ALK PHOS U/L  --   --   --   --  56   ALT (SGPT) U/L  --   --   --   --  45   AST (SGOT) U/L  --   --   --   --  33   GLUCOSE mg/dL 110* 108* 113*  < >  121*   < > = values in this interval not displayed.    Results from last 7 days  Lab Units 10/21/18  0611 10/20/18  0406 10/19/18  0607   WBC 10*3/mm3 6.51 6.62 8.78   HEMOGLOBIN g/dL 11.0* 11.3* 11.9*   HEMATOCRIT % 35.5* 35.9* 37.0   PLATELETS 10*3/mm3 258 277 255           Results from last 7 days  Lab Units 10/21/18  0611 10/19/18  1836 10/19/18  1153   TROPONIN I ng/mL 0.033 0.058* 0.049*       Results from last 7 days  Lab Units 10/19/18  0043   PROBNP pg/mL 9,630.0*               Results from last 7 days  Lab Units 10/19/18  0112   PH, ARTERIAL pH units 7.421   PO2 ART mm Hg 63.6*   PCO2, ARTERIAL mm Hg 33.0*   HCO3 ART mmol/L 21.4*           Invalid input(s): USDES,  BLOODU, NITRITITE, BACT, EP  Pain Management Panel     Pain Management Panel Latest Ref Rng & Units 4/5/2018 1/28/2016    CREATININE UR Not Estab. mg/dL 70.9 122.0              Pertinent Radiology Results:    Imaging Results (all)     Procedure Component Value Units Date/Time    CT Chest Pulmonary Embolism With Contrast [[id number]] Collected:  10/19/18 0750     Updated:  10/19/18 0755    Narrative:       PROCEDURE: CT CHEST PULMONARY EMBOLISM W CONTRAST-     HISTORY: chest/back pain, sob, hypoxia, on chemo for lymphoma     COMPARISON: None .     TECHNIQUE: Multiple axial CT images were obtained from the thoracic  inlet through the upper abdomen following the administration of Isovue  300 per the CT PE protocol. Coronal and oblique 3D MIP images were  reconstructed from the original axial data set.      FINDINGS: There are no filling defects within the pulmonary arteries to  suggest an embolus. The thoracic aorta is normal in caliber with no  evidence of dissection. The heart is normal in size. There are small  bilateral pleural effusions. There is no pericardial effusion. Enlarged  lymph nodes are noted within the mediastinum, for example there is a 1.8  cm prevascular lymph node.. Lung windows reveal interlobular septal  thickening with patchy  bilateral opacities. The visualized upper abdomen  is unremarkable. Bone windows reveal no acute osseous abnormalities.       Impression:       1. No evidence of pulmonary embolus or dissection.   2. Findings consistent with pulmonary edema with small bilateral pleural  effusions. There is more confluent airspace disease bilaterally which  may also reflect pulmonary edema, however a superimposed pneumonia is  not excluded.  3. Nonspecific mediastinal adenopathy.             449.88 mGy.cm          This study was performed with techniques to keep radiation doses as low  as reasonably achievable (ALARA). Individualized dose reduction  techniques using automated exposure control or adjustment of mA and/or  kV according to the patient size were employed.      This report was finalized on 10/19/2018 7:53 AM by Mukesh Hong M.D..          Condition on Discharge:      Stable.    Code status during the hospital stay:    Code Status and Medical Interventions:   Ordered at: 10/19/18 0456     Code Status:    CPR     Medical Interventions (Level of Support Prior to Arrest):    Full       Discharge Disposition:    Home or Self Care    Discharge Medications:       Discharge Medications      New Medications      Instructions Start Date   aspirin 81 MG EC tablet   81 mg, Oral, Daily      atorvastatin 20 MG tablet  Commonly known as:  LIPITOR   20 mg, Oral, Nightly      metoprolol succinate XL 50 MG 24 hr tablet  Commonly known as:  TOPROL-XL   50 mg, Oral, Every 24 Hours Scheduled      sacubitril-valsartan 24-26 MG tablet  Commonly known as:  ENTRESTO   1 tablet, Oral, Every 12 Hours Scheduled      spironolactone 25 MG tablet  Commonly known as:  ALDACTONE   25 mg, Oral, Daily         Changes to Medications      Instructions Start Date   ondansetron ODT 4 MG disintegrating tablet  Commonly known as:  ZOFRAN-ODT  What changed:  when to take this   4 mg, Oral, Every 12 Hours PRN         Continue These Medications       Instructions Start Date   benzonatate 200 MG capsule  Commonly known as:  TESSALON   200 mg, Oral, 3 Times Daily PRN      docusate sodium 100 MG capsule  Commonly known as:  COLACE   100 mg, Oral, Daily      fluticasone 50 MCG/ACT nasal spray  Commonly known as:  FLONASE   As Needed      lactulose 10 GM/15ML solution  Commonly known as:  CHRONULAC   20 g, Oral, 3 Times Daily, PRN constipation      lidocaine-prilocaine 2.5-2.5 % cream  Commonly known as:  EMLA   Topical, As Needed      montelukast 10 MG tablet  Commonly known as:  SINGULAIR   No dose, route, or frequency recorded.      oxyCODONE-acetaminophen 7.5-325 MG per tablet  Commonly known as:  PERCOCET   1 tablet, Oral, 2 Times Daily PRN      sertraline 50 MG tablet  Commonly known as:  ZOLOFT   50 mg, Oral, Daily, Total 150 MG daily dose       sertraline 100 MG tablet  Commonly known as:  ZOLOFT   100 mg, Oral, Daily      traZODone 50 MG tablet  Commonly known as:  DESYREL   50 mg, Nightly PRN      vitamin D 97011 units capsule capsule  Commonly known as:  ERGOCALCIFEROL   50,000 Units, Oral, Weekly      XTAMPZA ER 13.5 MG capsule extended-release 12 hour   Generic drug:  OxyCODONE ER   1 capsule, Oral, 2 Times Daily         Stop These Medications    amitriptyline 10 MG tablet  Commonly known as:  ELAVIL     cetirizine 10 MG tablet  Commonly known as:  zyrTEC     cyclobenzaprine 10 MG tablet  Commonly known as:  FLEXERIL     hydrochlorothiazide 25 MG tablet  Commonly known as:  HYDRODIURIL     HYDROcodone-acetaminophen 5-325 MG per tablet  Commonly known as:  NORCO     ibuprofen 800 MG tablet  Commonly known as:  ADVIL,MOTRIN     metroNIDAZOLE 0.75 % gel  Commonly known as:  METROGEL     ondansetron 8 MG tablet  Commonly known as:  ZOFRAN     traMADol 50 MG tablet  Commonly known as:  ULTRAM            Discharge Diet:     Diet Instructions     Diet: Cardiac       Discharge Diet:  Cardiac    Fluid Restriction per day:  Other (See comments)    2000 mL of Fluid           Activity at Discharge:     Activity Instructions     Activity as Tolerated             Follow-up Appointments:    Additional Instructions for the Follow-ups that You Need to Schedule     Ambulatory Referral to Cardiac Rehab    As directed      Discharge Follow-up with PCP    As directed      Currently Documented PCP:  Malia Powers APRN  PCP Phone Number:  [phone]    Follow Up Details:  1 week         Discharge Follow-up with Specified Provider: Dr. Curtis; 2 Weeks    As directed      To:  Dr. Curtis    Follow Up:  2 Weeks           Follow-up Information     Zheng Curtis MD. Schedule an appointment as soon as possible for a visit on 11/14/2018.    Specialties:  Interventional Cardiology, Cardiology  Why:  @10AM  Contact information:  [address]  [facility] [address]  [phone]             Malia Powers APRN Follow up on 10/30/2018.    Specialty:  Family Medicine  Why:  @9AM  Contact information:  [address] [facility] [address]  [phone]             Malia Powers APRN .    Specialty:  Family Medicine  Why:  1 week  Contact information:  [address] [facility] [address]  [phone]                   Future Appointments  Date Time Provider Department Center   10/24/2018 1:15 PM Carlo Rice MD [facility]   10/24/2018 1:45 PM [facility] - [facility]  [facility]       Additional Instructions for the Follow-ups that You Need to Schedule     Ambulatory Referral to Cardiac Rehab    As directed      Discharge Follow-up with PCP    As directed      Currently Documented PCP:  Malia Powers APRN  PCP Phone Number:  [phone]    Follow Up Details:  1 week         Discharge Follow-up with Specified Provider: Dr. Curtis; 2 Weeks    As directed      To:  Dr. Curtis    Follow Up:  2 Weeks               Test Results Pending at Discharge:           Brenda Orozco MD  10/23/18  11:39 AM    Time spent: 32  minutes    Dictated utilizing Dragon dictation.

## 2018-10-23 NOTE — PROGRESS NOTES
Continued Stay Note  [facility]     Patient Name: Johny Hearn  MRN: [id number]  Today's Date: 10/23/2018    Admit Date: 10/19/2018          Discharge Plan     Row Name 10/23/18 0956       Plan    Plan Comments Spoke to pt she is discharging home with Family spoke to her she is denying any needs  to                Discharge Codes    No documentation.       Expected Discharge Date and Time     Expected Discharge Date Expected Discharge Time    Oct 23, 2018             Lubna Sosa

## 2018-10-24 ENCOUNTER — OFFICE VISIT (OUTPATIENT)
Dept: ONCOLOGY | Facility: CLINIC | Age: 33
End: 2018-10-24

## 2018-10-24 ENCOUNTER — READMISSION MANAGEMENT (OUTPATIENT)
Dept: CALL CENTER | Facility: HOSPITAL | Age: 33
End: 2018-10-24

## 2018-10-24 ENCOUNTER — HOSPITAL ENCOUNTER (OUTPATIENT)
Dept: CT IMAGING | Facility: HOSPITAL | Age: 33
Discharge: HOME | End: 2018-10-24
Admitting: INTERNAL MEDICINE

## 2018-10-24 ENCOUNTER — DOCUMENTATION (OUTPATIENT)
Dept: NUTRITION | Facility: HOSPITAL | Age: 33
End: 2018-10-24

## 2018-10-24 ENCOUNTER — HOSPITAL ENCOUNTER (OUTPATIENT)
Dept: CT IMAGING | Facility: HOSPITAL | Age: 33
Discharge: HOME | End: 2018-10-24
Attending: INTERNAL MEDICINE

## 2018-10-24 ENCOUNTER — APPOINTMENT (OUTPATIENT)
Dept: CT IMAGING | Facility: HOSPITAL | Age: 33
End: 2018-10-24

## 2018-10-24 VITALS
DIASTOLIC BLOOD PRESSURE: 62 MMHG | BODY MASS INDEX: 38.36 KG/M2 | HEIGHT: 67 IN | WEIGHT: 244.4 LBS | TEMPERATURE: 98.1 F | SYSTOLIC BLOOD PRESSURE: 110 MMHG | RESPIRATION RATE: 18 BRPM | OXYGEN SATURATION: 100 % | HEART RATE: 103 BPM

## 2018-10-24 DIAGNOSIS — C84.68 ANAPLASTIC ALK-POSITIVE LARGE CELL LYMPHOMA OF LYMPH NODES OF MULTIPLE REGIONS (HCC): ICD-10-CM

## 2018-10-24 DIAGNOSIS — C84.68 ANAPLASTIC ALK-POSITIVE LARGE CELL LYMPHOMA OF LYMPH NODES OF MULTIPLE REGIONS (HCC): Primary | ICD-10-CM

## 2018-10-24 PROCEDURE — 74177 CT ABD & PELVIS W/CONTRAST: CPT

## 2018-10-24 PROCEDURE — 25010000002 IOPAMIDOL 61 % SOLUTION: Performed by: INTERNAL MEDICINE

## 2018-10-24 PROCEDURE — 99214 OFFICE O/P EST MOD 30 MIN: CPT | Performed by: INTERNAL MEDICINE

## 2018-10-24 RX ADMIN — IOPAMIDOL 100 ML: 612 INJECTION, SOLUTION INTRAVENOUS at 16:35

## 2018-10-24 NOTE — OUTREACH NOTE
Prep Survey      Responses   Facility patient discharged from?  [facility]   Is patient eligible?  Yes   Discharge diagnosis  Acute exacerbation of CHF due to cardiomyopathy,    Acute respiratory failure with hypoxia due to acute pulmonary edema,      Left Heart Cath   Does the patient have one of the following disease processes/diagnoses(primary or secondary)?  COPD/Pneumonia   Does the patient have Home health ordered?  No   Is there a DME ordered?  No   Medication alerts for this patient  Stopping several meds   Prep survey completed?  Yes          Ailyn Cardona RN

## 2018-10-24 NOTE — PROGRESS NOTES
[facility]    Patient Name:  Johny Hearn  YOB: 1985  MRN: [id number]  Admit Date:  (Not on file)    Pt has not responded within two weeks of follow-up letter with questionnaire being sent. Requested pt's responses be returned or to contact RD to schedule follow-up. RD has not been contacted. Pt's chart will be closed at this time.      I sincerely would like to thank you for this consult. I have enjoyed working with this patient and am available to follow up with her in the future. Consult KATRINA PRN.         Electronically signed by:  Miroslava Guan RD  10/24/18 3:02 PM

## 2018-10-24 NOTE — PROGRESS NOTES
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     HISTORY OF PRESENT ILLNESS: The patient is a very pleasant 33 y.o. female with past medical history significant for anaplastic large T-cell lymphoma, ALK positive diagnosed in April 19, 2018 after CT-guided core biopsy of left iliac mass.  Whole body PET scan revealed disease above and below the diaphragm.  Bone marrow biopsy done on May 1, 2018, that failed to show any evidence of bone marrow involvement.  The patient was started on chemotherapy using CHOP May 2, 2018.  She completed 6 cycles of August 22, 2018.  The patient is here today for scheduled follow-up visit.     SUBJECTIVE: The patient is here today by herself. She has been doing fair since her last visit. She was just released from the hospital here at [facility]. She was admitted for hypoxia and acute exacerbation of CHF. She complains of shortness of breath and chest tightness and chest pain when she coughs. She also complains of incontinence.     PAST MEDICAL HISTORY/SOCIAL HISTORY/FAMILY HISTORY: Reviewed by me and unchanged from my documentation done on 08/22/18.    Review of Systems   Constitutional: Positive for fatigue. Negative for activity change, appetite change, chills, fever and unexpected weight change.   HENT: Positive for ear pain. Negative for hearing loss, mouth sores, nosebleeds, sore throat and trouble swallowing.    Eyes: Negative for visual disturbance.   Respiratory: Positive for cough, chest tightness and shortness of breath. Negative for wheezing.    Cardiovascular: Negative for chest pain, palpitations and leg swelling.   Gastrointestinal: Positive for nausea. Negative for abdominal distention, abdominal pain, blood in stool, constipation, diarrhea, rectal pain and vomiting.   Endocrine: Negative for cold intolerance and heat intolerance.   Genitourinary: Negative for difficulty urinating,  dysuria, frequency and urgency.   Musculoskeletal: Negative for arthralgias, back pain, gait problem, joint swelling and myalgias.   Skin: Negative for rash.   Neurological: Positive for headaches. Negative for dizziness, tremors, syncope, weakness, light-headedness and numbness.   Hematological: Negative for adenopathy. Does not bruise/bleed easily.   Psychiatric/Behavioral: Negative for confusion, sleep disturbance and suicidal ideas. The patient is nervous/anxious.          Current Outpatient Prescriptions:   •  aspirin 81 MG EC tablet, Take 1 tablet by mouth Daily., Disp: 30 tablet, Rfl: 0  •  atorvastatin (LIPITOR) 20 MG tablet, Take 1 tablet by mouth Every Night., Disp: 30 tablet, Rfl: 0  •  benzonatate (TESSALON) 200 MG capsule, Take 200 mg by mouth 3 (Three) Times a Day As Needed for Cough., Disp: , Rfl:   •  docusate sodium (COLACE) 100 MG capsule, Take 1 capsule by mouth Daily., Disp: 30 capsule, Rfl: 3  •  fluticasone (FLONASE) 50 MCG/ACT nasal spray, As Needed., Disp: , Rfl:   •  lactulose (CHRONULAC) 10 GM/15ML solution, Take 30 mL by mouth 3 (Three) Times a Day. PRN constipation, Disp: 240 mL, Rfl: 2  •  lidocaine-prilocaine (EMLA) 2.5-2.5 % cream, Apply  topically As Needed (45-60 minutes prior to port access.  Cover with saran/plastic wrap.)., Disp: 30 g, Rfl: 3  •  metoprolol succinate XL (TOPROL-XL) 50 MG 24 hr tablet, Take 1 tablet by mouth Daily., Disp: 30 tablet, Rfl: 0  •  montelukast (SINGULAIR) 10 MG tablet, , Disp: , Rfl:   •  ondansetron ODT (ZOFRAN-ODT) 4 MG disintegrating tablet, Take 1 tablet by mouth Every 12 (Twelve) Hours As Needed for Nausea or Vomiting., Disp: 10 tablet, Rfl: 0  •  OxyCODONE ER (XTAMPZA ER) 13.5 MG capsule extended-release 12 hour , Take 1 capsule by mouth 2 (Two) Times a Day., Disp: , Rfl:   •  oxyCODONE-acetaminophen (PERCOCET) 7.5-325 MG per tablet, Take 1 tablet by mouth 2 (Two) Times a Day As Needed for Severe Pain ., Disp: , Rfl:   •  sacubitril-valsartan  "(ENTRESTO) 24-26 MG tablet, Take 1 tablet by mouth Every 12 (Twelve) Hours., Disp: 60 tablet, Rfl: 0  •  sertraline (ZOLOFT) 100 MG tablet, Take 100 mg by mouth Daily., Disp: , Rfl:   •  sertraline (ZOLOFT) 50 MG tablet, Take 50 mg by mouth Daily. Total 150 MG daily dose, Disp: , Rfl:   •  spironolactone (ALDACTONE) 25 MG tablet, Take 1 tablet by mouth Daily., Disp: 30 tablet, Rfl: 0  •  traZODone (DESYREL) 50 MG tablet, 50 mg At Night As Needed., Disp: , Rfl:   •  vitamin D (ERGOCALCIFEROL) 08877 units capsule capsule, Take 50,000 Units by mouth 1 (One) Time Per Week., Disp: , Rfl:   No current facility-administered medications for this visit.     PHYSICAL EXAMINATION:   /62   Pulse 103   Temp 98.1 °F (36.7 °C) (Temporal Artery )   Resp 18   Ht 170.2 cm (67.01\")   Wt 111 kg (244 lb 6.4 oz)   SpO2 100% Comment: RA  BMI 38.27 kg/m²    ECOG Performance Status: 1 - Symptomatic but completely ambulatory  General Appearance:  alert, cooperative, no apparent distress and appears stated age   Neurologic/Psychiatric: A&O x 3, gait steady, appropriate affect, strength 5/5 in all muscle groups   HEENT:  Normocephalic, without obvious abnormality, mucous membranes moist   Neck: Supple, symmetrical, trachea midline, no adenopathy;  No thyromegaly, masses, or tenderness   Lungs:   Clear to auscultation bilaterally; respirations regular, even, and unlabored bilaterally   Heart:  Regular rate and rhythm, no murmurs appreciated   Abdomen:   Soft, non-tender, non-distended and no organomegaly   Lymph nodes: No cervical, supraclavicular, inguinal or axillary adenopathy noted   Extremities: Normal, atraumatic; no clubbing, cyanosis, or edema    Skin: No rashes, ulcers, or suspicious lesions noted         Glucose 74 - 98 mg/dL 123   133   127   122      BUN 7 - 20 mg/dL 10  18  11  6      Creatinine 0.60 - 1.30 mg/dL 0.60  0.50   0.50   0.50      Sodium 137 - 145 mmol/L 142  141  143  142     Potassium 3.5 - 5.1 mmol/L 3.6 "  3.4   3.3   3.6     Chloride 98 - 107 mmol/L 106  104  106  105     CO2 26.0 - 30.0 mmol/L 25.0   28.0  27.0  26.0     Calcium 8.4 - 10.2 mg/dL 8.9  9.1  8.9  8.4     Total Protein 6.3 - 8.2 g/dL 7.3  7.8  8.0  8.6      Albumin 3.50 - 5.00 g/dL 4.10  4.40  3.90  3.80     ALT (SGPT) 13 - 69 U/L 25  26  29  35     AST (SGOT) 15 - 46 U/L 26  25  24  24     Alkaline Phosphatase 38 - 126 U/L 51  54  59  70     Total Bilirubin 0.2 - 1.3 mg/dL 0.5  0.3  0.3  0.4     eGFR Non African Amer >60 mL/min/1.73 115  143  143  143     Globulin gm/dL 3.2  3.4  4.1  4.8     A/G Ratio 1.0 - 2.0 g/dL 1.3  1.3  1.0  0.8      BUN/Creatinine Ratio 7.1 - 23.5 16.7  36.0   22.0  12.0     Anion Gap 10.0 - 20.0 mmol/L 14.6  12.4  13.3  14.6    Resulting Agency  [facility]  [facility]     CBC Auto Differential   Order: [id number] - Part of Panel Order [id number]   Status:  Final result   Visible to patient:  Yes (MyChart) Dx:  Anaplastic ALK-positive large cell ly...     Ref Range & Units 3wk ago  (7/11/18) 1mo ago  (6/13/18) 2mo ago  (5/24/18) 2mo ago  (5/4/18)    WBC 4.80 - 10.80 10*3/mm3 5.19  6.39  6.51  3.46      RBC 4.20 - 5.40 10*6/mm3 3.61   3.70   3.76   3.62      Hemoglobin 12.0 - 16.0 g/dL 9.8   10.0   9.9   9.5      Hematocrit 37.0 - 47.0 % 30.5   30.8   31.0   29.9      MCV 81.0 - 99.0 fL 84.5  83.2  82.4  82.6     MCH 27.0 - 31.0 pg 27.1  27.0  26.3   26.2      MCHC 30.0 - 37.0 g/dL 32.1  32.5  31.9  31.8     RDW 11.5 - 14.5 % 19.3   19.7   17.1   16.0      RDW-SD 37.0 - 54.0 fl 59.7   58.8   49.5  48.4     MPV 6.0 - 12.0 fL 8.2  8.1  8.0  8.5     Platelets 130 - 400 10*3/mm3 257  348  358  218     Neutrophil % 37.0 - 80.0 % 55.4  67.9  63.7  49.1     Lymphocyte % 10.0 - 50.0 % 28.3  20.5  25.7  33.2     Monocyte % 0.0 - 12.0 % 10.4  9.2  6.9  13.9      Eosinophil % 0.0 - 7.0 % 3.5  0.5  1.1  2.0     Basophil % 0.0 - 2.5 % 1.2  0.8  1.1  1.2     Immature Grans % 0.0 - 0.6 % 1.2   1.1   1.5   0.6      Neutrophils, Absolute 2.00 - 6.90 10*3/mm3 2.88  4.34  4.15  1.70      Lymphocytes, Absolute 0.60 - 3.40 10*3/mm3 1.47  1.31  1.67  1.15     Monocytes, Absolute 0.00 - 0.90 10*3/mm3 0.54  0.59  0.45  0.48     Eosinophils, Absolute 0.00 - 0.70 10*3/mm3 0.18  0.03  0.07  0.07     Basophils, Absolute 0.00 - 0.20 10*3/mm3 0.06  0.05  0.07  0.04     Immature Grans, Absolute 0.00 - 0.06 10*3/mm3 0.06  0.07   0.10   0.02     nRBC 0.0 - 0.0 /100 WBC 0.0  0.0  0.0  0.0    Resulting Agency   [facility]  [facility]  [facility]  [facility]      Specimen Collected: 07/11/18 10:49 Last Resulted: 07/11/18 10:56                         Ct Chest Pulmonary Embolism With Contrast    Result Date: 10/19/2018  Narrative: PROCEDURE: CT CHEST PULMONARY EMBOLISM W CONTRAST-  HISTORY: chest/back pain, sob, hypoxia, on chemo for lymphoma  COMPARISON: None .  TECHNIQUE: Multiple axial CT images were obtained from the thoracic inlet through the upper abdomen following the administration of Isovue 300 per the CT PE protocol. Coronal and oblique 3D MIP images were reconstructed from the original axial data set.  FINDINGS: There are no filling defects within the pulmonary arteries to suggest an embolus. The thoracic aorta is normal in caliber with no evidence of dissection. The heart is normal in size. There are small bilateral pleural effusions. There is no pericardial effusion. Enlarged lymph nodes are noted within the mediastinum, for example there is a 1.8 cm prevascular lymph node.. Lung windows reveal interlobular septal thickening with patchy bilateral opacities. The visualized upper abdomen is unremarkable. Bone windows reveal no acute osseous abnormalities.      Impression: 1. No evidence of pulmonary embolus or dissection. 2. Findings consistent with pulmonary edema with small bilateral pleural effusions. There is more confluent airspace disease bilaterally which may also reflect pulmonary edema, however a superimposed pneumonia is not  excluded. 3. Nonspecific mediastinal adenopathy.      449.88 mGy.cm     This study was performed with techniques to keep radiation doses as low as reasonably achievable (ALARA). Individualized dose reduction techniques using automated exposure control or adjustment of mA and/or kV according to the patient size were employed.  This report was finalized on 10/19/2018 7:53 AM by Mukesh Hong M.D..      ASSESSMENT: The patient is a very pleasant 33 y.o. female  with anaplastic large T-cell lymphoma, ALK positive    PROBLEM LIST:  1. Anaplastic large T-cell lymphoma, ALK positive:  A. Incidentally found LAD on MRI done for low back and hip pain done 9/5/2017.   B. Follow up CAT scan confirmed pelvic adenopathy extending from the distal aortic region through the left iliac region.   C. Status post FNA to left iliac lymph node done 9/29/2017. Pathology revealed benign lymphoid cell groups.   D. Repeat CT done 3/15/2018 revealed increased size of left iliac adenopathy with mild splenomegaly.   E. CT-guided biopsy done on April 19, 2018 showed anaplastic large T-cell lymphoma ALK+  F. whole body PET scan done on May 1, 2018 revealed disease above and below the diaphragm with hypermetabolic active lymph nodes in the supraclavicular area as well as retroperitoneum.  G. Started chemotherapy using CHOP May 2, 2018, status post 6 cycles, completed August 22, 2018.   2. Mild splenomegaly   A. Incidentally found 9/5/2017.  B. Progressive size on repeat imaging done 3/15/2018.  3. Left hip and low back pain  A. Under care of pain management with use of Norco.   4. History of anoxic brain injury following cardiac arrest post-partum.   5. Postpartum cardiomyopathy.   6. Anxiety and depression  7.  Constipation  8.  Chemotherapy used nausea.  9.  Hypertension  10.  Systolic cardiomyopathy:  8.  Ejection fraction dropped from 60% on May 2 2:30 percent on October 19, 2018.    PLAN:  1.   unfortunately the patient the negative scans  and secondary to be admitted to the hospital.  I told her CT chest did not show any significant lymphadenopathy.  I will consider to have CAT scan abdomen and pelvis prior to return.  2.  The patient will follow-up with me in one week to go over the scan results are  3.  The patient will continue to follow-up with Dr. Curtis from cardiology.  Her cardiomyopathy could be induced by previous chemotherapy including Adriamycin.  4.  We'll continue Port-A-Cath care.  Patient was given a prescription for EMLA cream.  The patient would like to maintain her port for 1 year after completion of treatment.  5. I will continue the patient on Colace daily as well as lactulose as needed for constipation.  6. I will continue Zoloft for depression.    Carlo Rice MD  10/24/2018

## 2018-10-25 ENCOUNTER — READMISSION MANAGEMENT (OUTPATIENT)
Dept: CALL CENTER | Facility: HOSPITAL | Age: 33
End: 2018-10-25

## 2018-10-25 NOTE — OUTREACH NOTE
COPD/PN Week 1 Survey      Responses   Facility patient discharged from?  [facility]   Does the patient have one of the following disease processes/diagnoses(primary or secondary)?  COPD/Pneumonia   Is there a successful TCM telephone encounter documented?  No   Week 1 attempt successful?  Yes   Call start time  [id number]   Discharge diagnosis  Acute exacerbation of CHF due to cardiomyopathy,    Acute respiratory failure with hypoxia due to acute pulmonary edema,      Left Heart Cath   Meds reviewed with patient/caregiver?  Yes   Is the patient having any side effects they believe may be caused by any medication additions or changes?  No   Does the patient have all medications ordered at discharge?  Yes   Is the patient taking all medications as directed (includes completed medication regime)?  Yes   Does the patient have a primary care provider?   Yes   Does the patient have an appointment with their PCP or pulmonologist within 7 days of discharge?  Yes   Has the patient kept scheduled appointments due by today?  N/A   Psychosocial issues?  No   Did the patient receive a copy of their discharge instructions?  Yes   Nursing interventions  Reviewed instructions with patient   What is the patient's perception of their health status since discharge?  Improving   Nursing Interventions  Nurse provided patient education   Are the patient's immunizations up to date?   Yes   Additional teach back comments  Pt says she is feeling better   Week 1 call completed?  Yes          Angela Perea RN

## 2018-10-26 ENCOUNTER — TRANSCRIBE ORDERS (OUTPATIENT)
Dept: CARDIAC REHAB | Facility: HOSPITAL | Age: 33
End: 2018-10-26

## 2018-10-26 DIAGNOSIS — I21.4 NSTEMI (NON-ST ELEVATED MYOCARDIAL INFARCTION) (HCC): Primary | ICD-10-CM

## 2018-10-26 DIAGNOSIS — I50.22 CHRONIC SYSTOLIC HF (HEART FAILURE) (HCC): ICD-10-CM

## 2018-10-31 ENCOUNTER — OFFICE VISIT (OUTPATIENT)
Dept: ONCOLOGY | Facility: CLINIC | Age: 33
End: 2018-10-31

## 2018-10-31 VITALS
HEART RATE: 89 BPM | HEIGHT: 67 IN | WEIGHT: 243 LBS | TEMPERATURE: 97.2 F | BODY MASS INDEX: 38.14 KG/M2 | RESPIRATION RATE: 16 BRPM | DIASTOLIC BLOOD PRESSURE: 58 MMHG | SYSTOLIC BLOOD PRESSURE: 127 MMHG

## 2018-10-31 DIAGNOSIS — C84.68 ANAPLASTIC ALK-POSITIVE LARGE CELL LYMPHOMA OF LYMPH NODES OF MULTIPLE REGIONS (HCC): Primary | ICD-10-CM

## 2018-10-31 PROCEDURE — 99214 OFFICE O/P EST MOD 30 MIN: CPT | Performed by: NURSE PRACTITIONER

## 2018-10-31 NOTE — PROGRESS NOTES
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     HISTORY OF PRESENT ILLNESS: The patient is a very pleasant 33 y.o. female with past medical history significant for anaplastic large T-cell lymphoma, ALK positive diagnosed in April 19, 2018 after CT-guided core biopsy of left iliac mass.  Whole body PET scan revealed disease above and below the diaphragm.  Bone marrow biopsy done on May 1, 2018, that failed to show any evidence of bone marrow involvement.  The patient was started on chemotherapy using CHOP May 2, 2018.  She completed 6 cycles of August 22, 2018.  The patient is here today for scheduled follow-up visit.     SUBJECTIVE: The patient is here today by herself. She has been doing well since her last visit. She has no complaints.  Her energy has improved.  Her appetite is good.  Denies SOA, cough, and chest pain.  She continues to follow with Dr. Curtis.         PAST MEDICAL HISTORY/SOCIAL HISTORY/FAMILY HISTORY: Reviewed by me and unchanged from my documentation done on 08/22/18.    Review of Systems   Constitutional: Positive for fatigue. Negative for activity change, appetite change, chills, fever and unexpected weight change.   HENT: Negative for ear pain, hearing loss, mouth sores, nosebleeds, sore throat and trouble swallowing.    Eyes: Negative for visual disturbance.   Respiratory: Negative for cough, chest tightness, shortness of breath and wheezing.    Cardiovascular: Negative for chest pain, palpitations and leg swelling.   Gastrointestinal: Positive for nausea. Negative for abdominal distention, abdominal pain, blood in stool, constipation, diarrhea, rectal pain and vomiting.   Endocrine: Negative for cold intolerance and heat intolerance.   Genitourinary: Negative for difficulty urinating, dysuria, frequency and urgency.   Musculoskeletal: Negative for arthralgias, back pain, gait problem, joint swelling and myalgias.    Skin: Negative for rash.   Neurological: Positive for headaches. Negative for dizziness, tremors, syncope, weakness, light-headedness and numbness.   Hematological: Negative for adenopathy. Does not bruise/bleed easily.   Psychiatric/Behavioral: Negative for confusion, sleep disturbance and suicidal ideas. The patient is nervous/anxious.          Current Outpatient Prescriptions:   •  aspirin 81 MG EC tablet, Take 1 tablet by mouth Daily., Disp: 30 tablet, Rfl: 0  •  atorvastatin (LIPITOR) 20 MG tablet, Take 1 tablet by mouth Every Night., Disp: 30 tablet, Rfl: 0  •  benzonatate (TESSALON) 200 MG capsule, Take 200 mg by mouth 3 (Three) Times a Day As Needed for Cough., Disp: , Rfl:   •  docusate sodium (COLACE) 100 MG capsule, Take 1 capsule by mouth Daily., Disp: 30 capsule, Rfl: 3  •  fluticasone (FLONASE) 50 MCG/ACT nasal spray, As Needed., Disp: , Rfl:   •  lactulose (CHRONULAC) 10 GM/15ML solution, Take 30 mL by mouth 3 (Three) Times a Day. PRN constipation, Disp: 240 mL, Rfl: 2  •  lidocaine-prilocaine (EMLA) 2.5-2.5 % cream, Apply  topically As Needed (45-60 minutes prior to port access.  Cover with saran/plastic wrap.)., Disp: 30 g, Rfl: 3  •  metoprolol succinate XL (TOPROL-XL) 50 MG 24 hr tablet, Take 1 tablet by mouth Daily., Disp: 30 tablet, Rfl: 0  •  montelukast (SINGULAIR) 10 MG tablet, , Disp: , Rfl:   •  ondansetron ODT (ZOFRAN-ODT) 4 MG disintegrating tablet, Take 1 tablet by mouth Every 12 (Twelve) Hours As Needed for Nausea or Vomiting., Disp: 10 tablet, Rfl: 0  •  OxyCODONE ER (XTAMPZA ER) 13.5 MG capsule extended-release 12 hour , Take 1 capsule by mouth 2 (Two) Times a Day., Disp: , Rfl:   •  oxyCODONE-acetaminophen (PERCOCET) 7.5-325 MG per tablet, Take 1 tablet by mouth 2 (Two) Times a Day As Needed for Severe Pain ., Disp: , Rfl:   •  sacubitril-valsartan (ENTRESTO) 24-26 MG tablet, Take 1 tablet by mouth Every 12 (Twelve) Hours., Disp: 60 tablet, Rfl: 0  •  sertraline (ZOLOFT) 100 MG  "tablet, Take 100 mg by mouth Daily., Disp: , Rfl:   •  sertraline (ZOLOFT) 50 MG tablet, Take 50 mg by mouth Daily. Total 150 MG daily dose, Disp: , Rfl:   •  spironolactone (ALDACTONE) 25 MG tablet, Take 1 tablet by mouth Daily., Disp: 30 tablet, Rfl: 0  •  traZODone (DESYREL) 50 MG tablet, 50 mg At Night As Needed., Disp: , Rfl:   •  vitamin D (ERGOCALCIFEROL) 97323 units capsule capsule, Take 50,000 Units by mouth 1 (One) Time Per Week., Disp: , Rfl:     PHYSICAL EXAMINATION:   /58   Pulse 89   Temp 97.2 °F (36.2 °C) (Temporal Artery )   Resp 16   Ht 170.2 cm (67.01\")   Wt 110 kg (243 lb)   BMI 38.05 kg/m²    ECOG Performance Status: 1 - Symptomatic but completely ambulatory  General Appearance:  alert, cooperative, no apparent distress and appears stated age   Neurologic/Psychiatric: A&O x 3, gait steady, appropriate affect, strength 5/5 in all muscle groups   HEENT:  Normocephalic, without obvious abnormality, mucous membranes moist   Neck: Supple, symmetrical, trachea midline, no adenopathy;  No thyromegaly, masses, or tenderness   Lungs:   Clear to auscultation bilaterally; respirations regular, even, and unlabored bilaterally   Heart:  Regular rate and rhythm, no murmurs appreciated   Abdomen:   Soft, non-tender, non-distended and no organomegaly   Lymph nodes: No cervical, supraclavicular, inguinal or axillary adenopathy noted   Extremities: Normal, atraumatic; no clubbing, cyanosis, or edema    Skin: No rashes, ulcers, or suspicious lesions noted         Glucose 74 - 98 mg/dL 123   133   127   122      BUN 7 - 20 mg/dL 10  18  11  6      Creatinine 0.60 - 1.30 mg/dL 0.60  0.50   0.50   0.50      Sodium 137 - 145 mmol/L 142  141  143  142     Potassium 3.5 - 5.1 mmol/L 3.6  3.4   3.3   3.6     Chloride 98 - 107 mmol/L 106  104  106  105     CO2 26.0 - 30.0 mmol/L 25.0   28.0  27.0  26.0     Calcium 8.4 - 10.2 mg/dL 8.9  9.1  8.9  8.4     Total Protein 6.3 - 8.2 g/dL 7.3  7.8  8.0  8.6      " Albumin 3.50 - 5.00 g/dL 4.10  4.40  3.90  3.80     ALT (SGPT) 13 - 69 U/L 25  26  29  35     AST (SGOT) 15 - 46 U/L 26  25  24  24     Alkaline Phosphatase 38 - 126 U/L 51  54  59  70     Total Bilirubin 0.2 - 1.3 mg/dL 0.5  0.3  0.3  0.4     eGFR Non African Amer >60 mL/min/1.73 115  143  143  143     Globulin gm/dL 3.2  3.4  4.1  4.8     A/G Ratio 1.0 - 2.0 g/dL 1.3  1.3  1.0  0.8      BUN/Creatinine Ratio 7.1 - 23.5 16.7  36.0   22.0  12.0     Anion Gap 10.0 - 20.0 mmol/L 14.6  12.4  13.3  14.6    Resulting Agency   [facility]     CBC Auto Differential   Order: [id number] - Part of Panel Order [id number]   Status:  Final result   Visible to patient:  Yes (MyChart) Dx:  Anaplastic ALK-positive large cell ly...     Ref Range & Units 3wk ago  (7/11/18) 1mo ago  (6/13/18) 2mo ago  (5/24/18) 2mo ago  (5/4/18)    WBC 4.80 - 10.80 10*3/mm3 5.19  6.39  6.51  3.46      RBC 4.20 - 5.40 10*6/mm3 3.61   3.70   3.76   3.62      Hemoglobin 12.0 - 16.0 g/dL 9.8   10.0   9.9   9.5      Hematocrit 37.0 - 47.0 % 30.5   30.8   31.0   29.9      MCV 81.0 - 99.0 fL 84.5  83.2  82.4  82.6     MCH 27.0 - 31.0 pg 27.1  27.0  26.3   26.2      MCHC 30.0 - 37.0 g/dL 32.1  32.5  31.9  31.8     RDW 11.5 - 14.5 % 19.3   19.7   17.1   16.0      RDW-SD 37.0 - 54.0 fl 59.7   58.8   49.5  48.4     MPV 6.0 - 12.0 fL 8.2  8.1  8.0  8.5     Platelets 130 - 400 10*3/mm3 257  348  358  218     Neutrophil % 37.0 - 80.0 % 55.4  67.9  63.7  49.1     Lymphocyte % 10.0 - 50.0 % 28.3  20.5  25.7  33.2     Monocyte % 0.0 - 12.0 % 10.4  9.2  6.9  13.9      Eosinophil % 0.0 - 7.0 % 3.5  0.5  1.1  2.0     Basophil % 0.0 - 2.5 % 1.2  0.8  1.1  1.2     Immature Grans % 0.0 - 0.6 % 1.2   1.1   1.5   0.6     Neutrophils, Absolute 2.00 - 6.90 10*3/mm3 2.88  4.34  4.15  1.70      Lymphocytes, Absolute 0.60 - 3.40 10*3/mm3 1.47  1.31  1.67  1.15     Monocytes, Absolute 0.00 - 0.90 10*3/mm3 0.54  0.59  0.45  0.48     Eosinophils, Absolute  0.00 - 0.70 10*3/mm3 0.18  0.03  0.07  0.07     Basophils, Absolute 0.00 - 0.20 10*3/mm3 0.06  0.05  0.07  0.04     Immature Grans, Absolute 0.00 - 0.06 10*3/mm3 0.06  0.07   0.10   0.02     nRBC 0.0 - 0.0 /100 WBC 0.0  0.0  0.0  0.0    Resulting Agency  [facility]      Specimen Collected: 07/11/18 10:49 Last Resulted: 07/11/18 10:56                         Ct Abdomen Pelvis With Contrast    Result Date: 10/25/2018  Narrative: CT ABDOMEN PELVIS WITH CONTRAST-  TECHNIQUE: IV contrast enhanced exam  HISTORY: f/u scan; C84.68-Anaplastic large cell lymphoma, ALK-positive, lymph nodes of multiple sites  COMPARISON: 03/15/2018.  FINDINGS:  ABDOMEN: Liver is unremarkable. Spleen is borderline enlarged but decreased in size from prior exam. Remaining solid organs are normal.  There is no ascites. No enlarged lymph nodes are seen within the mesentery or retroperitoneum. Previously noted left periaortic nodes are normal in size by imaging criteria measuring up to 0.6 cm, previously 1.9 cm.  PELVIS: There has been near complete resolution of iliac adenopathy. Minimal soft tissue somewhat irregular and difficult to measure along left common iliac vasculature is seen, compatible with fibrotic tissue from treated disease. This measures less than 1 cm. A treated left external iliac node measures 1.2 x 0.5 cm, previously 4.0 x 2.2 cm. Small left inguinal nodes are seen, decreased in size.  There is mild stranding in the superficial portion of the left labial fatty tissues probably inflammatory process.  Uterus and ovaries are unremarkable.      Impression: Resolved adenopathy as above compatible with treatment response. Splenomegaly resolved. No new findings.   This study was performed with techniques to keep radiation doses as low as reasonably achievable (ALARA). Individualized dose reduction techniques using automated exposure control or adjustment of vA and/or kV according to the patient size  were employed.   This report was finalized on 10/25/2018 9:58 AM by Kanu Washington MD.    Ct Chest Pulmonary Embolism With Contrast    Result Date: 10/19/2018  Narrative: PROCEDURE: CT CHEST PULMONARY EMBOLISM W CONTRAST-  HISTORY: chest/back pain, sob, hypoxia, on chemo for lymphoma  COMPARISON: None .  TECHNIQUE: Multiple axial CT images were obtained from the thoracic inlet through the upper abdomen following the administration of Isovue 300 per the CT PE protocol. Coronal and oblique 3D MIP images were reconstructed from the original axial data set.  FINDINGS: There are no filling defects within the pulmonary arteries to suggest an embolus. The thoracic aorta is normal in caliber with no evidence of dissection. The heart is normal in size. There are small bilateral pleural effusions. There is no pericardial effusion. Enlarged lymph nodes are noted within the mediastinum, for example there is a 1.8 cm prevascular lymph node.. Lung windows reveal interlobular septal thickening with patchy bilateral opacities. The visualized upper abdomen is unremarkable. Bone windows reveal no acute osseous abnormalities.      Impression: 1. No evidence of pulmonary embolus or dissection. 2. Findings consistent with pulmonary edema with small bilateral pleural effusions. There is more confluent airspace disease bilaterally which may also reflect pulmonary edema, however a superimposed pneumonia is not excluded. 3. Nonspecific mediastinal adenopathy.      449.88 mGy.cm     This study was performed with techniques to keep radiation doses as low as reasonably achievable (ALARA). Individualized dose reduction techniques using automated exposure control or adjustment of mA and/or kV according to the patient size were employed.  This report was finalized on 10/19/2018 7:53 AM by Mukesh Hong M.D..      ASSESSMENT: The patient is a very pleasant 33 y.o. female  with anaplastic large T-cell lymphoma, ALK positive    PROBLEM LIST:  1.  Anaplastic large T-cell lymphoma, ALK positive:  A. Incidentally found LAD on MRI done for low back and hip pain done 9/5/2017.   B. Follow up CAT scan confirmed pelvic adenopathy extending from the distal aortic region through the left iliac region.   C. Status post FNA to left iliac lymph node done 9/29/2017. Pathology revealed benign lymphoid cell groups.   D. Repeat CT done 3/15/2018 revealed increased size of left iliac adenopathy with mild splenomegaly.   E. CT-guided biopsy done on April 19, 2018 showed anaplastic large T-cell lymphoma ALK+  F. whole body PET scan done on May 1, 2018 revealed disease above and below the diaphragm with hypermetabolic active lymph nodes in the supraclavicular area as well as retroperitoneum.  G. Started chemotherapy using CHOP May 2, 2018, status post 6 cycles, completed August 22, 2018.   2. Mild splenomegaly   A. Incidentally found 9/5/2017.  B. Progressive size on repeat imaging done 3/15/2018.  3. Left hip and low back pain  A. Under care of pain management with use of Norco.   4. History of anoxic brain injury following cardiac arrest post-partum.   5. Postpartum cardiomyopathy.   6. Anxiety and depression  7.  Constipation  8.  Chemotherapy used nausea.  9.  Hypertension  10.  Systolic cardiomyopathy:  8.  Ejection fraction dropped from 60% on May 2 2:30 percent on October 19, 2018.    PLAN:  1.   We discussed Ct scan of Abdomen and pelvis showed Resolved adenopathy compatible with treatment response. Splenomegaly resolved. No new findings. We will plan for CT scan in 3 months.   2.  The patient will follow-up with me in three months with a CT of chest, abdomen, and pelvis.   3.  The patient will continue to follow-up with Dr. Curtis from cardiology.  Her cardiomyopathy could be induced by previous chemotherapy including Adriamycin.  4.  We'll continue Port-A-Cath care.  We will continue EMLA cream. The patient would like to maintain her port for 1 year after completion of  treatment.  5. I will continue the patient on Colace daily as well as lactulose as needed for constipation.  6. I will continue Zoloft for depression.    Laurel Preston APRN  10/31/2018

## 2018-11-01 ENCOUNTER — READMISSION MANAGEMENT (OUTPATIENT)
Dept: CALL CENTER | Facility: HOSPITAL | Age: 33
End: 2018-11-01

## 2018-11-01 NOTE — OUTREACH NOTE
COPD/PN Week 2 Survey      Responses   Facility patient discharged from?  [facility]   Does the patient have one of the following disease processes/diagnoses(primary or secondary)?  COPD/Pneumonia   Was the primary reason for admission:  COPD exacerbation   Week 2 attempt successful?  Yes   Call start time  1151   Call end time  1157   Discharge diagnosis  Acute exacerbation of CHF due to cardiomyopathy,    Acute respiratory failure with hypoxia due to acute pulmonary edema,      Left Heart Cath   Meds reviewed with patient/caregiver?  Yes   Is the patient having any side effects they believe may be caused by any medication additions or changes?  No   Does the patient have all medications ordered at discharge?  Yes   Is the patient taking all medications as directed (includes completed medication regime)?  Yes   Comments regarding appointments  saw oncologist on 10/31/18   Does the patient have a primary care provider?   Yes   Does the patient have an appointment with their PCP or pulmonologist within 7 days of discharge?  Yes   Has the patient kept scheduled appointments due by today?  Yes   Comments  has several appts in month of Nov, all services   Psychosocial issues?  No   Comments  pt stated feeling so much better, saw oncologist and got report that CA is not back,   Did the patient receive a copy of their discharge instructions?  Yes   Nursing interventions  Reviewed instructions with patient   What is the patient's perception of their health status since discharge?  Improving   Nursing Interventions  Nurse provided patient education   Are the patient's immunizations up to date?   Yes   Is the patient/caregiver able to teach back the hierarchy of who to call/visit for symptoms/problems? PCP, Specialist, Home health nurse, Urgent Care, ED, 911  Yes   Is the patient able to teach back COPD zones?  Yes   Nursing interventions  Education provided on various zones   Patient reports what zone on this call?  Green  Zone   Green Zone  Reports doing well, Breathing without shortness of breath, Usual activity and exercise level, Usual amount of phlegm/mucus without difficulty coughing up, Sleeping well, Appetite is good   Green Zone interventions:  Take daily medications, Avoid indoor/outdoor triggers, Continue regular exercise/diet plan   Week 2 call completed?  Yes   Revoked  No further contact(revokes)-requires comment   Graduated/Revoked comments  pt free of respiratory issues, meeting goals, no further contact required          Radha Colorado RN

## 2018-11-13 ENCOUNTER — TREATMENT (OUTPATIENT)
Dept: CARDIAC REHAB | Facility: HOSPITAL | Age: 33
End: 2018-11-13

## 2018-11-13 DIAGNOSIS — I21.4 ACUTE NON-ST-SEGMENT ELEVATION MYOCARDIAL INFARCTION (HCC): Primary | ICD-10-CM

## 2018-11-13 PROCEDURE — 93797 PHYS/QHP OP CAR RHAB WO ECG: CPT

## 2018-11-13 PROCEDURE — 93798 PHYS/QHP OP CAR RHAB W/ECG: CPT

## 2018-11-13 NOTE — PROGRESS NOTES
Pt was seen today in [facility] for a Phase 2 visit.  Vital signs and session notes recorded in 60mo and will be scanned into Epic by HIM.

## 2019-01-28 ENCOUNTER — APPOINTMENT (OUTPATIENT)
Dept: CT IMAGING | Facility: HOSPITAL | Age: 34
End: 2019-01-28

## 2019-01-31 ENCOUNTER — APPOINTMENT (OUTPATIENT)
Dept: GENERAL RADIOLOGY | Facility: HOSPITAL | Age: 34
End: 2019-01-31

## 2019-01-31 ENCOUNTER — HOSPITAL ENCOUNTER (INPATIENT)
Facility: HOSPITAL | Age: 34
LOS: 4 days | Discharge: HOME | End: 2019-02-04
Attending: EMERGENCY MEDICINE | Admitting: SURGERY

## 2019-01-31 DIAGNOSIS — R79.1 ELEVATED INR: ICD-10-CM

## 2019-01-31 DIAGNOSIS — K92.2 GASTROINTESTINAL HEMORRHAGE, UNSPECIFIED GASTROINTESTINAL HEMORRHAGE TYPE: Primary | ICD-10-CM

## 2019-01-31 DIAGNOSIS — K62.5 GASTROINTESTINAL HEMORRHAGE ASSOCIATED WITH ANORECTAL SOURCE: ICD-10-CM

## 2019-01-31 DIAGNOSIS — D62 ACUTE BLOOD LOSS ANEMIA: ICD-10-CM

## 2019-01-31 LAB
ABO GROUP BLD: NORMAL
ABO GROUP BLD: NORMAL
ALBUMIN SERPL-MCNC: 4.1 G/DL (ref 3.5–5)
ALBUMIN/GLOB SERPL: 1.5 G/DL (ref 1–2)
ALP SERPL-CCNC: 58 U/L (ref 38–126)
ALT SERPL W P-5'-P-CCNC: 21 U/L (ref 13–69)
ANION GAP SERPL CALCULATED.3IONS-SCNC: 15 MMOL/L (ref 10–20)
ANISOCYTOSIS BLD QL: ABNORMAL
AST SERPL-CCNC: 14 U/L (ref 15–46)
B-HCG UR QL: NEGATIVE
BASOPHILS # BLD AUTO: 0.01 10*3/MM3 (ref 0–0.2)
BASOPHILS # BLD MANUAL: 0.07 10*3/MM3 (ref 0–0.2)
BASOPHILS NFR BLD AUTO: 0.1 % (ref 0–2.5)
BASOPHILS NFR BLD AUTO: 1 % (ref 0–2.5)
BILIRUB SERPL-MCNC: 0.4 MG/DL (ref 0.2–1.3)
BLD GP AB SCN SERPL QL: NEGATIVE
BUN BLD-MCNC: 21 MG/DL (ref 7–20)
BUN/CREAT SERPL: 26.3 (ref 7.1–23.5)
CALCIUM SPEC-SCNC: 8.5 MG/DL (ref 8.4–10.2)
CHLORIDE SERPL-SCNC: 104 MMOL/L (ref 98–107)
CO2 SERPL-SCNC: 21 MMOL/L (ref 26–30)
CREAT BLD-MCNC: 0.8 MG/DL (ref 0.6–1.3)
DACRYOCYTES BLD QL SMEAR: ABNORMAL
DEPRECATED RDW RBC AUTO: 51.3 FL (ref 37–54)
EOSINOPHIL # BLD AUTO: 0.04 10*3/MM3 (ref 0–0.7)
EOSINOPHIL NFR BLD AUTO: 0.6 % (ref 0–7)
ERYTHROCYTE [DISTWIDTH] IN BLOOD BY AUTOMATED COUNT: 21.1 % (ref 11.5–14.5)
FERRITIN SERPL-MCNC: 3.44 NG/ML (ref 6.24–137)
FOLATE SERPL-MCNC: 10.5 NG/ML
GFR SERPL CREATININE-BSD FRML MDRD: 83 ML/MIN/1.73
GLOBULIN UR ELPH-MCNC: 2.8 GM/DL
GLUCOSE BLD-MCNC: 101 MG/DL (ref 74–98)
HCT VFR BLD AUTO: 13.8 % (ref 37–47)
HEMOCCULT STL QL: POSITIVE
HGB BLD-MCNC: 4 G/DL (ref 12–16)
HYPOCHROMIA BLD QL: ABNORMAL
IMM GRANULOCYTES # BLD AUTO: 0.05 10*3/MM3 (ref 0–0.06)
IMM GRANULOCYTES NFR BLD AUTO: 0.7 % (ref 0–0.6)
INR PPP: 1.63 (ref 0.9–1.1)
IRON 24H UR-MRATE: 11 MCG/DL (ref 37–181)
IRON SATN MFR SERPL: 2 % (ref 11–46)
LYMPHOCYTES # BLD AUTO: 1.71 10*3/MM3 (ref 0.6–3.4)
LYMPHOCYTES # BLD MANUAL: 1.34 10*3/MM3 (ref 0.6–3.4)
LYMPHOCYTES NFR BLD AUTO: 25.6 % (ref 10–50)
LYMPHOCYTES NFR BLD MANUAL: 2 % (ref 0–12)
LYMPHOCYTES NFR BLD MANUAL: 20 % (ref 10–50)
MCH RBC QN AUTO: 19.4 PG (ref 27–31)
MCHC RBC AUTO-ENTMCNC: 29 G/DL (ref 30–37)
MCV RBC AUTO: 67 FL (ref 81–99)
MICROCYTES BLD QL: ABNORMAL
MONOCYTES # BLD AUTO: 0.13 10*3/MM3 (ref 0–0.9)
MONOCYTES # BLD AUTO: 0.36 10*3/MM3 (ref 0–0.9)
MONOCYTES NFR BLD AUTO: 5.4 % (ref 0–12)
NEUTROPHILS # BLD AUTO: 4.52 10*3/MM3 (ref 2–6.9)
NEUTROPHILS # BLD AUTO: 5.15 10*3/MM3 (ref 2–6.9)
NEUTROPHILS NFR BLD AUTO: 67.6 % (ref 37–80)
NEUTROPHILS NFR BLD MANUAL: 72 % (ref 37–80)
NEUTS BAND NFR BLD MANUAL: 5 % (ref 0–6)
NRBC BLD AUTO-RTO: 2.1 /100 WBC (ref 0–0)
NRBC SPEC MANUAL: 2 /100 WBC (ref 0–0)
PLATELET # BLD AUTO: 235 10*3/MM3 (ref 130–400)
PMV BLD AUTO: 9.5 FL (ref 6–12)
POIKILOCYTOSIS BLD QL SMEAR: ABNORMAL
POTASSIUM BLD-SCNC: 4 MMOL/L (ref 3.5–5.1)
PROT SERPL-MCNC: 6.9 G/DL (ref 6.3–8.2)
PROTHROMBIN TIME: 18 SECONDS (ref 9.3–12.1)
RBC # BLD AUTO: 2.06 10*6/MM3 (ref 4.2–5.4)
RH BLD: POSITIVE
RH BLD: POSITIVE
SCAN SLIDE: NORMAL
SMALL PLATELETS BLD QL SMEAR: ADEQUATE
SODIUM BLD-SCNC: 136 MMOL/L (ref 137–145)
T&S EXPIRATION DATE: NORMAL
TIBC SERPL-MCNC: 470 MCG/DL (ref 261–497)
TROPONIN I SERPL-MCNC: <0.012 NG/ML (ref 0–0.03)
VIT B12 BLD-MCNC: 255 PG/ML (ref 239–931)
WBC MORPH BLD: NORMAL
WBC NRBC COR # BLD: 6.69 10*3/MM3 (ref 4.8–10.8)

## 2019-01-31 PROCEDURE — 86901 BLOOD TYPING SEROLOGIC RH(D): CPT

## 2019-01-31 PROCEDURE — 85007 BL SMEAR W/DIFF WBC COUNT: CPT | Performed by: EMERGENCY MEDICINE

## 2019-01-31 PROCEDURE — 86901 BLOOD TYPING SEROLOGIC RH(D): CPT | Performed by: EMERGENCY MEDICINE

## 2019-01-31 PROCEDURE — 86900 BLOOD TYPING SEROLOGIC ABO: CPT

## 2019-01-31 PROCEDURE — 82746 ASSAY OF FOLIC ACID SERUM: CPT | Performed by: FAMILY MEDICINE

## 2019-01-31 PROCEDURE — 81025 URINE PREGNANCY TEST: CPT | Performed by: FAMILY MEDICINE

## 2019-01-31 PROCEDURE — 86920 COMPATIBILITY TEST SPIN: CPT

## 2019-01-31 PROCEDURE — 36430 TRANSFUSION BLD/BLD COMPNT: CPT

## 2019-01-31 PROCEDURE — 99284 EMERGENCY DEPT VISIT MOD MDM: CPT

## 2019-01-31 PROCEDURE — 82728 ASSAY OF FERRITIN: CPT | Performed by: FAMILY MEDICINE

## 2019-01-31 PROCEDURE — 83550 IRON BINDING TEST: CPT | Performed by: FAMILY MEDICINE

## 2019-01-31 PROCEDURE — 85610 PROTHROMBIN TIME: CPT | Performed by: EMERGENCY MEDICINE

## 2019-01-31 PROCEDURE — 83540 ASSAY OF IRON: CPT | Performed by: FAMILY MEDICINE

## 2019-01-31 PROCEDURE — 86900 BLOOD TYPING SEROLOGIC ABO: CPT | Performed by: EMERGENCY MEDICINE

## 2019-01-31 PROCEDURE — 85025 COMPLETE CBC W/AUTO DIFF WBC: CPT | Performed by: EMERGENCY MEDICINE

## 2019-01-31 PROCEDURE — 82607 VITAMIN B-12: CPT | Performed by: FAMILY MEDICINE

## 2019-01-31 PROCEDURE — 86850 RBC ANTIBODY SCREEN: CPT | Performed by: EMERGENCY MEDICINE

## 2019-01-31 PROCEDURE — 93005 ELECTROCARDIOGRAM TRACING: CPT | Performed by: EMERGENCY MEDICINE

## 2019-01-31 PROCEDURE — 84484 ASSAY OF TROPONIN QUANT: CPT | Performed by: FAMILY MEDICINE

## 2019-01-31 PROCEDURE — 82272 OCCULT BLD FECES 1-3 TESTS: CPT | Performed by: EMERGENCY MEDICINE

## 2019-01-31 PROCEDURE — 80053 COMPREHEN METABOLIC PANEL: CPT | Performed by: EMERGENCY MEDICINE

## 2019-01-31 PROCEDURE — 71045 X-RAY EXAM CHEST 1 VIEW: CPT

## 2019-01-31 PROCEDURE — P9016 RBC LEUKOCYTES REDUCED: HCPCS

## 2019-01-31 PROCEDURE — 99222 1ST HOSP IP/OBS MODERATE 55: CPT | Performed by: FAMILY MEDICINE

## 2019-01-31 RX ORDER — ATORVASTATIN CALCIUM 20 MG/1
20 TABLET, FILM COATED ORAL NIGHTLY
Status: DISCONTINUED | OUTPATIENT
Start: 2019-01-31 | End: 2019-01-31

## 2019-01-31 RX ORDER — ACETAMINOPHEN 325 MG/1
650 TABLET ORAL EVERY 4 HOURS PRN
Status: DISCONTINUED | OUTPATIENT
Start: 2019-01-31 | End: 2019-02-04 | Stop reason: HOSPADM

## 2019-01-31 RX ORDER — LANOLIN ALCOHOL/MO/W.PET/CERES
6 CREAM (GRAM) TOPICAL NIGHTLY PRN
Status: DISCONTINUED | OUTPATIENT
Start: 2019-01-31 | End: 2019-02-04 | Stop reason: HOSPADM

## 2019-01-31 RX ORDER — SODIUM CHLORIDE 0.9 % (FLUSH) 0.9 %
3-10 SYRINGE (ML) INJECTION AS NEEDED
Status: DISCONTINUED | OUTPATIENT
Start: 2019-01-31 | End: 2019-02-04 | Stop reason: HOSPADM

## 2019-01-31 RX ORDER — SODIUM CHLORIDE 0.9 % (FLUSH) 0.9 %
3 SYRINGE (ML) INJECTION EVERY 12 HOURS SCHEDULED
Status: DISCONTINUED | OUTPATIENT
Start: 2019-01-31 | End: 2019-02-04 | Stop reason: HOSPADM

## 2019-01-31 RX ORDER — FUROSEMIDE 10 MG/ML
20 INJECTION INTRAMUSCULAR; INTRAVENOUS ONCE AS NEEDED
Status: COMPLETED | OUTPATIENT
Start: 2019-01-31 | End: 2019-02-01

## 2019-01-31 RX ORDER — SPIRONOLACTONE 25 MG/1
25 TABLET ORAL DAILY
Status: DISCONTINUED | OUTPATIENT
Start: 2019-02-01 | End: 2019-02-04 | Stop reason: HOSPADM

## 2019-01-31 RX ORDER — PANTOPRAZOLE SODIUM 40 MG/10ML
80 INJECTION, POWDER, LYOPHILIZED, FOR SOLUTION INTRAVENOUS ONCE
Status: COMPLETED | OUTPATIENT
Start: 2019-01-31 | End: 2019-01-31

## 2019-01-31 RX ORDER — FLUTICASONE PROPIONATE 50 MCG
2 SPRAY, SUSPENSION (ML) NASAL DAILY
Status: DISCONTINUED | OUTPATIENT
Start: 2019-02-01 | End: 2019-02-04 | Stop reason: HOSPADM

## 2019-01-31 RX ORDER — METOPROLOL SUCCINATE 50 MG/1
50 TABLET, EXTENDED RELEASE ORAL
Status: DISCONTINUED | OUTPATIENT
Start: 2019-02-01 | End: 2019-02-03

## 2019-01-31 RX ORDER — ALBUTEROL SULFATE 2.5 MG/3ML
2.5 SOLUTION RESPIRATORY (INHALATION) EVERY 6 HOURS PRN
Status: DISCONTINUED | OUTPATIENT
Start: 2019-01-31 | End: 2019-02-03

## 2019-01-31 RX ORDER — OXYCODONE AND ACETAMINOPHEN 7.5; 325 MG/1; MG/1
1 TABLET ORAL 2 TIMES DAILY PRN
Status: DISCONTINUED | OUTPATIENT
Start: 2019-01-31 | End: 2019-02-02

## 2019-01-31 RX ORDER — SODIUM CHLORIDE 9 MG/ML
30 INJECTION, SOLUTION INTRAVENOUS CONTINUOUS
Status: DISCONTINUED | OUTPATIENT
Start: 2019-01-31 | End: 2019-02-02

## 2019-01-31 RX ORDER — ONDANSETRON 2 MG/ML
4 INJECTION INTRAMUSCULAR; INTRAVENOUS EVERY 6 HOURS PRN
Status: DISCONTINUED | OUTPATIENT
Start: 2019-01-31 | End: 2019-02-04 | Stop reason: HOSPADM

## 2019-01-31 RX ORDER — LOSARTAN POTASSIUM 25 MG/1
25 TABLET ORAL
Status: DISCONTINUED | OUTPATIENT
Start: 2019-01-31 | End: 2019-02-03

## 2019-01-31 RX ORDER — BISACODYL 10 MG
10 SUPPOSITORY, RECTAL RECTAL DAILY PRN
Status: DISCONTINUED | OUTPATIENT
Start: 2019-01-31 | End: 2019-02-04 | Stop reason: HOSPADM

## 2019-01-31 RX ADMIN — SODIUM CHLORIDE, PRESERVATIVE FREE 3 ML: 5 INJECTION INTRAVENOUS at 21:22

## 2019-01-31 RX ADMIN — LOSARTAN POTASSIUM 25 MG: 25 TABLET, FILM COATED ORAL at 21:23

## 2019-01-31 RX ADMIN — SERTRALINE HYDROCHLORIDE 150 MG: 50 TABLET ORAL at 21:22

## 2019-01-31 RX ADMIN — SODIUM CHLORIDE 8 MG/HR: 9 INJECTION, SOLUTION INTRAVENOUS at 15:09

## 2019-01-31 RX ADMIN — SODIUM CHLORIDE, PRESERVATIVE FREE 3 ML: 5 INJECTION INTRAVENOUS at 21:00

## 2019-01-31 RX ADMIN — SODIUM CHLORIDE, PRESERVATIVE FREE 3 ML: 5 INJECTION INTRAVENOUS at 21:23

## 2019-01-31 RX ADMIN — PANTOPRAZOLE SODIUM 80 MG: 40 INJECTION, POWDER, FOR SOLUTION INTRAVENOUS at 15:07

## 2019-02-01 ENCOUNTER — ANESTHESIA EVENT (OUTPATIENT)
Dept: GASTROENTEROLOGY | Facility: HOSPITAL | Age: 34
End: 2019-02-01

## 2019-02-01 ENCOUNTER — ANESTHESIA (OUTPATIENT)
Dept: GASTROENTEROLOGY | Facility: HOSPITAL | Age: 34
End: 2019-02-01

## 2019-02-01 LAB
ANION GAP SERPL CALCULATED.3IONS-SCNC: 9.3 MMOL/L (ref 10–20)
ANISOCYTOSIS BLD QL: NORMAL
BASOPHILS # BLD AUTO: 0.03 10*3/MM3 (ref 0–0.2)
BASOPHILS NFR BLD AUTO: 0.6 % (ref 0–2.5)
BUN BLD-MCNC: 15 MG/DL (ref 7–20)
BUN/CREAT SERPL: 18.8 (ref 7.1–23.5)
CALCIUM SPEC-SCNC: 8.4 MG/DL (ref 8.4–10.2)
CHLORIDE SERPL-SCNC: 113 MMOL/L (ref 98–107)
CO2 SERPL-SCNC: 20 MMOL/L (ref 26–30)
CREAT BLD-MCNC: 0.8 MG/DL (ref 0.6–1.3)
DEPRECATED RDW RBC AUTO: 62.2 FL (ref 37–54)
EOSINOPHIL # BLD AUTO: 0.04 10*3/MM3 (ref 0–0.7)
EOSINOPHIL NFR BLD AUTO: 0.7 % (ref 0–7)
ERYTHROCYTE [DISTWIDTH] IN BLOOD BY AUTOMATED COUNT: 23.1 % (ref 11.5–14.5)
GFR SERPL CREATININE-BSD FRML MDRD: 83 ML/MIN/1.73
GLUCOSE BLD-MCNC: 99 MG/DL (ref 74–98)
HCT VFR BLD AUTO: 20.7 % (ref 37–47)
HCT VFR BLD AUTO: 22.7 % (ref 37–47)
HCT VFR BLD AUTO: 26.3 % (ref 37–47)
HGB BLD-MCNC: 6.4 G/DL (ref 12–16)
HGB BLD-MCNC: 7.2 G/DL (ref 12–16)
HGB BLD-MCNC: 8.4 G/DL (ref 12–16)
HYPOCHROMIA BLD QL: NORMAL
IMM GRANULOCYTES # BLD AUTO: 0.03 10*3/MM3 (ref 0–0.06)
IMM GRANULOCYTES NFR BLD AUTO: 0.6 % (ref 0–0.6)
LYMPHOCYTES # BLD AUTO: 1.65 10*3/MM3 (ref 0.6–3.4)
LYMPHOCYTES NFR BLD AUTO: 30.6 % (ref 10–50)
MCH RBC QN AUTO: 23.5 PG (ref 27–31)
MCHC RBC AUTO-ENTMCNC: 31.7 G/DL (ref 30–37)
MCV RBC AUTO: 73.9 FL (ref 81–99)
MICROCYTES BLD QL: NORMAL
MONOCYTES # BLD AUTO: 0.26 10*3/MM3 (ref 0–0.9)
MONOCYTES NFR BLD AUTO: 4.8 % (ref 0–12)
NEUTROPHILS # BLD AUTO: 3.38 10*3/MM3 (ref 2–6.9)
NEUTROPHILS NFR BLD AUTO: 62.7 % (ref 37–80)
NRBC BLD AUTO-RTO: 1.3 /100 WBC (ref 0–0)
PLAT MORPH BLD: NORMAL
PLATELET # BLD AUTO: 192 10*3/MM3 (ref 130–400)
PMV BLD AUTO: 10.2 FL (ref 6–12)
POIKILOCYTOSIS BLD QL SMEAR: NORMAL
POTASSIUM BLD-SCNC: 4.3 MMOL/L (ref 3.5–5.1)
RBC # BLD AUTO: 3.07 10*6/MM3 (ref 4.2–5.4)
SODIUM BLD-SCNC: 138 MMOL/L (ref 137–145)
WBC MORPH BLD: NORMAL
WBC NRBC COR # BLD: 5.39 10*3/MM3 (ref 4.8–10.8)

## 2019-02-01 PROCEDURE — 25010000002 FUROSEMIDE PER 20 MG: Performed by: FAMILY MEDICINE

## 2019-02-01 PROCEDURE — 80048 BASIC METABOLIC PNL TOTAL CA: CPT | Performed by: FAMILY MEDICINE

## 2019-02-01 PROCEDURE — 99222 1ST HOSP IP/OBS MODERATE 55: CPT | Performed by: SURGERY

## 2019-02-01 PROCEDURE — 85014 HEMATOCRIT: CPT | Performed by: FAMILY MEDICINE

## 2019-02-01 PROCEDURE — 99232 SBSQ HOSP IP/OBS MODERATE 35: CPT | Performed by: FAMILY MEDICINE

## 2019-02-01 PROCEDURE — 86900 BLOOD TYPING SEROLOGIC ABO: CPT

## 2019-02-01 PROCEDURE — P9016 RBC LEUKOCYTES REDUCED: HCPCS

## 2019-02-01 PROCEDURE — 0DB78ZX EXCISION OF STOMACH, PYLORUS, VIA NATURAL OR ARTIFICIAL OPENING ENDOSCOPIC, DIAGNOSTIC: ICD-10-PCS | Performed by: SURGERY

## 2019-02-01 PROCEDURE — 85018 HEMOGLOBIN: CPT | Performed by: FAMILY MEDICINE

## 2019-02-01 PROCEDURE — 85025 COMPLETE CBC W/AUTO DIFF WBC: CPT | Performed by: FAMILY MEDICINE

## 2019-02-01 PROCEDURE — 94799 UNLISTED PULMONARY SVC/PX: CPT

## 2019-02-01 PROCEDURE — 85007 BL SMEAR W/DIFF WBC COUNT: CPT | Performed by: FAMILY MEDICINE

## 2019-02-01 PROCEDURE — 36430 TRANSFUSION BLD/BLD COMPNT: CPT

## 2019-02-01 PROCEDURE — 25010000002 PROPOFOL 10 MG/ML EMULSION: Performed by: NURSE ANESTHETIST, CERTIFIED REGISTERED

## 2019-02-01 RX ORDER — LIDOCAINE HYDROCHLORIDE 20 MG/ML
INJECTION, SOLUTION INFILTRATION; PERINEURAL AS NEEDED
Status: DISCONTINUED | OUTPATIENT
Start: 2019-02-01 | End: 2019-02-01 | Stop reason: SURG

## 2019-02-01 RX ORDER — PANTOPRAZOLE SODIUM 40 MG/10ML
40 INJECTION, POWDER, LYOPHILIZED, FOR SOLUTION INTRAVENOUS EVERY 12 HOURS SCHEDULED
Status: DISCONTINUED | OUTPATIENT
Start: 2019-02-01 | End: 2019-02-04 | Stop reason: HOSPADM

## 2019-02-01 RX ORDER — SODIUM CHLORIDE 9 MG/ML
INJECTION, SOLUTION INTRAVENOUS CONTINUOUS PRN
Status: DISCONTINUED | OUTPATIENT
Start: 2019-02-01 | End: 2019-02-01 | Stop reason: SURG

## 2019-02-01 RX ORDER — PROPOFOL 10 MG/ML
VIAL (ML) INTRAVENOUS AS NEEDED
Status: DISCONTINUED | OUTPATIENT
Start: 2019-02-01 | End: 2019-02-01 | Stop reason: SURG

## 2019-02-01 RX ADMIN — FUROSEMIDE 20 MG: 10 INJECTION, SOLUTION INTRAMUSCULAR; INTRAVENOUS at 07:50

## 2019-02-01 RX ADMIN — SODIUM CHLORIDE: 9 INJECTION, SOLUTION INTRAVENOUS at 14:46

## 2019-02-01 RX ADMIN — ACETAMINOPHEN 650 MG: 325 TABLET, FILM COATED ORAL at 00:47

## 2019-02-01 RX ADMIN — FLUTICASONE PROPIONATE 2 SPRAY: 50 SPRAY, METERED NASAL at 09:30

## 2019-02-01 RX ADMIN — SODIUM CHLORIDE, PRESERVATIVE FREE 3 ML: 5 INJECTION INTRAVENOUS at 08:19

## 2019-02-01 RX ADMIN — SERTRALINE HYDROCHLORIDE 150 MG: 50 TABLET ORAL at 21:06

## 2019-02-01 RX ADMIN — SODIUM CHLORIDE 8 MG/HR: 9 INJECTION, SOLUTION INTRAVENOUS at 05:43

## 2019-02-01 RX ADMIN — SODIUM CHLORIDE 8 MG/HR: 9 INJECTION, SOLUTION INTRAVENOUS at 00:49

## 2019-02-01 RX ADMIN — SODIUM CHLORIDE 8 MG/HR: 9 INJECTION, SOLUTION INTRAVENOUS at 11:15

## 2019-02-01 RX ADMIN — PANTOPRAZOLE SODIUM 40 MG: 40 INJECTION, POWDER, FOR SOLUTION INTRAVENOUS at 21:06

## 2019-02-01 RX ADMIN — SODIUM CHLORIDE, PRESERVATIVE FREE 3 ML: 5 INJECTION INTRAVENOUS at 21:20

## 2019-02-01 RX ADMIN — LIDOCAINE HYDROCHLORIDE 100 MG: 20 INJECTION, SOLUTION INFILTRATION; PERINEURAL at 14:48

## 2019-02-01 RX ADMIN — SODIUM CHLORIDE, PRESERVATIVE FREE 3 ML: 5 INJECTION INTRAVENOUS at 21:07

## 2019-02-01 RX ADMIN — PROPOFOL 80 MG: 10 INJECTION, EMULSION INTRAVENOUS at 14:59

## 2019-02-01 RX ADMIN — POLYETHYLENE GLYCOL 3350 238 G: 17 POWDER, FOR SOLUTION ORAL at 17:16

## 2019-02-01 RX ADMIN — SODIUM CHLORIDE, PRESERVATIVE FREE 3 ML: 5 INJECTION INTRAVENOUS at 08:18

## 2019-02-01 NOTE — ANESTHESIA POSTPROCEDURE EVALUATION
Patient: Johny Hearn    Procedure Summary     Date:  02/01/19 Room / Location:  [facility] ENDOSCOPY 2 / [facility] ENDOSCOPY    Anesthesia Start:  1446 Anesthesia Stop:  1504    Procedure:  ESOPHAGOGASTRODUODENOSCOPY WITH COLD FORCEP BIOPSY (N/A Esophagus) Diagnosis:       Gastrointestinal hemorrhage, unspecified gastrointestinal hemorrhage type      (Gastrointestinal hemorrhage, unspecified gastrointestinal hemorrhage type [K92.2])    Surgeon:  Venkata Wiley MD Provider:  Johnny Farris CRNA    Anesthesia Type:  MAC ASA Status:  3          Anesthesia Type: MAC  Last vitals  BP   125/81 (02/01/19 1526)   Temp   98 °F (36.7 °C) (02/01/19 1210)   Pulse   79 (02/01/19 1526)   Resp   16 (02/01/19 1210)     SpO2   98 % (02/01/19 1526)     Post Anesthesia Care and Evaluation    Patient location during evaluation: ICU  Patient participation: complete - patient participated  Level of consciousness: awake  Pain score: 1  Pain management: adequate  Airway patency: patent  Anesthetic complications: No anesthetic complications  PONV Status: controlled  Cardiovascular status: acceptable and stable  Respiratory status: acceptable  Hydration status: acceptable

## 2019-02-01 NOTE — PROGRESS NOTES
[facility]    Name:  Johny Hearn   Age:  33 y.o.  Sex:  female  :  1985  MRN:  [id number]   Visit Number:  [id number]  Admission Date:  2019  Date Of Service:  19  Primary Care Physician:  Malia Powers APRN     LOS: 1 day :      Chief Complaint:  Follow up acute gi bleed        Subjective / Interval History: Patient admitted yesterday with hemoglobin 4. She has now improved to 7 and getting 4th unit. Planning for EGD today. Patient seen again today. She denies chest pain, shortness of breath, abdominal pain. She is hungry and agreeable to wait for EGD to eat. BP low normal without sepsis. She has acute hypovolemia that is improving. No tachycardia noted. No acute blood loss identified.       Review of Systems:     General ROS: Patient denies any fevers, chills or loss of consciousness. +generalized weakness with ambulation, but placed on bedrest  Ophthalmic ROS: Denies any diplopia or transient loss of vision.  ENT ROS: Denies sore throat, nasal congestion or ear pain.   Respiratory ROS: Denies cough with resolved shortness of breath.  Cardiovascular ROS: Denies chest pain or palpitations. No history of exertional chest pain.   Gastrointestinal ROS: Denies nausea and vomiting. Denies any abdominal pain. No diarrhea.  Genito-Urinary ROS: Denies dysuria or hematuria.  Musculoskeletal ROS: Denies chronic back pain. No muscle pain. No calf pain.  Neurological ROS: Denies any focal weakness. No loss of consciousness. Denies any numbness. Denies neck pain.   Dermatological ROS: Denies any redness or pruritis. Skin color less pale today    Vital Signs:    Temp:  [97.4 °F (36.3 °C)-98.9 °F (37.2 °C)] 98.1 °F (36.7 °C)  Heart Rate:  [65-91] 73  Resp:  [16-20] 18  BP: ()/(40-69) 91/68    Intake and output:    I/O last 3 completed shifts:  In: 2374 [I.V.:1474; Blood:900]  Out: 1500 [Urine:1500]  I/O this shift:  In: -   Out: 1300  [Urine:1300]    Physical Examination:    General Appearance:    Sitting up in ICU bed. Alert and cooperative, not in any acute distress.   Head:    Atraumatic and normocephalic, without obvious abnormality.   Eyes:            PERRLA, conjunctivae and sclerae normal, no Icterus. No pallor. Extraocular movements are within normal limits.   Throat:   No oral lesions, no thrush, oral mucosa dry   Neck:   Supple, trachea midline, no thyromegaly   Lungs:     Chest shape is normal. Breath sounds heard bilaterally equally.  No crackles or wheezing. No Pleural rub or bronchial breathing.    Heart:    Normal S1 and S2, no murmur, no gallop   Abdomen:     Normal bowel sounds, no masses, no organomegaly. Soft        non-tender, non-distended, no guarding, no rebound                tenderness   Extremities:   Moves all extremities well, no edema, no cyanosis, no             clubbing   Skin:   No bleeding, bruising or rash. Improved pallor   Neurologic:   No tremor, sensation intact, Motor power is normal and equal bilaterally.   Laboratory results:    Lab Results (last 24 hours)     Procedure Component Value Units Date/Time    Scan Slide [[id number]] Collected:  02/01/19 0602    Specimen:  Blood Updated:  02/01/19 0643     Anisocytosis Mod/2+     Hypochromia Slight/1+     Microcytes Slight/1+     Poikilocytes Mod/2+     WBC Morphology Normal     Platelet Morphology Normal    CBC Auto Differential [[id number]]  (Abnormal) Collected:  02/01/19 0602    Specimen:  Blood Updated:  02/01/19 0643     WBC 5.39 10*3/mm3      RBC 3.07 10*6/mm3      Hemoglobin 7.2 g/dL      Hematocrit 22.7 %      MCV 73.9 fL      MCH 23.5 pg      MCHC 31.7 g/dL      RDW 23.1 %      RDW-SD 62.2 fl      MPV 10.2 fL      Platelets 192 10*3/mm3      Neutrophil % 62.7 %      Lymphocyte % 30.6 %      Monocyte % 4.8 %      Eosinophil % 0.7 %      Basophil % 0.6 %      Immature Grans % 0.6 %      Neutrophils, Absolute 3.38 10*3/mm3      Lymphocytes, Absolute 1.65  10*3/mm3      Monocytes, Absolute 0.26 10*3/mm3      Eosinophils, Absolute 0.04 10*3/mm3      Basophils, Absolute 0.03 10*3/mm3      Immature Grans, Absolute 0.03 10*3/mm3      nRBC 1.3 /100 WBC     Basic Metabolic Panel [[id number]]  (Abnormal) Collected:  02/01/19 0602    Specimen:  Blood Updated:  02/01/19 0635     Glucose 99 mg/dL      BUN 15 mg/dL      Creatinine 0.80 mg/dL      Sodium 138 mmol/L      Potassium 4.3 mmol/L      Chloride 113 mmol/L      CO2 20.0 mmol/L      Calcium 8.4 mg/dL      eGFR Non African Amer 83 mL/min/1.73      BUN/Creatinine Ratio 18.8     Anion Gap 9.3 mmol/L     Narrative:       GFR Normal >60  Chronic Kidney Disease <60  Kidney Failure <15    Hemoglobin & Hematocrit, Blood [[id number]]  (Abnormal) Collected:  02/01/19 0034    Specimen:  Blood Updated:  02/01/19 0101     Hemoglobin 6.4 g/dL      Hematocrit 20.7 %     Vitamin B12 [[id number]]  (Normal) Collected:  01/31/19 1345    Specimen:  Blood Updated:  01/31/19 1818     Vitamin B-12 255 pg/mL     Folate [[id number]]  (Normal) Collected:  01/31/19 1345    Specimen:  Blood Updated:  01/31/19 1818     Folate 10.50 ng/mL     Pregnancy, Urine - Urine, Clean Catch [[id number]]  (Normal) Collected:  01/31/19 1741    Specimen:  Urine, Clean Catch Updated:  01/31/19 1802     HCG, Urine QL Negative    Iron Profile [[id number]]  (Abnormal) Collected:  01/31/19 1345    Specimen:  Blood Updated:  01/31/19 1750     Iron 11 mcg/dL      TIBC 470 mcg/dL      Iron Saturation 2 %     Ferritin [[id number]]  (Abnormal) Collected:  01/31/19 1345    Specimen:  Blood Updated:  01/31/19 1750     Ferritin 3.44 ng/mL     Troponin [[id number]]  (Normal) Collected:  01/31/19 1335    Specimen:  Blood Updated:  01/31/19 1720     Troponin I <0.012 ng/mL     Narrative:       Normal Patient Upper Reference Limit (URL) (99th Percentile)=0.03 ng/mL   Non-AMI Illness Reference Limit=0.03-0.11 ng/mL   AMI Confirmation=0.12 ng/mL and above    Occult Blood X 1, Stool - Stool,  Per Rectum [[id number]]  (Abnormal) Collected:  01/31/19 1441    Specimen:  Stool from Per Rectum Updated:  01/31/19 1447     Fecal Occult Blood Positive    CBC & Differential [[id number]] Collected:  01/31/19 1335    Specimen:  Blood Updated:  01/31/19 1425    Narrative:       The following orders were created for panel order CBC & Differential.  Procedure                               Abnormality         Status                     ---------                               -----------         ------                     Manual Differential[[id number]]          Abnormal            Final result               Scan Slide[[id number]]                                       Final result               CBC Auto Differential[[id number]]        Abnormal            Final result                 Please view results for these tests on the individual orders.    Scan Slide [[id number]] Collected:  01/31/19 1335    Specimen:  Blood Updated:  01/31/19 1425     Scan Slide --     Comment: See Manual Differential Results       Manual Differential [[id number]]  (Abnormal) Collected:  01/31/19 1335    Specimen:  Blood Updated:  01/31/19 1425     Neutrophil % 72.0 %      Lymphocyte % 20.0 %      Monocyte % 2.0 %      Basophil % 1.0 %      Bands %  5.0 %      Neutrophils Absolute 5.15 10*3/mm3      Lymphocytes Absolute 1.34 10*3/mm3      Monocytes Absolute 0.13 10*3/mm3      Basophils Absolute 0.07 10*3/mm3      nRBC 2.0 /100 WBC      Anisocytosis Mod/2+     Dacrocytes Slight/1+     Hypochromia Mod/2+     Microcytes Slight/1+     Poikilocytes Slight/1+     WBC Morphology Normal     Platelet Estimate Adequate    Protime-INR [[id number]]  (Abnormal) Collected:  01/31/19 1335    Specimen:  Blood Updated:  01/31/19 1411     Protime 18.0 Seconds      INR 1.63    CBC Auto Differential [[id number]]  (Abnormal) Collected:  01/31/19 1335    Specimen:  Blood Updated:  01/31/19 1411     WBC 6.69 10*3/mm3      RBC 2.06 10*6/mm3      Hemoglobin 4.0 g/dL       Hematocrit 13.8 %      MCV 67.0 fL      MCH 19.4 pg      MCHC 29.0 g/dL      RDW 21.1 %      RDW-SD 51.3 fl      MPV 9.5 fL      Platelets 235 10*3/mm3      Neutrophil % 67.6 %      Lymphocyte % 25.6 %      Monocyte % 5.4 %      Eosinophil % 0.6 %      Basophil % 0.1 %      Immature Grans % 0.7 %      Neutrophils, Absolute 4.52 10*3/mm3      Lymphocytes, Absolute 1.71 10*3/mm3      Monocytes, Absolute 0.36 10*3/mm3      Eosinophils, Absolute 0.04 10*3/mm3      Basophils, Absolute 0.01 10*3/mm3      Immature Grans, Absolute 0.05 10*3/mm3      nRBC 2.1 /100 WBC     Comprehensive Metabolic Panel [[id number]]  (Abnormal) Collected:  01/31/19 1335    Specimen:  Blood Updated:  01/31/19 1410     Glucose 101 mg/dL      BUN 21 mg/dL      Creatinine 0.80 mg/dL      Sodium 136 mmol/L      Potassium 4.0 mmol/L      Chloride 104 mmol/L      CO2 21.0 mmol/L      Calcium 8.5 mg/dL      Total Protein 6.9 g/dL      Albumin 4.10 g/dL      ALT (SGPT) 21 U/L      AST (SGOT) 14 U/L      Alkaline Phosphatase 58 U/L      Total Bilirubin 0.4 mg/dL      eGFR Non African Amer 83 mL/min/1.73      Globulin 2.8 gm/dL      A/G Ratio 1.5 g/dL      BUN/Creatinine Ratio 26.3     Anion Gap 15.0 mmol/L     Narrative:       GFR Normal >60  Chronic Kidney Disease <60  Kidney Failure <15          I have reviewed the patient's laboratory results.    Radiology results:    Imaging Results (last 24 hours)     Procedure Component Value Units Date/Time    XR Chest 1 View [[id number]] Collected:  01/31/19 1925     Updated:  01/31/19 1926    Narrative:       FINAL REPORT    CLINICAL HISTORY:  preop    FINDINGS:  A single view of the chest demonstrates a right subclavian  Mediport catheter appropriately positioned with the tip in the  SVC.  There is no pneumothorax.  There is borderline  cardiomegaly without pulmonary edemaa.  Lungs are clear.  There  is  no pleural disease or significant adenopathy.      Impression:       No acute disease.    Authenticated by  Freddie Montgomery M.D. on 01/31/2019 07:25:07 PM          I have reviewed the patient's radiology reports.    Medication Review:     I have reviewed the patients active and prn medications.     Assessment:  Acute blood loss anemia, Symptomatic Anemia, prior to admission, post 4 unit PRBC  Suspected acute vs chronic upper GI bleeding suspect gastritis vs gastric ulcer  Epigastric abdominal pain   Chronic bright red bleeding per rectum ?hemorrhoids  Hypotension without sepsis, due to acute hypovolemia, related to blood loss  Atypical chest pain with dyspnea, suspect symptomatic anemia  History of cardiac arrest, with chronic cardiomyopathy/chronic systolic CHF EF 30%  History of elevated troponin  Chronic diuretic therapy  Suspected RUFINA  Obesity      Plan:    Continue ICU monitoring. She is getting transfusion of 4th unit PRBC after lasix. Continue to keep iv fluids very low dose if needed. Holding AM BP medications. Continue to monitor serial hemoglobin. She is NPO for EGD today with Dr Wiley. Dr Curtis saw her preop.  Continue protonix drip. Anticipate further monitoring will be needed in the next 1-2days.    SCD. Anticoagulation contraindicated. Monitor labs and adjust medications accordingly on daily basis.    via phone, plus patient agreeable and happy with this plan.      Ailyn Villa,   02/01/19  9:25 AM    ADDENDUM: Patient later had some bright red bleeding around her stool. Dr Wiley called [facility] to notify

## 2019-02-01 NOTE — PROGRESS NOTES
Adult Nutrition  Assessment/PES    Patient Name:  Johny Hearn  YOB: 1985  MRN: [id number]  Admit Date:  1/31/2019    Assessment Date:  2/1/2019    Comments:  Rec #1: Pt. Currently NPO; Advance diet when medically appropriate. Rec #2: Consider MVI with minerals daily. Rec #3: Continue to monitor/replace electrolytes PRN. RD to follow pt. Consult RD PRN.         Reason for Assessment     Row Name 02/01/19 1045          Reason for Assessment    Reason For Assessment  diagnosis/disease state  (Pended)      Diagnosis  cardiac disease;gastrointestinal disease  (Pended)  CHF, GI hemorrhage     Identified At Risk by Screening Criteria  BMI  (Pended)              Labs/Tests/Procedures/Meds     Row Name 02/01/19 1047          Labs/Procedures/Meds    Lab Results Reviewed  reviewed, pertinent  (Pended)      Lab Results Comments  High: Glucose, Chloride  (Pended)         Diagnostic Tests/Procedures    Diagnostic Test/Procedure Reviewed  reviewed  (Pended)         Medications    Pertinent Medications Reviewed  reviewed  (Pended)          Physical Findings     Row Name 02/01/19 1050          Physical Findings    Overall Physical Appearance  obese  (Pended)          Estimated/Assessed Needs     Row Name 02/01/19 1050          Calculation Measurements    Weight Used For Calculations  63.9 kg (140 lb 14 oz)  (Pended)  IBW        Estimated/Assessed Needs    Additional Documentation  Christian-St. Jeor Equation (Group);Fluid Requirements (Group);Protein Requirements (Group);Calorie Requirements (Group)  (Pended)         Calorie Requirements    Estimated Calorie Requirement Comment  6661-4078 kcal  (Pended)  1.2        KCAL/KG    14 Kcal/Kg (kcal)  894.6  (Pended)      15 Kcal/Kg (kcal)  958.5  (Pended)      18 Kcal/Kg (kcal)  1150.2  (Pended)      20 Kcal/Kg (kcal)  1278  (Pended)      25 Kcal/Kg (kcal)  1597.5  (Pended)      30 Kcal/Kg (kcal)  1917  (Pended)      35 Kcal/Kg (kcal)  2236.5  (Pended)      40 Kcal/Kg  (kcal)  2556  (Pended)      45 Kcal/Kg (kcal)  2875.5  (Pended)      50 Kcal/Kg (kcal)  3195  (Pended)         [place]-St. Jeor Equation    RMR ([place]-St. Jeor Equation)  1392.5  (Pended)         Protein Requirements    Est Protein Requirement Amount (gms/kg)  1.2 gm protein  (Pended)  64-77 gm     Estimated Protein Requirements (gms/day)  76.68  (Pended)         Fluid Requirements    Estimated Fluid Requirement Method  Zoran-Segar Formula  (Pended)      [place]-Segar Method (over 20 kg)  2778  (Pended)          Nutrition Prescription Ordered     Row Name 02/01/19 1052          Nutrition Prescription PO    Current PO Diet  NPO  (Pended)          Evaluation of Received Nutrient/Fluid Intake     Row Name 02/01/19 1050          Calculation Measurements    Weight Used For Calculations  63.9 kg (140 lb 14 oz)  (Pended)  IBW        PO Evaluation    Number of Days PO Intake Evaluated  Insufficient Data  (Pended)  NPO         Evaluation of Prescribed Nutrient/Fluid Intake     Row Name 02/01/19 1050          Calculation Measurements    Weight Used For Calculations  63.9 kg (140 lb 14 oz)  (Pended)  IBW             Problem/Interventions:  Problem 1     [facility] Name 02/01/19 1053          Nutrition Diagnoses Problem 1    Problem 1  Inadequate Nutrient Intake  (Pended)      Etiology (related to)  MNT for Treatment/Condition  (Pended)      Signs/Symptoms (evidenced by)  NPO  (Pended)          Problem 2     [facility] Name 02/01/19 1054          Nutrition Diagnoses Problem 2    Problem 2  Overweight/Obesity  (Pended)      Etiology (related to)  Factors Affecting Nutrition  (Pended)      Food Habit/Preferences  Large Meals  (Pended)      Signs/Symptoms (evidenced by)  BMI  (Pended)      BMI  35 - 39.9  (Pended)          Problem 3     Row Name 02/01/19 1055          Nutrition Diagnoses Problem 3    Problem 3  Altered GI Function  (Pended)      Etiology (related to)  Medical Diagnosis  (Pended)      Gastrointestinal  Gastrointestinal  bleed  (Pended)      Signs/Symptoms (evidenced by)  Report/Observation  (Pended)      Reported/Observed By  MD;RN  (Pended)              Intervention Goal     Row Name 02/01/19 105          Intervention Goal    General  Meet nutritional needs for age/condition;Improved nutrition related lab(s)  (Pended)      PO  Establish PO;Tolerate PO;Advance diet;Meet estimated needs  (Pended)      Weight  No significant weight loss  (Pended)          Nutrition Intervention     Row Name 02/01/19 1053          Nutrition Intervention    RD/Tech Action  Await begin PO;Follow Tx progress  (Pended)            Education/Evaluation     Row Name 02/01/19 1057          Education    Education  Will Instruct as appropriate  (Pended)         Monitor/Evaluation    Monitor  PO intake;Per protocol;I&O;Pertinent labs;Weight  (Pended)            Electronically signed by:  Shari Finney  02/01/19 1:34 PM

## 2019-02-01 NOTE — PAYOR COMM NOTE
"TO:[facility]  FROM:PRINCESS VAZQUEZ, RN PHONE [phone]  INPT NOTIFICATION/CLINICALS    Johny Hearn (33 y.o. Female)     Date of Birth Social Security Number Address Home Phone MRN    1985  [id number] [address] [phone] [id number]    [employer] Marital Status          None        Admission Date Admission Type Admitting Provider Attending Provider Department, Room/Bed    19 Emergency Ailyn Villa DO Gandee, Julie G, DO [facility] INTENSIVE CARE, [address]03    Discharge Date Discharge Disposition Discharge Destination                       Attending Provider:  Ailyn Villa DO    Allergies:  Erythromycin, Macrobid [Nitrofurantoin Monohyd Macro], Contrast Dye    Isolation:  None   Infection:  None   Code Status:  CPR    Ht:  172.7 cm (68\")   Wt:  111 kg (245 lb 11.2 oz)    Admission Cmt:  None   Principal Problem:  None                Active Insurance as of 2019     Primary Coverage     Payor Plan Insurance Group Employer/Plan Group    [facility] MEDICAID      Payor Plan Address Payor Plan Phone Number Payor Plan Fax Number Effective Dates    [address] [address] [phone]  2017 - None Entered    [facility] [id number]       Subscriber Name Subscriber Birth Date Member ID       JOHNY HEARN 1985 [id number]                 Emergency Contacts      (Rel.) Home Phone Work Phone Mobile Phone    To Hearn (Spouse) [phone] -- [phone]    Trino Hearn (Relative) [phone] -- [phone]    JeremyCleo (Mother) [phone] -- [phone]               History & Physical      Ailyn Villa DO at 2019  4:33 PM              [facility] HOSPITALIST   HISTORY AND PHYSICAL      Name:  Johny Hearn   Age:  33 y.o.  Sex:  female  :  1985  MRN:  [id number]   Visit Number:  [id number]  Admission Date:  2019  Date Of Service:  19  Primary Care " Physician:  Malia Powers APRN    Chief Complaint: Shortness of breath and fatigue        History Of Presenting Illness:  Patient is a 33-year-old lady who has past medical history of postpartum cardiomyopathy with history of cardiac arrest and ejection fraction of 30% with history of prolonged QT, elevated troponin, history of T-cell lymphoma/nonHodgkins with last treatment radiation and chemo completed 3 months ago.  The patient home history of cardiac arrest unfortunately also experienced acute brain injury with chronic slow mental functioning.  She no longer has menstrual periods after hysteroscopy.  She has chronic constipation and chronic bright red bleeding per rectum thought to be hemorrhoids and has to aggressively help herself physically remove stool from her rectum frequently with history of rectal tear and bleeding.    She complained of progressively worsening shortness of breath, worse with exertion, severe fatigue, worsening over the past month.  Earlier today, a family member stopped by the house and noted her skin color to be severely pale and encouraged her to present to the emergency room for further evaluation and treatment.  The patient complained of chest pain/pressure with the exertional shortness of breath, which she felt similar to her history of CHF.  She has history of left heart catheter with normal coronaries.    Her blood pressure was low at 96/51 but she was afebrile with heart rate 86 satting 100% on room air.  Lab work confirmed occult blood positive stool with a hemoglobin of 4. INR elevated at 1.6 with no documented history of liver disease other than fatty liver.      Review Of Systems:     General ROS: Patient denies any fevers, chills or loss of consciousness. Complains of generalized weakness.  Positive fatigue  Psychological ROS: Denies any hallucinations and delusions.  Ophthalmic ROS: Denies any diplopia or transient loss of vision.  ENT ROS: Denies sore throat, nasal  "congestion or ear pain.   Allergy and Immunology ROS: Denies rash or itching.  Hematological and Lymphatic ROS: Denies neck swelling or easy bleeding.  Endocrine ROS: Denies any recent unintentional weight gain or loss.  Breast ROS: Denies any pain or swelling.  Respiratory ROS: Denies cough but does have exertional shortness of breath.   Cardiovascular ROS: Denies chest pain or palpitations.  Positive exertional chest pain.  Gastrointestinal ROS: Denies nausea and vomiting. Denies any abdominal pain. No diarrhea.  Genito-Urinary ROS: Denies dysuria or hematuria.  Musculoskeletal ROS: Complains of chronic back pain. No muscle pain. No calf pain.   Neurological ROS: Denies any focal weakness. No loss of consciousness. Denies any numbness. Denies neck pain.   Dermatological ROS: Denies any redness or pruritis.       Past Medical History: Reviewed    Past Medical History:   Diagnosis Date   • Anesthesia     pt reports \"screamed for my  and wouldnt calm down when anesthesia gas used\".   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   • Cancer (CMS/HCC)     LYMPHOMA STAGE 3   • Cardiac arrest (CMS/HCC)     dionisio partum cardiomyopathy   • Cardiomyopathy (CMS/HCC)    • CHF (congestive heart failure) (CMS/HCC)    • Dermatitis     chronic on face   • Fatty liver    • Full dentures     DOESNT WEAR   • H/O chronic ear infection    • Headaches due to old head trauma    • Heavy menses    • Hemorrhoids    • Hip pain, left    • Hyperlipidemia    • Hypertension    • Short-term memory loss    • Stuttering in conditions classified elsewhere     IF PT GETS TOO NERVOUS OR EXCITED HAS STUTTERING   • 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       Past Surgical history: Reviewed    Past Surgical History:   Procedure Laterality Date   • CARDIAC CATHETERIZATION N/A 10/22/2018    Procedure: Left Heart Cath;  Surgeon: Zheng Curtis MD;  " Location: [facility] CATH INVASIVE LOCATION;  Service: Cardiology   • CARDIAC CATHETERIZATION N/A 10/22/2018    Procedure: Coronary angiography;  Surgeon: Zheng Curtis MD;  Location: [facility] CATH INVASIVE LOCATION;  Service: Cardiology   • CARDIAC CATHETERIZATION N/A 10/22/2018    Procedure: Left ventriculography;  Surgeon: Zheng Curtis MD;  Location: [facility] CATH INVASIVE LOCATION;  Service: Cardiology   •  SECTION     • ENDOMETRIAL ABLATION     • FEMORAL LYMPH NODE BIOPSY/EXCISON Left     lt [facility]    • PORTACATH PLACEMENT N/A 2018    Procedure: INSERTION OF PORTACATH right subclavian;  Surgeon: Shelley Brock MD;  Location: [facility] OR;  Service: General   • TUBAL ABDOMINAL LIGATION         Social History: Never smoked.  Denies drinking alcohol or using any drugs.  Used to have oxygen but does not have this any longer.  Lives at home with her  and has a visual  assisted cane  Pediatric History   Patient Guardian Status   • Mother:  Cleo Luna     Other Topics Concern   • Not on file   Social History Narrative   • Not on file       Family History:    Family History   Problem Relation Age of Onset   • Hypertension Father    • Diabetes Father    • Diabetes Paternal Grandmother        Allergies:      Erythromycin; Macrobid [nitrofurantoin monohyd macro]; and Contrast dye    Home Medications:    Prior to Admission Medications     Prescriptions Last Dose Informant Patient Reported? Taking?    aspirin 81 MG EC tablet 2019  No Yes    Take 1 tablet by mouth Daily.    atorvastatin (LIPITOR) 20 MG tablet 2019  No Yes    Take 1 tablet by mouth Every Night.    fluticasone (FLONASE) 50 MCG/ACT nasal spray More than a month  Yes No    As Needed.    metoprolol succinate XL (TOPROL-XL) 50 MG 24 hr tablet 2019  No Yes    Take 1 tablet by mouth Daily.    ondansetron ODT (ZOFRAN-ODT) 4 MG disintegrating tablet More than a month  No No    Take 1 tablet by mouth Every 12  (Twelve) Hours As Needed for Nausea or Vomiting.    OxyCODONE ER (XTAMPZA ER) 13.5 MG capsule extended-release 12 hour  Past Month  Yes Yes    Take 1 capsule by mouth 2 (Two) Times a Day.    oxyCODONE-acetaminophen (PERCOCET) 7.5-325 MG per tablet Past Week  Yes Yes    Take 1 tablet by mouth 2 (Two) Times a Day As Needed for Severe Pain .    sacubitril-valsartan (ENTRESTO) 24-26 MG tablet 1/31/2019  No Yes    Take 1 tablet by mouth Every 12 (Twelve) Hours.    sertraline (ZOLOFT) 100 MG tablet 1/30/2019  Yes Yes    Take 100 mg by mouth Every Night.    sertraline (ZOLOFT) 50 MG tablet 1/30/2019  Yes Yes    Take 50 mg by mouth Daily. Total 150 MG daily dose    spironolactone (ALDACTONE) 25 MG tablet 1/31/2019  No Yes    Take 1 tablet by mouth Daily.             ED Medications:    Medications   pantoprazole (PROTONIX) injection 80 mg (80 mg Intravenous Given 1/31/19 8744)     And   pantoprazole (PROTONIX) 40 mg in sodium chloride 0.9 % 100 mL (0.4 mg/mL) infusion (8 mg/hr Intravenous New Bag 1/31/19 0209)   fluticasone (FLONASE) 50 MCG/ACT nasal spray 2 spray (not administered)   atorvastatin (LIPITOR) tablet 20 mg (not administered)   metoprolol succinate XL (TOPROL-XL) 24 hr tablet 50 mg (not administered)   PATIENT SUPPLIED MEDICATION (not administered)   oxyCODONE-acetaminophen (PERCOCET) 7.5-325 MG per tablet 1 tablet (not administered)   sacubitril-valsartan (ENTRESTO) 24-26 MG tablet 1 tablet (not administered)   sertraline (ZOLOFT) tablet 100 mg (not administered)   sertraline (ZOLOFT) tablet 50 mg (not administered)   spironolactone (ALDACTONE) tablet 25 mg (not administered)   sodium chloride 0.9 % flush 3 mL (not administered)   sodium chloride 0.9 % flush 3-10 mL (not administered)   sodium chloride 0.9 % flush 3 mL (not administered)   sodium chloride 0.9 % flush 3-10 mL (not administered)   acetaminophen (TYLENOL) tablet 650 mg (not administered)   bisacodyl (DULCOLAX) suppository 10 mg (not administered)    sodium chloride 0.9 % infusion (not administered)   melatonin tablet 5.25 mg (not administered)   ondansetron (ZOFRAN) injection 4 mg (not administered)   albuterol (PROVENTIL) nebulizer solution 0.083% 2.5 mg/3mL (not administered)       Vital Signs:    Temp:  [98.1 °F (36.7 °C)-98.3 °F (36.8 °C)] 98.3 °F (36.8 °C)  Heart Rate:  [82-86] 82  Resp:  [17-20] 20  BP: ()/(40-57) 105/57    No intake or output data in the 24 hours ending 01/31/19 1633        01/31/19  1305   Weight: 112 kg (247 lb 6.4 oz)       Body mass index is 39.93 kg/m².    Physical Exam:      General Appearance:    Morbidly obese appearing lady. Sitting up in bed. Appears much older than stated age. Short cut brown hair. Alert and cooperative, no acute distress, oriented to person and place   Head:    Atraumatic and normocephalic, without obvious defect.   Eyes:            PERRLA, conjunctivae and sclerae normal, no icterus. No pallor. Extraocular movements are within normal limits.   Ears:    Ears appear intact with no abnormalities noted.   Throat:   No oral lesions, no thrush, oral mucosa dry.Edentulous       Back:    +Kyphosis present. No tenderness to palpation,   range of motion normal.   Lungs:     Chest shape is normal. Breath sounds heard bilaterally equally reduced.  No crackles or wheezing. No Pleural rub or bronchial breathing.      Heart:    Distant heart sounds Normal S1 and S2, no murmur, no gallop, no rub. No JVD   Abdomen:     Normal bowel sounds, no masses, no organomegaly. Soft        non-tender, non-distended, no guarding, no rebound                Tenderness, obese with multiple pannus   Extremities:   Moves all extremities well, trace lower leg edema, no cyanosis   Pulses:   Pulses palpable and equal bilaterally   Skin:   No bleeding, bruising or rash; severe pallor of diffuse skin   Lymph nodes:   No palpable adenopathy   Neurologic:   No tremor. Memory deficit is noted. Follows all commands sensation intact, Motor  power is normal and equal bilaterally.       EKG:    Sinus 77 first degree block with nonspecific st changes   Labs:    Lab Results (last 24 hours)     Procedure Component Value Units Date/Time    Occult Blood X 1, Stool - Stool, Per Rectum [[id number]]  (Abnormal) Collected:  01/31/19 1441    Specimen:  Stool from Per Rectum Updated:  01/31/19 1447     Fecal Occult Blood Positive    CBC & Differential [[id number]] Collected:  01/31/19 1335    Specimen:  Blood Updated:  01/31/19 1425    Narrative:       The following orders were created for panel order CBC & Differential.  Procedure                               Abnormality         Status                     ---------                               -----------         ------                     Manual Differential[[id number]]          Abnormal            Final result               Scan Slide[[id number]]                                       Final result               CBC Auto Differential[[id number]]        Abnormal            Final result                 Please view results for these tests on the individual orders.    Scan Slide [[id number]] Collected:  01/31/19 1335    Specimen:  Blood Updated:  01/31/19 1425     Scan Slide --     Comment: See Manual Differential Results       Manual Differential [[id number]]  (Abnormal) Collected:  01/31/19 1335    Specimen:  Blood Updated:  01/31/19 1425     Neutrophil % 72.0 %      Lymphocyte % 20.0 %      Monocyte % 2.0 %      Basophil % 1.0 %      Bands %  5.0 %      Neutrophils Absolute 5.15 10*3/mm3      Lymphocytes Absolute 1.34 10*3/mm3      Monocytes Absolute 0.13 10*3/mm3      Basophils Absolute 0.07 10*3/mm3      nRBC 2.0 /100 WBC      Anisocytosis Mod/2+     Dacrocytes Slight/1+     Hypochromia Mod/2+     Microcytes Slight/1+     Poikilocytes Slight/1+     WBC Morphology Normal     Platelet Estimate Adequate    Protime-INR [[id number]]  (Abnormal) Collected:  01/31/19 1335    Specimen:  Blood Updated:  01/31/19 1410      Protime 18.0 Seconds      INR 1.63    CBC Auto Differential [[id number]]  (Abnormal) Collected:  01/31/19 1335    Specimen:  Blood Updated:  01/31/19 1411     WBC 6.69 10*3/mm3      RBC 2.06 10*6/mm3      Hemoglobin 4.0 g/dL      Hematocrit 13.8 %      MCV 67.0 fL      MCH 19.4 pg      MCHC 29.0 g/dL      RDW 21.1 %      RDW-SD 51.3 fl      MPV 9.5 fL      Platelets 235 10*3/mm3      Neutrophil % 67.6 %      Lymphocyte % 25.6 %      Monocyte % 5.4 %      Eosinophil % 0.6 %      Basophil % 0.1 %      Immature Grans % 0.7 %      Neutrophils, Absolute 4.52 10*3/mm3      Lymphocytes, Absolute 1.71 10*3/mm3      Monocytes, Absolute 0.36 10*3/mm3      Eosinophils, Absolute 0.04 10*3/mm3      Basophils, Absolute 0.01 10*3/mm3      Immature Grans, Absolute 0.05 10*3/mm3      nRBC 2.1 /100 WBC     Comprehensive Metabolic Panel [[id number]]  (Abnormal) Collected:  01/31/19 1335    Specimen:  Blood Updated:  01/31/19 1410     Glucose 101 mg/dL      BUN 21 mg/dL      Creatinine 0.80 mg/dL      Sodium 136 mmol/L      Potassium 4.0 mmol/L      Chloride 104 mmol/L      CO2 21.0 mmol/L      Calcium 8.5 mg/dL      Total Protein 6.9 g/dL      Albumin 4.10 g/dL      ALT (SGPT) 21 U/L      AST (SGOT) 14 U/L      Alkaline Phosphatase 58 U/L      Total Bilirubin 0.4 mg/dL      eGFR Non African Amer 83 mL/min/1.73      Globulin 2.8 gm/dL      A/G Ratio 1.5 g/dL      BUN/Creatinine Ratio 26.3     Anion Gap 15.0 mmol/L     Narrative:       GFR Normal >60  Chronic Kidney Disease <60  Kidney Failure <15          Radiology:    Imaging Results (last 72 hours)     ** No results found for the last 72 hours. **          Assessment:    Gastrointestinal hemorrhage    Acute blood loss anemia, Symptomatic Anemia, prior to admission  Suspected acute vs chronic upper GI bleeding suspect gastritis vs gastric ulcer  Epigastric abdominal pain   Chronic bright red bleeding per rectum ?hemorrhoids  Hypotension without sepsis, due to acute hypovolemia, related  to blood loss  Atypical chest pain with dyspnea, suspect symptomatic anemia  History of cardiac arrest, with chronic cardiomyopathy/chronic systolic CHF EF 30%  History of elevated troponin  Chronic diuretic therapy  Suspected RUFINA  Obesity     Plan:    She will be admitted to ICU to hospitalist service. Dr Wiley agreed to consult for possible EGD tomorrow and Dr Curtis agreed to consult for preop clearance. Add troponin, iron studies. She has been ordered 2 units PRBC so far. With CHF would check hemoglobin after the 2 units and then if more units given will likely need lasix in between. Monitor telemetry. Hold off on her diuretic today if not already given. Stop her aspirin. Check ultrasound abdomen, with epigastric abdominal pain but suspect upper GI bleed with possible gastritis vs ulcer. Protonix drip given.     SCD. Anticoagulation contraindicated. Monitor labs and adjust medications accordingly. Expect 2-3 days will be needed for treatment.    Get preop Chest xray and follow result, not performed in ER. EKG reviewed by me as stated above. Troponin pending.       Medication risks and benefits were discussed in detail. Patient reported being satisfied with current treatment plan.    Ailyn Villa DO  01/31/19  4:33 PM        Electronically signed by Ailyn Villa DO at 1/31/2019  5:23 PM          Emergency Department Notes      Tom Girard DO at 1/31/2019  1:27 PM          Subjective   33-year-old female with an extensive medical history presents to the ED with a chief complaint of blood per rectum.  The patient states that she has issues with intermittent constipation and takes MiraLAX and other stool softeners.  She states that for greater than 1 month she has had bleeding per rectum.  Patient states that she has progressed to dark black stool.  She complains of some intermittent right lower quadrant abdominal pain.  Patient admits to appearing more pale than her baseline.  She admits to generalized  "fatigue and dyspnea on exertion.  No chest pain.  No nausea vomiting diarrhea or abdominal pain.  No other complaints at this time.            Review of Systems   Constitutional: Positive for fatigue.   Respiratory: Positive for shortness of breath.    Gastrointestinal: Positive for abdominal pain and blood in stool.   All other systems reviewed and are negative.      Past Medical History:   Diagnosis Date   • Anesthesia     pt reports \"screamed for my  and wouldnt calm down when anesthesia gas used\".   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   • Cancer (CMS/HCC)     LYMPHOMA STAGE 3   • Cardiac arrest (CMS/HCC)     dionisio partum cardiomyopathy   • Cardiomyopathy (CMS/HCC)    • CHF (congestive heart failure) (CMS/HCC)    • Dermatitis     chronic on face   • Fatty liver    • Full dentures     DOESNT WEAR   • H/O chronic ear infection    • Headaches due to old head trauma    • Heavy menses    • Hemorrhoids    • Hip pain, left    • Hyperlipidemia    • Hypertension    • Short-term memory loss    • Stuttering in conditions classified elsewhere     IF PT GETS TOO NERVOUS OR EXCITED HAS STUTTERING   • 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   • Macrobid [Nitrofurantoin Monohyd Macro] Other (See Comments)     Pt does not know   • Contrast Dye Itching       Past Surgical History:   Procedure Laterality Date   • CARDIAC CATHETERIZATION N/A 10/22/2018    Procedure: Left Heart Cath;  Surgeon: Zheng Curtis MD;  Location: [facility] CATH INVASIVE LOCATION;  Service: Cardiology   • CARDIAC CATHETERIZATION N/A 10/22/2018    Procedure: Coronary angiography;  Surgeon: Zheng Curtis MD;  Location: [facility] CATH INVASIVE LOCATION;  Service: Cardiology   • CARDIAC CATHETERIZATION N/A 10/22/2018    Procedure: Left ventriculography;  " Surgeon: Zheng Curtis MD;  Location: [facility] CATH INVASIVE LOCATION;  Service: Cardiology   •  SECTION     • ENDOMETRIAL ABLATION     • FEMORAL LYMPH NODE BIOPSY/EXCISON Left     lt [facility]    • PORTACATH PLACEMENT N/A 2018    Procedure: INSERTION OF PORTACATH right subclavian;  Surgeon: Shelley Brock MD;  Location: [facility] OR;  Service: General   • TUBAL ABDOMINAL LIGATION         Family History   Problem Relation Age of Onset   • Hypertension Father    • Diabetes Father    • Diabetes Paternal Grandmother        Social History     Socioeconomic History   • Marital status:      Spouse name: Not on file   • Number of children: Not on file   • Years of education: Not on file   • Highest education level: Not on file   Tobacco Use   • Smoking status: Never Smoker   • Smokeless tobacco: Never Used   Substance and Sexual Activity   • Alcohol use: Yes     Comment: OCCASIONALLY   • Drug use: No   • Sexual activity: Defer           Objective   Physical Exam   Constitutional: She is oriented to person, place, and time. No distress.   Pale     HENT:   Head: Normocephalic and atraumatic.   Nose: Nose normal.   Pale oral mucosa.  Pale lips.   Eyes: Conjunctivae and EOM are normal.   Conjunctival pallor.   Cardiovascular: Normal rate and regular rhythm.   Pulmonary/Chest: Effort normal and breath sounds normal. No respiratory distress.   Abdominal: Soft. She exhibits no distension. There is no tenderness. There is no guarding.   Musculoskeletal: She exhibits no edema or deformity.   Neurological: She is alert and oriented to person, place, and time. No cranial nerve deficit.   Skin: She is not diaphoretic. There is pallor.   Nursing note and vitals reviewed.      Procedures          ED Course  ED Course as of  163   Thu 2019   1448 RBC: (!) 2.06 [CG]   1449 Hemoglobin: (!!) 4.0 [CG]   1529 EKG interpreted by me.  Sinus rhythm.  First-degree AV block.  Rate 77.  Nonspecific  T-wave changes.  QTc 460.  Prolonged QT.  Abnormal EKG.  [CG]      ED Course User Index  [CG] Tom Girard DO      Patient presented to the ED with concern of blood in stool.  Ongoing symptoms for greater than 1 month.  Hemoglobin was 4.0.  Patient was hemodynamically stable but review of most recent records show that 3 months ago her hemoglobin was 12.  Patient was started on Protonix.  Hemoccult was positive.  Discussed the case with Dr. Contreras who plans for EGD tomorrow.  Patient will be transfused 2 units PRBCs.  She will be admitted to the ICU for further evaluation and medical management.            MDM      Final diagnoses:   Gastrointestinal hemorrhage, unspecified gastrointestinal hemorrhage type   Acute blood loss anemia   Elevated INR            Tom Girard DO  01/31/19 1637      Electronically signed by Tom Girard DO at 1/31/2019  4:37 PM     Rachele Akhtar at 1/31/2019  2:49 PM        Dr. Wiley called per Dr. Girard. Call was transferred @ this time.      Rachele Akhtar  01/31/19 1449      Electronically signed by Rachele Akhtar at 1/31/2019  2:49 PM     Ashwini Rivers RN at 1/31/2019  3:00 PM        HS called at this time, will call back with bed assignment.     Ashwini Rivers RN  01/31/19 1501      Electronically signed by Ashwini Rivers, RN at 1/31/2019  3:01 PM       ICU Vital Signs     Row Name 02/01/19 1210 02/01/19 1121 02/01/19 1115 02/01/19 1102 02/01/19 1028       Vitals    Temp  98 °F (36.7 °C)  --  98 °F (36.7 °C)  --  --    Temp src  Oral  --  Oral  --  --    Pulse  68  70  67  67  68    Heart Rate Source  Monitor  --  --  --  --    Resp  16  --  18  --  --    Resp Rate Source  Monitor  --  --  --  --    BP  96/57  94/60  94/60  100/64  94/61    Noninvasive MAP (mmHg)  70  72  --  75  69    BP Location  Left arm  --  --  --  --    BP Method  Automatic  --  --  --  --    Patient Position  Sitting  --  --  --  --       Oxygen Therapy    SpO2  99 %  100 %   100 %  98 %  99 %    Pulse Oximetry Type  --  --  Continuous  --  --    Device (Oxygen Therapy)  room air  --  room air  --  --    Oximetry Probe Site Changed  Yes  --  Yes  --  --    Row Name 02/01/19 1024 02/01/19 1002 02/01/19 0924 02/01/19 0901 02/01/19 0824       Vitals    Temp  98 °F (36.7 °C)  --  98.1 °F (36.7 °C)  --  98.1 °F (36.7 °C)    Temp src  Oral  --  Oral  --  Oral    Pulse  68  70  73  70  68    Heart Rate Source  Monitor  --  --  --  --    Resp  18  --  18  --  18    Resp Rate Source  Visual  --  --  --  --    BP  94/61  95/62  91/68  100/59  95/64    Noninvasive MAP (mmHg)  --  70  --  75  --    BP Location  Left arm  --  --  --  --    BP Method  Automatic  --  --  --  --    Patient Position  Sitting  --  --  --  --       Oxygen Therapy    SpO2  96 %  99 %  100 %  98 %  100 %    Pulse Oximetry Type  Continuous  --  Continuous  --  Continuous    Device (Oxygen Therapy)  room air  --  room air  --  room air    Oximetry Probe Site Changed  Yes  --  --  --  --    Row Name 02/01/19 0809 02/01/19 0802 02/01/19 0752 02/01/19 0701 02/01/19 0602       Vitals    Temp  --  --  98 °F (36.7 °C)  --  --    Temp src  --  --  Oral  --  --    Pulse  66  71  66  71  70    Heart Rate Source  --  --  --  --  Monitor    Resp  19  --  18  --  16    Resp Rate Source  --  --  --  --  Visual    BP  --  100/69  102/64  98/42  97/64    Noninvasive MAP (mmHg)  --  76  --  72  74    BP Location  --  --  --  --  Left arm    BP Method  --  --  --  --  Automatic    Patient Position  --  --  --  --  Lying       Oxygen Therapy    SpO2  99 %  99 %  99 %  98 %  96 %    Pulse Oximetry Type  Continuous  --  Continuous  --  Continuous    Device (Oxygen Therapy)  room air  --  room air  --  room air    Row Name 02/01/19 0600 02/01/19 0545 02/01/19 0502 02/01/19 0445 02/01/19 0403       Vitals    Temp  --  97.4 °F (36.3 °C)  --  97.9 °F (36.6 °C)  98 °F (36.7 °C)    Temp src  --  Oral  --  Oral  Oral    Pulse  --  81  65  69  68     Heart Rate Source  --  --  Monitor  --  Monitor    Resp  --  16 16 16 16    Resp Rate Source  --  --  Visual  --  Visual    BP  --  97/64  83/48  (Abnormal)   88/54  (Abnormal)   92/60    Noninvasive MAP (mmHg)  --  --  55  --  73    BP Location  --  --  Left arm  --  Left arm    BP Method  --  --  Automatic  --  Automatic    Patient Position  --  --  Lying  --  Lying       Oxygen Therapy    SpO2  --  97 %  98 %  99 %  98 %    Pulse Oximetry Type  --  Continuous  Continuous  Continuous  Continuous    Device (Oxygen Therapy)  room air  room air  room air  room air  room air    Row Name 02/01/19 0400 02/01/19 0302 02/01/19 0300 02/01/19 0200 02/01/19 0145       Vitals    Temp  98 °F (36.7 °C)  --  97.8 °F (36.6 °C)  97.8 °F (36.6 °C)  98.2 °F (36.8 °C)    Temp src  Oral  --  Oral  Oral  --    Pulse  75  72  68  73  72    Heart Rate Source  --  Monitor  --  --  --    Resp  16  16 16 16 16    Resp Rate Source  --  Visual  --  --  --    BP  92/60  99/65  99/65  99/60  104/57    Noninvasive MAP (mmHg)  --  76  --  --  --    BP Location  --  Left arm  --  --  --    BP Method  --  Automatic  --  --  --    Patient Position  --  Lying  --  --  --       Oxygen Therapy    SpO2  99 %  97 %  97 %  98 %  98 %    Pulse Oximetry Type  Continuous  Continuous  Continuous  Continuous  --    Device (Oxygen Therapy)  room air  room air  room air  room air  --    Row Name 02/01/19 0102 02/01/19 0045 02/01/19 0000 01/31/19 2330 01/31/19 2305       Vitals    Temp  --  98.8 °F (37.1 °C)  --  97.7 °F (36.5 °C)  98 °F (36.7 °C)    Temp src  --  Oral  --  Axillary  Oral    Pulse  77  78  --  77  78    Heart Rate Source  Monitor  --  --  --  --    Resp  16  16  --  16 16    Resp Rate Source  Visual  --  --  --  --    BP  104/57  93/61  --  94/63  94/63    Noninvasive MAP (mmHg)  73  --  --  --  --    BP Location  Left arm  --  --  --  --    BP Method  Automatic  --  --  --  --    Patient Position  Lying  --  --  --  --       Oxygen  Therapy    SpO2  99 %  100 %  --  98 %  98 %    Pulse Oximetry Type  Continuous  Continuous  --  Continuous  Continuous    Device (Oxygen Therapy)  room air  room air  room air  room air  room air    Row Name 01/31/19 2303 01/31/19 2205 01/31/19 2200 01/31/19 2123 01/31/19 2105       Vitals    Temp  --  98.6 °F (37 °C)  --  --  98.9 °F (37.2 °C)    Temp src  --  Oral  --  --  Oral    Pulse  78  90  --  77  78    Heart Rate Source  Monitor  Monitor  --  --  --    Resp  16  16  --  --  16    Resp Rate Source  Visual  Visual  --  --  --    BP  94/63  100/63  --  98/60  97/59    Noninvasive MAP (mmHg)  72  70  --  --  --    BP Location  Left arm  Left arm  --  --  --    BP Method  Automatic  Automatic  --  --  --    Patient Position  Lying  Lying  --  --  --       Oxygen Therapy    SpO2  99 %  100 %  --  --  100 %    Pulse Oximetry Type  Continuous  Continuous  --  --  Continuous    Device (Oxygen Therapy)  room air  room air  room air  --  room air    Row Name 01/31/19 2050 01/31/19 2015 01/31/19 2000 01/31/19 1941 01/31/19 1902       Vitals    Temp  98.5 °F (36.9 °C)  98.3 °F (36.8 °C)  --  97.4 °F (36.3 °C)  --    Temp src  --  Oral  --  Axillary  --    Pulse  80  79  --  85  77    Resp  16  16  --  16  --    BP  99/55  99/55  --  93/59  97/60    Noninvasive MAP (mmHg)  --  --  --  --  73       Oxygen Therapy    SpO2  100 %  92 %  --  100 %  100 %    Pulse Oximetry Type  --  Continuous  --  Continuous  --    Device (Oxygen Therapy)  --  room air  room air  room air  --    Row Name 01/31/19 1841 01/31/19 1831 01/31/19 1802 01/31/19 1747 01/31/19 1741       Vitals    Temp  98.1 °F (36.7 °C)  --  --  --  97.7 °F (36.5 °C)    Temp src  Oral  --  --  --  Oral    Pulse  78  78  82  85  91    Resp  17  --  --  --  18    BP  96/47  96/53  92/58  103/54  96/61    Noninvasive MAP (mmHg)  63  67  68  70  --       Oxygen Therapy    SpO2  100 %  100 %  100 %  97 %  99 %    Pulse Oximetry Type  Continuous  --  --  --  Continuous  "   Device (Oxygen Therapy)  room air  --  --  --  room air    Row Name 01/31/19 1733 01/31/19 1717 01/31/19 1711 01/31/19 1702 01/31/19 1646       Vitals    Pulse  82  78  79  82  82    BP  95/44  94/51  93/50  89/55  (Abnormal)   93/54    Noninvasive MAP (mmHg)  70  63  59  66  67       Oxygen Therapy    SpO2  100 %  100 %  100 %  100 %  100 %    Row Name 01/31/19 1641 01/31/19 1606 01/31/19 1600 01/31/19 1548 01/31/19 15:08:44       Height and Weight    Height  --  --  --  172.7 cm (68\")  --    Weight  --  --  --  111 kg (245 lb 11.2 oz)  --    Weight Method  --  --  --  Bed scale  --    Ideal Body Weight (IBW) (kg)  --  --  --  64.15  --    BSA (Calculated - sq m)  --  --  --  2.23 sq meters  --    BMI (Calculated)  --  --  --  37.4  --    Weight in (lb) to have BMI = 25  --  --  --  164.1  --       Vitals    Temp  97.9 °F (36.6 °C)  --  98.3 °F (36.8 °C)  --  --    Temp src  Oral  --  Oral  --  --    Pulse  81  --  --  87  --    Resp  18  --  20  --  17    Resp Rate Source  --  --  --  --  Visual    BP  93/54  --  105/57  105/57  --    Noninvasive MAP (mmHg)  --  --  --  70  --       Oxygen Therapy    SpO2  100 %  --  --  100 %  --    Pulse Oximetry Type  Continuous  --  Continuous  Continuous  --    Device (Oxygen Therapy)  room air  room air  room air  room air  --    Oximetry Probe Site Changed  --  --  Yes  Yes  --    Row Name 01/31/19 1501 01/31/19 1444 01/31/19 1305             Height and Weight    Height  --  --  167.6 cm (66\")      Height Method  --  --  Stated      Weight  --  --  112 kg (247 lb 6.4 oz)      Weight Method  --  --  Standing scale      Ideal Body Weight (IBW) (kg)  --  --  59.58      BSA (Calculated - sq m)  --  --  2.19 sq meters      BMI (Calculated)  --  --  40      Weight in (lb) to have BMI = 25  --  --  154.6         Vitals    Temp  --  --  98.1 °F (36.7 °C)      Temp src  --  --  Oral      Pulse  82  86  85      Heart Rate Source  --  --  Monitor      Resp  --  --  18      Resp " Rate Source  --  --  Visual      BP  100/56  96/51  101/40      Noninvasive MAP (mmHg)  70  68  --      BP Location  --  --  Right arm      BP Method  --  --  Automatic      Patient Position  --  --  Sitting         Oxygen Therapy    SpO2  100 %  100 %  100 %          Hospital Medications (active)       Dose Frequency Start End    acetaminophen (TYLENOL) tablet 650 mg 650 mg Every 4 Hours PRN 1/31/2019     Sig - Route: Take 2 tablets by mouth Every 4 (Four) Hours As Needed for Mild Pain . - Oral    albuterol (PROVENTIL) nebulizer solution 0.083% 2.5 mg/3mL 2.5 mg Every 6 Hours PRN 1/31/2019     Sig - Route: Take 2.5 mg by nebulization Every 6 (Six) Hours As Needed for Wheezing or Shortness of Air. - Nebulization    bisacodyl (DULCOLAX) suppository 10 mg 10 mg Daily PRN 1/31/2019     Sig - Route: Insert 1 suppository into the rectum Daily As Needed for Constipation. - Rectal    fluticasone (FLONASE) 50 MCG/ACT nasal spray 2 spray 2 spray Daily 2/1/2019     Sig - Route: 2 sprays by Each Nare route Daily. - Each Nare    furosemide (LASIX) injection 20 mg 20 mg Once As Needed 1/31/2019 2/1/2019    Sig - Route: Infuse 2 mL into a venous catheter 1 (One) Time As Needed (after 2 unit blood transfusion if needed). - Intravenous    losartan (COZAAR) tablet 25 mg 25 mg Every 24 Hours Scheduled 1/31/2019     Sig - Route: Take 1 tablet by mouth Daily. - Oral    melatonin tablet 6 mg 6 mg Nightly PRN 1/31/2019     Sig - Route: Take 2 tablets by mouth At Night As Needed for Sleep. - Oral    metoprolol succinate XL (TOPROL-XL) 24 hr tablet 50 mg 50 mg Every 24 Hours Scheduled 2/1/2019     Sig - Route: Take 1 tablet by mouth Daily. - Oral    ondansetron (ZOFRAN) injection 4 mg 4 mg Every 6 Hours PRN 1/31/2019     Sig - Route: Infuse 2 mL into a venous catheter Every 6 (Six) Hours As Needed for Nausea or Vomiting. - Intravenous    oxyCODONE-acetaminophen (PERCOCET) 7.5-325 MG per tablet 1 tablet 1 tablet 2 Times Daily PRN 1/31/2019  "2/10/2019    Sig - Route: Take 1 tablet by mouth 2 (Two) Times a Day As Needed for Moderate Pain  or Severe Pain . - Oral    pantoprazole (PROTONIX) 40 mg in sodium chloride 0.9 % 100 mL (0.4 mg/mL) infusion 8 mg/hr Continuous 1/31/2019     Sig - Route: Infuse 8 mg/hr into a venous catheter Continuous. - Intravenous    Linked Group 1:  \"And\" Linked Group Details        pantoprazole (PROTONIX) injection 80 mg 80 mg Once 1/31/2019 1/31/2019    Sig - Route: Infuse 20 mL into a venous catheter 1 (One) Time. - Intravenous    Linked Group 1:  \"And\" Linked Group Details        sertraline (ZOLOFT) tablet 150 mg 150 mg Nightly 1/31/2019     Sig - Route: Take 3 tablets by mouth Every Night. - Oral    sodium chloride 0.9 % flush 3 mL 3 mL Every 12 Hours Scheduled 1/31/2019     Sig - Route: Infuse 3 mL into a venous catheter Every 12 (Twelve) Hours. - Intravenous    sodium chloride 0.9 % flush 3 mL 3 mL Every 12 Hours Scheduled 1/31/2019     Sig - Route: Infuse 3 mL into a venous catheter Every 12 (Twelve) Hours. - Intravenous    sodium chloride 0.9 % flush 3-10 mL 3-10 mL As Needed 1/31/2019     Sig - Route: Infuse 3-10 mL into a venous catheter As Needed for Line Care. - Intravenous    sodium chloride 0.9 % flush 3-10 mL 3-10 mL As Needed 1/31/2019     Sig - Route: Infuse 3-10 mL into a venous catheter As Needed for Line Care. - Intravenous    sodium chloride 0.9 % infusion 30 mL/hr Continuous 1/31/2019     Sig - Route: Infuse 30 mL/hr into a venous catheter Continuous. - Intravenous    spironolactone (ALDACTONE) tablet 25 mg 25 mg Daily 2/1/2019     Sig - Route: Take 1 tablet by mouth Daily. - Oral    atorvastatin (LIPITOR) tablet 20 mg (Discontinued) 20 mg Nightly 1/31/2019 1/31/2019    Sig - Route: Take 1 tablet by mouth Every Night. - Oral    OxyCODONE ER capsule extended-release 12 hour  13.5 mg (Discontinued) 13.5 mg 2 Times Daily 1/31/2019 1/31/2019    Sig - Route: Take 13.5 mg by mouth 2 (Two) Times a Day. - Oral    " Reason for Discontinue: Non-compliance    sacubitril-valsartan (ENTRESTO) 24-26 MG tablet 1 tablet (Discontinued) 1 tablet Every 12 Hours Scheduled 2/1/2019 1/31/2019    Sig - Route: Take 1 tablet by mouth Every 12 (Twelve) Hours. - Oral    Reason for Discontinue: Duplicate order    sertraline (ZOLOFT) tablet 100 mg (Discontinued) 100 mg Nightly 1/31/2019 1/31/2019    Sig - Route: Take 2 tablets by mouth Every Night. - Oral    sertraline (ZOLOFT) tablet 50 mg (Discontinued) 50 mg Nightly 1/31/2019 1/31/2019    Sig - Route: Take 1 tablet by mouth Every Night. - Oral    Reason for Discontinue: *Re-Entry            Lab Results (last 24 hours)     Procedure Component Value Units Date/Time    Hemoglobin & Hematocrit, Blood [[id number]]  (Abnormal) Collected:  02/01/19 1213    Specimen:  Blood Updated:  02/01/19 1220     Hemoglobin 8.4 g/dL      Hematocrit 26.3 %     Scan Slide [[id number]] Collected:  02/01/19 0602    Specimen:  Blood Updated:  02/01/19 0643     Anisocytosis Mod/2+     Hypochromia Slight/1+     Microcytes Slight/1+     Poikilocytes Mod/2+     WBC Morphology Normal     Platelet Morphology Normal    CBC Auto Differential [[id number]]  (Abnormal) Collected:  02/01/19 0602    Specimen:  Blood Updated:  02/01/19 0643     WBC 5.39 10*3/mm3      RBC 3.07 10*6/mm3      Hemoglobin 7.2 g/dL      Hematocrit 22.7 %      MCV 73.9 fL      MCH 23.5 pg      MCHC 31.7 g/dL      RDW 23.1 %      RDW-SD 62.2 fl      MPV 10.2 fL      Platelets 192 10*3/mm3      Neutrophil % 62.7 %      Lymphocyte % 30.6 %      Monocyte % 4.8 %      Eosinophil % 0.7 %      Basophil % 0.6 %      Immature Grans % 0.6 %      Neutrophils, Absolute 3.38 10*3/mm3      Lymphocytes, Absolute 1.65 10*3/mm3      Monocytes, Absolute 0.26 10*3/mm3      Eosinophils, Absolute 0.04 10*3/mm3      Basophils, Absolute 0.03 10*3/mm3      Immature Grans, Absolute 0.03 10*3/mm3      nRBC 1.3 /100 WBC     Basic Metabolic Panel [[id number]]  (Abnormal) Collected:   02/01/19 0602    Specimen:  Blood Updated:  02/01/19 0635     Glucose 99 mg/dL      BUN 15 mg/dL      Creatinine 0.80 mg/dL      Sodium 138 mmol/L      Potassium 4.3 mmol/L      Chloride 113 mmol/L      CO2 20.0 mmol/L      Calcium 8.4 mg/dL      eGFR Non African Amer 83 mL/min/1.73      BUN/Creatinine Ratio 18.8     Anion Gap 9.3 mmol/L     Narrative:       GFR Normal >60  Chronic Kidney Disease <60  Kidney Failure <15    Hemoglobin & Hematocrit, Blood [[id number]]  (Abnormal) Collected:  02/01/19 0034    Specimen:  Blood Updated:  02/01/19 0101     Hemoglobin 6.4 g/dL      Hematocrit 20.7 %     Vitamin B12 [[id number]]  (Normal) Collected:  01/31/19 1345    Specimen:  Blood Updated:  01/31/19 1818     Vitamin B-12 255 pg/mL     Folate [[id number]]  (Normal) Collected:  01/31/19 1345    Specimen:  Blood Updated:  01/31/19 1818     Folate 10.50 ng/mL     Pregnancy, Urine - Urine, Clean Catch [[id number]]  (Normal) Collected:  01/31/19 1741    Specimen:  Urine, Clean Catch Updated:  01/31/19 1802     HCG, Urine QL Negative    Iron Profile [[id number]]  (Abnormal) Collected:  01/31/19 1345    Specimen:  Blood Updated:  01/31/19 1750     Iron 11 mcg/dL      TIBC 470 mcg/dL      Iron Saturation 2 %     Ferritin [[id number]]  (Abnormal) Collected:  01/31/19 1345    Specimen:  Blood Updated:  01/31/19 1750     Ferritin 3.44 ng/mL     Troponin [[id number]]  (Normal) Collected:  01/31/19 1335    Specimen:  Blood Updated:  01/31/19 1720     Troponin I <0.012 ng/mL     Narrative:       Normal Patient Upper Reference Limit (URL) (99th Percentile)=0.03 ng/mL   Non-AMI Illness Reference Limit=0.03-0.11 ng/mL   AMI Confirmation=0.12 ng/mL and above    Occult Blood X 1, Stool - Stool, Per Rectum [[id number]]  (Abnormal) Collected:  01/31/19 1441    Specimen:  Stool from Per Rectum Updated:  01/31/19 1447     Fecal Occult Blood Positive    CBC & Differential [[id number]] Collected:  01/31/19 1335    Specimen:  Blood Updated:  01/31/19 6552     Narrative:       The following orders were created for panel order CBC & Differential.  Procedure                               Abnormality         Status                     ---------                               -----------         ------                     Manual Differential[[id number]]          Abnormal            Final result               Scan Slide[[id number]]                                       Final result               CBC Auto Differential[[id number]]        Abnormal            Final result                 Please view results for these tests on the individual orders.    Scan Slide [[id number]] Collected:  01/31/19 1335    Specimen:  Blood Updated:  01/31/19 1425     Scan Slide --     Comment: See Manual Differential Results       Manual Differential [[id number]]  (Abnormal) Collected:  01/31/19 1335    Specimen:  Blood Updated:  01/31/19 1425     Neutrophil % 72.0 %      Lymphocyte % 20.0 %      Monocyte % 2.0 %      Basophil % 1.0 %      Bands %  5.0 %      Neutrophils Absolute 5.15 10*3/mm3      Lymphocytes Absolute 1.34 10*3/mm3      Monocytes Absolute 0.13 10*3/mm3      Basophils Absolute 0.07 10*3/mm3      nRBC 2.0 /100 WBC      Anisocytosis Mod/2+     Dacrocytes Slight/1+     Hypochromia Mod/2+     Microcytes Slight/1+     Poikilocytes Slight/1+     WBC Morphology Normal     Platelet Estimate Adequate    Protime-INR [[id number]]  (Abnormal) Collected:  01/31/19 1335    Specimen:  Blood Updated:  01/31/19 1411     Protime 18.0 Seconds      INR 1.63    CBC Auto Differential [[id number]]  (Abnormal) Collected:  01/31/19 1335    Specimen:  Blood Updated:  01/31/19 1411     WBC 6.69 10*3/mm3      RBC 2.06 10*6/mm3      Hemoglobin 4.0 g/dL      Hematocrit 13.8 %      MCV 67.0 fL      MCH 19.4 pg      MCHC 29.0 g/dL      RDW 21.1 %      RDW-SD 51.3 fl      MPV 9.5 fL      Platelets 235 10*3/mm3      Neutrophil % 67.6 %      Lymphocyte % 25.6 %      Monocyte % 5.4 %      Eosinophil % 0.6 %      Basophil %  0.1 %      Immature Grans % 0.7 %      Neutrophils, Absolute 4.52 10*3/mm3      Lymphocytes, Absolute 1.71 10*3/mm3      Monocytes, Absolute 0.36 10*3/mm3      Eosinophils, Absolute 0.04 10*3/mm3      Basophils, Absolute 0.01 10*3/mm3      Immature Grans, Absolute 0.05 10*3/mm3      nRBC 2.1 /100 WBC     Comprehensive Metabolic Panel [[id number]]  (Abnormal) Collected:  19 1335    Specimen:  Blood Updated:  19 1410     Glucose 101 mg/dL      BUN 21 mg/dL      Creatinine 0.80 mg/dL      Sodium 136 mmol/L      Potassium 4.0 mmol/L      Chloride 104 mmol/L      CO2 21.0 mmol/L      Calcium 8.5 mg/dL      Total Protein 6.9 g/dL      Albumin 4.10 g/dL      ALT (SGPT) 21 U/L      AST (SGOT) 14 U/L      Alkaline Phosphatase 58 U/L      Total Bilirubin 0.4 mg/dL      eGFR Non African Amer 83 mL/min/1.73      Globulin 2.8 gm/dL      A/G Ratio 1.5 g/dL      BUN/Creatinine Ratio 26.3     Anion Gap 15.0 mmol/L     Narrative:       GFR Normal >60  Chronic Kidney Disease <60  Kidney Failure <15           Physician Progress Notes (last 24 hours) (Notes from 2019 12:51 PM through 2019 12:51 PM)      Ailyn Villa DO at 2019  9:25 AM                [facility]    [facility]    Name:  Johny Hearn   Age:  33 y.o.  Sex:  female  :  1985  MRN:  [id number]   Visit Number:  [id number]  Admission Date:  2019  Date Of Service:  19  Primary Care Physician:  Malia Powers APRN     LOS: 1 day :      Chief Complaint:  Follow up acute gi bleed        Subjective / Interval History: Patient admitted yesterday with hemoglobin 4. She has now improved to 7 and getting 4th unit. Planning for EGD today. Patient seen again today. She denies chest pain, shortness of breath, abdominal pain. She is hungry and agreeable to wait for EGD to eat. BP low normal without sepsis. She has acute hypovolemia that is improving. No tachycardia noted. No acute blood loss identified.        Review of Systems:     General ROS: Patient denies any fevers, chills or loss of consciousness. +generalized weakness with ambulation, but placed on bedrest  Ophthalmic ROS: Denies any diplopia or transient loss of vision.  ENT ROS: Denies sore throat, nasal congestion or ear pain.   Respiratory ROS: Denies cough with resolved shortness of breath.  Cardiovascular ROS: Denies chest pain or palpitations. No history of exertional chest pain.   Gastrointestinal ROS: Denies nausea and vomiting. Denies any abdominal pain. No diarrhea.  Genito-Urinary ROS: Denies dysuria or hematuria.  Musculoskeletal ROS: Denies chronic back pain. No muscle pain. No calf pain.  Neurological ROS: Denies any focal weakness. No loss of consciousness. Denies any numbness. Denies neck pain.   Dermatological ROS: Denies any redness or pruritis. Skin color less pale today    Vital Signs:    Temp:  [97.4 °F (36.3 °C)-98.9 °F (37.2 °C)] 98.1 °F (36.7 °C)  Heart Rate:  [65-91] 73  Resp:  [16-20] 18  BP: ()/(40-69) 91/68    Intake and output:    I/O last 3 completed shifts:  In: 2374 [I.V.:1474; Blood:900]  Out: 1500 [Urine:1500]  I/O this shift:  In: -   Out: 1300 [Urine:1300]    Physical Examination:    General Appearance:    Sitting up in ICU bed. Alert and cooperative, not in any acute distress.   Head:    Atraumatic and normocephalic, without obvious abnormality.   Eyes:            PERRLA, conjunctivae and sclerae normal, no Icterus. No pallor. Extraocular movements are within normal limits.   Throat:   No oral lesions, no thrush, oral mucosa dry   Neck:   Supple, trachea midline, no thyromegaly   Lungs:     Chest shape is normal. Breath sounds heard bilaterally equally.  No crackles or wheezing. No Pleural rub or bronchial breathing.    Heart:    Normal S1 and S2, no murmur, no gallop   Abdomen:     Normal bowel sounds, no masses, no organomegaly. Soft        non-tender, non-distended, no guarding, no rebound                 tenderness   Extremities:   Moves all extremities well, no edema, no cyanosis, no             clubbing   Skin:   No bleeding, bruising or rash. Improved pallor   Neurologic:   No tremor, sensation intact, Motor power is normal and equal bilaterally.   Laboratory results:    Lab Results (last 24 hours)     Procedure Component Value Units Date/Time    Scan Slide [[id number]] Collected:  02/01/19 0602    Specimen:  Blood Updated:  02/01/19 0643     Anisocytosis Mod/2+     Hypochromia Slight/1+     Microcytes Slight/1+     Poikilocytes Mod/2+     WBC Morphology Normal     Platelet Morphology Normal    CBC Auto Differential [[id number]]  (Abnormal) Collected:  02/01/19 0602    Specimen:  Blood Updated:  02/01/19 0643     WBC 5.39 10*3/mm3      RBC 3.07 10*6/mm3      Hemoglobin 7.2 g/dL      Hematocrit 22.7 %      MCV 73.9 fL      MCH 23.5 pg      MCHC 31.7 g/dL      RDW 23.1 %      RDW-SD 62.2 fl      MPV 10.2 fL      Platelets 192 10*3/mm3      Neutrophil % 62.7 %      Lymphocyte % 30.6 %      Monocyte % 4.8 %      Eosinophil % 0.7 %      Basophil % 0.6 %      Immature Grans % 0.6 %      Neutrophils, Absolute 3.38 10*3/mm3      Lymphocytes, Absolute 1.65 10*3/mm3      Monocytes, Absolute 0.26 10*3/mm3      Eosinophils, Absolute 0.04 10*3/mm3      Basophils, Absolute 0.03 10*3/mm3      Immature Grans, Absolute 0.03 10*3/mm3      nRBC 1.3 /100 WBC     Basic Metabolic Panel [[id number]]  (Abnormal) Collected:  02/01/19 0602    Specimen:  Blood Updated:  02/01/19 0635     Glucose 99 mg/dL      BUN 15 mg/dL      Creatinine 0.80 mg/dL      Sodium 138 mmol/L      Potassium 4.3 mmol/L      Chloride 113 mmol/L      CO2 20.0 mmol/L      Calcium 8.4 mg/dL      eGFR Non African Amer 83 mL/min/1.73      BUN/Creatinine Ratio 18.8     Anion Gap 9.3 mmol/L     Narrative:       GFR Normal >60  Chronic Kidney Disease <60  Kidney Failure <15    Hemoglobin & Hematocrit, Blood [[id number]]  (Abnormal) Collected:  02/01/19 0034    Specimen:   Blood Updated:  02/01/19 0101     Hemoglobin 6.4 g/dL      Hematocrit 20.7 %     Vitamin B12 [[id number]]  (Normal) Collected:  01/31/19 1345    Specimen:  Blood Updated:  01/31/19 1818     Vitamin B-12 255 pg/mL     Folate [[id number]]  (Normal) Collected:  01/31/19 1345    Specimen:  Blood Updated:  01/31/19 1818     Folate 10.50 ng/mL     Pregnancy, Urine - Urine, Clean Catch [[id number]]  (Normal) Collected:  01/31/19 1741    Specimen:  Urine, Clean Catch Updated:  01/31/19 1802     HCG, Urine QL Negative    Iron Profile [[id number]]  (Abnormal) Collected:  01/31/19 1345    Specimen:  Blood Updated:  01/31/19 1750     Iron 11 mcg/dL      TIBC 470 mcg/dL      Iron Saturation 2 %     Ferritin [[id number]]  (Abnormal) Collected:  01/31/19 1345    Specimen:  Blood Updated:  01/31/19 1750     Ferritin 3.44 ng/mL     Troponin [[id number]]  (Normal) Collected:  01/31/19 1335    Specimen:  Blood Updated:  01/31/19 1720     Troponin I <0.012 ng/mL     Narrative:       Normal Patient Upper Reference Limit (URL) (99th Percentile)=0.03 ng/mL   Non-AMI Illness Reference Limit=0.03-0.11 ng/mL   AMI Confirmation=0.12 ng/mL and above    Occult Blood X 1, Stool - Stool, Per Rectum [[id number]]  (Abnormal) Collected:  01/31/19 1441    Specimen:  Stool from Per Rectum Updated:  01/31/19 1447     Fecal Occult Blood Positive    CBC & Differential [[id number]] Collected:  01/31/19 1335    Specimen:  Blood Updated:  01/31/19 1428    Narrative:       The following orders were created for panel order CBC & Differential.  Procedure                               Abnormality         Status                     ---------                               -----------         ------                     Manual Differential[[id number]]          Abnormal            Final result               Scan Slide[[id number]]                                       Final result               CBC Auto Differential[[id number]]        Abnormal            Final result                  Please view results for these tests on the individual orders.    Scan Slide [[id number]] Collected:  01/31/19 1335    Specimen:  Blood Updated:  01/31/19 1425     Scan Slide --     Comment: See Manual Differential Results       Manual Differential [[id number]]  (Abnormal) Collected:  01/31/19 1335    Specimen:  Blood Updated:  01/31/19 1425     Neutrophil % 72.0 %      Lymphocyte % 20.0 %      Monocyte % 2.0 %      Basophil % 1.0 %      Bands %  5.0 %      Neutrophils Absolute 5.15 10*3/mm3      Lymphocytes Absolute 1.34 10*3/mm3      Monocytes Absolute 0.13 10*3/mm3      Basophils Absolute 0.07 10*3/mm3      nRBC 2.0 /100 WBC      Anisocytosis Mod/2+     Dacrocytes Slight/1+     Hypochromia Mod/2+     Microcytes Slight/1+     Poikilocytes Slight/1+     WBC Morphology Normal     Platelet Estimate Adequate    Protime-INR [[id number]]  (Abnormal) Collected:  01/31/19 1335    Specimen:  Blood Updated:  01/31/19 1411     Protime 18.0 Seconds      INR 1.63    CBC Auto Differential [[id number]]  (Abnormal) Collected:  01/31/19 1335    Specimen:  Blood Updated:  01/31/19 1411     WBC 6.69 10*3/mm3      RBC 2.06 10*6/mm3      Hemoglobin 4.0 g/dL      Hematocrit 13.8 %      MCV 67.0 fL      MCH 19.4 pg      MCHC 29.0 g/dL      RDW 21.1 %      RDW-SD 51.3 fl      MPV 9.5 fL      Platelets 235 10*3/mm3      Neutrophil % 67.6 %      Lymphocyte % 25.6 %      Monocyte % 5.4 %      Eosinophil % 0.6 %      Basophil % 0.1 %      Immature Grans % 0.7 %      Neutrophils, Absolute 4.52 10*3/mm3      Lymphocytes, Absolute 1.71 10*3/mm3      Monocytes, Absolute 0.36 10*3/mm3      Eosinophils, Absolute 0.04 10*3/mm3      Basophils, Absolute 0.01 10*3/mm3      Immature Grans, Absolute 0.05 10*3/mm3      nRBC 2.1 /100 WBC     Comprehensive Metabolic Panel [[id number]]  (Abnormal) Collected:  01/31/19 1335    Specimen:  Blood Updated:  01/31/19 1410     Glucose 101 mg/dL      BUN 21 mg/dL      Creatinine 0.80 mg/dL      Sodium 136 mmol/L       Potassium 4.0 mmol/L      Chloride 104 mmol/L      CO2 21.0 mmol/L      Calcium 8.5 mg/dL      Total Protein 6.9 g/dL      Albumin 4.10 g/dL      ALT (SGPT) 21 U/L      AST (SGOT) 14 U/L      Alkaline Phosphatase 58 U/L      Total Bilirubin 0.4 mg/dL      eGFR Non African Amer 83 mL/min/1.73      Globulin 2.8 gm/dL      A/G Ratio 1.5 g/dL      BUN/Creatinine Ratio 26.3     Anion Gap 15.0 mmol/L     Narrative:       GFR Normal >60  Chronic Kidney Disease <60  Kidney Failure <15          I have reviewed the patient's laboratory results.    Radiology results:    Imaging Results (last 24 hours)     Procedure Component Value Units Date/Time    XR Chest 1 View [[id number]] Collected:  01/31/19 1925     Updated:  01/31/19 1926    Narrative:       FINAL REPORT    CLINICAL HISTORY:  preop    FINDINGS:  A single view of the chest demonstrates a right subclavian  Mediport catheter appropriately positioned with the tip in the  SVC.  There is no pneumothorax.  There is borderline  cardiomegaly without pulmonary edemaa.  Lungs are clear.  There  is  no pleural disease or significant adenopathy.      Impression:       No acute disease.    Authenticated by Freddie Montgomery M.D. on 01/31/2019 07:25:07 PM          I have reviewed the patient's radiology reports.    Medication Review:     I have reviewed the patients active and prn medications.     Assessment:  Acute blood loss anemia, Symptomatic Anemia, prior to admission, post 4 unit PRBC  Suspected acute vs chronic upper GI bleeding suspect gastritis vs gastric ulcer  Epigastric abdominal pain   Chronic bright red bleeding per rectum ?hemorrhoids  Hypotension without sepsis, due to acute hypovolemia, related to blood loss  Atypical chest pain with dyspnea, suspect symptomatic anemia  History of cardiac arrest, with chronic cardiomyopathy/chronic systolic CHF EF 30%  History of elevated troponin  Chronic diuretic therapy  Suspected RUFINA  Obesity      Plan:    Continue ICU monitoring. She  is getting transfusion of 4th unit PRBC after lasix. Continue to keep iv fluids very low dose if needed. Holding AM BP medications. Continue to monitor serial hemoglobin. She is NPO for EGD today with Dr Wiley. Dr Curtis saw her preop.  Continue protonix drip. Anticipate further monitoring will be needed in the next 1-2days.    SCD. Anticoagulation contraindicated. Monitor labs and adjust medications accordingly on daily basis.    via phone, plus patient agreeable and happy with this plan.      Ailyn Villa DO  02/01/19  9:25 AM            Electronically signed by Ailyn Villa DO at 2/1/2019  9:30 AM          Consult Notes (last 24 hours) (Notes from 1/31/2019 12:51 PM through 2/1/2019 12:51 PM)      Zheng Curtis MD at 1/31/2019  7:27 PM            Referring Provider:Dr Villa   Reason for Consultation: pre op clearance     Patient Care Team:  Malia Powers APRN as PCP - General (Family Medicine)  Shelley Brock MD as Consulting Physician (General Surgery)  Cirilo Deshpande II, MD (Pain Medicine)      History of present illness:   Patient has been having bright red blood per rectum and black stools ever since October.  For the last couple of weeks this has been more severe.  Went to see her sister-in-law who thought that she was stupid and needed urgent care.  Patient is been feeling very short of breath on minimal amount of activity.  Has been feeling exhausted.  Since she started taking the new pills there has been some improvement in her symptoms.  She has not passed out at any time.    Review of Systems   Pertinent items are noted in HPI  Review of Systems           History  baseline EKG-sinus tachycardia voltage criteria for left ventricular hypertrophy diffuse ST segment changes.     -LV function assessment EF 60% echocardiac exam 5/18.  EF 30% echocardiogram 10/18.     -CAD workup: Has had multiple stress tests according to the patient lasted about 3-4 years ago told to be  normal.  Cardiac catheterization 10/18 normal coronaries.     -Obesity.    -Mitral insufficiency-moderate echocardiogram 10/18.     -Hypertension.     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     -T-cell lymphoma on chemotherapy.    Pulmonary hypertension PA systolic of 45 mm echo 10/18.        Personal history:     Nonsmoker does not drink alcohol function status the patient has been limited.       Family history: Noncontributory.     Review of symptoms:     Has had shortness of breath.  Has been coughing up yellow sputum.  Has had heaviness in the chest.  Has occasional swelling of the lower extremities has been having orthopnea as well as PND.  There is no stroke no weakness joint swelling respiratory symptoms are negative.        Past Surgical History:   Procedure Laterality Date   • CARDIAC CATHETERIZATION N/A 10/22/2018    Procedure: Left Heart Cath;  Surgeon: Zheng Curtis MD;  Location: [facility] CATH INVASIVE LOCATION;  Service: Cardiology   • CARDIAC CATHETERIZATION N/A 10/22/2018    Procedure: Coronary angiography;  Surgeon: Zheng Curtis MD;  Location: [facility] CATH INVASIVE LOCATION;  Service: Cardiology   • CARDIAC CATHETERIZATION N/A 10/22/2018    Procedure: Left ventriculography;  Surgeon: Zheng Curtis MD;  Location: [facility] CATH INVASIVE LOCATION;  Service: Cardiology   •  SECTION     • ENDOMETRIAL ABLATION     • FEMORAL LYMPH NODE BIOPSY/EXCISON Left     lt [facility]    • PORTACATH PLACEMENT N/A 2018    Procedure: INSERTION OF PORTACATH right subclavian;  Surgeon: Shelley Brock MD;  Location: [facility] OR;  Service: General   • TUBAL ABDOMINAL LIGATION     , Family History   Problem Relation Age of Onset   • Hypertension Father    • Diabetes Father    • Diabetes Paternal Grandmother    , Social History     Tobacco Use   • Smoking status: Never Smoker   • Smokeless tobacco: Never Used   Substance Use Topics   •  Alcohol use: Yes     Comment: OCCASIONALLY   • Drug use: No   , Medications Prior to Admission   Medication Sig Dispense Refill Last Dose   • aspirin 81 MG EC tablet Take 1 tablet by mouth Daily. 30 tablet 0 1/31/2019 at Unknown time   • atorvastatin (LIPITOR) 20 MG tablet Take 1 tablet by mouth Every Night. 30 tablet 0 1/30/2019 at Unknown time   • metoprolol succinate XL (TOPROL-XL) 50 MG 24 hr tablet Take 1 tablet by mouth Daily. 30 tablet 0 1/31/2019 at Unknown time   • OxyCODONE ER (XTAMPZA ER) 13.5 MG capsule extended-release 12 hour  Take 1 capsule by mouth 2 (Two) Times a Day.   Past Month at Unknown time   • oxyCODONE-acetaminophen (PERCOCET) 7.5-325 MG per tablet Take 1 tablet by mouth 2 (Two) Times a Day As Needed for Severe Pain .   Past Week at Unknown time   • sacubitril-valsartan (ENTRESTO) 24-26 MG tablet Take 1 tablet by mouth Every 12 (Twelve) Hours. 60 tablet 0 1/31/2019 at Unknown time   • sertraline (ZOLOFT) 100 MG tablet Take 100 mg by mouth Every Night.   1/30/2019 at Unknown time   • sertraline (ZOLOFT) 50 MG tablet Take 50 mg by mouth Daily. Total 150 MG daily dose   1/30/2019 at Unknown time   • spironolactone (ALDACTONE) 25 MG tablet Take 1 tablet by mouth Daily. 30 tablet 0 1/31/2019 at Unknown time   • fluticasone (FLONASE) 50 MCG/ACT nasal spray As Needed.   More than a month at Unknown time   • ondansetron ODT (ZOFRAN-ODT) 4 MG disintegrating tablet Take 1 tablet by mouth Every 12 (Twelve) Hours As Needed for Nausea or Vomiting. 10 tablet 0 More than a month at Unknown time   , Scheduled Meds:    atorvastatin 20 mg Oral Nightly   [START ON 2/1/2019] fluticasone 2 spray Each Nare Daily   [START ON 2/1/2019] metoprolol succinate XL 50 mg Oral Q24H   [START ON 2/1/2019] sacubitril-valsartan 1 tablet Oral Q12H   sertraline 150 mg Oral Nightly   sodium chloride 3 mL Intravenous Q12H   sodium chloride 3 mL Intravenous Q12H   [START ON 2/1/2019] spironolactone 25 mg Oral Daily   , Continuous  "Infusions:    pantoprazole 8 mg/hr Last Rate: 8 mg/hr (01/31/19 1509)   sodium chloride 30 mL/hr    , PRN Meds:  •  acetaminophen  •  albuterol  •  bisacodyl  •  furosemide  •  melatonin  •  ondansetron  •  oxyCODONE-acetaminophen  •  sodium chloride  •  sodium chloride, Allergies:  Erythromycin; Macrobid [nitrofurantoin monohyd macro]; and Contrast dye     Objective     Vital Sign Min/Max for last 24 hours  Temp  Min: 97.7 °F (36.5 °C)  Max: 98.3 °F (36.8 °C)   BP  Min: 89/55  Max: 105/57   Pulse  Min: 77  Max: 91   Resp  Min: 17  Max: 20   SpO2  Min: 97 %  Max: 100 %   No Data Recorded   Weight  Min: 111 kg (245 lb 11.2 oz)  Max: 112 kg (247 lb 6.4 oz)     Flowsheet Rows      First Filed Value   Admission Height  167.6 cm (66\") Documented at 01/31/2019 1305   Admission Weight  112 kg (247 lb 6.4 oz) Documented at 01/31/2019 1305               Physical Exam:     General Appearance:    Alert, cooperative, in no acute distress   Head:    Normocephalic, without obvious abnormality, atraumatic   Eyes:            Lids and lashes normal, conjunctivae and sclerae normal, no   icterus, no pallor, corneas clear, PERRLA   Ears:    Ears appear intact with no abnormalities noted   Throat:   No oral lesions, no thrush, oral mucosa moist   Neck:   No adenopathy, supple, trachea midline, no thyromegaly, no     carotid bruit, no JVD   Back:     No kyphosis present, no scoliosis present, no skin lesions,       erythema or scars, no tenderness to percussion or                   palpation,   range of motion normal   Lungs:     Clear to auscultation,respirations regular, even and                   unlabored    Heart:    Regular rhythm and normal rate, normal S1 and S2, no            murmur, no gallop, no rub, no click   Breast Exam:    Deferred   Abdomen:     Normal bowel sounds, no masses, no organomegaly, soft        non-tender, non-distended, no guarding, no rebound                 tenderness   Genitalia:    Deferred   Extremities: "   Moves all extremities well, no edema, no cyanosis, no              redness   Pulses:   Pulses palpable and equal bilaterally   Skin:   No bleeding, bruising or rash   Lymph nodes:   No palpable adenopathy   Neurologic:   Cranial nerves 2 - 12 grossly intact, sensation intact, DTR        present and equal bilaterally       Results Review:   I reviewed the patient's new clinical results.    EKG: normal EKG, normal sinus rhythm, unchanged from previous tracings.    Bedside echo today: Moderate mitral insufficiency mild LV dilatation EF of 35-40% septal hypokinesis.  RV is dilated reduced RV systolic function.    LAB DATA :           WBC   Date Value Ref Range Status   01/31/2019 6.69 4.80 - 10.80 10*3/mm3 Final     RBC   Date Value Ref Range Status   01/31/2019 2.06 (L) 4.20 - 5.40 10*6/mm3 Final     Hemoglobin   Date Value Ref Range Status   01/31/2019 4.0 (C) 12.0 - 16.0 g/dL Final     Hematocrit   Date Value Ref Range Status   01/31/2019 13.8 (C) 37.0 - 47.0 % Final     MCV   Date Value Ref Range Status   01/31/2019 67.0 (L) 81.0 - 99.0 fL Final     MCH   Date Value Ref Range Status   01/31/2019 19.4 (L) 27.0 - 31.0 pg Final     MCHC   Date Value Ref Range Status   01/31/2019 29.0 (L) 30.0 - 37.0 g/dL Final     RDW   Date Value Ref Range Status   01/31/2019 21.1 (H) 11.5 - 14.5 % Final     RDW-SD   Date Value Ref Range Status   01/31/2019 51.3 37.0 - 54.0 fl Final     MPV   Date Value Ref Range Status   01/31/2019 9.5 6.0 - 12.0 fL Final     Platelets   Date Value Ref Range Status   01/31/2019 235 130 - 400 10*3/mm3 Final     Neutrophil %   Date Value Ref Range Status   01/31/2019 67.6 37.0 - 80.0 % Final     Lymphocyte %   Date Value Ref Range Status   01/31/2019 25.6 10.0 - 50.0 % Final     Monocyte %   Date Value Ref Range Status   01/31/2019 5.4 0.0 - 12.0 % Final     Eosinophil %   Date Value Ref Range Status   01/31/2019 0.6 0.0 - 7.0 % Final     Basophil %   Date Value Ref Range Status   01/31/2019 0.1 0.0  - 2.5 % Final     Immature Grans %   Date Value Ref Range Status   01/31/2019 0.7 (H) 0.0 - 0.6 % Final     Neutrophils Absolute   Date Value Ref Range Status   01/31/2019 5.15 2.00 - 6.90 10*3/mm3 Final     Neutrophils, Absolute   Date Value Ref Range Status   01/31/2019 4.52 2.00 - 6.90 10*3/mm3 Final     Lymphocytes, Absolute   Date Value Ref Range Status   01/31/2019 1.71 0.60 - 3.40 10*3/mm3 Final     Monocytes, Absolute   Date Value Ref Range Status   01/31/2019 0.36 0.00 - 0.90 10*3/mm3 Final     Eosinophils, Absolute   Date Value Ref Range Status   01/31/2019 0.04 0.00 - 0.70 10*3/mm3 Final     Basophils Absolute   Date Value Ref Range Status   01/31/2019 0.07 0.00 - 0.20 10*3/mm3 Final     Basophils, Absolute   Date Value Ref Range Status   01/31/2019 0.01 0.00 - 0.20 10*3/mm3 Final     Immature Grans, Absolute   Date Value Ref Range Status   01/31/2019 0.05 0.00 - 0.06 10*3/mm3 Final     nRBC   Date Value Ref Range Status   01/31/2019 2.1 (H) 0.0 - 0.0 /100 WBC Final   01/31/2019 2.0 (H) 0.0 - 0.0 /100 WBC Final       Lab Results   Component Value Date    GLUCOSE 101 (H) 01/31/2019    BUN 21 (H) 01/31/2019    CREATININE 0.80 01/31/2019    EGFRIFNONA 83 01/31/2019    BCR 26.3 (H) 01/31/2019    CO2 21.0 (L) 01/31/2019    CALCIUM 8.5 01/31/2019    ALBUMIN 4.10 01/31/2019    LABIL2 1.4 01/27/2016    AST 14 (L) 01/31/2019    ALT 21 01/31/2019       Lab Results   Component Value Date    TROPONINI <0.012 01/31/2019       No results found for: DDIMER    No results found for: SITE, ALLENTEST, PHART, ZQN9CXB, PO2ART, WZC6MAL, BASEEXCESS, S8WLRDFF, HGBBG, HCTABG, OXYHEMOGLOBI, METHHGBN, CARBOXYHGB, CO2CT, BAROMETRIC, MODALITY, FIO2  Lab Results   Component Value Date    HGBA1C 5.3 10/19/2018         Lab Results   Component Value Date    LIPASE 46 04/08/2018       IMAGING DATA:     Xr Chest 1 View    Result Date: 1/31/2019  Narrative: FINAL REPORT CLINICAL HISTORY: preop FINDINGS: A single view of the chest  demonstrates a right subclavian Mediport catheter appropriately positioned with the tip in the SVC.  There is no pneumothorax.  There is borderline cardiomegaly without pulmonary edemaa.  Lungs are clear.  There is  no pleural disease or significant adenopathy.     Impression: No acute disease. Authenticated by Freddie Montgomery M.D. on 01/31/2019 07:25:07 PM        DIAGNOSIS   #1 biventricular failure:  Patient has had biventricular failure of nonischemic etiology.  Recent Workup Has Been Done.  In Association with That She Also Has Moderate Mitral Insufficiency.  Would Be Careful with Respect to Excessive IV Fluid Administration.  As Discussed and Agreed with the Idea of IV Diuresis Being Administered between the Blood Transfusions.  As the Patient Has No Evidence for Coronary Artery Disease She Is Cleared for Her Workup and Surgeries As Needed As a Low-Risk Candidate for perioperative Coronary Event.    #2 mitral insufficiency:  Continues to be at least moderate in severity.  Would be very cautious with respect to fluid administration.  As the blood pressure tolerates will continue to increase afterload reduction.    #3 anemia:  Concomitant volume expansion is a significant issue also.  Continue diuresis as the blood pressure permits would be a prudent move.    Thank you for me take part in the care of Mrs. Hearn with the permission we'll continue to follow her during the hospital stay.          Gastrointestinal hemorrhage          I discussed the patients findings and my recommendations with patient    Zheng Curtis MD  01/31/19  7:27 PM      Please note that portions of this note may have been completed with a voice recognition program. Efforts were made to edit the dictations, but occasionally words are mistranscribed.             Electronically signed by Zheng Curtis MD at 1/31/2019  7:55 PM

## 2019-02-01 NOTE — PLAN OF CARE
Problem: Gastrointestinal Bleeding (Adult)  Goal: Signs and Symptoms of Listed Potential Problems Will be Absent, Minimized or Managed (Gastrointestinal Bleeding)  Outcome: Ongoing (interventions implemented as appropriate)   02/01/19 9483   Goal/Outcome Evaluation   Problems Assessed (GI Bleeding) all   Problems Present (GI Bleeding) other (see comments)  (rectal bleeding)

## 2019-02-01 NOTE — PLAN OF CARE
Problem: Patient Care Overview  Goal: Individualization and Mutuality  Outcome: Ongoing (interventions implemented as appropriate)      Problem: Gastrointestinal Bleeding (Adult)  Goal: Signs and Symptoms of Listed Potential Problems Will be Absent, Minimized or Managed (Gastrointestinal Bleeding)  Outcome: Ongoing (interventions implemented as appropriate)      Problem: Fall Risk (Adult)  Goal: Absence of Fall  Outcome: Ongoing (interventions implemented as appropriate)

## 2019-02-01 NOTE — CONSULTS
"Inpatient General Surgery Consult  Consult performed by: Venkata Wiley MD  Consult ordered by: Ailyn Villa DO            Referring Provider: No ref. provider found    Reason for Consultation: Gastrointestinal bleeding    Patient Care Team:  Malia Powers APRN as PCP - General (Family Medicine)  Shelley Brock MD as Consulting Physician (General Surgery)  Cirilo Deshpande II, MD (Pain Medicine)    Chief complaint   Chief Complaint   Patient presents with   • Rectal Bleeding       Subjective .     History of present illness:  Patient states that she has had a one-month history of lower GI bleeding.  It has been both bright and dark blood.  She states that it is a lot of blood.  Nothing makes it better or worse.  She has had some associated lower abdominal crampy pain.  None today.  She states that feeling somewhat weak and ultimately came to the emergency room was found to have significant anemia.  She has associated constipation.  No diarrhea.    Review of Systems:    Review of Systems - General ROS: No fever or chills.  Generalized weakness and fatigue  Psychological ROS: negative for - Depression or anxiety  HEENT ROS: negative for -  No nasal/oral pharynx drainage or pain. No acute visual complaints.  Respiratory ROS: Some shortness of breath.  Cardiovascular ROS: No current chest pain.  Gastrointestinal ROS: As per history of present illness  Genito-Urinary ROS: negative for - dysuria or hematuria  Musculoskeletal ROS: negative for - gait disturbance or muscle pain  Neurological ROS: No focal complaints.  Skin: no rash    History  Past Medical History:   Diagnosis Date   • Anesthesia     pt reports \"screamed for my  and wouldnt calm down when anesthesia gas used\".   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   • Cancer (CMS/HCC)     LYMPHOMA STAGE 3   • Cardiac arrest (CMS/HCC)     dionisio partum cardiomyopathy   • " Cardiomyopathy (CMS/HCC)    • CHF (congestive heart failure) (CMS/HCC)    • Dermatitis     chronic on face   • Fatty liver    • Full dentures     DOESNT WEAR   • H/O chronic ear infection    • Headaches due to old head trauma    • Heavy menses    • Hemorrhoids    • Hip pain, left    • Hyperlipidemia    • Hypertension    • Short-term memory loss    • Stuttering in conditions classified elsewhere     IF PT GETS TOO NERVOUS OR EXCITED HAS STUTTERING   • 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       Past Surgical History:   Procedure Laterality Date   • CARDIAC CATHETERIZATION N/A 10/22/2018    Procedure: Left Heart Cath;  Surgeon: Zheng Curtis MD;  Location: [facility] CATH INVASIVE LOCATION;  Service: Cardiology   • CARDIAC CATHETERIZATION N/A 10/22/2018    Procedure: Coronary angiography;  Surgeon: Zheng Curtis MD;  Location: [facility] CATH INVASIVE LOCATION;  Service: Cardiology   • CARDIAC CATHETERIZATION N/A 10/22/2018    Procedure: Left ventriculography;  Surgeon: Zheng Curtis MD;  Location: [facility] CATH INVASIVE LOCATION;  Service: Cardiology   •  SECTION     • ENDOMETRIAL ABLATION     • FEMORAL LYMPH NODE BIOPSY/EXCISON Left     lt [facility]    • PORTACATH PLACEMENT N/A 2018    Procedure: INSERTION OF PORTACATH right subclavian;  Surgeon: Shelley Brock MD;  Location: [facility] OR;  Service: General   • TUBAL ABDOMINAL LIGATION         Family History   Problem Relation Age of Onset   • Hypertension Father    • Diabetes Father    • Diabetes Paternal Grandmother        Social History     Socioeconomic History   • Marital status:      Spouse name: Not on file   • Number of children: Not on file   • Years of education: Not on file   • Highest education level: Not on file   Social Needs   • Financial resource strain: Not on file   • Food insecurity - worry: Not on file   • Food insecurity - inability: Not on file   • Transportation needs - medical:  Not on file   • Transportation needs - non-medical: Not on file   Occupational History   • Not on file   Tobacco Use   • Smoking status: Never Smoker   • Smokeless tobacco: Never Used   Substance and Sexual Activity   • Alcohol use: Yes     Comment: OCCASIONALLY   • Drug use: No   • Sexual activity: Defer   Other Topics Concern   • Not on file   Social History Narrative   • Not on file       Allergies   Allergen Reactions   • Erythromycin Other (See Comments)     Pt unsure.   • Macrobid [Nitrofurantoin Monohyd Macro] Other (See Comments)     Pt does not know   • Contrast Dye Itching       Objective     Medications  Current Facility-Administered Medications   Medication Dose Route Frequency Provider Last Rate Last Dose   • acetaminophen (TYLENOL) tablet 650 mg  650 mg Oral Q4H PRN Ailyn Villa DO   650 mg at 02/01/19 0047   • albuterol (PROVENTIL) nebulizer solution 0.083% 2.5 mg/3mL  2.5 mg Nebulization Q6H PRN Ailyn Villa DO       • bisacodyl (DULCOLAX) suppository 10 mg  10 mg Rectal Daily PRN Ailyn Villa DO       • fluticasone (FLONASE) 50 MCG/ACT nasal spray 2 spray  2 spray Each Nare Daily Ailyn Villa DO   2 spray at 02/01/19 0930   • losartan (COZAAR) tablet 25 mg  25 mg Oral Q24H Zheng Curtis MD   25 mg at 01/31/19 2123   • melatonin tablet 6 mg  6 mg Oral Nightly PRN Ailyn Villa DO       • metoprolol succinate XL (TOPROL-XL) 24 hr tablet 50 mg  50 mg Oral Q24H Ailyn Villa DO       • ondansetron (ZOFRAN) injection 4 mg  4 mg Intravenous Q6H PRN Ailyn Villa DO       • oxyCODONE-acetaminophen (PERCOCET) 7.5-325 MG per tablet 1 tablet  1 tablet Oral BID PRN Ailyn Villa DO       • pantoprazole (PROTONIX) 40 mg in sodium chloride 0.9 % 100 mL (0.4 mg/mL) infusion  8 mg/hr Intravenous Continuous Tom Girard DO 20 mL/hr at 02/01/19 1115 8 mg/hr at 02/01/19 1115   • sertraline (ZOLOFT) tablet 150 mg  150 mg Oral Nightly Ailyn Villa DO   150 mg at 01/31/19  2122   • sodium chloride 0.9 % flush 3 mL  3 mL Intravenous Q12H Ailyn Villa, DO   3 mL at 02/01/19 0819   • sodium chloride 0.9 % flush 3 mL  3 mL Intravenous Q12H AnnetteeAilyn G, DO   3 mL at 02/01/19 0818   • sodium chloride 0.9 % flush 3-10 mL  3-10 mL Intravenous PRN Annettee, Ailyn G, DO       • sodium chloride 0.9 % flush 3-10 mL  3-10 mL Intravenous PRN Annettee, Ailyn G, DO       • sodium chloride 0.9 % infusion  30 mL/hr Intravenous Continuous Ailyn Villa G, DO       • spironolactone (ALDACTONE) tablet 25 mg  25 mg Oral Daily Ailyn Villa, DO             Vital Signs   Temp:  [97.4 °F (36.3 °C)-98.9 °F (37.2 °C)] 98 °F (36.7 °C)  Heart Rate:  [65-91] 73  Resp:  [16-20] 16  BP: ()/(42-69) 93/57    Physical Exam:     General Appearance:  Alert and cooperative, not in any acute distress.   Head:  Atraumatic and normocephalic, without obvious abnormality.   Eyes:          PERRLA, conjunctivae and sclerae normal, no Icterus. No pallor. Extraocular movements are within normal limits.   Ears:  Ears appear intact with no abnormalities noted.   Respiratory/Lungs:   Clear to auscultation.     Cardiovascular/Heart:  Normal S1 and S2, no murmur.  Trace  edema   GI/Abdomen:   No masses, no hepatosplenomegaly. Soft, non-tender, non-distended, no hernia                 Musculoskeletal/ Extremities:   Moves all extremities well   Pulses: Pulses palpable and equal bilaterally   Psychiatric : Alert and oriented ×3.  No depression or anxiety    Neurologic: Cranial nerves 2 - 12 grossly intact, sensation intact, Motor power is normal and equal bilaterally.     Results Review:  Lab Results (last 24 hours)     Procedure Component Value Units Date/Time    Hemoglobin & Hematocrit, Blood [[id number]]  (Abnormal) Collected:  02/01/19 1213    Specimen:  Blood Updated:  02/01/19 1220     Hemoglobin 8.4 g/dL      Hematocrit 26.3 %     Scan Slide [[id number]] Collected:  02/01/19 0602    Specimen:  Blood Updated:  02/01/19  0643     Anisocytosis Mod/2+     Hypochromia Slight/1+     Microcytes Slight/1+     Poikilocytes Mod/2+     WBC Morphology Normal     Platelet Morphology Normal    CBC Auto Differential [[id number]]  (Abnormal) Collected:  02/01/19 0602    Specimen:  Blood Updated:  02/01/19 0643     WBC 5.39 10*3/mm3      RBC 3.07 10*6/mm3      Hemoglobin 7.2 g/dL      Hematocrit 22.7 %      MCV 73.9 fL      MCH 23.5 pg      MCHC 31.7 g/dL      RDW 23.1 %      RDW-SD 62.2 fl      MPV 10.2 fL      Platelets 192 10*3/mm3      Neutrophil % 62.7 %      Lymphocyte % 30.6 %      Monocyte % 4.8 %      Eosinophil % 0.7 %      Basophil % 0.6 %      Immature Grans % 0.6 %      Neutrophils, Absolute 3.38 10*3/mm3      Lymphocytes, Absolute 1.65 10*3/mm3      Monocytes, Absolute 0.26 10*3/mm3      Eosinophils, Absolute 0.04 10*3/mm3      Basophils, Absolute 0.03 10*3/mm3      Immature Grans, Absolute 0.03 10*3/mm3      nRBC 1.3 /100 WBC     Basic Metabolic Panel [[id number]]  (Abnormal) Collected:  02/01/19 0602    Specimen:  Blood Updated:  02/01/19 0635     Glucose 99 mg/dL      BUN 15 mg/dL      Creatinine 0.80 mg/dL      Sodium 138 mmol/L      Potassium 4.3 mmol/L      Chloride 113 mmol/L      CO2 20.0 mmol/L      Calcium 8.4 mg/dL      eGFR Non African Amer 83 mL/min/1.73      BUN/Creatinine Ratio 18.8     Anion Gap 9.3 mmol/L     Narrative:       GFR Normal >60  Chronic Kidney Disease <60  Kidney Failure <15    Hemoglobin & Hematocrit, Blood [[id number]]  (Abnormal) Collected:  02/01/19 0034    Specimen:  Blood Updated:  02/01/19 0101     Hemoglobin 6.4 g/dL      Hematocrit 20.7 %     Vitamin B12 [[id number]]  (Normal) Collected:  01/31/19 1345    Specimen:  Blood Updated:  01/31/19 1818     Vitamin B-12 255 pg/mL     Folate [[id number]]  (Normal) Collected:  01/31/19 1345    Specimen:  Blood Updated:  01/31/19 1818     Folate 10.50 ng/mL     Pregnancy, Urine - Urine, Clean Catch [[id number]]  (Normal) Collected:  01/31/19 9016    Specimen:   Urine, Clean Catch Updated:  01/31/19 1802     HCG, Urine QL Negative    Iron Profile [[id number]]  (Abnormal) Collected:  01/31/19 1345    Specimen:  Blood Updated:  01/31/19 1750     Iron 11 mcg/dL      TIBC 470 mcg/dL      Iron Saturation 2 %     Ferritin [[id number]]  (Abnormal) Collected:  01/31/19 1345    Specimen:  Blood Updated:  01/31/19 1750     Ferritin 3.44 ng/mL     Troponin [[id number]]  (Normal) Collected:  01/31/19 1335    Specimen:  Blood Updated:  01/31/19 1720     Troponin I <0.012 ng/mL     Narrative:       Normal Patient Upper Reference Limit (URL) (99th Percentile)=0.03 ng/mL   Non-AMI Illness Reference Limit=0.03-0.11 ng/mL   AMI Confirmation=0.12 ng/mL and above          Assessment/Plan       Gastrointestinal hemorrhage    Patient with GI bleeding of uncertain etiology.  I recommend initial investigation by EGD.  This appears to be chronic in nature.  Also has iron deficiency.  She understands procedure as well as risk of bleeding and perforation and she wishes to proceed.  If this is a normal endoscopy I will recommend a colonoscopy in the morning and she understands this as well with similar risks of bleeding and perforation.  She agrees to both as necessary.    Venkata Wiley MD  02/01/19  2:47 PM

## 2019-02-01 NOTE — CONSULTS
Referring Provider:Dr Villa   Reason for Consultation: pre op clearance     Patient Care Team:  Malia Powers APRN as PCP - General (Family Medicine)  Shelley Brock MD as Consulting Physician (General Surgery)  Cirilo Deshpande II, MD (Pain Medicine)      History of present illness:   Patient has been having bright red blood per rectum and black stools ever since October.  For the last couple of weeks this has been more severe.  Went to see her sister-in-law who thought that she was stupid and needed urgent care.  Patient is been feeling very short of breath on minimal amount of activity.  Has been feeling exhausted.  Since she started taking the new pills there has been some improvement in her symptoms.  She has not passed out at any time.    Review of Systems   Pertinent items are noted in HPI  Review of Systems           History  baseline EKG-sinus tachycardia voltage criteria for left ventricular hypertrophy diffuse ST segment changes.     -LV function assessment EF 60% echocardiac exam 5/18.  EF 30% echocardiogram 10/18.     -CAD workup: Has had multiple stress tests according to the patient lasted about 3-4 years ago told to be normal.  Cardiac catheterization 10/18 normal coronaries.     -Obesity.    -Mitral insufficiency-moderate echocardiogram 10/18.     -Hypertension.     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     -T-cell lymphoma on chemotherapy.    Pulmonary hypertension PA systolic of 45 mm echo 10/18.        Personal history:     Nonsmoker does not drink alcohol function status the patient has been limited.       Family history: Noncontributory.     Review of symptoms:     Has had shortness of breath.  Has been coughing up yellow sputum.  Has had heaviness in the chest.  Has occasional swelling of the lower extremities has been having orthopnea as well as PND.  There is no stroke no weakness joint swelling respiratory symptoms are  negative.        Past Surgical History:   Procedure Laterality Date   • CARDIAC CATHETERIZATION N/A 10/22/2018    Procedure: Left Heart Cath;  Surgeon: Zheng Curtis MD;  Location: [facility] CATH INVASIVE LOCATION;  Service: Cardiology   • CARDIAC CATHETERIZATION N/A 10/22/2018    Procedure: Coronary angiography;  Surgeon: Zheng Curtis MD;  Location: [facility] CATH INVASIVE LOCATION;  Service: Cardiology   • CARDIAC CATHETERIZATION N/A 10/22/2018    Procedure: Left ventriculography;  Surgeon: Zheng Curtis MD;  Location: [facility] CATH INVASIVE LOCATION;  Service: Cardiology   •  SECTION     • ENDOMETRIAL ABLATION     • FEMORAL LYMPH NODE BIOPSY/EXCISON Left     lt [facility]    • PORTACATH PLACEMENT N/A 2018    Procedure: INSERTION OF PORTACATH right subclavian;  Surgeon: Shelley Brock MD;  Location: [facility] OR;  Service: General   • TUBAL ABDOMINAL LIGATION     , Family History   Problem Relation Age of Onset   • Hypertension Father    • Diabetes Father    • Diabetes Paternal Grandmother    , Social History     Tobacco Use   • Smoking status: Never Smoker   • Smokeless tobacco: Never Used   Substance Use Topics   • Alcohol use: Yes     Comment: OCCASIONALLY   • Drug use: No   , Medications Prior to Admission   Medication Sig Dispense Refill Last Dose   • aspirin 81 MG EC tablet Take 1 tablet by mouth Daily. 30 tablet 0 2019 at Unknown time   • atorvastatin (LIPITOR) 20 MG tablet Take 1 tablet by mouth Every Night. 30 tablet 0 2019 at Unknown time   • metoprolol succinate XL (TOPROL-XL) 50 MG 24 hr tablet Take 1 tablet by mouth Daily. 30 tablet 0 2019 at Unknown time   • OxyCODONE ER (XTAMPZA ER) 13.5 MG capsule extended-release 12 hour  Take 1 capsule by mouth 2 (Two) Times a Day.   Past Month at Unknown time   • oxyCODONE-acetaminophen (PERCOCET) 7.5-325 MG per tablet Take 1 tablet by mouth 2 (Two) Times a Day As Needed for Severe Pain .   Past Week at  "Unknown time   • sacubitril-valsartan (ENTRESTO) 24-26 MG tablet Take 1 tablet by mouth Every 12 (Twelve) Hours. 60 tablet 0 1/31/2019 at Unknown time   • sertraline (ZOLOFT) 100 MG tablet Take 100 mg by mouth Every Night.   1/30/2019 at Unknown time   • sertraline (ZOLOFT) 50 MG tablet Take 50 mg by mouth Daily. Total 150 MG daily dose   1/30/2019 at Unknown time   • spironolactone (ALDACTONE) 25 MG tablet Take 1 tablet by mouth Daily. 30 tablet 0 1/31/2019 at Unknown time   • fluticasone (FLONASE) 50 MCG/ACT nasal spray As Needed.   More than a month at Unknown time   • ondansetron ODT (ZOFRAN-ODT) 4 MG disintegrating tablet Take 1 tablet by mouth Every 12 (Twelve) Hours As Needed for Nausea or Vomiting. 10 tablet 0 More than a month at Unknown time   , Scheduled Meds:    atorvastatin 20 mg Oral Nightly   [START ON 2/1/2019] fluticasone 2 spray Each Nare Daily   [START ON 2/1/2019] metoprolol succinate XL 50 mg Oral Q24H   [START ON 2/1/2019] sacubitril-valsartan 1 tablet Oral Q12H   sertraline 150 mg Oral Nightly   sodium chloride 3 mL Intravenous Q12H   sodium chloride 3 mL Intravenous Q12H   [START ON 2/1/2019] spironolactone 25 mg Oral Daily   , Continuous Infusions:    pantoprazole 8 mg/hr Last Rate: 8 mg/hr (01/31/19 1509)   sodium chloride 30 mL/hr    , PRN Meds:  •  acetaminophen  •  albuterol  •  bisacodyl  •  furosemide  •  melatonin  •  ondansetron  •  oxyCODONE-acetaminophen  •  sodium chloride  •  sodium chloride, Allergies:  Erythromycin; Macrobid [nitrofurantoin monohyd macro]; and Contrast dye     Objective     Vital Sign Min/Max for last 24 hours  Temp  Min: 97.7 °F (36.5 °C)  Max: 98.3 °F (36.8 °C)   BP  Min: 89/55  Max: 105/57   Pulse  Min: 77  Max: 91   Resp  Min: 17  Max: 20   SpO2  Min: 97 %  Max: 100 %   No Data Recorded   Weight  Min: 111 kg (245 lb 11.2 oz)  Max: 112 kg (247 lb 6.4 oz)     Flowsheet Rows      First Filed Value   Admission Height  167.6 cm (66\") Documented at 01/31/2019 " 1305   Admission Weight  112 kg (247 lb 6.4 oz) Documented at 01/31/2019 1305               Physical Exam:     General Appearance:    Alert, cooperative, in no acute distress   Head:    Normocephalic, without obvious abnormality, atraumatic   Eyes:            Lids and lashes normal, conjunctivae and sclerae normal, no   icterus, no pallor, corneas clear, PERRLA   Ears:    Ears appear intact with no abnormalities noted   Throat:   No oral lesions, no thrush, oral mucosa moist   Neck:   No adenopathy, supple, trachea midline, no thyromegaly, no     carotid bruit, no JVD   Back:     No kyphosis present, no scoliosis present, no skin lesions,       erythema or scars, no tenderness to percussion or                   palpation,   range of motion normal   Lungs:     Clear to auscultation,respirations regular, even and                   unlabored    Heart:    Regular rhythm and normal rate, normal S1 and S2, no            murmur, no gallop, no rub, no click   Breast Exam:    Deferred   Abdomen:     Normal bowel sounds, no masses, no organomegaly, soft        non-tender, non-distended, no guarding, no rebound                 tenderness   Genitalia:    Deferred   Extremities:   Moves all extremities well, no edema, no cyanosis, no              redness   Pulses:   Pulses palpable and equal bilaterally   Skin:   No bleeding, bruising or rash   Lymph nodes:   No palpable adenopathy   Neurologic:   Cranial nerves 2 - 12 grossly intact, sensation intact, DTR        present and equal bilaterally       Results Review:   I reviewed the patient's new clinical results.    EKG: normal EKG, normal sinus rhythm, unchanged from previous tracings.    Bedside echo today: Moderate mitral insufficiency mild LV dilatation EF of 35-40% septal hypokinesis.  RV is dilated reduced RV systolic function.    LAB DATA :           WBC   Date Value Ref Range Status   01/31/2019 6.69 4.80 - 10.80 10*3/mm3 Final     RBC   Date Value Ref Range Status    01/31/2019 2.06 (L) 4.20 - 5.40 10*6/mm3 Final     Hemoglobin   Date Value Ref Range Status   01/31/2019 4.0 (C) 12.0 - 16.0 g/dL Final     Hematocrit   Date Value Ref Range Status   01/31/2019 13.8 (C) 37.0 - 47.0 % Final     MCV   Date Value Ref Range Status   01/31/2019 67.0 (L) 81.0 - 99.0 fL Final     MCH   Date Value Ref Range Status   01/31/2019 19.4 (L) 27.0 - 31.0 pg Final     MCHC   Date Value Ref Range Status   01/31/2019 29.0 (L) 30.0 - 37.0 g/dL Final     RDW   Date Value Ref Range Status   01/31/2019 21.1 (H) 11.5 - 14.5 % Final     RDW-SD   Date Value Ref Range Status   01/31/2019 51.3 37.0 - 54.0 fl Final     MPV   Date Value Ref Range Status   01/31/2019 9.5 6.0 - 12.0 fL Final     Platelets   Date Value Ref Range Status   01/31/2019 235 130 - 400 10*3/mm3 Final     Neutrophil %   Date Value Ref Range Status   01/31/2019 67.6 37.0 - 80.0 % Final     Lymphocyte %   Date Value Ref Range Status   01/31/2019 25.6 10.0 - 50.0 % Final     Monocyte %   Date Value Ref Range Status   01/31/2019 5.4 0.0 - 12.0 % Final     Eosinophil %   Date Value Ref Range Status   01/31/2019 0.6 0.0 - 7.0 % Final     Basophil %   Date Value Ref Range Status   01/31/2019 0.1 0.0 - 2.5 % Final     Immature Grans %   Date Value Ref Range Status   01/31/2019 0.7 (H) 0.0 - 0.6 % Final     Neutrophils Absolute   Date Value Ref Range Status   01/31/2019 5.15 2.00 - 6.90 10*3/mm3 Final     Neutrophils, Absolute   Date Value Ref Range Status   01/31/2019 4.52 2.00 - 6.90 10*3/mm3 Final     Lymphocytes, Absolute   Date Value Ref Range Status   01/31/2019 1.71 0.60 - 3.40 10*3/mm3 Final     Monocytes, Absolute   Date Value Ref Range Status   01/31/2019 0.36 0.00 - 0.90 10*3/mm3 Final     Eosinophils, Absolute   Date Value Ref Range Status   01/31/2019 0.04 0.00 - 0.70 10*3/mm3 Final     Basophils Absolute   Date Value Ref Range Status   01/31/2019 0.07 0.00 - 0.20 10*3/mm3 Final     Basophils, Absolute   Date Value Ref Range Status    01/31/2019 0.01 0.00 - 0.20 10*3/mm3 Final     Immature Grans, Absolute   Date Value Ref Range Status   01/31/2019 0.05 0.00 - 0.06 10*3/mm3 Final     nRBC   Date Value Ref Range Status   01/31/2019 2.1 (H) 0.0 - 0.0 /100 WBC Final   01/31/2019 2.0 (H) 0.0 - 0.0 /100 WBC Final       Lab Results   Component Value Date    GLUCOSE 101 (H) 01/31/2019    BUN 21 (H) 01/31/2019    CREATININE 0.80 01/31/2019    EGFRIFNONA 83 01/31/2019    BCR 26.3 (H) 01/31/2019    CO2 21.0 (L) 01/31/2019    CALCIUM 8.5 01/31/2019    ALBUMIN 4.10 01/31/2019    LABIL2 1.4 01/27/2016    AST 14 (L) 01/31/2019    ALT 21 01/31/2019       Lab Results   Component Value Date    TROPONINI <0.012 01/31/2019       No results found for: DDIMER    No results found for: SITE, ALLENTEST, PHART, WTT5RHP, PO2ART, QTW7OUG, BASEEXCESS, F2AMJYZL, HGBBG, HCTABG, OXYHEMOGLOBI, METHHGBN, CARBOXYHGB, CO2CT, BAROMETRIC, MODALITY, FIO2  Lab Results   Component Value Date    HGBA1C 5.3 10/19/2018         Lab Results   Component Value Date    LIPASE 46 04/08/2018       IMAGING DATA:     Xr Chest 1 View    Result Date: 1/31/2019  Narrative: FINAL REPORT CLINICAL HISTORY: preop FINDINGS: A single view of the chest demonstrates a right subclavian Mediport catheter appropriately positioned with the tip in the SVC.  There is no pneumothorax.  There is borderline cardiomegaly without pulmonary edemaa.  Lungs are clear.  There is  no pleural disease or significant adenopathy.     Impression: No acute disease. Authenticated by Freddie Montgomery M.D. on 01/31/2019 07:25:07 PM        DIAGNOSIS   #1 biventricular failure:  Patient has had biventricular failure of nonischemic etiology.  Recent Workup Has Been Done.  In Association with That She Also Has Moderate Mitral Insufficiency.  Would Be Careful with Respect to Excessive IV Fluid Administration.  As Discussed and Agreed with the Idea of IV Diuresis Being Administered between the Blood Transfusions.  As the Patient Has No Evidence  for Coronary Artery Disease She Is Cleared for Her Workup and Surgeries As Needed As a Low-Risk Candidate for perioperative Coronary Event.    #2 mitral insufficiency:  Continues to be at least moderate in severity.  Would be very cautious with respect to fluid administration.  As the blood pressure tolerates will continue to increase afterload reduction.    #3 anemia:  Concomitant volume expansion is a significant issue also.  Continue diuresis as the blood pressure permits would be a prudent move.    Thank you for me take part in the care of Mrs. Hearn with the permission we'll continue to follow her during the hospital stay.          Gastrointestinal hemorrhage          I discussed the patients findings and my recommendations with patient    Zheng Curtis MD  01/31/19  7:27 PM      Please note that portions of this note may have been completed with a voice recognition program. Efforts were made to edit the dictations, but occasionally words are mistranscribed.

## 2019-02-01 NOTE — ANESTHESIA PREPROCEDURE EVALUATION
Anesthesia Evaluation     Patient summary reviewed and Nursing notes reviewed   no history of anesthetic complications:  NPO Solid Status: > 8 hours  NPO Liquid Status: > 8 hours           Airway   Mallampati: I  TM distance: >3 FB  Neck ROM: full  no difficulty expected  Dental - normal exam     Pulmonary - normal exam   (+) sleep apnea,   Cardiovascular - normal exam    (+) hypertension, past MI , CHF, hyperlipidemia,     ROS comment: 10/22/2018 heart cath   Left main coronary artery: Nonexistent.     Left anterior descending coronary artery type II vessel 2 diagonal branches minimal luminal irregularities ostial diagonal 1 is a 30% narrowing no area of atherosclerotic narrowing seen.     Circumflex coronary artery nondominant vessel and obtuse marginal branch no atherosclerotic narrowing seen.     Right coronary artery dominant vessel large in caliber no atherosclerotic narrowing noted through its course.     Left ventricle overall LV systolic function appears to be moderately reduced ejection fraction is 35-40% global hypokinesis of the left ventricle seen.     Hemodynamics:  LVEDP of 23.  Ejection fraction 35-40%.     Conclusions:     #1 normal coronary arteriogram.     #2 mild to moderate dilatation of the left ventricle with mild elevation in left ventricular end diastolic pressures and moderate reduction in LV systolic function (EF 35-40%)    DIAGNOSIS   #1 biventricular failure:  Patient has had biventricular failure of nonischemic etiology.  Recent Workup Has Been Done.  In Association with That She Also Has Moderate Mitral Insufficiency.  Would Be Careful with Respect to Excessive IV Fluid Administration.  As Discussed and Agreed with the Idea of IV Diuresis Being Administered between the Blood Transfusions.  As the Patient Has No Evidence for Coronary Artery Disease She Is Cleared for Her Workup and Surgeries As Needed As a Low-Risk Candidate for perioperative Coronary Event.     #2 mitral  insufficiency:  Continues to be at least moderate in severity.  Would be very cautious with respect to fluid administration.  As the blood pressure tolerates will continue to increase afterload reduction.     #3 anemia:  Concomitant volume expansion is a significant issue also.  Continue diuresis as the blood pressure permits would be a prudent move.     Thank you for me take part in the care of Mrs. Hearn with the permission we'll continue to follow her during the hospital stay.          Neuro/Psych  (+) headaches, numbness, psychiatric history,     GI/Hepatic/Renal/Endo    (+)  GI bleeding, liver disease,     Musculoskeletal     Abdominal    Substance History - negative use     OB/GYN negative ob/gyn ROS         Other   (+) arthritis                     Anesthesia Plan    ASA 3     MAC     intravenous induction   Anesthetic plan, all risks, benefits, and alternatives have been provided, discussed and informed consent has been obtained with: patient.

## 2019-02-02 ENCOUNTER — APPOINTMENT (OUTPATIENT)
Dept: GENERAL RADIOLOGY | Facility: HOSPITAL | Age: 34
End: 2019-02-02

## 2019-02-02 ENCOUNTER — ANESTHESIA EVENT (OUTPATIENT)
Dept: PERIOP | Facility: HOSPITAL | Age: 34
End: 2019-02-02

## 2019-02-02 ENCOUNTER — ANESTHESIA (OUTPATIENT)
Dept: GASTROENTEROLOGY | Facility: HOSPITAL | Age: 34
End: 2019-02-02

## 2019-02-02 ENCOUNTER — ANESTHESIA (OUTPATIENT)
Dept: PERIOP | Facility: HOSPITAL | Age: 34
End: 2019-02-02

## 2019-02-02 ENCOUNTER — ANESTHESIA EVENT (OUTPATIENT)
Dept: GASTROENTEROLOGY | Facility: HOSPITAL | Age: 34
End: 2019-02-02

## 2019-02-02 PROBLEM — K62.5 GASTROINTESTINAL HEMORRHAGE ASSOCIATED WITH ANORECTAL SOURCE: Status: ACTIVE | Noted: 2019-01-31

## 2019-02-02 LAB
ABO + RH BLD: NORMAL
ALBUMIN SERPL-MCNC: 3.8 G/DL (ref 3.5–5)
ALBUMIN/GLOB SERPL: 1.5 G/DL (ref 1–2)
ALP SERPL-CCNC: 64 U/L (ref 38–126)
ALT SERPL W P-5'-P-CCNC: 23 U/L (ref 13–69)
ANION GAP SERPL CALCULATED.3IONS-SCNC: 13.5 MMOL/L (ref 10–20)
ANISOCYTOSIS BLD QL: NORMAL
AST SERPL-CCNC: 15 U/L (ref 15–46)
BASOPHILS # BLD AUTO: 0.02 10*3/MM3 (ref 0–0.2)
BASOPHILS NFR BLD AUTO: 0.4 % (ref 0–2.5)
BH BB BLOOD EXPIRATION DATE: NORMAL
BH BB BLOOD TYPE BARCODE: 6200
BH BB DISPENSE STATUS: NORMAL
BH BB PRODUCT CODE: NORMAL
BH BB UNIT NUMBER: NORMAL
BILIRUB SERPL-MCNC: 1.3 MG/DL (ref 0.2–1.3)
BUN BLD-MCNC: 13 MG/DL (ref 7–20)
BUN/CREAT SERPL: 14.4 (ref 7.1–23.5)
CALCIUM SPEC-SCNC: 8.4 MG/DL (ref 8.4–10.2)
CHLORIDE SERPL-SCNC: 109 MMOL/L (ref 98–107)
CO2 SERPL-SCNC: 20 MMOL/L (ref 26–30)
CREAT BLD-MCNC: 0.9 MG/DL (ref 0.6–1.3)
CROSSMATCH INTERPRETATION: NORMAL
DEPRECATED RDW RBC AUTO: 61.3 FL (ref 37–54)
EOSINOPHIL # BLD AUTO: 0.03 10*3/MM3 (ref 0–0.7)
EOSINOPHIL NFR BLD AUTO: 0.5 % (ref 0–7)
ERYTHROCYTE [DISTWIDTH] IN BLOOD BY AUTOMATED COUNT: 22.3 % (ref 11.5–14.5)
GFR SERPL CREATININE-BSD FRML MDRD: 72 ML/MIN/1.73
GLOBULIN UR ELPH-MCNC: 2.6 GM/DL
GLUCOSE BLD-MCNC: 89 MG/DL (ref 74–98)
HCT VFR BLD AUTO: 24.6 % (ref 37–47)
HGB BLD-MCNC: 8 G/DL (ref 12–16)
HYPOCHROMIA BLD QL: NORMAL
IMM GRANULOCYTES # BLD AUTO: 0.04 10*3/MM3 (ref 0–0.06)
IMM GRANULOCYTES NFR BLD AUTO: 0.7 % (ref 0–0.6)
LYMPHOCYTES # BLD AUTO: 1.26 10*3/MM3 (ref 0.6–3.4)
LYMPHOCYTES NFR BLD AUTO: 22.4 % (ref 10–50)
MCH RBC QN AUTO: 24.8 PG (ref 27–31)
MCHC RBC AUTO-ENTMCNC: 32.5 G/DL (ref 30–37)
MCV RBC AUTO: 76.4 FL (ref 81–99)
MICROCYTES BLD QL: NORMAL
MONOCYTES # BLD AUTO: 0.32 10*3/MM3 (ref 0–0.9)
MONOCYTES NFR BLD AUTO: 5.7 % (ref 0–12)
NEUTROPHILS # BLD AUTO: 3.96 10*3/MM3 (ref 2–6.9)
NEUTROPHILS NFR BLD AUTO: 70.3 % (ref 37–80)
NRBC BLD AUTO-RTO: 1.1 /100 WBC (ref 0–0)
PLATELET # BLD AUTO: 163 10*3/MM3 (ref 130–400)
PMV BLD AUTO: 9.5 FL (ref 6–12)
POTASSIUM BLD-SCNC: 3.5 MMOL/L (ref 3.5–5.1)
PROT SERPL-MCNC: 6.4 G/DL (ref 6.3–8.2)
RBC # BLD AUTO: 3.22 10*6/MM3 (ref 4.2–5.4)
SMALL PLATELETS BLD QL SMEAR: ADEQUATE
SODIUM BLD-SCNC: 139 MMOL/L (ref 137–145)
UNIT  ABO: NORMAL
UNIT  RH: NORMAL
WBC MORPH BLD: NORMAL
WBC NRBC COR # BLD: 5.63 10*3/MM3 (ref 4.8–10.8)

## 2019-02-02 PROCEDURE — 25010000002 FUROSEMIDE PER 20 MG: Performed by: INTERNAL MEDICINE

## 2019-02-02 PROCEDURE — 80053 COMPREHEN METABOLIC PANEL: CPT | Performed by: FAMILY MEDICINE

## 2019-02-02 PROCEDURE — 06BY4ZC EXCISION OF HEMORRHOIDAL PLEXUS, PERCUTANEOUS ENDOSCOPIC APPROACH: ICD-10-PCS | Performed by: SURGERY

## 2019-02-02 PROCEDURE — 0DJD8ZZ INSPECTION OF LOWER INTESTINAL TRACT, VIA NATURAL OR ARTIFICIAL OPENING ENDOSCOPIC: ICD-10-PCS | Performed by: SURGERY

## 2019-02-02 PROCEDURE — 85007 BL SMEAR W/DIFF WBC COUNT: CPT | Performed by: FAMILY MEDICINE

## 2019-02-02 PROCEDURE — 25010000002 PROPOFOL 10 MG/ML EMULSION: Performed by: NURSE ANESTHETIST, CERTIFIED REGISTERED

## 2019-02-02 PROCEDURE — 46945 INT HRHC LIG 1 HROID W/O IMG: CPT | Performed by: SURGERY

## 2019-02-02 PROCEDURE — 25010000002 FENTANYL CITRATE (PF) 100 MCG/2ML SOLUTION: Performed by: NURSE ANESTHETIST, CERTIFIED REGISTERED

## 2019-02-02 PROCEDURE — 99232 SBSQ HOSP IP/OBS MODERATE 35: CPT | Performed by: INTERNAL MEDICINE

## 2019-02-02 PROCEDURE — 25010000003 AMPICILLIN-SULBACTAM PER 1.5 G: Performed by: SURGERY

## 2019-02-02 PROCEDURE — 25010000002 MIDAZOLAM PER 1 MG: Performed by: NURSE ANESTHETIST, CERTIFIED REGISTERED

## 2019-02-02 PROCEDURE — 85025 COMPLETE CBC W/AUTO DIFF WBC: CPT | Performed by: FAMILY MEDICINE

## 2019-02-02 PROCEDURE — 71045 X-RAY EXAM CHEST 1 VIEW: CPT

## 2019-02-02 PROCEDURE — S0260 H&P FOR SURGERY: HCPCS | Performed by: SURGERY

## 2019-02-02 PROCEDURE — 25010000002 MORPHINE PER 10 MG: Performed by: SURGERY

## 2019-02-02 RX ORDER — FUROSEMIDE 10 MG/ML
20 INJECTION INTRAMUSCULAR; INTRAVENOUS ONCE
Status: COMPLETED | OUTPATIENT
Start: 2019-02-02 | End: 2019-02-02

## 2019-02-02 RX ORDER — LIDOCAINE HYDROCHLORIDE 20 MG/ML
INJECTION, SOLUTION INFILTRATION; PERINEURAL AS NEEDED
Status: DISCONTINUED | OUTPATIENT
Start: 2019-02-02 | End: 2019-02-02 | Stop reason: SURG

## 2019-02-02 RX ORDER — MIDAZOLAM HYDROCHLORIDE 1 MG/ML
INJECTION INTRAMUSCULAR; INTRAVENOUS AS NEEDED
Status: DISCONTINUED | OUTPATIENT
Start: 2019-02-02 | End: 2019-02-02 | Stop reason: SURG

## 2019-02-02 RX ORDER — MAGNESIUM HYDROXIDE 1200 MG/15ML
LIQUID ORAL AS NEEDED
Status: DISCONTINUED | OUTPATIENT
Start: 2019-02-02 | End: 2019-02-02 | Stop reason: HOSPADM

## 2019-02-02 RX ORDER — BUPIVACAINE HYDROCHLORIDE AND EPINEPHRINE 5; 5 MG/ML; UG/ML
INJECTION, SOLUTION EPIDURAL; INTRACAUDAL; PERINEURAL AS NEEDED
Status: DISCONTINUED | OUTPATIENT
Start: 2019-02-02 | End: 2019-02-02 | Stop reason: HOSPADM

## 2019-02-02 RX ORDER — OXYCODONE AND ACETAMINOPHEN 10; 325 MG/1; MG/1
1 TABLET ORAL EVERY 4 HOURS PRN
Status: COMPLETED | OUTPATIENT
Start: 2019-02-02 | End: 2019-02-02

## 2019-02-02 RX ORDER — FENTANYL CITRATE 50 UG/ML
INJECTION, SOLUTION INTRAMUSCULAR; INTRAVENOUS AS NEEDED
Status: DISCONTINUED | OUTPATIENT
Start: 2019-02-02 | End: 2019-02-02 | Stop reason: SURG

## 2019-02-02 RX ORDER — PROPOFOL 10 MG/ML
VIAL (ML) INTRAVENOUS AS NEEDED
Status: DISCONTINUED | OUTPATIENT
Start: 2019-02-02 | End: 2019-02-02 | Stop reason: SURG

## 2019-02-02 RX ORDER — SODIUM CHLORIDE 9 MG/ML
INJECTION, SOLUTION INTRAVENOUS CONTINUOUS PRN
Status: DISCONTINUED | OUTPATIENT
Start: 2019-02-02 | End: 2019-02-02 | Stop reason: SURG

## 2019-02-02 RX ORDER — MORPHINE SULFATE 2 MG/ML
2 INJECTION, SOLUTION INTRAMUSCULAR; INTRAVENOUS
Status: DISCONTINUED | OUTPATIENT
Start: 2019-02-02 | End: 2019-02-04 | Stop reason: HOSPADM

## 2019-02-02 RX ORDER — SODIUM CHLORIDE, SODIUM LACTATE, POTASSIUM CHLORIDE, CALCIUM CHLORIDE 600; 310; 30; 20 MG/100ML; MG/100ML; MG/100ML; MG/100ML
50 INJECTION, SOLUTION INTRAVENOUS CONTINUOUS
Status: DISCONTINUED | OUTPATIENT
Start: 2019-02-02 | End: 2019-02-03

## 2019-02-02 RX ORDER — OXYCODONE HYDROCHLORIDE AND ACETAMINOPHEN 5; 325 MG/1; MG/1
1 TABLET ORAL EVERY 4 HOURS PRN
Status: DISCONTINUED | OUTPATIENT
Start: 2019-02-02 | End: 2019-02-04 | Stop reason: HOSPADM

## 2019-02-02 RX ORDER — IBUPROFEN 200 MG
TABLET ORAL AS NEEDED
Status: DISCONTINUED | OUTPATIENT
Start: 2019-02-02 | End: 2019-02-02 | Stop reason: HOSPADM

## 2019-02-02 RX ORDER — KETAMINE HYDROCHLORIDE 50 MG/ML
INJECTION, SOLUTION, CONCENTRATE INTRAMUSCULAR; INTRAVENOUS AS NEEDED
Status: DISCONTINUED | OUTPATIENT
Start: 2019-02-02 | End: 2019-02-02 | Stop reason: SURG

## 2019-02-02 RX ADMIN — PANTOPRAZOLE SODIUM 40 MG: 40 INJECTION, POWDER, FOR SOLUTION INTRAVENOUS at 07:34

## 2019-02-02 RX ADMIN — FUROSEMIDE 20 MG: 10 INJECTION, SOLUTION INTRAMUSCULAR; INTRAVENOUS at 09:53

## 2019-02-02 RX ADMIN — PANTOPRAZOLE SODIUM 40 MG: 40 INJECTION, POWDER, FOR SOLUTION INTRAVENOUS at 20:24

## 2019-02-02 RX ADMIN — SERTRALINE HYDROCHLORIDE 150 MG: 50 TABLET ORAL at 20:24

## 2019-02-02 RX ADMIN — MORPHINE SULFATE 2 MG: 2 INJECTION, SOLUTION INTRAMUSCULAR; INTRAVENOUS at 15:48

## 2019-02-02 RX ADMIN — LOSARTAN POTASSIUM 25 MG: 25 TABLET, FILM COATED ORAL at 07:34

## 2019-02-02 RX ADMIN — FENTANYL CITRATE 25 MCG: 50 INJECTION, SOLUTION INTRAMUSCULAR; INTRAVENOUS at 14:21

## 2019-02-02 RX ADMIN — SPIRONOLACTONE 25 MG: 25 TABLET, FILM COATED ORAL at 07:35

## 2019-02-02 RX ADMIN — ACETAMINOPHEN 650 MG: 325 TABLET, FILM COATED ORAL at 02:24

## 2019-02-02 RX ADMIN — PROPOFOL 550 MG: 10 INJECTION, EMULSION INTRAVENOUS at 15:13

## 2019-02-02 RX ADMIN — LIDOCAINE HYDROCHLORIDE 100 MG: 20 INJECTION, SOLUTION INFILTRATION; PERINEURAL at 08:04

## 2019-02-02 RX ADMIN — FENTANYL CITRATE 25 MCG: 50 INJECTION, SOLUTION INTRAMUSCULAR; INTRAVENOUS at 14:38

## 2019-02-02 RX ADMIN — MIDAZOLAM HYDROCHLORIDE 2 MG: 1 INJECTION, SOLUTION INTRAMUSCULAR; INTRAVENOUS at 14:15

## 2019-02-02 RX ADMIN — METOPROLOL SUCCINATE 50 MG: 50 TABLET, EXTENDED RELEASE ORAL at 07:34

## 2019-02-02 RX ADMIN — OXYCODONE HYDROCHLORIDE AND ACETAMINOPHEN 1 TABLET: 10; 325 TABLET ORAL at 20:24

## 2019-02-02 RX ADMIN — SODIUM CHLORIDE: 9 INJECTION, SOLUTION INTRAVENOUS at 07:56

## 2019-02-02 RX ADMIN — FENTANYL CITRATE 25 MCG: 50 INJECTION, SOLUTION INTRAMUSCULAR; INTRAVENOUS at 14:32

## 2019-02-02 RX ADMIN — SODIUM CHLORIDE 3 G: 9 INJECTION, SOLUTION INTRAVENOUS at 14:13

## 2019-02-02 RX ADMIN — SODIUM CHLORIDE: 9 INJECTION, SOLUTION INTRAVENOUS at 14:13

## 2019-02-02 RX ADMIN — SODIUM CHLORIDE, POTASSIUM CHLORIDE, SODIUM LACTATE AND CALCIUM CHLORIDE 50 ML/HR: 600; 310; 30; 20 INJECTION, SOLUTION INTRAVENOUS at 20:32

## 2019-02-02 RX ADMIN — SODIUM CHLORIDE, PRESERVATIVE FREE 3 ML: 5 INJECTION INTRAVENOUS at 20:32

## 2019-02-02 RX ADMIN — LIDOCAINE HYDROCHLORIDE 100 MG: 20 INJECTION, SOLUTION INFILTRATION; PERINEURAL at 14:21

## 2019-02-02 RX ADMIN — MELATONIN TAB 3 MG 6 MG: 3 TAB at 20:28

## 2019-02-02 RX ADMIN — KETAMINE HYDROCHLORIDE 50 MG: 50 INJECTION, SOLUTION INTRAMUSCULAR; INTRAVENOUS at 14:21

## 2019-02-02 RX ADMIN — SODIUM CHLORIDE, POTASSIUM CHLORIDE, SODIUM LACTATE AND CALCIUM CHLORIDE 50 ML/HR: 600; 310; 30; 20 INJECTION, SOLUTION INTRAVENOUS at 15:49

## 2019-02-02 RX ADMIN — SODIUM CHLORIDE, PRESERVATIVE FREE 3 ML: 5 INJECTION INTRAVENOUS at 20:27

## 2019-02-02 RX ADMIN — FENTANYL CITRATE 25 MCG: 50 INJECTION, SOLUTION INTRAMUSCULAR; INTRAVENOUS at 14:26

## 2019-02-02 RX ADMIN — PROPOFOL 200 MG: 10 INJECTION, EMULSION INTRAVENOUS at 08:31

## 2019-02-02 RX ADMIN — FLUTICASONE PROPIONATE 2 SPRAY: 50 SPRAY, METERED NASAL at 07:35

## 2019-02-02 NOTE — PROGRESS NOTES
Patient: Johny Hearn  Procedure(s):  COLONOSCOPY  Anesthesia type: [unfilled]    Patient location: ICU  Last vitals:   Vitals:    02/02/19 0750   BP: 100/55   Pulse: 88   Resp: 16   Temp: 98.4 °F (36.9 °C)   SpO2: 96%     Level of consciousness: awake, alert and oriented    Post-anesthesia pain: adequate analgesia  Airway patency: patent  Respiratory: unassisted  Cardiovascular: stable and blood pressure at baseline  Hydration: euvolemic    Anesthetic complications: no

## 2019-02-02 NOTE — OP NOTE
PATIENT:    Johny Hearn    DATE OF SURGERY:  1/31/2019 - 2/2/2019    PHYSICIAN:    Venkata Wiley MD    REFERRING PHYSICIAN:  No ref. provider found    YOB: 1985    PREOPERATIVE DIAGNOSIS:  Type III hemorrhoid was severe bleeding    POSTOPERATIVE DIAGNOSIS:  Same    PROCEDURE:  Single column right posterior column hemorrhoidectomy      ANESTHESIA:  Betty     OPERATIVE PROCEDURE:  The patient was taken to the operating room, placed in the supine position, and given Mac anesthesia.  Patient was placed in lithotomy position.  Reinspection of the perianal area again revealed a right posterior column hemorrhoid with evidence of significant bleeding from that hemorrhoid.  The bleeding appeared to be coming from a chronic site in the anal canal just below the dentate line.  This was a rather large hemorrhoid.  Incidentally the left-sided hemorrhoid that was in a more posterior location is also relatively large but no evidence of bleeding involving that hemorrhoid and I left this alone.    0.5% Marcaine was used locally after the perianal area was prepped in usual fashion.  Base of the hemorrhoid was ligated with a chromic suture.  Elliptical incision was made around the right posterior column hemorrhoid and it was fully excised.  Using existing suture the wound was then reapproximated in a running simple fashion incorporating the base as well.  I checked for hemostasis which had been well controlled.  Patient tolerated procedure well and there were no complications.    The patient was stable at this point in time and subsequently transferred back to the recovery room in stable condition.       Venkata Wiley MD  2/2/2019  3:27 PM

## 2019-02-02 NOTE — PROGRESS NOTES
I spoke to the patient preoperatively about my findings during colonoscopy.  She has had a long history of hemorrhoid issues.  I do believe that this hemorrhoid bleeding as the cause of her issue since no other findings were identified during upper or lower endoscopy.  She had fairly brisk bleeding during the colonoscopy exam from that hemorrhoid.  I recommend a hemorrhoidectomy.  She understands the procedure.  She also understands the risks of bleeding and infection including severe infections.  She also understands that she'll have likely other hemorrhoids that may or may not need to be addressed in the future.  She understands all of the above and wishes to proceed    Venkata Wiley MD

## 2019-02-02 NOTE — ANESTHESIA PREPROCEDURE EVALUATION
Anesthesia Evaluation     Patient summary reviewed and Nursing notes reviewed   no history of anesthetic complications:  NPO Solid Status: > 8 hours  NPO Liquid Status: > 8 hours           Airway   Mallampati: II  TM distance: >3 FB  Neck ROM: full  no difficulty expected  Dental - normal exam     Pulmonary - normal exam   (+) sleep apnea,   Cardiovascular - normal exam    Rhythm: regular  Rate: normal    (+) hypertension, past MI  >12 months, CHF, hyperlipidemia,       Neuro/Psych  (+) headaches, numbness, psychiatric history Anxiety and Depression,     GI/Hepatic/Renal/Endo    (+)  GI bleeding, liver disease,     Musculoskeletal     Abdominal    Substance History - negative use     OB/GYN negative ob/gyn ROS         Other   (+) arthritis   history of cancer                    Anesthesia Plan    ASA 3     MAC   (Pt told that intravenous sedation will be used as the primary anesthetic along with local anesthesia if necessary. Every effort will be made to make sure the patient is comfortable.     The patient was told they may or may not have recall for the procedure. It was further explained that if the MAC was not adequate that a general anesthetic with either an LMA or endotracheal tube would be required.     Will proceed with the plan of care.)  intravenous induction   Anesthetic plan, all risks, benefits, and alternatives have been provided, discussed and informed consent has been obtained with: patient.

## 2019-02-02 NOTE — ANESTHESIA POSTPROCEDURE EVALUATION
Patient: Johny Hearn    Procedure Summary     Date:  02/02/19 Room / Location:  [facility] OR 2 /  [facility]    Anesthesia Start:  1413 Anesthesia Stop:  1530    Procedure:  HEMORRHOIDECTOMY (N/A Anus) Diagnosis:       Gastrointestinal hemorrhage associated with anorectal source      (Gastrointestinal hemorrhage associated with anorectal source [K62.5])    Surgeon:  Venkata Wiley MD Provider:  Johnny Farris CRNA    Anesthesia Type:  MAC ASA Status:  3          Anesthesia Type: MAC  Last vitals  BP   98/57 (02/04/19 0715)   Temp   97.4 °F (36.3 °C) (02/04/19 0715)   Pulse   70 (02/04/19 0715)   Resp   18 (02/04/19 0715)     SpO2   95 % (02/04/19 0715)     Post Anesthesia Care and Evaluation    Patient location during evaluation: ICU  Patient participation: complete - patient participated  Level of consciousness: awake  Pain score: 1  Pain management: adequate  Airway patency: patent  Anesthetic complications: No anesthetic complications  PONV Status: controlled  Cardiovascular status: acceptable and stable  Respiratory status: acceptable  Hydration status: acceptable

## 2019-02-02 NOTE — PLAN OF CARE
Problem: Patient Care Overview  Goal: Plan of Care Review  Outcome: Ongoing (interventions implemented as appropriate)      Problem: Gastrointestinal Bleeding (Adult)  Goal: Signs and Symptoms of Listed Potential Problems Will be Absent, Minimized or Managed (Gastrointestinal Bleeding)  Outcome: Ongoing (interventions implemented as appropriate)      Problem: Fall Risk (Adult)  Goal: Absence of Fall  Outcome: Ongoing (interventions implemented as appropriate)

## 2019-02-02 NOTE — ANESTHESIA POSTPROCEDURE EVALUATION
Patient: Johny Hearn    Procedure Summary     Date:  02/02/19 Room / Location:  [facility] / [facility]    Anesthesia Start:  0756 Anesthesia Stop:  0839    Procedure:  COLONOSCOPY (N/A Anus) Diagnosis:       Gastrointestinal hemorrhage, unspecified gastrointestinal hemorrhage type      (Gastrointestinal hemorrhage, unspecified gastrointestinal hemorrhage type [K92.2])    Surgeon:  Venkata Wiley MD Provider:  Johnny Farris CRNA    Anesthesia Type:  MAC ASA Status:  3          Anesthesia Type: MAC  Last vitals  BP   101/63 (02/02/19 1205)   Temp   98.6 °F (37 °C) (02/02/19 1205)   Pulse   83 (02/02/19 1205)   Resp   17 (02/02/19 1205)     SpO2   97 % (02/02/19 1205)     Post Anesthesia Care and Evaluation    Patient location during evaluation: ICU  Patient participation: complete - patient participated  Level of consciousness: awake  Pain score: 1  Pain management: adequate  Airway patency: patent  Anesthetic complications: No anesthetic complications  PONV Status: controlled  Cardiovascular status: acceptable and stable  Respiratory status: acceptable  Hydration status: acceptable

## 2019-02-02 NOTE — PLAN OF CARE
Problem: Gastrointestinal Bleeding (Adult)  Goal: Signs and Symptoms of Listed Potential Problems Will be Absent, Minimized or Managed (Gastrointestinal Bleeding)  Outcome: Ongoing (interventions implemented as appropriate)   02/02/19 0211   Goal/Outcome Evaluation   Problems Assessed (GI Bleeding) all   Problems Present (GI Bleeding) other (see comments)  (colonoscopy planned for this a.m.)

## 2019-02-02 NOTE — ANESTHESIA PREPROCEDURE EVALUATION
Anesthesia Evaluation     Patient summary reviewed and Nursing notes reviewed   no history of anesthetic complications:  NPO Solid Status: > 8 hours  NPO Liquid Status: > 8 hours           Airway   Mallampati: II  TM distance: >3 FB  Neck ROM: full  no difficulty expected  Dental - normal exam     Pulmonary - normal exam   (+) sleep apnea,   Cardiovascular - normal exam    Rhythm: regular  Rate: normal    (+) hypertension, past MI , CHF, hyperlipidemia,       Neuro/Psych  (+) headaches, numbness, psychiatric history Anxiety and Depression,     GI/Hepatic/Renal/Endo    (+) obesity, morbid obesity, GI bleeding, liver disease,     Musculoskeletal     Abdominal    Substance History - negative use     OB/GYN negative ob/gyn ROS         Other   (+) arthritis   history of cancer                    Anesthesia Plan    ASA 3     MAC   (Pt told that intravenous sedation will be used as the primary anesthetic along with local anesthesia if necessary. Every effort will be made to make sure the patient is comfortable.     The patient was told they may or may not have recall for the procedure. It was further explained that if the MAC was not adequate that a general anesthetic with either an LMA or endotracheal tube would be required.     Will proceed with the plan of care.)  intravenous induction   Anesthetic plan, all risks, benefits, and alternatives have been provided, discussed and informed consent has been obtained with: patient.

## 2019-02-02 NOTE — PROGRESS NOTES
"    [facility]     LOS: 2 days    Patient Care Team:  Malia Powers APRN as PCP - General (Family Medicine)  Shelley Brock MD as Consulting Physician (General Surgery)  Cirilo Deshpande II, MD (Pain Medicine)    Chief Complaint:    Chief Complaint   Patient presents with   • Rectal Bleeding       Subjective   I have reviewed labs/imaging/records from this hospitalization, including ER staff and admitting/attending physicians H/P's and progress notes to establish a comprehensive understanding of this patient's clinical hospital course, as well as to establish plan of care appropriately.     Patient seen and examined this morning.  Events from last night noted.  Patient denies having any fevers chills.  No nausea or vomiting no abdominal pain.  Denies any chest pain, shortness of breath or cough and sputum production.  There is no significant edema.   Patient also denies having new onset weakness of numbness of either extremity.  She had colonoscopy done this morning that was reported as negative except as hemorrhoids.  She has been crying and very anxious since the colonoscopy.      Review of Systems:    The pertinent  ROS was done and it is noted above, rest  was negative.    Objective     Vital Signs  /55   Pulse 88   Temp 98.4 °F (36.9 °C) (Oral)   Resp 16   Ht 172.7 cm (68\")   Wt 111 kg (244 lb 9.6 oz)   SpO2 96%   BMI 37.19 kg/m²       I/O this shift:  In: 300 [I.V.:300]  Out: -     Intake/Output Summary (Last 24 hours) at 2/2/2019 0959  Last data filed at 2/2/2019 0831  Gross per 24 hour   Intake 1332 ml   Output 875 ml   Net 457 ml       Physical Exam:    General Appearance: alert, no acute distress,   HEENT: Oral mucosa dry, extra occular movements intact. Sclera clear.  Skin: Warm and dry  Neck: supple, no JVD, trachea midline  Lungs:Chest shape is normal. Breath sounds heard bilaterally equally. No crackles, No wheezing.   Heart: RRR, normal S1 " and S2, no S3, no rub, peripheral pulses weak but palpable.  Abdomen: soft, non-tender,  present bowel sounds to auscultation  : no palpable bladder.  Extremities: no edema, cyanosis or clubbing.   Neuro: normal speech and grossly non focal.     Results Review:    Results from last 7 days   Lab Units 02/02/19  0417 02/01/19  0602 01/31/19  1335   SODIUM mmol/L 139 138 136*   POTASSIUM mmol/L 3.5 4.3 4.0   CHLORIDE mmol/L 109* 113* 104   CO2 mmol/L 20.0* 20.0* 21.0*   BUN mg/dL 13 15 21*   CREATININE mg/dL 0.90 0.80 0.80   CALCIUM mg/dL 8.4 8.4 8.5   BILIRUBIN mg/dL 1.3  --  0.4   ALK PHOS U/L 64  --  58   ALT (SGPT) U/L 23  --  21   AST (SGOT) U/L 15  --  14*   GLUCOSE mg/dL 89 99* 101*       Estimated Creatinine Clearance: 116.1 mL/min (by C-G formula based on SCr of 0.9 mg/dL).                Results from last 7 days   Lab Units 02/02/19  0417 02/01/19  1213 02/01/19  0602 02/01/19  0034 01/31/19  1335   WBC 10*3/mm3 5.63  --  5.39  --  6.69   HEMOGLOBIN g/dL 8.0* 8.4* 7.2* 6.4* 4.0*   PLATELETS 10*3/mm3 163  --  192  --  235       Results from last 7 days   Lab Units 01/31/19  1335   INR  1.63*         Imaging Results (last 24 hours)     ** No results found for the last 24 hours. **          ampicillin-sulbactam 3 g Intravenous Once   fluticasone 2 spray Each Nare Daily   losartan 25 mg Oral Q24H   metoprolol succinate XL 50 mg Oral Q24H   pantoprazole 40 mg Intravenous Q12H   sertraline 150 mg Oral Nightly   sodium chloride 3 mL Intravenous Q12H   sodium chloride 3 mL Intravenous Q12H   spironolactone 25 mg Oral Daily       sodium chloride 30 mL/hr       Medication Review:   Current Facility-Administered Medications   Medication Dose Route Frequency Provider Last Rate Last Dose   • acetaminophen (TYLENOL) tablet 650 mg  650 mg Oral Q4H PRN Gandee, Ailyn G,    650 mg at 02/02/19 0224   • albuterol (PROVENTIL) nebulizer solution 0.083% 2.5 mg/3mL  2.5 mg Nebulization Q6H PRN Ailyn Villa,        •  ampicillin-sulbactam (UNASYN) 3 g in sodium chloride 0.9 % 100 mL IVPB-MBP  3 g Intravenous Once Veknata Wiley MD       • bisacodyl (DULCOLAX) suppository 10 mg  10 mg Rectal Daily PRN Ailyn Villa DO       • fluticasone (FLONASE) 50 MCG/ACT nasal spray 2 spray  2 spray Each Nare Daily Ailyn Villa DO   2 spray at 02/02/19 0735   • losartan (COZAAR) tablet 25 mg  25 mg Oral Q24H Zheng Curtis MD   25 mg at 02/02/19 0734   • melatonin tablet 6 mg  6 mg Oral Nightly PRN Ailyn Villa DO       • metoprolol succinate XL (TOPROL-XL) 24 hr tablet 50 mg  50 mg Oral Q24H Ailyn Villa DO   50 mg at 02/02/19 0734   • ondansetron (ZOFRAN) injection 4 mg  4 mg Intravenous Q6H PRN Ailyn Villa DO       • oxyCODONE-acetaminophen (PERCOCET) 7.5-325 MG per tablet 1 tablet  1 tablet Oral BID PRN Ailyn Villa DO       • pantoprazole (PROTONIX) injection 40 mg  40 mg Intravenous Q12H Ailyn Villa DO   40 mg at 02/02/19 0734   • sertraline (ZOLOFT) tablet 150 mg  150 mg Oral Nightly Ailyn Villa DO   150 mg at 02/01/19 2106   • sodium chloride 0.9 % flush 3 mL  3 mL Intravenous Q12H Ailyn Villa DO   3 mL at 02/01/19 2120   • sodium chloride 0.9 % flush 3 mL  3 mL Intravenous Q12H Ailyn Villa DO   3 mL at 02/01/19 2107   • sodium chloride 0.9 % flush 3-10 mL  3-10 mL Intravenous PRN Ailyn Villa DO       • sodium chloride 0.9 % flush 3-10 mL  3-10 mL Intravenous PRN Ailyn Villa DO       • sodium chloride 0.9 % infusion  30 mL/hr Intravenous Continuous Gandee, Ailyn G, DO       • spironolactone (ALDACTONE) tablet 25 mg  25 mg Oral Daily Ailyn Villa, DO   25 mg at 02/02/19 0735       Assessment/Plan     1. Acute blood loss anemia, Symptomatic Anemia, prior to admission, post 4 unit PRBC  2. Suspected acute vs chronic upper GI bleeding suspect gastritis vs gastric ulcer  3. Epigastric abdominal pain   4. Chronic bright red bleeding per rectum ?hemorrhoids  5. Hypotension  without sepsis, due to acute hypovolemia, related to blood loss  6. Atypical chest pain with dyspnea, suspect symptomatic anemia  7. History of cardiac arrest, with chronic cardiomyopathy/chronic systolic CHF EF 30%  8. History of elevated troponin  9. Chronic diuretic therapy  10. Suspected RUFINA  11. Obesity       Plan:  · She complains of shortness of breath with the saturation dropping below 90, we'll go ahead and give her a dose of diuretic, check a chest x-ray and follow closely.  · She had negative colonoscopy.  Hemorrhoidectomy planned for later today.  · Continue with rest of the treatment plan.  · She is anxious and crying since her colonoscopy, likely the effect of medications.  · Hemoglobin is stable continue to watch closely.  · Surveillance lab.  · Details were discussed with the patient no family in the room.    · Further recommendations will depend on clinical course of the patient during the current hospitalization.   · I also discussed the details with the nursing staff.  · Rest as ordered.    Rajesh Hauser MD, MIGUEL ÁNGELN  02/02/19  9:59 AM    Dictated using Dragon.

## 2019-02-03 ENCOUNTER — APPOINTMENT (OUTPATIENT)
Dept: GENERAL RADIOLOGY | Facility: HOSPITAL | Age: 34
End: 2019-02-03

## 2019-02-03 LAB
ALBUMIN SERPL-MCNC: 3.8 G/DL (ref 3.5–5)
ANION GAP SERPL CALCULATED.3IONS-SCNC: 14.8 MMOL/L (ref 10–20)
BUN BLD-MCNC: 14 MG/DL (ref 7–20)
BUN/CREAT SERPL: 15.6 (ref 7.1–23.5)
CALCIUM SPEC-SCNC: 8.4 MG/DL (ref 8.4–10.2)
CHLORIDE SERPL-SCNC: 104 MMOL/L (ref 98–107)
CO2 SERPL-SCNC: 22 MMOL/L (ref 26–30)
CREAT BLD-MCNC: 0.9 MG/DL (ref 0.6–1.3)
DEPRECATED RDW RBC AUTO: 63.6 FL (ref 37–54)
ERYTHROCYTE [DISTWIDTH] IN BLOOD BY AUTOMATED COUNT: 23.1 % (ref 11.5–14.5)
GFR SERPL CREATININE-BSD FRML MDRD: 72 ML/MIN/1.73
GLUCOSE BLD-MCNC: 107 MG/DL (ref 74–98)
HCT VFR BLD AUTO: 25.6 % (ref 37–47)
HGB BLD-MCNC: 8 G/DL (ref 12–16)
MCH RBC QN AUTO: 23.7 PG (ref 27–31)
MCHC RBC AUTO-ENTMCNC: 31.3 G/DL (ref 30–37)
MCV RBC AUTO: 76 FL (ref 81–99)
PHOSPHATE SERPL-MCNC: 4.5 MG/DL (ref 2.5–4.5)
PLATELET # BLD AUTO: 170 10*3/MM3 (ref 130–400)
PMV BLD AUTO: 10.1 FL (ref 6–12)
POTASSIUM BLD-SCNC: 3.8 MMOL/L (ref 3.5–5.1)
RBC # BLD AUTO: 3.37 10*6/MM3 (ref 4.2–5.4)
SODIUM BLD-SCNC: 137 MMOL/L (ref 137–145)
WBC NRBC COR # BLD: 6.79 10*3/MM3 (ref 4.8–10.8)

## 2019-02-03 PROCEDURE — 25010000002 FUROSEMIDE PER 20 MG: Performed by: INTERNAL MEDICINE

## 2019-02-03 PROCEDURE — 94640 AIRWAY INHALATION TREATMENT: CPT

## 2019-02-03 PROCEDURE — 99232 SBSQ HOSP IP/OBS MODERATE 35: CPT | Performed by: INTERNAL MEDICINE

## 2019-02-03 PROCEDURE — 71045 X-RAY EXAM CHEST 1 VIEW: CPT

## 2019-02-03 PROCEDURE — 80069 RENAL FUNCTION PANEL: CPT | Performed by: INTERNAL MEDICINE

## 2019-02-03 PROCEDURE — 25010000002 MAGNESIUM SULFATE 2 GM/50ML SOLUTION: Performed by: INTERNAL MEDICINE

## 2019-02-03 PROCEDURE — 85027 COMPLETE CBC AUTOMATED: CPT | Performed by: INTERNAL MEDICINE

## 2019-02-03 PROCEDURE — 25010000002 DIGOXIN PER 500 MCG: Performed by: INTERNAL MEDICINE

## 2019-02-03 PROCEDURE — 99024 POSTOP FOLLOW-UP VISIT: CPT | Performed by: SURGERY

## 2019-02-03 RX ORDER — METOPROLOL SUCCINATE 50 MG/1
50 TABLET, EXTENDED RELEASE ORAL EVERY 12 HOURS SCHEDULED
Status: DISCONTINUED | OUTPATIENT
Start: 2019-02-03 | End: 2019-02-04 | Stop reason: HOSPADM

## 2019-02-03 RX ORDER — FUROSEMIDE 10 MG/ML
40 INJECTION INTRAMUSCULAR; INTRAVENOUS ONCE
Status: DISCONTINUED | OUTPATIENT
Start: 2019-02-03 | End: 2019-02-04 | Stop reason: HOSPADM

## 2019-02-03 RX ORDER — FUROSEMIDE 10 MG/ML
40 INJECTION INTRAMUSCULAR; INTRAVENOUS ONCE
Status: COMPLETED | OUTPATIENT
Start: 2019-02-03 | End: 2019-02-03

## 2019-02-03 RX ORDER — DIGOXIN 0.25 MG/ML
500 INJECTION INTRAMUSCULAR; INTRAVENOUS ONCE
Status: COMPLETED | OUTPATIENT
Start: 2019-02-03 | End: 2019-02-03

## 2019-02-03 RX ORDER — MAGNESIUM SULFATE HEPTAHYDRATE 40 MG/ML
2 INJECTION, SOLUTION INTRAVENOUS ONCE
Status: COMPLETED | OUTPATIENT
Start: 2019-02-03 | End: 2019-02-03

## 2019-02-03 RX ORDER — POTASSIUM CHLORIDE 20 MEQ/1
40 TABLET, EXTENDED RELEASE ORAL ONCE
Status: COMPLETED | OUTPATIENT
Start: 2019-02-03 | End: 2019-02-03

## 2019-02-03 RX ORDER — POLYETHYLENE GLYCOL 3350 17 G/17G
17 POWDER, FOR SOLUTION ORAL DAILY
Status: DISCONTINUED | OUTPATIENT
Start: 2019-02-03 | End: 2019-02-04 | Stop reason: HOSPADM

## 2019-02-03 RX ORDER — FUROSEMIDE 40 MG/1
40 TABLET ORAL DAILY
Status: DISCONTINUED | OUTPATIENT
Start: 2019-02-03 | End: 2019-02-04 | Stop reason: HOSPADM

## 2019-02-03 RX ADMIN — PANTOPRAZOLE SODIUM 40 MG: 40 INJECTION, POWDER, FOR SOLUTION INTRAVENOUS at 08:43

## 2019-02-03 RX ADMIN — METOPROLOL SUCCINATE 50 MG: 50 TABLET, EXTENDED RELEASE ORAL at 20:03

## 2019-02-03 RX ADMIN — MORPHINE SULFATE 2 MG: 2 INJECTION, SOLUTION INTRAMUSCULAR; INTRAVENOUS at 05:12

## 2019-02-03 RX ADMIN — LOSARTAN POTASSIUM 25 MG: 25 TABLET, FILM COATED ORAL at 08:43

## 2019-02-03 RX ADMIN — SODIUM CHLORIDE, PRESERVATIVE FREE 3 ML: 5 INJECTION INTRAVENOUS at 20:04

## 2019-02-03 RX ADMIN — SODIUM CHLORIDE, PRESERVATIVE FREE 3 ML: 5 INJECTION INTRAVENOUS at 08:44

## 2019-02-03 RX ADMIN — POLYETHYLENE GLYCOL 3350 17 G: 17 POWDER, FOR SOLUTION ORAL at 12:49

## 2019-02-03 RX ADMIN — FUROSEMIDE 40 MG: 40 TABLET ORAL at 09:06

## 2019-02-03 RX ADMIN — FLUTICASONE PROPIONATE 2 SPRAY: 50 SPRAY, METERED NASAL at 08:43

## 2019-02-03 RX ADMIN — MAGNESIUM SULFATE HEPTAHYDRATE 2 G: 40 INJECTION, SOLUTION INTRAVENOUS at 10:48

## 2019-02-03 RX ADMIN — DIGOXIN 500 MCG: 0.25 INJECTION INTRAMUSCULAR; INTRAVENOUS at 09:06

## 2019-02-03 RX ADMIN — POTASSIUM CHLORIDE 40 MEQ: 1500 TABLET, EXTENDED RELEASE ORAL at 09:06

## 2019-02-03 RX ADMIN — MORPHINE SULFATE 2 MG: 2 INJECTION, SOLUTION INTRAMUSCULAR; INTRAVENOUS at 02:11

## 2019-02-03 RX ADMIN — FUROSEMIDE 40 MG: 10 INJECTION, SOLUTION INTRAMUSCULAR; INTRAVENOUS at 06:08

## 2019-02-03 RX ADMIN — PANTOPRAZOLE SODIUM 40 MG: 40 INJECTION, POWDER, FOR SOLUTION INTRAVENOUS at 20:03

## 2019-02-03 RX ADMIN — ALBUTEROL SULFATE 2.5 MG: 2.5 SOLUTION RESPIRATORY (INHALATION) at 05:39

## 2019-02-03 RX ADMIN — SODIUM CHLORIDE, PRESERVATIVE FREE 3 ML: 5 INJECTION INTRAVENOUS at 08:45

## 2019-02-03 RX ADMIN — METOPROLOL SUCCINATE 50 MG: 50 TABLET, EXTENDED RELEASE ORAL at 08:43

## 2019-02-03 RX ADMIN — SERTRALINE HYDROCHLORIDE 150 MG: 50 TABLET ORAL at 20:03

## 2019-02-03 RX ADMIN — SPIRONOLACTONE 25 MG: 25 TABLET, FILM COATED ORAL at 08:43

## 2019-02-03 NOTE — PROGRESS NOTES
"    [facility]     LOS: 3 days    Patient Care Team:  Malia Powers APRN as PCP - General (Family Medicine)  Shelley Brock MD as Consulting Physician (General Surgery)  Cirilo Deshpande II, MD (Pain Medicine)    Chief Complaint:    Chief Complaint   Patient presents with   • Rectal Bleeding       Subjective     Patient seen and examined this morning.  Events from last night noted.  Patient denies having any fevers chills.  No nausea or vomiting no abdominal pain.  Denies any chest pain, still have shortness of breath as well as cough and denies any sputum production.  There is no significant edema.   Patient also denies having new onset weakness of numbness of either extremity.  Surgical procedures done and noted.    Review of Systems:    The pertinent  ROS was done and it is noted above, rest  was negative.    Objective     Vital Signs  /53   Pulse 99   Temp 99.5 °F (37.5 °C) (Oral)   Resp 18   Ht 172.7 cm (68\")   Wt 111 kg (244 lb 8 oz)   SpO2 99%   BMI 37.18 kg/m²       I/O this shift:  In: 200 [I.V.:200]  Out: 3650 [Urine:3650]    Intake/Output Summary (Last 24 hours) at 2/3/2019 1437  Last data filed at 2/3/2019 1300  Gross per 24 hour   Intake 2264 ml   Output 4250 ml   Net -1986 ml       Physical Exam:    General Appearance: alert, no acute distress,   HEENT: Oral mucosa dry, extra occular movements intact. Sclera clear.  Skin: Warm and dry  Neck: supple, no JVD, trachea midline  Lungs:Chest shape is normal. Breath sounds heard bilaterally equally. No crackles, No wheezing.   Heart: RRR, normal S1 and S2, no S3, no rub, peripheral pulses weak but palpable.  Abdomen: soft, non-tender,  present bowel sounds to auscultation  : no palpable bladder.  Extremities: no edema, cyanosis or clubbing.   Neuro: normal speech and grossly non focal.     Results Review:    Results from last 7 days   Lab Units 02/03/19  0408 02/02/19  0417 02/01/19  0602 " 01/31/19  1335   SODIUM mmol/L 137 139 138 136*   POTASSIUM mmol/L 3.8 3.5 4.3 4.0   CHLORIDE mmol/L 104 109* 113* 104   CO2 mmol/L 22.0* 20.0* 20.0* 21.0*   BUN mg/dL 14 13 15 21*   CREATININE mg/dL 0.90 0.90 0.80 0.80   CALCIUM mg/dL 8.4 8.4 8.4 8.5   BILIRUBIN mg/dL  --  1.3  --  0.4   ALK PHOS U/L  --  64  --  58   ALT (SGPT) U/L  --  23  --  21   AST (SGOT) U/L  --  15  --  14*   GLUCOSE mg/dL 107* 89 99* 101*       Estimated Creatinine Clearance: 116.1 mL/min (by C-G formula based on SCr of 0.9 mg/dL).    Results from last 7 days   Lab Units 02/03/19  0408   PHOSPHORUS mg/dL 4.5             Results from last 7 days   Lab Units 02/03/19  0408 02/02/19  0417 02/01/19  1213 02/01/19  0602 02/01/19  0034 01/31/19  1335   WBC 10*3/mm3 6.79 5.63  --  5.39  --  6.69   HEMOGLOBIN g/dL 8.0* 8.0* 8.4* 7.2* 6.4* 4.0*   PLATELETS 10*3/mm3 170 163  --  192  --  235       Results from last 7 days   Lab Units 01/31/19  1335   INR  1.63*         Imaging Results (last 24 hours)     Procedure Component Value Units Date/Time    XR Chest 1 View [[id number]] Collected:  02/03/19 0905     Updated:  02/03/19 0908    Narrative:       PROCEDURE: XR CHEST 1 VW-     INDICATION:  cough , wheezing; K92.2-Gastrointestinal hemorrhage,  unspecified; D62-Acute posthemorrhagic anemia; R79.1-Abnormal  coagulation profile; K62.5-Hemorrhage of anus and rectum     FINDINGS:  A portable view of the chest was obtained.  Comparison is  made to a prior exam dated 2/2/2019.   There is no change in a right  Port-A-Cath. Mild cardiac enlargement is stable. There are stable  bilateral interstitial opacities. This could represent interstitial  pulmonary edema or pneumonia. There is no pneumothorax.       Impression:       No interval change.     This report was finalized on 2/3/2019 9:06 AM by Elham Carbajal MD.          fluticasone 2 spray Each Nare Daily   furosemide 40 mg Oral Daily   losartan 25 mg Oral Q24H   metoprolol succinate XL 50 mg Oral Q12H    pantoprazole 40 mg Intravenous Q12H   polyethylene glycol 17 g Oral Daily   sertraline 150 mg Oral Nightly   sodium chloride 3 mL Intravenous Q12H   sodium chloride 3 mL Intravenous Q12H   spironolactone 25 mg Oral Daily          Medication Review:   Current Facility-Administered Medications   Medication Dose Route Frequency Provider Last Rate Last Dose   • acetaminophen (TYLENOL) tablet 650 mg  650 mg Oral Q4H PRN Ailyn Villa DO   650 mg at 02/02/19 0224   • bisacodyl (DULCOLAX) suppository 10 mg  10 mg Rectal Daily PRN Ailyn Villa DO       • fluticasone (FLONASE) 50 MCG/ACT nasal spray 2 spray  2 spray Each Nare Daily Ailyn Villa DO   2 spray at 02/03/19 0843   • furosemide (LASIX) tablet 40 mg  40 mg Oral Daily Zheng Curtis MD   40 mg at 02/03/19 0906   • losartan (COZAAR) tablet 25 mg  25 mg Oral Q24H Zheng Curtis MD   25 mg at 02/03/19 0843   • melatonin tablet 6 mg  6 mg Oral Nightly PRN Ailyn Villa DO   6 mg at 02/02/19 2028   • metoprolol succinate XL (TOPROL-XL) 24 hr tablet 50 mg  50 mg Oral Q12H Zheng Curtis MD   50 mg at 02/03/19 0843   • Morphine sulfate (PF) injection 2 mg  2 mg Intravenous Q1H PRN Venkata Wiley MD   2 mg at 02/03/19 0512   • ondansetron (ZOFRAN) injection 4 mg  4 mg Intravenous Q6H PRN Ailyn Villa DO       • oxyCODONE-acetaminophen (PERCOCET) 5-325 MG per tablet 1 tablet  1 tablet Oral Q4H PRN Venkata Wiley MD       • pantoprazole (PROTONIX) injection 40 mg  40 mg Intravenous Q12H Ailyn Villa DO   40 mg at 02/03/19 0843   • polyethylene glycol (MIRALAX) powder 17 g  17 g Oral Daily Venkata Wiley MD   17 g at 02/03/19 1249   • sertraline (ZOLOFT) tablet 150 mg  150 mg Oral Nightly Ailyn Villa DO   150 mg at 02/02/19 2024   • sodium chloride 0.9 % flush 3 mL  3 mL Intravenous Q12H Ailyn Villa DO   3 mL at 02/03/19 0845   • sodium chloride 0.9 % flush 3 mL  3 mL Intravenous Q12H Ailyn Villa,  DO   3 mL at 02/03/19 0844   • sodium chloride 0.9 % flush 3-10 mL  3-10 mL Intravenous PRN Ailyn Villa, DO       • sodium chloride 0.9 % flush 3-10 mL  3-10 mL Intravenous PRN Ailyn Villa, DO       • spironolactone (ALDACTONE) tablet 25 mg  25 mg Oral Daily Ailyn Villa DO   25 mg at 02/03/19 0843       Assessment/Plan     1. Acute blood loss anemia, Symptomatic Anemia, prior to admission, post 4 unit PRBC  2. Suspected acute vs chronic upper GI bleeding suspect gastritis vs gastric ulcer  3. Epigastric abdominal pain   4. Chronic bright red bleeding per rectum ?hemorrhoids  5. Hypotension without sepsis, due to acute hypovolemia, related to blood loss  6. Atypical chest pain with dyspnea, suspect symptomatic anemia  7. History of cardiac arrest, with chronic cardiomyopathy/chronic systolic CHF EF 30%  8. History of elevated troponin  9. Chronic diuretic therapy  10. Suspected RUFINA  11. Obesity       Plan:    · She complains of shortness of breath with the saturation dropping below 90, we'll go ahead and give her diuretics as ordered.  · She is gradually improving with increased urine output and has been in negative fluid balance.  I'll recheck again and see if she is not in negative balance will give more IV diuretics.  · Hemoglobin is stable continue to watch closely.  · Surveillance lab.  · Details were discussed with the patient no family in the room.    · Further recommendations will depend on clinical course of the patient during the current hospitalization.   · I also discussed the details with the nursing staff.  · Rest as ordered.    Rajesh Hauser MD, FASN  02/03/19  2:37 PM    Dictated using Dragon.

## 2019-02-03 NOTE — PLAN OF CARE
Problem: Patient Care Overview  Goal: Plan of Care Review   02/03/19 1706   Coping/Psychosocial   Plan of Care Reviewed With patient;spouse;family;mother;father   Plan of Care Review   Progress improving   OTHER   Outcome Summary pt tolerating nasal cannula

## 2019-02-03 NOTE — PLAN OF CARE
Problem: Patient Care Overview  Goal: Plan of Care Review  Outcome: Ongoing (interventions implemented as appropriate)      Problem: Gastrointestinal Bleeding (Adult)  Goal: Signs and Symptoms of Listed Potential Problems Will be Absent, Minimized or Managed (Gastrointestinal Bleeding)  Outcome: Outcome(s) achieved Date Met: 02/03/19 02/03/19 0136   Goal/Outcome Evaluation   Problems Assessed (GI Bleeding) all   Problems Present (GI Bleeding) other (see comments)  (labs monitored, no bleeding seen.)       Problem: Fall Risk (Adult)  Goal: Absence of Fall  Outcome: Ongoing (interventions implemented as appropriate)   02/03/19 0136   Fall Risk (Adult)   Absence of Fall making progress toward outcome  (pt able to ambulate independently, needs SBA due to wires ec)

## 2019-02-03 NOTE — PROGRESS NOTES
"   LOS: 3 days   Patient Care Team:  Malia Powers APRN as PCP - General (Family Medicine)  Shelley Brock MD as Consulting Physician (General Surgery)  Cirilo Deshpande II, MD (Pain Medicine)      Interval History: Status post surgery for hemorrhoids last night.  Has had an episode of panic attack after her initial evaluation with the scopes.        Review of Systems:   Pertinent items are noted in HPI.      Objective     Vital Sign Min/Max for last 24 hours  Temp  Min: 98.2 °F (36.8 °C)  Max: 99.5 °F (37.5 °C)   BP  Min: 91/53  Max: 135/97   Pulse  Min: 76  Max: 123   Resp  Min: 14  Max: 22   SpO2  Min: 86 %  Max: 100 %   Flow (L/min)  Min: 2  Max: 3   Weight  Min: 111 kg (244 lb 8 oz)  Max: 111 kg (244 lb 8 oz)     Flowsheet Rows      First Filed Value   Admission Height  167.6 cm (66\") Documented at 01/31/2019 1305   Admission Weight  112 kg (247 lb 6.4 oz) Documented at 01/31/2019 1305          Physical Exam:     General Appearance:    Alert, cooperative, in no acute distress   Head:    Normocephalic, without obvious abnormality, atraumatic   Eyes:            Lids and lashes normal, conjunctivae and sclerae normal, no   icterus, no pallor, corneas clear, PERRLA   Ears:    Ears appear intact with no abnormalities noted   Throat:   No oral lesions, no thrush, oral mucosa moist   Neck:   No adenopathy, supple, trachea midline, no thyromegaly, no     carotid bruit, no JVD   Back:     No kyphosis present, no scoliosis present, no skin lesions,       erythema or scars, no tenderness to percussion or                   palpation,   range of motion normal   Lungs:       Heart:    Regular rhythm and normal rate, normal S1 and S2, no            murmur, no gallop, no rub, no click   Breast Exam:    Deferred   Abdomen:     Normal bowel sounds, no masses, no organomegaly, soft        non-tender, non-distended, no guarding, no rebound                 tenderness   Genitalia:    Deferred   Extremities:   Moves all " extremities well, no edema, no cyanosis, no              redness   Pulses:   Pulses palpable and equal bilaterally   Skin:   No bleeding, bruising or rash   Lymph nodes:   No palpable adenopathy   Neurologic:   Cranial nerves 2 - 12 grossly intact, sensation intact, DTR        present and equal bilaterally        Results Review:     I reviewed the patient's new clinical results.    Results Review:   I reviewed the patient's new clinical results.        LAB DATA :           Laboratory results:    Results from last 7 days   Lab Units 02/03/19  0408 02/02/19  0417 02/01/19  0602 01/31/19  1335   SODIUM mmol/L 137 139 138 136*   POTASSIUM mmol/L 3.8 3.5 4.3 4.0   CHLORIDE mmol/L 104 109* 113* 104   CO2 mmol/L 22.0* 20.0* 20.0* 21.0*   BUN mg/dL 14 13 15 21*   CREATININE mg/dL 0.90 0.90 0.80 0.80   CALCIUM mg/dL 8.4 8.4 8.4 8.5   BILIRUBIN mg/dL  --  1.3  --  0.4   ALK PHOS U/L  --  64  --  58   ALT (SGPT) U/L  --  23  --  21   AST (SGOT) U/L  --  15  --  14*   GLUCOSE mg/dL 107* 89 99* 101*     Results from last 7 days   Lab Units 02/03/19  0408 02/02/19  0417 02/01/19  1213 02/01/19  0602 02/01/19  0034 01/31/19  1335   WBC 10*3/mm3 6.79 5.63  --  5.39  --  6.69   HEMOGLOBIN g/dL 8.0* 8.0* 8.4* 7.2* 6.4* 4.0*   HEMATOCRIT % 25.6* 24.6* 26.3* 22.7* 20.7* 13.8*   PLATELETS 10*3/mm3 170 163  --  192  --  235     Results from last 7 days   Lab Units 01/31/19  1335   INR  1.63*             Results from last 7 days   Lab Units 01/31/19  1335   TROPONIN I ng/mL <0.012                 Lab Results   Component Value Date    HGBA1C 5.3 10/19/2018               IMAGING DATA:     Xr Chest 1 View    Result Date: 2/2/2019  Narrative: PROCEDURE: XR CHEST 1 VW-  INDICATION:  shortness of air; K92.2-Gastrointestinal hemorrhage, unspecified; D62-Acute posthemorrhagic anemia; R79.1-Abnormal coagulation profile; K62.5-Hemorrhage of anus and rectum  FINDINGS:  A portable view of the chest was obtained.  Comparison is made to a prior exam  dated 1/31/2019.   There is no change in a right Port-A-Cath with its tip in the SVC. Cardiomegaly is stable. Interstitial opacities have worsened and may represent interstitial edema or pneumonia. Left basilar opacity could represent atelectasis. A left pleural effusion is not excluded.      Impression: Worsening interstitial opacities which could represent pulmonary edema. Stable cardiomegaly. Recommend upright PA and lateral chest x-ray.  This report was finalized on 2/2/2019 10:32 AM by Elham Carbajal MD.    Xr Chest 1 View    Result Date: 1/31/2019  Narrative: FINAL REPORT CLINICAL HISTORY: preop FINDINGS: A single view of the chest demonstrates a right subclavian Mediport catheter appropriately positioned with the tip in the SVC.  There is no pneumothorax.  There is borderline cardiomegaly without pulmonary edemaa.  Lungs are clear.  There is  no pleural disease or significant adenopathy.     Impression: No acute disease. Authenticated by Freddie Montgomery M.D. on 01/31/2019 07:25:07 PM        Assessment/Plan    #1 CHF:  Patient is nonischemic LV dysfunction.  Has had a lot of fluid shift secondary to GI bleed as well as transfusions and IV fluids.  Would continue with IV diuresis.  Has received a dose this morning is responded quite well.  Would continue with the daily dose of Lasix 40 mg and use when necessary Lasix IV.    #2 mitral insufficiency:  Has moderate mitral insufficiency which would make her very susceptible to volume overload and pulmonary edema.  Would try to avoid excessive tachycardia as well as excessive volume.  We will add digoxin to the present drug regimen.    #3 anemia:  Secondary to blood loss from this hemorrhoids status post thyroid surgery and is doing reasonably well.        Gastrointestinal hemorrhage associated with anorectal source    Gastrointestinal hemorrhage            Zheng Curtis MD  02/03/19  8:33 AM

## 2019-02-03 NOTE — PROGRESS NOTES
LOS: 3 days   Patient Care Team:  Malia Powers APRN as PCP - General (Family Medicine)  Shelley Brock MD as Consulting Physician (General Surgery)  Cirilo Deshpande II, MD (Pain Medicine)           HPI: Patient with no significant complaints.  No abdominal pain.  She has some rectal pain that is well controlled.  She has had minimal spotting perianally.  No further bleeding leg prior to surgery.    ROS:    Vital Signs  Temp:  [98.2 °F (36.8 °C)-99.5 °F (37.5 °C)] 99.5 °F (37.5 °C)  Heart Rate:  [] 114  Resp:  [14-22] 22  BP: ()/(36-97) 119/80    Physical Exam:     General Appearance:  Alert and cooperative, not in any acute distress.   GI/Abdomen:   Soft nontender nondistended                  Results Review:       Lab Results (last 24 hours)     Procedure Component Value Units Date/Time    Renal Function Panel [[id number]]  (Abnormal) Collected:  02/03/19 0408    Specimen:  Blood Updated:  02/03/19 0515     Glucose 107 mg/dL      BUN 14 mg/dL      Creatinine 0.90 mg/dL      Sodium 137 mmol/L      Potassium 3.8 mmol/L      Chloride 104 mmol/L      CO2 22.0 mmol/L      Calcium 8.4 mg/dL      Albumin 3.80 g/dL      Phosphorus 4.5 mg/dL      Anion Gap 14.8 mmol/L      BUN/Creatinine Ratio 15.6     eGFR Non African Amer 72 mL/min/1.73     Narrative:       GFR Normal >60  Chronic Kidney Disease <60  Kidney Failure <15    CBC (No Diff) [[id number]]  (Abnormal) Collected:  02/03/19 0408    Specimen:  Blood Updated:  02/03/19 0513     WBC 6.79 10*3/mm3      RBC 3.37 10*6/mm3      Hemoglobin 8.0 g/dL      Hematocrit 25.6 %      MCV 76.0 fL      MCH 23.7 pg      MCHC 31.3 g/dL      RDW 23.1 %      RDW-SD 63.6 fl      MPV 10.1 fL      Platelets 170 10*3/mm3               Assessment/Plan       Gastrointestinal hemorrhage associated with anorectal source    Gastrointestinal hemorrhage    Stable postoperative day 1 after hemorrhoidectomy for significant bleeding.  Hemoglobin is stable.  Patient doing  well from my standpoint.  Advance diet.  From my standpoint she can be discharged any time.  Follow-up in a week.  Recommend MiraLAX daily to avoid constipation.    Venkata Wiley MD  02/03/19  8:59 AM

## 2019-02-04 ENCOUNTER — HOSPITAL ENCOUNTER (OUTPATIENT)
Dept: CT IMAGING | Facility: HOSPITAL | Age: 34
End: 2019-02-04

## 2019-02-04 VITALS
HEIGHT: 68 IN | RESPIRATION RATE: 18 BRPM | TEMPERATURE: 97.4 F | DIASTOLIC BLOOD PRESSURE: 57 MMHG | WEIGHT: 240.8 LBS | SYSTOLIC BLOOD PRESSURE: 98 MMHG | BODY MASS INDEX: 36.49 KG/M2 | HEART RATE: 70 BPM | OXYGEN SATURATION: 95 %

## 2019-02-04 LAB
ALBUMIN SERPL-MCNC: 3.7 G/DL (ref 3.5–5)
ANION GAP SERPL CALCULATED.3IONS-SCNC: 11.6 MMOL/L (ref 10–20)
BUN BLD-MCNC: 13 MG/DL (ref 7–20)
BUN/CREAT SERPL: 16.3 (ref 7.1–23.5)
CALCIUM SPEC-SCNC: 8.1 MG/DL (ref 8.4–10.2)
CHLORIDE SERPL-SCNC: 104 MMOL/L (ref 98–107)
CO2 SERPL-SCNC: 26 MMOL/L (ref 26–30)
CREAT BLD-MCNC: 0.8 MG/DL (ref 0.6–1.3)
DEPRECATED RDW RBC AUTO: 63.3 FL (ref 37–54)
ERYTHROCYTE [DISTWIDTH] IN BLOOD BY AUTOMATED COUNT: 23.8 % (ref 11.5–14.5)
GFR SERPL CREATININE-BSD FRML MDRD: 83 ML/MIN/1.73
GLUCOSE BLD-MCNC: 99 MG/DL (ref 74–98)
HCT VFR BLD AUTO: 24.8 % (ref 37–47)
HGB BLD-MCNC: 7.8 G/DL (ref 12–16)
MCH RBC QN AUTO: 23.5 PG (ref 27–31)
MCHC RBC AUTO-ENTMCNC: 31.5 G/DL (ref 30–37)
MCV RBC AUTO: 74.7 FL (ref 81–99)
PHOSPHATE SERPL-MCNC: 3.8 MG/DL (ref 2.5–4.5)
PLATELET # BLD AUTO: 171 10*3/MM3 (ref 130–400)
PMV BLD AUTO: 10.3 FL (ref 6–12)
POTASSIUM BLD-SCNC: 3.6 MMOL/L (ref 3.5–5.1)
RBC # BLD AUTO: 3.32 10*6/MM3 (ref 4.2–5.4)
SODIUM BLD-SCNC: 138 MMOL/L (ref 137–145)
WBC NRBC COR # BLD: 5.15 10*3/MM3 (ref 4.8–10.8)

## 2019-02-04 PROCEDURE — 80069 RENAL FUNCTION PANEL: CPT | Performed by: INTERNAL MEDICINE

## 2019-02-04 PROCEDURE — 99239 HOSP IP/OBS DSCHRG MGMT >30: CPT | Performed by: FAMILY MEDICINE

## 2019-02-04 PROCEDURE — 85027 COMPLETE CBC AUTOMATED: CPT | Performed by: INTERNAL MEDICINE

## 2019-02-04 RX ORDER — FUROSEMIDE 40 MG/1
40 TABLET ORAL EVERY OTHER DAY
Qty: 12 TABLET | Refills: 0 | Status: ON HOLD | OUTPATIENT
Start: 2019-02-04 | End: 2019-10-14 | Stop reason: SDUPTHER

## 2019-02-04 RX ORDER — POLYETHYLENE GLYCOL 3350 17 G/17G
17 POWDER, FOR SOLUTION ORAL DAILY
Qty: 510 G | Refills: 30 | Status: SHIPPED | OUTPATIENT
Start: 2019-02-05 | End: 2020-06-12 | Stop reason: ALTCHOICE

## 2019-02-04 RX ORDER — PANTOPRAZOLE SODIUM 40 MG/1
40 TABLET, DELAYED RELEASE ORAL DAILY
Qty: 30 TABLET | Refills: 0 | Status: SHIPPED | OUTPATIENT
Start: 2019-02-04 | End: 2021-10-06 | Stop reason: ALTCHOICE

## 2019-02-04 RX ADMIN — POLYETHYLENE GLYCOL 3350 17 G: 17 POWDER, FOR SOLUTION ORAL at 08:59

## 2019-02-04 RX ADMIN — SODIUM CHLORIDE, PRESERVATIVE FREE 3 ML: 5 INJECTION INTRAVENOUS at 09:00

## 2019-02-04 RX ADMIN — METOPROLOL SUCCINATE 50 MG: 50 TABLET, EXTENDED RELEASE ORAL at 08:59

## 2019-02-04 RX ADMIN — FUROSEMIDE 40 MG: 40 TABLET ORAL at 08:59

## 2019-02-04 RX ADMIN — SPIRONOLACTONE 25 MG: 25 TABLET, FILM COATED ORAL at 08:59

## 2019-02-04 RX ADMIN — SODIUM CHLORIDE, PRESERVATIVE FREE 300 UNITS: 5 INJECTION INTRAVENOUS at 10:51

## 2019-02-04 RX ADMIN — PANTOPRAZOLE SODIUM 40 MG: 40 INJECTION, POWDER, FOR SOLUTION INTRAVENOUS at 08:59

## 2019-02-04 RX ADMIN — FLUTICASONE PROPIONATE 2 SPRAY: 50 SPRAY, METERED NASAL at 09:00

## 2019-02-04 NOTE — DISCHARGE INSTR - LAB
Follow up appt with Dr Curtis March 11th @ 3:45pm       Follow up appt with Dr Wiley Feb 19th @ 10:10am

## 2019-02-04 NOTE — PROGRESS NOTES
"   LOS: 4 days   Patient Care Team:  Malia Powers APRN as PCP - General (Family Medicine)  Shelley Brock MD as Consulting Physician (General Surgery)  Cirilo Deshpande II, MD (Pain Medicine)      Interval History:   Patient's orders to wants to go home.  Has no new symptoms at this time.        Review of Systems:   Pertinent items are noted in HPI.      Objective     Vital Sign Min/Max for last 24 hours  Temp  Min: 97.4 °F (36.3 °C)  Max: 98.5 °F (36.9 °C)   BP  Min: 89/44  Max: 107/64   Pulse  Min: 70  Max: 97   Resp  Min: 12  Max: 21   SpO2  Min: 90 %  Max: 100 %   Flow (L/min)  Min: 2  Max: 10   Weight  Min: 109 kg (240 lb 12.8 oz)  Max: 109 kg (240 lb 12.8 oz)     Flowsheet Rows      First Filed Value   Admission Height  167.6 cm (66\") Documented at 01/31/2019 1305   Admission Weight  112 kg (247 lb 6.4 oz) Documented at 01/31/2019 1305          Physical Exam:     General Appearance:    Alert, cooperative, in no acute distress   Head:    Normocephalic, without obvious abnormality, atraumatic   Eyes:            Lids and lashes normal, conjunctivae and sclerae normal, no   icterus, no pallor, corneas clear, PERRLA   Ears:    Ears appear intact with no abnormalities noted   Throat:   No oral lesions, no thrush, oral mucosa moist   Neck:   No adenopathy, supple, trachea midline, no thyromegaly, no     carotid bruit, no JVD   Back:     No kyphosis present, no scoliosis present, no skin lesions,       erythema or scars, no tenderness to percussion or                   palpation,   range of motion normal   Lungs:     Clear to auscultation,respirations regular, even and                   unlabored    Heart:    Regular rhythm and normal rate, normal S1 and S2, no            murmur, no gallop, no rub, no click   Breast Exam:    Deferred   Abdomen:     Normal bowel sounds, no masses, no organomegaly, soft        non-tender, non-distended, no guarding, no rebound                 tenderness   Genitalia:    " Deferred   Extremities:   Moves all extremities well, no edema, no cyanosis, no              redness   Pulses:   Pulses palpable and equal bilaterally   Skin:   No bleeding, bruising or rash   Lymph nodes:   No palpable adenopathy   Neurologic:   Cranial nerves 2 - 12 grossly intact, sensation intact, DTR        present and equal bilaterally        Results Review:     I reviewed the patient's new clinical results.    Results Review:   I reviewed the patient's new clinical results.    Telemetry average heart rate in 80s.  No SVT seen.    LAB DATA :           Laboratory results:    Results from last 7 days   Lab Units 02/04/19  0613 02/03/19  0408 02/02/19  0417 02/01/19  0602 01/31/19  1335   SODIUM mmol/L 138 137 139 138 136*   POTASSIUM mmol/L 3.6 3.8 3.5 4.3 4.0   CHLORIDE mmol/L 104 104 109* 113* 104   CO2 mmol/L 26.0 22.0* 20.0* 20.0* 21.0*   BUN mg/dL 13 14 13 15 21*   CREATININE mg/dL 0.80 0.90 0.90 0.80 0.80   CALCIUM mg/dL 8.1* 8.4 8.4 8.4 8.5   BILIRUBIN mg/dL  --   --  1.3  --  0.4   ALK PHOS U/L  --   --  64  --  58   ALT (SGPT) U/L  --   --  23  --  21   AST (SGOT) U/L  --   --  15  --  14*   GLUCOSE mg/dL 99* 107* 89 99* 101*     Results from last 7 days   Lab Units 02/04/19  0613 02/03/19  0408 02/02/19  0417 02/01/19  1213 02/01/19  0602 02/01/19  0034 01/31/19  1335   WBC 10*3/mm3 5.15 6.79 5.63  --  5.39  --  6.69   HEMOGLOBIN g/dL 7.8* 8.0* 8.0* 8.4* 7.2* 6.4* 4.0*   HEMATOCRIT % 24.8* 25.6* 24.6* 26.3* 22.7* 20.7* 13.8*   PLATELETS 10*3/mm3 171 170 163  --  192  --  235     Results from last 7 days   Lab Units 01/31/19  1335   INR  1.63*             Results from last 7 days   Lab Units 01/31/19  1335   TROPONIN I ng/mL <0.012                 Lab Results   Component Value Date    HGBA1C 5.3 10/19/2018               IMAGING DATA:     Xr Chest 1 View    Result Date: 2/3/2019  Narrative: PROCEDURE: XR CHEST 1 VW-  INDICATION:  cough , wheezing; K92.2-Gastrointestinal hemorrhage, unspecified; D62-Acute  posthemorrhagic anemia; R79.1-Abnormal coagulation profile; K62.5-Hemorrhage of anus and rectum  FINDINGS:  A portable view of the chest was obtained.  Comparison is made to a prior exam dated 2/2/2019.   There is no change in a right Port-A-Cath. Mild cardiac enlargement is stable. There are stable bilateral interstitial opacities. This could represent interstitial pulmonary edema or pneumonia. There is no pneumothorax.      Impression: No interval change.  This report was finalized on 2/3/2019 9:06 AM by Elham Carbajal MD.    Xr Chest 1 View    Result Date: 2/2/2019  Narrative: PROCEDURE: XR CHEST 1 VW-  INDICATION:  shortness of air; K92.2-Gastrointestinal hemorrhage, unspecified; D62-Acute posthemorrhagic anemia; R79.1-Abnormal coagulation profile; K62.5-Hemorrhage of anus and rectum  FINDINGS:  A portable view of the chest was obtained.  Comparison is made to a prior exam dated 1/31/2019.   There is no change in a right Port-A-Cath with its tip in the SVC. Cardiomegaly is stable. Interstitial opacities have worsened and may represent interstitial edema or pneumonia. Left basilar opacity could represent atelectasis. A left pleural effusion is not excluded.      Impression: Worsening interstitial opacities which could represent pulmonary edema. Stable cardiomegaly. Recommend upright PA and lateral chest x-ray.  This report was finalized on 2/2/2019 10:32 AM by Elham Carbajal MD.    Xr Chest 1 View    Result Date: 1/31/2019  Narrative: FINAL REPORT CLINICAL HISTORY: preop FINDINGS: A single view of the chest demonstrates a right subclavian Mediport catheter appropriately positioned with the tip in the SVC.  There is no pneumothorax.  There is borderline cardiomegaly without pulmonary edemaa.  Lungs are clear.  There is  no pleural disease or significant adenopathy.     Impression: No acute disease. Authenticated by Freddie Montgomery M.D. on 01/31/2019 07:25:07 PM            Assessment/Plan    #1 CHF:  Secondary to volume  overload after blood loss anemia.  He appears to be stable at this time.  Would resume all her home medications.  Agree with resuming the [facility]  therapy after the blood pressures over 100 systolic.  Needs to call if any interim symptoms workup.    #2 valvular heart disease:  Has moderate mitral insufficiency.  Would be cautious about the volume status.  Would do Lasix 20 mg once a day.    #3 anemia:  Thought to be secondary to hemorrhoid bleed is status post surgery.  Hemoglobin still below 8.  Would be cautious with respect to her activity over the next 4-6 weeks time as this would lead to significant shortness of breath.    Will follow-up with her as previously scheduled.      Gastrointestinal hemorrhage associated with anorectal source    Gastrointestinal hemorrhage            Zheng Curtis MD  02/04/19  11:18 AM

## 2019-02-04 NOTE — PLAN OF CARE
Problem: Patient Care Overview  Goal: Plan of Care Review  Outcome: Ongoing (interventions implemented as appropriate)   02/04/19 0526   Coping/Psychosocial   Plan of Care Reviewed With patient   Plan of Care Review   Progress improving   OTHER   Outcome Summary Patient came to floor from ICU this evening. She is alert, oriented and very pleasant. vitals stable and will continue to monitor. She is hoping to go home today.      Goal: Interprofessional Rounds/Family Conf  Outcome: Ongoing (interventions implemented as appropriate)      Problem: Fall Risk (Adult)  Goal: Absence of Fall  Outcome: Ongoing (interventions implemented as appropriate)   02/04/19 0526   Fall Risk (Adult)   Absence of Fall making progress toward outcome

## 2019-02-04 NOTE — DISCHARGE INSTR - OTHER ORDERS
If you have any questions about your recovery, please call the [facility] Nurse Call Center at [phone]. A registered nurse is available 24 hours a day 7 days a week to assist you.

## 2019-02-04 NOTE — PROGRESS NOTES
Case Management Discharge Note         Destination      No service has been selected for the patient.      Durable Medical Equipment      No service has been selected for the patient.      Dialysis/Infusion      No service has been selected for the patient.      Home Medical Care      No service has been selected for the patient.      Community Resources      No service has been selected for the patient.             Final Discharge Disposition Code: 01 - home or self-care   Via car

## 2019-02-04 NOTE — PROGRESS NOTES
Discharge Planning Assessment  [facility]     Patient Name: Johny Hearn  MRN: [id number]  Today's Date: 2/4/2019    Admit Date: 1/31/2019    Discharge Needs Assessment     Row Name 02/04/19 1127       Living Environment    Name(s) of Who Lives With Patient      Unique Family Situation  disabled, uses visual cane    Primary Care Provided by  self;spouse/significant other;other (see comments)    Provides Primary Care For  spouse    Caregiving Concerns  father in law can pick her up.    Family Caregiver if Needed  spouse    Family Caregiver Names         Discharge Needs Assessment    Readmission Within the Last 30 Days  no previous admission in last 30 days    Concerns to be Addressed  discharge planning    Anticipated Changes Related to Illness  none    Equipment Needed After Discharge  none    Offered/Gave Vendor List  other (see comments)    Discharge Coordination/Progress  home        Discharge Plan     Row Name 02/04/19 1135       Plan    Plan  Sw talked to pt. Verified address and PCP.   is next of kin. Disabled, uses visual cane. No home health, dme.  assists at home.  Father in law will pick  her up today. No medicine or transport issues.        Destination      No service coordination in this encounter.      Durable Medical Equipment      No service coordination in this encounter.      Dialysis/Infusion      No service coordination in this encounter.      Home Medical Care      No service coordination in this encounter.      Community Resources      No service coordination in this encounter.        Expected Discharge Date and Time     Expected Discharge Date Expected Discharge Time    Feb 4, 2019         Demographic Summary     Row Name 02/04/19 1125       General Information    Admission Type  inpatient    Arrived From  home    Expected Length of Stay (LOS)  3    Referral Source  admission list    Reason for Consult  discharge planning    Preferred Language  English         Functional Status     Row Name 02/04/19 1125       Functional Status, IADL    Medications  other (see comments)    Meal Preparation  other (see comments)    Housekeeping  other (see comments)    Laundry  other (see comments)    Shopping  other (see comments)    IADL Comments  disabled, uses vusually impaired cane       Employment/    Employment Status  disabled    Employment/ Comments  uses visual cane        Psychosocial    No documentation.       Abuse/Neglect    No documentation.       Legal    No documentation.       Substance Abuse    No documentation.       Patient Forms    No documentation.           Nataly Roth

## 2019-02-04 NOTE — DISCHARGE SUMMARY
[facility]   DISCHARGE SUMMARY      Name:  Johny Hearn   Age:  33 y.o.  Sex:  female  :  1985  MRN:  [id number]   Visit Number:  [id number]  Primary Care Physician:  Malia Powers APRN  Date of Discharge:  2019  Admission Date:  2019    Discharge Diagnosis:     1. Acute blood loss anemia, Symptomatic Anemia, prior to admission, post 4 unit PRBC, stable   2. Acute lower GI bleeding, secondary to hemorrhoid bleeding chronically, ruled out  gastric ulcer  3. Epigastric abdominal pain, resolved  4. Chronic bright red bleeding per rectum  With hemorrhoids, improved post hemorrhoidectomy  5. Hypotension without sepsis, due to acute hypovolemia, related to blood loss, improved with medication titration  6. Atypical chest pain with dyspnea, suspect symptomatic anemia, resolved  7. History of cardiac arrest, with chronic cardiomyopathy/chronic systolic CHF EF 30%  8. History of elevated troponin  9. Chronic diuretic therapy  10. Suspected RUFINA  11. Obesity   12. Difficult iv access with right chest portacath    History of Present Illness/Hospital Course:    The patient is a 33-year-old lady with history of postpartum cardiomyopathy with chronic systolic congestive heart failure, history of T-cell lymphoma/non-Hodgkin's with last chemotherapy and radiation 3 months ago.  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  She was initially admitted to the ICU with a hemoglobin of 4 with symptomatic anemia and acute blood loss anemia.  She was transfused 4 units of packed red blood cells and given Lasix. Her Entresto was held and metoprolol titrated for heart rate and blood pressure.     She improved and was transferred out of ICU. She had EGD which did not show any acute upper GI bleeding. She then had colonoscopy with hemorrhoidectomy, showing active bleeding hemorrhoid prior to this. AFter  procedures, she has very little to none hemorrhoid bleeding now. She is feeling better now. She denies chest pain, shortness of breath, abdominal pain. Her hemoglobin has remained stable without further occult loss. She will continue to hold and avoid her aspirin and continue metoprolol and lasix and spironolactone. Check BP at home. Once her BP improves to systolic 110, she will resume the Entresto. She should see PCP next week and follow up with DR Curtis in a month. She will also need to see Dr Wiley in follow up, in 2 week or as directed.         Consults:     Consults     Date and Time Order Name Status Description    1/31/2019 1620 Inpatient General Surgery Consult Completed           Procedures Performed:    Procedure(s):  HEMORRHOIDECTOMY  02/02 0749 COLONOSCOPY      Vital Signs:    Temp:  [97.4 °F (36.3 °C)-98.5 °F (36.9 °C)] 97.4 °F (36.3 °C)  Heart Rate:  [70-82] 70  Resp:  [12-18] 18  BP: ()/(45-64) 98/57    Physical Exam:      General Appearance:    Obese lady. Chronically ill appearing. Alert, cooperative, in no acute distress. Short straight hair. Smiling and laughing   Head:    Normocephalic, without obvious abnormality, atraumatic   Eyes:            Lids and lashes normal, conjunctivae and sclerae normal, no   icterus, no pallor, corneas clear, PERRLA   Ears:    Ears appear intact with no abnormalities noted   Throat:   No oral lesions, no thrush, oral mucosa moist   Neck:   No adenopathy, supple, trachea midline, no thyromegaly   Back:     No kyphosis present, no scoliosis present, no skin lesions,       erythema or scars, no tenderness to percussion or                   palpation,   range of motion normal   Lungs:     Clear to auscultation,respirations regular, even and                   unlabored    Heart:    Regular rhythm and normal rate, normal S1 and S2, no            murmur   Breast Exam:    Deferred   Abdomen:     Normal bowel sounds, no masses, no organomegaly, soft         non-tender, non-distended, no guarding, no rebound                 tenderness, obese    Genitalia:    Deferred   Extremities:   Moves all extremities well, 1+ equal edema, no cyanosis, no              redness   Pulses:   Pulses palpable and equal bilaterally   Skin:   No bleeding, bruising or rash   Lymph nodes:   No palpable adenopathy   Neurologic:   Cranial nerves 2 - 12 grossly intact, sensation intact, DTR        present and equal bilaterally         Pertinent Lab Results:     Lab Results (all)     Procedure Component Value Units Date/Time    Tissue Pathology Exam [[id number]] Collected:  02/01/19 1456    Specimen:  Tissue from Gastric, Antrum Updated:  02/04/19 0826    Tissue Pathology Exam [[id number]] Collected:  02/02/19 1506    Specimen:  Tissue from Hemorrhoid(s) Updated:  02/04/19 0821    Renal Function Panel [[id number]]  (Abnormal) Collected:  02/04/19 0613    Specimen:  Blood Updated:  02/04/19 0701     Glucose 99 mg/dL      BUN 13 mg/dL      Creatinine 0.80 mg/dL      Sodium 138 mmol/L      Potassium 3.6 mmol/L      Chloride 104 mmol/L      CO2 26.0 mmol/L      Calcium 8.1 mg/dL      Albumin 3.70 g/dL      Phosphorus 3.8 mg/dL      Anion Gap 11.6 mmol/L      BUN/Creatinine Ratio 16.3     eGFR Non African Amer 83 mL/min/1.73     Narrative:       GFR Normal >60  Chronic Kidney Disease <60  Kidney Failure <15    CBC (No Diff) [[id number]]  (Abnormal) Collected:  02/04/19 0613    Specimen:  Blood Updated:  02/04/19 0653     WBC 5.15 10*3/mm3      RBC 3.32 10*6/mm3      Hemoglobin 7.8 g/dL      Hematocrit 24.8 %      MCV 74.7 fL      MCH 23.5 pg      MCHC 31.5 g/dL      RDW 23.8 %      RDW-SD 63.3 fl      MPV 10.3 fL      Platelets 171 10*3/mm3     Renal Function Panel [[id number]]  (Abnormal) Collected:  02/03/19 0408    Specimen:  Blood Updated:  02/03/19 0515     Glucose 107 mg/dL      BUN 14 mg/dL      Creatinine 0.90 mg/dL      Sodium 137 mmol/L      Potassium 3.8 mmol/L      Chloride 104 mmol/L      CO2  22.0 mmol/L      Calcium 8.4 mg/dL      Albumin 3.80 g/dL      Phosphorus 4.5 mg/dL      Anion Gap 14.8 mmol/L      BUN/Creatinine Ratio 15.6     eGFR Non African Amer 72 mL/min/1.73     Narrative:       GFR Normal >60  Chronic Kidney Disease <60  Kidney Failure <15    CBC (No Diff) [[id number]]  (Abnormal) Collected:  02/03/19 0408    Specimen:  Blood Updated:  02/03/19 0513     WBC 6.79 10*3/mm3      RBC 3.37 10*6/mm3      Hemoglobin 8.0 g/dL      Hematocrit 25.6 %      MCV 76.0 fL      MCH 23.7 pg      MCHC 31.3 g/dL      RDW 23.1 %      RDW-SD 63.6 fl      MPV 10.1 fL      Platelets 170 10*3/mm3     Comprehensive Metabolic Panel [[id number]]  (Abnormal) Collected:  02/02/19 0417    Specimen:  Blood Updated:  02/02/19 0526     Glucose 89 mg/dL      BUN 13 mg/dL      Creatinine 0.90 mg/dL      Sodium 139 mmol/L      Potassium 3.5 mmol/L      Chloride 109 mmol/L      CO2 20.0 mmol/L      Calcium 8.4 mg/dL      Total Protein 6.4 g/dL      Albumin 3.80 g/dL      ALT (SGPT) 23 U/L      AST (SGOT) 15 U/L      Alkaline Phosphatase 64 U/L      Total Bilirubin 1.3 mg/dL      eGFR Non African Amer 72 mL/min/1.73      Globulin 2.6 gm/dL      A/G Ratio 1.5 g/dL      BUN/Creatinine Ratio 14.4     Anion Gap 13.5 mmol/L     Narrative:       GFR Normal >60  Chronic Kidney Disease <60  Kidney Failure <15    CBC & Differential [[id number]] Collected:  02/02/19 0417    Specimen:  Blood Updated:  02/02/19 0525    Narrative:       The following orders were created for panel order CBC & Differential.  Procedure                               Abnormality         Status                     ---------                               -----------         ------                     Scan Slide[[id number]]                                       Final result               CBC Auto Differential[[id number]]        Abnormal            Final result                 Please view results for these tests on the individual orders.    CBC Auto Differential  [[id number]]  (Abnormal) Collected:  02/02/19 0417    Specimen:  Blood Updated:  02/02/19 0525     WBC 5.63 10*3/mm3      RBC 3.22 10*6/mm3      Hemoglobin 8.0 g/dL      Hematocrit 24.6 %      MCV 76.4 fL      MCH 24.8 pg      MCHC 32.5 g/dL      RDW 22.3 %      RDW-SD 61.3 fl      MPV 9.5 fL      Platelets 163 10*3/mm3      Neutrophil % 70.3 %      Lymphocyte % 22.4 %      Monocyte % 5.7 %      Eosinophil % 0.5 %      Basophil % 0.4 %      Immature Grans % 0.7 %      Neutrophils, Absolute 3.96 10*3/mm3      Lymphocytes, Absolute 1.26 10*3/mm3      Monocytes, Absolute 0.32 10*3/mm3      Eosinophils, Absolute 0.03 10*3/mm3      Basophils, Absolute 0.02 10*3/mm3      Immature Grans, Absolute 0.04 10*3/mm3      nRBC 1.1 /100 WBC     Scan Slide [[id number]] Collected:  02/02/19 0417    Specimen:  Blood Updated:  02/02/19 0525     Anisocytosis Slight/1+     Hypochromia Slight/1+     Microcytes Mod/2+     WBC Morphology Normal     Platelet Estimate Adequate    Hemoglobin & Hematocrit, Blood [[id number]]  (Abnormal) Collected:  02/01/19 1213    Specimen:  Blood Updated:  02/01/19 1220     Hemoglobin 8.4 g/dL      Hematocrit 26.3 %     Scan Slide [[id number]] Collected:  02/01/19 0602    Specimen:  Blood Updated:  02/01/19 0643     Anisocytosis Mod/2+     Hypochromia Slight/1+     Microcytes Slight/1+     Poikilocytes Mod/2+     WBC Morphology Normal     Platelet Morphology Normal    CBC Auto Differential [[id number]]  (Abnormal) Collected:  02/01/19 0602    Specimen:  Blood Updated:  02/01/19 0643     WBC 5.39 10*3/mm3      RBC 3.07 10*6/mm3      Hemoglobin 7.2 g/dL      Hematocrit 22.7 %      MCV 73.9 fL      MCH 23.5 pg      MCHC 31.7 g/dL      RDW 23.1 %      RDW-SD 62.2 fl      MPV 10.2 fL      Platelets 192 10*3/mm3      Neutrophil % 62.7 %      Lymphocyte % 30.6 %      Monocyte % 4.8 %      Eosinophil % 0.7 %      Basophil % 0.6 %      Immature Grans % 0.6 %      Neutrophils, Absolute 3.38 10*3/mm3      Lymphocytes,  Absolute 1.65 10*3/mm3      Monocytes, Absolute 0.26 10*3/mm3      Eosinophils, Absolute 0.04 10*3/mm3      Basophils, Absolute 0.03 10*3/mm3      Immature Grans, Absolute 0.03 10*3/mm3      nRBC 1.3 /100 WBC     Basic Metabolic Panel [[id number]]  (Abnormal) Collected:  02/01/19 0602    Specimen:  Blood Updated:  02/01/19 0635     Glucose 99 mg/dL      BUN 15 mg/dL      Creatinine 0.80 mg/dL      Sodium 138 mmol/L      Potassium 4.3 mmol/L      Chloride 113 mmol/L      CO2 20.0 mmol/L      Calcium 8.4 mg/dL      eGFR Non African Amer 83 mL/min/1.73      BUN/Creatinine Ratio 18.8     Anion Gap 9.3 mmol/L     Narrative:       GFR Normal >60  Chronic Kidney Disease <60  Kidney Failure <15    Hemoglobin & Hematocrit, Blood [[id number]]  (Abnormal) Collected:  02/01/19 0034    Specimen:  Blood Updated:  02/01/19 0101     Hemoglobin 6.4 g/dL      Hematocrit 20.7 %     Vitamin B12 [[id number]]  (Normal) Collected:  01/31/19 1345    Specimen:  Blood Updated:  01/31/19 1818     Vitamin B-12 255 pg/mL     Folate [[id number]]  (Normal) Collected:  01/31/19 1345    Specimen:  Blood Updated:  01/31/19 1818     Folate 10.50 ng/mL     Pregnancy, Urine - Urine, Clean Catch [[id number]]  (Normal) Collected:  01/31/19 1741    Specimen:  Urine, Clean Catch Updated:  01/31/19 1802     HCG, Urine QL Negative    Iron Profile [[id number]]  (Abnormal) Collected:  01/31/19 1345    Specimen:  Blood Updated:  01/31/19 1750     Iron 11 mcg/dL      TIBC 470 mcg/dL      Iron Saturation 2 %     Ferritin [[id number]]  (Abnormal) Collected:  01/31/19 1345    Specimen:  Blood Updated:  01/31/19 1750     Ferritin 3.44 ng/mL     Troponin [[id number]]  (Normal) Collected:  01/31/19 1335    Specimen:  Blood Updated:  01/31/19 1720     Troponin I <0.012 ng/mL     Narrative:       Normal Patient Upper Reference Limit (URL) (99th Percentile)=0.03 ng/mL   Non-AMI Illness Reference Limit=0.03-0.11 ng/mL   AMI Confirmation=0.12 ng/mL and above    Occult Blood X 1,  Stool - Stool, Per Rectum [[id number]]  (Abnormal) Collected:  01/31/19 1441    Specimen:  Stool from Per Rectum Updated:  01/31/19 1447     Fecal Occult Blood Positive    CBC & Differential [[id number]] Collected:  01/31/19 1335    Specimen:  Blood Updated:  01/31/19 1425    Narrative:       The following orders were created for panel order CBC & Differential.  Procedure                               Abnormality         Status                     ---------                               -----------         ------                     Manual Differential[[id number]]          Abnormal            Final result               Scan Slide[[id number]]                                       Final result               CBC Auto Differential[[id number]]        Abnormal            Final result                 Please view results for these tests on the individual orders.    Scan Slide [[id number]] Collected:  01/31/19 1335    Specimen:  Blood Updated:  01/31/19 1425     Scan Slide --     Comment: See Manual Differential Results       Manual Differential [[id number]]  (Abnormal) Collected:  01/31/19 1335    Specimen:  Blood Updated:  01/31/19 1425     Neutrophil % 72.0 %      Lymphocyte % 20.0 %      Monocyte % 2.0 %      Basophil % 1.0 %      Bands %  5.0 %      Neutrophils Absolute 5.15 10*3/mm3      Lymphocytes Absolute 1.34 10*3/mm3      Monocytes Absolute 0.13 10*3/mm3      Basophils Absolute 0.07 10*3/mm3      nRBC 2.0 /100 WBC      Anisocytosis Mod/2+     Dacrocytes Slight/1+     Hypochromia Mod/2+     Microcytes Slight/1+     Poikilocytes Slight/1+     WBC Morphology Normal     Platelet Estimate Adequate    Protime-INR [[id number]]  (Abnormal) Collected:  01/31/19 1335    Specimen:  Blood Updated:  01/31/19 1411     Protime 18.0 Seconds      INR 1.63    CBC Auto Differential [[id number]]  (Abnormal) Collected:  01/31/19 1335    Specimen:  Blood Updated:  01/31/19 1411     WBC 6.69 10*3/mm3      RBC 2.06 10*6/mm3      Hemoglobin 4.0  g/dL      Hematocrit 13.8 %      MCV 67.0 fL      MCH 19.4 pg      MCHC 29.0 g/dL      RDW 21.1 %      RDW-SD 51.3 fl      MPV 9.5 fL      Platelets 235 10*3/mm3      Neutrophil % 67.6 %      Lymphocyte % 25.6 %      Monocyte % 5.4 %      Eosinophil % 0.6 %      Basophil % 0.1 %      Immature Grans % 0.7 %      Neutrophils, Absolute 4.52 10*3/mm3      Lymphocytes, Absolute 1.71 10*3/mm3      Monocytes, Absolute 0.36 10*3/mm3      Eosinophils, Absolute 0.04 10*3/mm3      Basophils, Absolute 0.01 10*3/mm3      Immature Grans, Absolute 0.05 10*3/mm3      nRBC 2.1 /100 WBC     Comprehensive Metabolic Panel [[id number]]  (Abnormal) Collected:  01/31/19 1335    Specimen:  Blood Updated:  01/31/19 1410     Glucose 101 mg/dL      BUN 21 mg/dL      Creatinine 0.80 mg/dL      Sodium 136 mmol/L      Potassium 4.0 mmol/L      Chloride 104 mmol/L      CO2 21.0 mmol/L      Calcium 8.5 mg/dL      Total Protein 6.9 g/dL      Albumin 4.10 g/dL      ALT (SGPT) 21 U/L      AST (SGOT) 14 U/L      Alkaline Phosphatase 58 U/L      Total Bilirubin 0.4 mg/dL      eGFR Non African Amer 83 mL/min/1.73      Globulin 2.8 gm/dL      A/G Ratio 1.5 g/dL      BUN/Creatinine Ratio 26.3     Anion Gap 15.0 mmol/L     Narrative:       GFR Normal >60  Chronic Kidney Disease <60  Kidney Failure <15          Pertinent Radiology Results:    Imaging Results (all)     Procedure Component Value Units Date/Time    XR Chest 1 View [[id number]] Collected:  02/03/19 0905     Updated:  02/03/19 0908    Narrative:       PROCEDURE: XR CHEST 1 VW-     INDICATION:  cough , wheezing; K92.2-Gastrointestinal hemorrhage,  unspecified; D62-Acute posthemorrhagic anemia; R79.1-Abnormal  coagulation profile; K62.5-Hemorrhage of anus and rectum     FINDINGS:  A portable view of the chest was obtained.  Comparison is  made to a prior exam dated 2/2/2019.   There is no change in a right  Port-A-Cath. Mild cardiac enlargement is stable. There are stable  bilateral interstitial  opacities. This could represent interstitial  pulmonary edema or pneumonia. There is no pneumothorax.       Impression:       No interval change.     This report was finalized on 2/3/2019 9:06 AM by Elham Carbajal MD.    XR Chest 1 View [[id number]] Collected:  02/02/19 1032     Updated:  02/02/19 1035    Narrative:       PROCEDURE: XR CHEST 1 VW-     INDICATION:  shortness of air; K92.2-Gastrointestinal hemorrhage,  unspecified; D62-Acute posthemorrhagic anemia; R79.1-Abnormal  coagulation profile; K62.5-Hemorrhage of anus and rectum     FINDINGS:  A portable view of the chest was obtained.  Comparison is  made to a prior exam dated 1/31/2019.   There is no change in a right  Port-A-Cath with its tip in the SVC. Cardiomegaly is stable.  Interstitial opacities have worsened and may represent interstitial  edema or pneumonia. Left basilar opacity could represent atelectasis. A  left pleural effusion is not excluded.       Impression:       Worsening interstitial opacities which could represent  pulmonary edema. Stable cardiomegaly. Recommend upright PA and lateral  chest x-ray.     This report was finalized on 2/2/2019 10:32 AM by Elham Carbajal MD.    XR Chest 1 View [[id number]] Collected:  01/31/19 1925     Updated:  01/31/19 1926    Narrative:       FINAL REPORT    CLINICAL HISTORY:  preop    FINDINGS:  A single view of the chest demonstrates a right subclavian  Mediport catheter appropriately positioned with the tip in the  SVC.  There is no pneumothorax.  There is borderline  cardiomegaly without pulmonary edemaa.  Lungs are clear.  There  is  no pleural disease or significant adenopathy.      Impression:       No acute disease.    Authenticated by Freddie Montgomery M.D. on 01/31/2019 07:25:07 PM          Condition on Discharge:  Stable        Code status during the hospital stay:        Discharge Disposition:    Home or Self Care    Discharge Medication:       Discharge Medications      New Medications      Instructions  Start Date   CLEARLAX powder  Generic drug:  polyethylene glycol   17 g, Oral, Daily      furosemide 40 MG tablet  Commonly known as:  LASIX   40 mg, Oral, Every Other Day      pantoprazole 40 MG EC tablet  Commonly known as:  PROTONIX   40 mg, Oral, Daily         Continue These Medications      Instructions Start Date   atorvastatin 20 MG tablet  Commonly known as:  LIPITOR   20 mg, Oral, Nightly      fluticasone 50 MCG/ACT nasal spray  Commonly known as:  FLONASE   As Needed      metoprolol succinate XL 50 MG 24 hr tablet  Commonly known as:  TOPROL-XL   50 mg, Oral, Every 24 Hours Scheduled      ondansetron ODT 4 MG disintegrating tablet  Commonly known as:  ZOFRAN-ODT   4 mg, Oral, Every 12 Hours PRN      oxyCODONE-acetaminophen 7.5-325 MG per tablet  Commonly known as:  PERCOCET   1 tablet, Oral, 2 Times Daily PRN      sacubitril-valsartan 24-26 MG tablet  Commonly known as:  ENTRESTO   1 tablet, Oral, Every 12 Hours Scheduled      sertraline 50 MG tablet  Commonly known as:  ZOLOFT   50 mg, Oral, Daily, Total 150 MG daily dose       sertraline 100 MG tablet  Commonly known as:  ZOLOFT   100 mg, Oral, Nightly, Total 150 mg daily      spironolactone 25 MG tablet  Commonly known as:  ALDACTONE   25 mg, Oral, Daily         Stop These Medications    aspirin 81 MG EC tablet     XTAMPZA ER 13.5 MG capsule extended-release 12 hour   Generic drug:  OxyCODONE ER          Recommended she also take OTC iron supplementation daily.     Discharge Diet:     Diet Instructions     Diet: Cardiac      Discharge Diet:  Cardiac          Activity at Discharge:     Activity Instructions     Activity as Tolerated            Follow-up Appointments:    Future Appointments   Date Time Provider Department Center   2/6/2019  2:00 PM Laurel Preston APRN [facility]   2/19/2019 10:10 AM Venkata Wiley MD [facility] None     Additional Instructions for the Follow-ups that You Need to Schedule     Discharge Follow-up with PCP   As  directed       Currently Documented PCP:    Malia Powers APRN    PCP Phone Number:    [phone]     Follow Up Details:  Less than one week         Discharge Follow-up with Specialty: Dr Curtis; 1 Month   As directed      Specialty:  Dr Curtis    Follow Up:  1 Month         Discharge Follow-up with Specified Provider: Dr Wiley; 2 Weeks   As directed      To:  Dr Wiley    Follow Up:  2 Weeks         Additional information on Labs and Follow-ups:      Follow up appt with Dr Curtis March 11th @ 3:45pm       Follow up appt with Dr Wiley Feb 19th @ 10:10am                      Test Results Pending at Discharge:     Order Current Status    Tissue Pathology Exam In process    Tissue Pathology Exam In process             Ailyn Villa DO  02/04/19  4:57 PM      Medication risks and benefits were discussed in great detail. The patient reported being satisfied with the current treatment plan and the care delivered while hospitalized.     Time:  I spent 35 minutes preparing discharge counseling and teaching.

## 2019-02-05 ENCOUNTER — READMISSION MANAGEMENT (OUTPATIENT)
Dept: CALL CENTER | Facility: HOSPITAL | Age: 34
End: 2019-02-05

## 2019-02-05 NOTE — OUTREACH NOTE
Prep Survey      Responses   Facility patient discharged from?  [facility]   Is patient eligible?  Yes   Discharge diagnosis  anemia, GI bleed, Hemorrhoidectomy, Hx CHF   Does the patient have one of the following disease processes/diagnoses(primary or secondary)?  General Surgery   Does the patient have Home health ordered?  No   Is there a DME ordered?  No   General alerts for this patient  non-Hodgkins lymphoma, chemo/radiation 3 mos ago   Prep survey completed?  Yes          Michelle Rowell RN

## 2019-02-06 ENCOUNTER — READMISSION MANAGEMENT (OUTPATIENT)
Dept: CALL CENTER | Facility: HOSPITAL | Age: 34
End: 2019-02-06

## 2019-02-06 NOTE — OUTREACH NOTE
General Surgery Week 1 Survey      Responses   Facility patient discharged from?  [facility]   Does the patient have one of the following disease processes/diagnoses(primary or secondary)?  General Surgery   Is there a successful TCM telephone encounter documented?  No   Week 1 attempt successful?  No   Unsuccessful attempts  Attempt 1          Svitlana Ni RN

## 2019-02-07 ENCOUNTER — READMISSION MANAGEMENT (OUTPATIENT)
Dept: CALL CENTER | Facility: HOSPITAL | Age: 34
End: 2019-02-07

## 2019-02-07 LAB
LAB AP CASE REPORT: NORMAL
LAB AP CASE REPORT: NORMAL
PATH REPORT.FINAL DX SPEC: NORMAL
PATH REPORT.FINAL DX SPEC: NORMAL

## 2019-02-07 NOTE — OUTREACH NOTE
General Surgery Week 1 Survey      Responses   Facility patient discharged from?  [facility]   Does the patient have one of the following disease processes/diagnoses(primary or secondary)?  General Surgery   Is there a successful TCM telephone encounter documented?  No   Week 1 attempt successful?  Yes   Call start time  1337   Call end time  1343   Is patient permission given to speak with other caregiver?  Yes   List who call center can speak with  To,    Meds reviewed with patient/caregiver?  Yes   Is the patient having any side effects they believe may be caused by any medication additions or changes?  No   Does the patient have all medications related to this admission filled (includes all antibiotics, pain medications, etc.)  Yes   Is the patient taking all medications as directed (includes completed medication regime)?  Yes   Does the patient have a follow up appointment scheduled with their surgeon?  Yes   Has the patient kept scheduled appointments due by today?  N/A   Has home health visited the patient within 72 hours of discharge?  N/A   Psychosocial issues?  No   Comments  Has visual aid cane,    Did the patient receive a copy of their discharge instructions?  Yes   Nursing interventions  Reviewed instructions with patient   What is the patient's perception of their health status since discharge?  Improving   Nursing interventions  Nurse provided patient education   Is the patient /caregiver able to teach back basic post-op care?  Continue use of incentive spirometry at least 1 week post discharge, Practice 'cough and deep breath', Drive as instructed by MD in discharge instructions, Take showers only when approved by MD-sponge bathe until then, No tub bath, swimming, or hot tub until instructed by MD, Keep incision areas clean,dry and protected, Do not remove steri-strips, Lifting as instructed by MD in discharge instructions   Is the patient/caregiver able to teach back signs and symptoms of  incisional infection?  Increased redness, swelling or pain at the incisonal site, Increased drainage or bleeding, Incisional warmth, Pus or odor from incision, Fever   Is the patient/caregiver able to teach back steps to recovery at home?  Set small, achievable goals for return to baseline health, Rest and rebuild strength, gradually increase activity, Practice good oral hygiene, Eat a well-balance diet, Make a list of questions for surgeon's appointment   Is the patient/caregiver able to teach back the hierarchy of who to call/visit for symptoms/problems? PCP, Specialist, Home health nurse, Urgent Care, ED, 911  Yes   Additional teach back comments  Other than being sore, she is feeling better, using miralax    Week 1 call completed?  Yes   Wrap up additional comments  is using miralax as ordered, having stools          Lisa Flores, RN

## 2019-02-14 RX ORDER — PREDNISONE 50 MG/1
50 TABLET ORAL DAILY
Qty: 3 TABLET | Refills: 0 | Status: SHIPPED | OUTPATIENT
Start: 2019-02-14 | End: 2019-02-20 | Stop reason: SDUPTHER

## 2019-02-15 ENCOUNTER — READMISSION MANAGEMENT (OUTPATIENT)
Dept: CALL CENTER | Facility: HOSPITAL | Age: 34
End: 2019-02-15

## 2019-02-18 ENCOUNTER — HOSPITAL ENCOUNTER (OUTPATIENT)
Dept: CT IMAGING | Facility: HOSPITAL | Age: 34
Discharge: HOME | End: 2019-02-18

## 2019-02-18 ENCOUNTER — HOSPITAL ENCOUNTER (OUTPATIENT)
Dept: CT IMAGING | Facility: HOSPITAL | Age: 34
Discharge: HOME | End: 2019-02-18
Admitting: NURSE PRACTITIONER

## 2019-02-18 DIAGNOSIS — C84.68 ANAPLASTIC ALK-POSITIVE LARGE CELL LYMPHOMA OF LYMPH NODES OF MULTIPLE REGIONS (HCC): ICD-10-CM

## 2019-02-18 PROCEDURE — 74177 CT ABD & PELVIS W/CONTRAST: CPT

## 2019-02-18 PROCEDURE — 25010000002 IOPAMIDOL 61 % SOLUTION: Performed by: NURSE PRACTITIONER

## 2019-02-18 PROCEDURE — 71260 CT THORAX DX C+: CPT

## 2019-02-18 RX ADMIN — IOPAMIDOL 100 ML: 612 INJECTION, SOLUTION INTRAVENOUS at 08:29

## 2019-02-19 ENCOUNTER — OFFICE VISIT (OUTPATIENT)
Dept: SURGERY | Facility: CLINIC | Age: 34
End: 2019-02-19

## 2019-02-19 VITALS
HEIGHT: 68 IN | DIASTOLIC BLOOD PRESSURE: 72 MMHG | HEART RATE: 97 BPM | TEMPERATURE: 97.7 F | SYSTOLIC BLOOD PRESSURE: 118 MMHG | OXYGEN SATURATION: 99 % | BODY MASS INDEX: 37.59 KG/M2 | WEIGHT: 248 LBS

## 2019-02-19 DIAGNOSIS — Z48.89 POSTOPERATIVE VISIT: ICD-10-CM

## 2019-02-19 DIAGNOSIS — K62.5 GASTROINTESTINAL HEMORRHAGE ASSOCIATED WITH ANORECTAL SOURCE: Primary | ICD-10-CM

## 2019-02-19 PROCEDURE — 99024 POSTOP FOLLOW-UP VISIT: CPT | Performed by: SURGERY

## 2019-02-19 NOTE — PROGRESS NOTES
Patient: Johny Hearn    YOB: 1985    Date: 02/19/2019    Primary Care Provider: Malia Powers APRN    Reason for Consultation: Follow-up colonoscopy and EGD    Chief Complaint   Patient presents with   • Follow-up     EGD and colonoscopy       History of present illness:  Patient here in follow-up after her hemorrhoidectomy performed about 2 weeks ago for significant anorectal hemorrhage deemed secondary to her hemorrhoids.  She has had a minimal amount of bleeding with bowel movements since surgery intermittently.  None today.  She has been taking MiraLAX which has helped her constipation which was significant prior to surgery.    The following portions of the patient's history were reviewed and updated as appropriate: allergies, current medications, past family history, past medical history, past social history, past surgical history and problem list.      Review of Systems   Constitutional: Negative for chills, fatigue and fever.   Respiratory: Negative for cough.    Cardiovascular: Negative for chest pain.   Gastrointestinal: Negative for abdominal pain, diarrhea, nausea and vomiting.       Allergies:  Allergies   Allergen Reactions   • Erythromycin Other (See Comments)     Pt unsure.   • Macrobid [Nitrofurantoin Monohyd Macro] Other (See Comments)     Pt does not know   • Contrast Dye Itching       Medications:    Current Outpatient Medications:   •  atorvastatin (LIPITOR) 20 MG tablet, Take 1 tablet by mouth Every Night., Disp: 30 tablet, Rfl: 0  •  fluticasone (FLONASE) 50 MCG/ACT nasal spray, As Needed., Disp: , Rfl:   •  furosemide (LASIX) 40 MG tablet, Take 1 tablet by mouth Every Other Day., Disp: 12 tablet, Rfl: 0  •  metoprolol succinate XL (TOPROL-XL) 50 MG 24 hr tablet, Take 1 tablet by mouth Daily., Disp: 30 tablet, Rfl: 0  •  ondansetron ODT (ZOFRAN-ODT) 4 MG disintegrating tablet, Take 1 tablet by mouth Every 12 (Twelve) Hours As Needed for Nausea or Vomiting., Disp: 10  "tablet, Rfl: 0  •  oxyCODONE-acetaminophen (PERCOCET) 7.5-325 MG per tablet, Take 1 tablet by mouth 2 (Two) Times a Day As Needed for Severe Pain ., Disp: , Rfl:   •  pantoprazole (PROTONIX) 40 MG EC tablet, Take 1 tablet by mouth Daily., Disp: 30 tablet, Rfl: 0  •  polyethylene glycol (MIRALAX) powder, Dissolve 1 capful in your choice of beverage and drink once daily, Disp: 510 g, Rfl: 30  •  predniSONE (DELTASONE) 50 MG tablet, Take 1 tablet by mouth Daily. 1 tab 13 hours before scan, 1 tab 7 hours before, and 1 tab 1 hour before scan, Disp: 3 tablet, Rfl: 0  •  sacubitril-valsartan (ENTRESTO) 24-26 MG tablet, Take 1 tablet by mouth Every 12 (Twelve) Hours., Disp: 60 tablet, Rfl: 0  •  sertraline (ZOLOFT) 100 MG tablet, Take 100 mg by mouth Every Night. Total 150 mg daily, Disp: , Rfl:   •  sertraline (ZOLOFT) 50 MG tablet, Take 50 mg by mouth Daily. Total 150 MG daily dose, Disp: , Rfl:   •  spironolactone (ALDACTONE) 25 MG tablet, Take 1 tablet by mouth Daily., Disp: 30 tablet, Rfl: 0    Vital Signs:  Vitals:    02/19/19 1025   BP: 118/72   Pulse: 97   Temp: 97.7 °F (36.5 °C)   SpO2: 99%   Weight: 112 kg (248 lb)   Height: 172.7 cm (67.99\")       Physical Exam:   General Appearance:    Alert, cooperative, in no acute distress.  Anal exam reveals healing hemorrhoidectomy site.  No evidence of infection.  No current bleeding.         Results Review:   None    Assessment / Plan:    1. Gastrointestinal hemorrhage associated with anorectal source    2. Postoperative visit        Patient here postoperatively after hemorrhoid surgery.  I explained to her that occasional minimal bleeding may still occur postoperatively for several weeks after surgery.  If this continues to be a problem after 6 weeks or so she should follow-up with me.  Certainly if she has significant bleeding like she had prior to surgery she should follow-up with me as well.  Fiber therapy versus MiraLAX should be instituted daily to avoid " constipation as well.  Otherwise she will see me as needed.  I will recheck her CBC         Electronically signed by Venkata Wiley MD  02/19/19    Portions of this note have been scribed for Venkata Wiley MD by Michelle Milligan. 2/19/2019  10:46 AM

## 2019-02-20 ENCOUNTER — OFFICE VISIT (OUTPATIENT)
Dept: ONCOLOGY | Facility: CLINIC | Age: 34
End: 2019-02-20

## 2019-02-20 ENCOUNTER — INFUSION (OUTPATIENT)
Dept: ONCOLOGY | Facility: HOSPITAL | Age: 34
End: 2019-02-20

## 2019-02-20 ENCOUNTER — TELEPHONE (OUTPATIENT)
Dept: ONCOLOGY | Facility: CLINIC | Age: 34
End: 2019-02-20

## 2019-02-20 VITALS
WEIGHT: 248 LBS | HEIGHT: 68 IN | TEMPERATURE: 98 F | RESPIRATION RATE: 19 BRPM | SYSTOLIC BLOOD PRESSURE: 102 MMHG | BODY MASS INDEX: 37.59 KG/M2 | DIASTOLIC BLOOD PRESSURE: 58 MMHG | HEART RATE: 89 BPM

## 2019-02-20 DIAGNOSIS — C82.00 FOLLICULAR LYMPHOMA GRADE I, UNSPECIFIED BODY REGION (HCC): Primary | ICD-10-CM

## 2019-02-20 LAB
ALBUMIN SERPL-MCNC: 4.3 G/DL (ref 3.5–5)
ALBUMIN/GLOB SERPL: 1.5 G/DL (ref 1–2)
ALP SERPL-CCNC: 63 U/L (ref 38–126)
ALT SERPL W P-5'-P-CCNC: 20 U/L (ref 13–69)
ANION GAP SERPL CALCULATED.3IONS-SCNC: 12.7 MMOL/L (ref 10–20)
ANISOCYTOSIS BLD QL: NORMAL
AST SERPL-CCNC: 19 U/L (ref 15–46)
BASOPHILS # BLD AUTO: 0.07 10*3/MM3 (ref 0–0.2)
BASOPHILS NFR BLD AUTO: 0.9 % (ref 0–2.5)
BILIRUB SERPL-MCNC: 0.3 MG/DL (ref 0.2–1.3)
BUN BLD-MCNC: 18 MG/DL (ref 7–20)
BUN/CREAT SERPL: 25.7 (ref 7.1–23.5)
CALCIUM SPEC-SCNC: 8.7 MG/DL (ref 8.4–10.2)
CHLORIDE SERPL-SCNC: 105 MMOL/L (ref 98–107)
CO2 SERPL-SCNC: 26 MMOL/L (ref 26–30)
CREAT BLD-MCNC: 0.7 MG/DL (ref 0.6–1.3)
DACRYOCYTES BLD QL SMEAR: NORMAL
DEPRECATED RDW RBC AUTO: 61.8 FL (ref 37–54)
EOSINOPHIL # BLD AUTO: 0.1 10*3/MM3 (ref 0–0.7)
EOSINOPHIL NFR BLD AUTO: 1.2 % (ref 0–7)
ERYTHROCYTE [DISTWIDTH] IN BLOOD BY AUTOMATED COUNT: 23.2 % (ref 11.5–14.5)
GFR SERPL CREATININE-BSD FRML MDRD: 96 ML/MIN/1.73
GLOBULIN UR ELPH-MCNC: 2.9 GM/DL
GLUCOSE BLD-MCNC: 110 MG/DL (ref 74–98)
HCT VFR BLD AUTO: 32.1 % (ref 37–47)
HGB BLD-MCNC: 9.5 G/DL (ref 12–16)
IMM GRANULOCYTES # BLD AUTO: 0.04 10*3/MM3 (ref 0–0.06)
IMM GRANULOCYTES NFR BLD AUTO: 0.5 % (ref 0–0.6)
LDH SERPL-CCNC: 392 U/L (ref 313–618)
LYMPHOCYTES # BLD AUTO: 2.06 10*3/MM3 (ref 0.6–3.4)
LYMPHOCYTES NFR BLD AUTO: 25.4 % (ref 10–50)
MCH RBC QN AUTO: 22.1 PG (ref 27–31)
MCHC RBC AUTO-ENTMCNC: 29.6 G/DL (ref 30–37)
MCV RBC AUTO: 74.7 FL (ref 81–99)
MONOCYTES # BLD AUTO: 0.49 10*3/MM3 (ref 0–0.9)
MONOCYTES NFR BLD AUTO: 6 % (ref 0–12)
NEUTROPHILS # BLD AUTO: 5.34 10*3/MM3 (ref 2–6.9)
NEUTROPHILS NFR BLD AUTO: 66 % (ref 37–80)
NRBC BLD AUTO-RTO: 0 /100 WBC (ref 0–0)
PLATELET # BLD AUTO: 394 10*3/MM3 (ref 130–400)
PMV BLD AUTO: 8.6 FL (ref 6–12)
POIKILOCYTOSIS BLD QL SMEAR: NORMAL
POTASSIUM BLD-SCNC: 3.7 MMOL/L (ref 3.5–5.1)
PROT SERPL-MCNC: 7.2 G/DL (ref 6.3–8.2)
RBC # BLD AUTO: 4.3 10*6/MM3 (ref 4.2–5.4)
SMALL PLATELETS BLD QL SMEAR: ADEQUATE
SODIUM BLD-SCNC: 140 MMOL/L (ref 137–145)
WBC MORPH BLD: NORMAL
WBC NRBC COR # BLD: 8.1 10*3/MM3 (ref 4.8–10.8)

## 2019-02-20 PROCEDURE — 85007 BL SMEAR W/DIFF WBC COUNT: CPT | Performed by: NURSE PRACTITIONER

## 2019-02-20 PROCEDURE — 85025 COMPLETE CBC W/AUTO DIFF WBC: CPT | Performed by: NURSE PRACTITIONER

## 2019-02-20 PROCEDURE — 36591 DRAW BLOOD OFF VENOUS DEVICE: CPT

## 2019-02-20 PROCEDURE — 83615 LACTATE (LD) (LDH) ENZYME: CPT | Performed by: NURSE PRACTITIONER

## 2019-02-20 PROCEDURE — 99214 OFFICE O/P EST MOD 30 MIN: CPT | Performed by: NURSE PRACTITIONER

## 2019-02-20 PROCEDURE — 96523 IRRIG DRUG DELIVERY DEVICE: CPT

## 2019-02-20 PROCEDURE — 80053 COMPREHEN METABOLIC PANEL: CPT | Performed by: NURSE PRACTITIONER

## 2019-02-20 PROCEDURE — 36415 COLL VENOUS BLD VENIPUNCTURE: CPT | Performed by: NURSE PRACTITIONER

## 2019-02-20 RX ORDER — OXYCODONE AND ACETAMINOPHEN 10; 325 MG/1; MG/1
1 TABLET ORAL EVERY 6 HOURS PRN
COMMUNITY
End: 2020-12-09

## 2019-02-20 RX ORDER — SODIUM CHLORIDE 0.9 % (FLUSH) 0.9 %
10 SYRINGE (ML) INJECTION AS NEEDED
Status: DISCONTINUED | OUTPATIENT
Start: 2019-02-20 | End: 2019-02-20 | Stop reason: HOSPADM

## 2019-02-20 RX ORDER — PREDNISONE 50 MG/1
50 TABLET ORAL DAILY
Qty: 3 TABLET | Refills: 0 | Status: SHIPPED | OUTPATIENT
Start: 2019-02-20 | End: 2019-06-05 | Stop reason: SDUPTHER

## 2019-02-20 RX ORDER — SODIUM CHLORIDE 0.9 % (FLUSH) 0.9 %
10 SYRINGE (ML) INJECTION AS NEEDED
Status: CANCELLED | OUTPATIENT
Start: 2019-02-20

## 2019-02-20 RX ADMIN — SODIUM CHLORIDE, PRESERVATIVE FREE 500 UNITS: 5 INJECTION INTRAVENOUS at 09:31

## 2019-02-20 RX ADMIN — SODIUM CHLORIDE, PRESERVATIVE FREE 10 ML: 5 INJECTION INTRAVENOUS at 09:31

## 2019-02-20 NOTE — TELEPHONE ENCOUNTER
----- Message from Radha Valdez sent at 2/20/2019  2:07 PM EST -----  Regarding: G[facility]  Pt called back to day . Forgot to asked if anything was seen in her hip area. She is having pain in her hips where she can't sleep at night. She is wanting to know if it is arthritis.    Thanks     Pt phone is [phone]

## 2019-02-20 NOTE — TELEPHONE ENCOUNTER
Pt informed that her scan showed no bone lesions, and the chain of slightly enlarged lymph nodes have not changed so should not contribute to her pain.  She states she sees her PCP for arthritis, and thinks this may just be a flare up.  Advised to call back if she has any further questions or concerns

## 2019-02-20 NOTE — PROGRESS NOTES
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     HISTORY OF PRESENT ILLNESS: The patient is a very pleasant 33 y.o. female with past medical history significant for anaplastic large T-cell lymphoma, ALK positive diagnosed in April 19, 2018 after CT-guided core biopsy of left iliac mass.  Whole body PET scan revealed disease above and below the diaphragm.  Bone marrow biopsy done on May 1, 2018, that failed to show any evidence of bone marrow involvement.  The patient was started on chemotherapy using CHOP May 2, 2018.  She completed 6 cycles of August 22, 2018.  The patient is here today for scheduled follow-up visit.     SUBJECTIVE: The patient is here today by herself. She has been doing fairly  well since her last visit. She was admitted for anemia and a GI bleed.  She was found to have hemorrhoids and a hemorrhoidectomy was performed. She is doing much better now. She saw Dr. Wiley yesterday and reports she is healing well. Her energy has improved since she had the blood transfusion. Although she still does report some fatigue.  Her appetite is good.  Denies SOA, cough, and chest pain.  She continues to follow with Dr. Curtis.       PAST MEDICAL HISTORY/SOCIAL HISTORY/FAMILY HISTORY: Reviewed by me and unchanged from my documentation done on 08/22/18.    Review of Systems   Constitutional: Positive for fatigue. Negative for activity change, appetite change, chills, fever and unexpected weight change.   HENT: Negative for ear pain, hearing loss, mouth sores, nosebleeds, sore throat and trouble swallowing.    Eyes: Negative for visual disturbance.   Respiratory: Negative for cough, chest tightness, shortness of breath and wheezing.    Cardiovascular: Negative for chest pain, palpitations and leg swelling.   Gastrointestinal: Negative for abdominal distention, abdominal pain, blood in stool, constipation, diarrhea, nausea, rectal pain and  vomiting.   Endocrine: Negative for cold intolerance and heat intolerance.   Genitourinary: Negative for difficulty urinating, dysuria, frequency and urgency.   Musculoskeletal: Negative for arthralgias, back pain, gait problem, joint swelling and myalgias.   Skin: Negative for rash.   Neurological: Negative for dizziness, tremors, syncope, weakness, light-headedness, numbness and headaches.   Hematological: Negative for adenopathy. Does not bruise/bleed easily.   Psychiatric/Behavioral: Negative for confusion, sleep disturbance and suicidal ideas. The patient is nervous/anxious.          Current Outpatient Medications:   •  oxyCODONE-acetaminophen (PERCOCET)  MG per tablet, Take 1 tablet by mouth Every 6 (Six) Hours As Needed for Moderate Pain ., Disp: , Rfl:   •  atorvastatin (LIPITOR) 20 MG tablet, Take 1 tablet by mouth Every Night., Disp: 30 tablet, Rfl: 0  •  diphenhydrAMINE (BENADRYL) 50 MG tablet, Take 1 tablet by mouth Take As Directed. 1 hour prior to scan, Disp: 1 tablet, Rfl: 0  •  fluticasone (FLONASE) 50 MCG/ACT nasal spray, As Needed., Disp: , Rfl:   •  furosemide (LASIX) 40 MG tablet, Take 1 tablet by mouth Every Other Day., Disp: 12 tablet, Rfl: 0  •  metoprolol succinate XL (TOPROL-XL) 50 MG 24 hr tablet, Take 1 tablet by mouth Daily., Disp: 30 tablet, Rfl: 0  •  ondansetron ODT (ZOFRAN-ODT) 4 MG disintegrating tablet, Take 1 tablet by mouth Every 12 (Twelve) Hours As Needed for Nausea or Vomiting., Disp: 10 tablet, Rfl: 0  •  pantoprazole (PROTONIX) 40 MG EC tablet, Take 1 tablet by mouth Daily., Disp: 30 tablet, Rfl: 0  •  polyethylene glycol (MIRALAX) powder, Dissolve 1 capful in your choice of beverage and drink once daily, Disp: 510 g, Rfl: 30  •  predniSONE (DELTASONE) 50 MG tablet, Take 1 tablet by mouth Daily. 1 tab 13 hours before scan, 1 tab 7 hours before, and 1 tab 1 hour before scan, Disp: 3 tablet, Rfl: 0  •  sacubitril-valsartan (ENTRESTO) 24-26 MG tablet, Take 1 tablet by  "mouth Every 12 (Twelve) Hours., Disp: 60 tablet, Rfl: 0  •  sertraline (ZOLOFT) 100 MG tablet, Take 100 mg by mouth Every Night. Total 150 mg daily, Disp: , Rfl:   •  sertraline (ZOLOFT) 50 MG tablet, Take 50 mg by mouth Daily. Total 150 MG daily dose, Disp: , Rfl:   •  spironolactone (ALDACTONE) 25 MG tablet, Take 1 tablet by mouth Daily., Disp: 30 tablet, Rfl: 0    PHYSICAL EXAMINATION:   /58   Pulse 89   Temp 98 °F (36.7 °C) (Temporal)   Resp 19   Ht 172.7 cm (67.99\")   Wt 112 kg (248 lb)   BMI 37.72 kg/m²    ECOG Performance Status: 1 - Symptomatic but completely ambulatory  General Appearance:  alert, cooperative, no apparent distress and appears stated age   Neurologic/Psychiatric: A&O x 3, gait steady, appropriate affect, strength 5/5 in all muscle groups   HEENT:  Normocephalic, without obvious abnormality, mucous membranes moist   Neck: Supple, symmetrical, trachea midline, no adenopathy;  No thyromegaly, masses, or tenderness   Lungs:   Clear to auscultation bilaterally; respirations regular, even, and unlabored bilaterally   Heart:  Regular rate and rhythm, no murmurs appreciated   Abdomen:   Soft, non-tender, non-distended and no organomegaly   Lymph nodes: No cervical, supraclavicular, inguinal or axillary adenopathy noted   Extremities: Normal, atraumatic; no clubbing, cyanosis, or edema    Skin: No rashes, ulcers, or suspicious lesions noted         Glucose 74 - 98 mg/dL 123   133   127   122      BUN 7 - 20 mg/dL 10  18  11  6      Creatinine 0.60 - 1.30 mg/dL 0.60  0.50   0.50   0.50      Sodium 137 - 145 mmol/L 142  141  143  142     Potassium 3.5 - 5.1 mmol/L 3.6  3.4   3.3   3.6     Chloride 98 - 107 mmol/L 106  104  106  105     CO2 26.0 - 30.0 mmol/L 25.0   28.0  27.0  26.0     Calcium 8.4 - 10.2 mg/dL 8.9  9.1  8.9  8.4     Total Protein 6.3 - 8.2 g/dL 7.3  7.8  8.0  8.6      Albumin 3.50 - 5.00 g/dL 4.10  4.40  3.90  3.80     ALT (SGPT) 13 - 69 U/L 25  26  29  35     AST (SGOT) 15 " - 46 U/L 26  25  24  24     Alkaline Phosphatase 38 - 126 U/L 51  54  59  70     Total Bilirubin 0.2 - 1.3 mg/dL 0.5  0.3  0.3  0.4     eGFR Non African Amer >60 mL/min/1.73 115  143  143  143     Globulin gm/dL 3.2  3.4  4.1  4.8     A/G Ratio 1.0 - 2.0 g/dL 1.3  1.3  1.0  0.8      BUN/Creatinine Ratio 7.1 - 23.5 16.7  36.0   22.0  12.0     Anion Gap 10.0 - 20.0 mmol/L 14.6  12.4  13.3  14.6    Resulting Agency   [facility]     CBC Auto Differential   Order: [id number] - Part of Panel Order [id number]   Status:  Final result   Visible to patient:  Yes (MyChart) Dx:  Anaplastic ALK-positive large cell ly...     Ref Range & Units 3wk ago  (7/11/18) 1mo ago  (6/13/18) 2mo ago  (5/24/18) 2mo ago  (5/4/18)    WBC 4.80 - 10.80 10*3/mm3 5.19  6.39  6.51  3.46      RBC 4.20 - 5.40 10*6/mm3 3.61   3.70   3.76   3.62      Hemoglobin 12.0 - 16.0 g/dL 9.8   10.0   9.9   9.5      Hematocrit 37.0 - 47.0 % 30.5   30.8   31.0   29.9      MCV 81.0 - 99.0 fL 84.5  83.2  82.4  82.6     MCH 27.0 - 31.0 pg 27.1  27.0  26.3   26.2      MCHC 30.0 - 37.0 g/dL 32.1  32.5  31.9  31.8     RDW 11.5 - 14.5 % 19.3   19.7   17.1   16.0      RDW-SD 37.0 - 54.0 fl 59.7   58.8   49.5  48.4     MPV 6.0 - 12.0 fL 8.2  8.1  8.0  8.5     Platelets 130 - 400 10*3/mm3 257  348  358  218     Neutrophil % 37.0 - 80.0 % 55.4  67.9  63.7  49.1     Lymphocyte % 10.0 - 50.0 % 28.3  20.5  25.7  33.2     Monocyte % 0.0 - 12.0 % 10.4  9.2  6.9  13.9      Eosinophil % 0.0 - 7.0 % 3.5  0.5  1.1  2.0     Basophil % 0.0 - 2.5 % 1.2  0.8  1.1  1.2     Immature Grans % 0.0 - 0.6 % 1.2   1.1   1.5   0.6     Neutrophils, Absolute 2.00 - 6.90 10*3/mm3 2.88  4.34  4.15  1.70      Lymphocytes, Absolute 0.60 - 3.40 10*3/mm3 1.47  1.31  1.67  1.15     Monocytes, Absolute 0.00 - 0.90 10*3/mm3 0.54  0.59  0.45  0.48     Eosinophils, Absolute 0.00 - 0.70 10*3/mm3 0.18  0.03  0.07  0.07     Basophils, Absolute 0.00 - 0.20 10*3/mm3 0.06  0.05  0.07   0.04     Immature Grans, Absolute 0.00 - 0.06 10*3/mm3 0.06  0.07   0.10   0.02     nRBC 0.0 - 0.0 /100 WBC 0.0  0.0  0.0  0.0    Resulting Agency   [facility]  [facility]  [facility]      Specimen Collected: 07/11/18 10:49 Last Resulted: 07/11/18 10:56                         Ct Chest With Contrast    Result Date: 2/18/2019  Narrative: PROCEDURE: CT CHEST W CONTRAST-, CT ABDOMEN PELVIS W CONTRAST-  HISTORY: Anaplastic large T-cell lymphoma, ALK positive; C84.68-Anaplastic large cell lymphoma, ALK-positive, lymph nodes of multiple sites  COMPARISON: CT of the abdomen and pelvis dated October 24, 2018 and CT PE protocol dated October 19, 2018.  PROCEDURE: Axial images were obtained from the thoracic inlet through the pubic symphysis following the administration of Isovue 300     contrast.   FINDINGS:  CHEST: There is no evidence of mediastinal or axillary adenopathy. A right subclavian Port-A-Cath is in place. Heart size is normal. There are no pleural or pericardial effusions. Lung windows reveal no focal opacities or suspicious pulmonary nodules. Bone windows reveal no lytic or destructive lesions.  ABDOMEN: The liver is homogeneous in appearance with no focal lesions. The spleen is unremarkable. No adrenal mass is present.  The pancreas is normal. The kidneys are unremarkable. The aorta is normal in caliber. Small lymph nodes within the retroperitoneum are unchanged. There is no adenopathy per size criteria. The abdominal portions of the GI tract are unremarkable.  PELVIS: The appendix is not identified. The urinary bladder is unremarkable. Small left external iliac chain lymph nodes measuring up to 8 mm are unchanged. There is no adenopathy per size criteria. Bone windows reveal no lytic or destructive lesions.      Impression: No evidence of recurrent or metastatic disease within the chest, abdomen or pelvis.  This study was performed with techniques to keep radiation doses as low as reasonably  achievable (ALARA). Individualized dose reduction techniques using automated exposure control or adjustment of mA and/or kV according to the patient size were employed.  This report was finalized on 2/18/2019 8:55 AM by Mukesh Hong M.D..    Ct Abdomen Pelvis With Contrast    Result Date: 2/18/2019  Narrative: PROCEDURE: CT CHEST W CONTRAST-, CT ABDOMEN PELVIS W CONTRAST-  HISTORY: Anaplastic large T-cell lymphoma, ALK positive; C84.68-Anaplastic large cell lymphoma, ALK-positive, lymph nodes of multiple sites  COMPARISON: CT of the abdomen and pelvis dated October 24, 2018 and CT PE protocol dated October 19, 2018.  PROCEDURE: Axial images were obtained from the thoracic inlet through the pubic symphysis following the administration of Isovue 300     contrast.   FINDINGS:  CHEST: There is no evidence of mediastinal or axillary adenopathy. A right subclavian Port-A-Cath is in place. Heart size is normal. There are no pleural or pericardial effusions. Lung windows reveal no focal opacities or suspicious pulmonary nodules. Bone windows reveal no lytic or destructive lesions.  ABDOMEN: The liver is homogeneous in appearance with no focal lesions. The spleen is unremarkable. No adrenal mass is present.  The pancreas is normal. The kidneys are unremarkable. The aorta is normal in caliber. Small lymph nodes within the retroperitoneum are unchanged. There is no adenopathy per size criteria. The abdominal portions of the GI tract are unremarkable.  PELVIS: The appendix is not identified. The urinary bladder is unremarkable. Small left external iliac chain lymph nodes measuring up to 8 mm are unchanged. There is no adenopathy per size criteria. Bone windows reveal no lytic or destructive lesions.      Impression: No evidence of recurrent or metastatic disease within the chest, abdomen or pelvis.  This study was performed with techniques to keep radiation doses as low as reasonably achievable (ALARA). Individualized  dose reduction techniques using automated exposure control or adjustment of mA and/or kV according to the patient size were employed.  This report was finalized on 2/18/2019 8:55 AM by Mukesh Hong M.D..    Xr Chest 1 View    Result Date: 2/3/2019  Narrative: PROCEDURE: XR CHEST 1 VW-  INDICATION:  cough , wheezing; K92.2-Gastrointestinal hemorrhage, unspecified; D62-Acute posthemorrhagic anemia; R79.1-Abnormal coagulation profile; K62.5-Hemorrhage of anus and rectum  FINDINGS:  A portable view of the chest was obtained.  Comparison is made to a prior exam dated 2/2/2019.   There is no change in a right Port-A-Cath. Mild cardiac enlargement is stable. There are stable bilateral interstitial opacities. This could represent interstitial pulmonary edema or pneumonia. There is no pneumothorax.      Impression: No interval change.  This report was finalized on 2/3/2019 9:06 AM by Elham Carbajal MD.    Xr Chest 1 View    Result Date: 2/2/2019  Narrative: PROCEDURE: XR CHEST 1 VW-  INDICATION:  shortness of air; K92.2-Gastrointestinal hemorrhage, unspecified; D62-Acute posthemorrhagic anemia; R79.1-Abnormal coagulation profile; K62.5-Hemorrhage of anus and rectum  FINDINGS:  A portable view of the chest was obtained.  Comparison is made to a prior exam dated 1/31/2019.   There is no change in a right Port-A-Cath with its tip in the SVC. Cardiomegaly is stable. Interstitial opacities have worsened and may represent interstitial edema or pneumonia. Left basilar opacity could represent atelectasis. A left pleural effusion is not excluded.      Impression: Worsening interstitial opacities which could represent pulmonary edema. Stable cardiomegaly. Recommend upright PA and lateral chest x-ray.  This report was finalized on 2/2/2019 10:32 AM by Elham Carbajal MD.    Xr Chest 1 View    Result Date: 1/31/2019  Narrative: FINAL REPORT CLINICAL HISTORY: preop FINDINGS: A single view of the chest demonstrates a right subclavian  Mediport catheter appropriately positioned with the tip in the SVC.  There is no pneumothorax.  There is borderline cardiomegaly without pulmonary edemaa.  Lungs are clear.  There is  no pleural disease or significant adenopathy.     Impression: No acute disease. Authenticated by Freddie Montgomery M.D. on 01/31/2019 07:25:07 PM      ASSESSMENT: The patient is a very pleasant 33 y.o. female  with anaplastic large T-cell lymphoma, ALK positive    PROBLEM LIST:  1. Anaplastic large T-cell lymphoma, ALK positive:  A. Incidentally found LAD on MRI done for low back and hip pain done 9/5/2017.   B. Follow up CAT scan confirmed pelvic adenopathy extending from the distal aortic region through the left iliac region.   C. Status post FNA to left iliac lymph node done 9/29/2017. Pathology revealed benign lymphoid cell groups.   D. Repeat CT done 3/15/2018 revealed increased size of left iliac adenopathy with mild splenomegaly.   E. CT-guided biopsy done on April 19, 2018 showed anaplastic large T-cell lymphoma ALK+  F. whole body PET scan done on May 1, 2018 revealed disease above and below the diaphragm with hypermetabolic active lymph nodes in the supraclavicular area as well as retroperitoneum.  G. Started chemotherapy using CHOP May 2, 2018, status post 6 cycles, completed August 22, 2018.   2. Mild splenomegaly   A. Incidentally found 9/5/2017.  B. Progressive size on repeat imaging done 3/15/2018.  3. Left hip and low back pain  A. Under care of pain management with use of Norco.   4. History of anoxic brain injury following cardiac arrest post-partum.   5. Postpartum cardiomyopathy.   6. Anxiety and depression  7.  Constipation  8.  Chemotherapy used nausea.  9.  Hypertension  10.  Systolic cardiomyopathy:  8.  Ejection fraction dropped from 60% on May 2 2:30 percent on October 19, 2018.    PLAN:  1.   We discussed Ct scan of chest, Abdomen and pelvis showed No evidence of recurrent or metastatic disease within the chest,  abdomen or pelvis. We will plan for CT scan in 3 months. I will order this prior to return.  2.  The patient will follow-up in three months with a CT of chest, abdomen, and pelvis.   3.  The patient will continue to follow-up with Dr. Curtis from cardiology.  Her cardiomyopathy could be induced by previous chemotherapy including Adriamycin.  4.  We'll continue Port-A-Cath care.  We will continue EMLA cream. The patient would like to maintain her port for 1 year after completion of treatment.  5. I will continue the patient on Colace daily as well as lactulose as needed for constipation.  6. I will continue Zoloft for depression.  7. I will order labs for today to check hgb and HCt. If Hgb is less than 8 we will plan to transfuse with 2 units.   8. She will continue to follow up with Dr. Wiley for post operative monitoring after hemorrhoid surgery.     Laurel Preston APRN  2/20/2019

## 2019-02-22 ENCOUNTER — READMISSION MANAGEMENT (OUTPATIENT)
Dept: CALL CENTER | Facility: HOSPITAL | Age: 34
End: 2019-02-22

## 2019-02-22 NOTE — OUTREACH NOTE
General Surgery Week 3 Survey      Responses   Facility patient discharged from?  [facility]   Does the patient have one of the following disease processes/diagnoses(primary or secondary)?  General Surgery   Week 3 attempt successful?  No   Unsuccessful attempts  Attempt 1          Paulina Ledezma RN

## 2019-02-23 ENCOUNTER — READMISSION MANAGEMENT (OUTPATIENT)
Dept: CALL CENTER | Facility: HOSPITAL | Age: 34
End: 2019-02-23

## 2019-02-23 NOTE — OUTREACH NOTE
General Surgery Week 3 Survey      Responses   Facility patient discharged from?  [facility]   Does the patient have one of the following disease processes/diagnoses(primary or secondary)?  General Surgery   Week 3 attempt successful?  No   Unsuccessful attempts  Attempt 2          Angela Perea RN

## 2019-02-24 ENCOUNTER — RESULTS ENCOUNTER (OUTPATIENT)
Dept: SURGERY | Facility: CLINIC | Age: 34
End: 2019-02-24

## 2019-02-24 DIAGNOSIS — K62.5 GASTROINTESTINAL HEMORRHAGE ASSOCIATED WITH ANORECTAL SOURCE: ICD-10-CM

## 2019-03-12 ENCOUNTER — TRANSCRIBE ORDERS (OUTPATIENT)
Dept: CARDIOLOGY | Facility: HOSPITAL | Age: 34
End: 2019-03-12

## 2019-03-12 DIAGNOSIS — R06.02 SHORTNESS OF BREATH: Primary | ICD-10-CM

## 2019-03-21 ENCOUNTER — TRANSCRIBE ORDERS (OUTPATIENT)
Dept: ADMINISTRATIVE | Facility: HOSPITAL | Age: 34
End: 2019-03-21

## 2019-03-21 DIAGNOSIS — Z12.39 SCREENING BREAST EXAMINATION: Primary | ICD-10-CM

## 2019-05-20 ENCOUNTER — APPOINTMENT (OUTPATIENT)
Dept: CT IMAGING | Facility: HOSPITAL | Age: 34
End: 2019-05-20

## 2019-05-28 ENCOUNTER — APPOINTMENT (OUTPATIENT)
Dept: CT IMAGING | Facility: HOSPITAL | Age: 34
End: 2019-05-28

## 2019-06-03 ENCOUNTER — HOSPITAL ENCOUNTER (OUTPATIENT)
Dept: CT IMAGING | Facility: HOSPITAL | Age: 34
Discharge: HOME | End: 2019-06-03

## 2019-06-03 ENCOUNTER — HOSPITAL ENCOUNTER (OUTPATIENT)
Dept: CT IMAGING | Facility: HOSPITAL | Age: 34
Discharge: HOME | End: 2019-06-03
Admitting: NURSE PRACTITIONER

## 2019-06-03 DIAGNOSIS — C82.00 FOLLICULAR LYMPHOMA GRADE I, UNSPECIFIED BODY REGION (HCC): ICD-10-CM

## 2019-06-03 PROCEDURE — 71260 CT THORAX DX C+: CPT

## 2019-06-03 PROCEDURE — 25010000002 IOPAMIDOL 61 % SOLUTION: Performed by: NURSE PRACTITIONER

## 2019-06-03 PROCEDURE — 74177 CT ABD & PELVIS W/CONTRAST: CPT

## 2019-06-03 RX ADMIN — IOPAMIDOL 100 ML: 612 INJECTION, SOLUTION INTRAVENOUS at 13:20

## 2019-06-05 ENCOUNTER — OFFICE VISIT (OUTPATIENT)
Dept: ONCOLOGY | Facility: CLINIC | Age: 34
End: 2019-06-05

## 2019-06-05 VITALS
TEMPERATURE: 97 F | DIASTOLIC BLOOD PRESSURE: 68 MMHG | HEART RATE: 93 BPM | HEIGHT: 68 IN | SYSTOLIC BLOOD PRESSURE: 112 MMHG | WEIGHT: 269 LBS | BODY MASS INDEX: 40.77 KG/M2 | RESPIRATION RATE: 19 BRPM

## 2019-06-05 DIAGNOSIS — C82.00 FOLLICULAR LYMPHOMA GRADE I, UNSPECIFIED BODY REGION (HCC): Primary | ICD-10-CM

## 2019-06-05 PROCEDURE — 99214 OFFICE O/P EST MOD 30 MIN: CPT | Performed by: INTERNAL MEDICINE

## 2019-06-05 RX ORDER — PREDNISONE 50 MG/1
50 TABLET ORAL DAILY
Qty: 3 TABLET | Refills: 0 | Status: SHIPPED | OUTPATIENT
Start: 2019-06-05 | End: 2019-10-14 | Stop reason: HOSPADM

## 2019-06-05 NOTE — PROGRESS NOTES
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     HISTORY OF PRESENT ILLNESS: The patient is a very pleasant 33 y.o. female with past medical history significant for anaplastic large T-cell lymphoma, ALK positive diagnosed in April 19, 2018 after CT-guided core biopsy of left iliac mass.  Whole body PET scan revealed disease above and below the diaphragm.  Bone marrow biopsy done on May 1, 2018, that failed to show any evidence of bone marrow involvement.  The patient was started on chemotherapy using CHOP May 2, 2018.  She completed 6 cycles of August 22, 2018.  The patient is here today for scheduled follow-up visit.     SUBJECTIVE: The patient is here today by herself. She has been doing fairly  well since her last visit.       PAST MEDICAL HISTORY/SOCIAL HISTORY/FAMILY HISTORY: Reviewed by me and unchanged from my documentation done on 08/22/18.    Review of Systems   Constitutional: Positive for fatigue. Negative for activity change, appetite change, chills, fever and unexpected weight change.   HENT: Negative for ear pain, hearing loss, mouth sores, nosebleeds, sore throat and trouble swallowing.    Eyes: Negative for visual disturbance.   Respiratory: Negative for cough, chest tightness, shortness of breath and wheezing.    Cardiovascular: Negative for chest pain, palpitations and leg swelling.   Gastrointestinal: Negative for abdominal distention, abdominal pain, blood in stool, constipation, diarrhea, nausea, rectal pain and vomiting.   Endocrine: Negative for cold intolerance and heat intolerance.   Genitourinary: Negative for difficulty urinating, dysuria, frequency and urgency.   Musculoskeletal: Negative for arthralgias, back pain, gait problem, joint swelling and myalgias.   Skin: Negative for rash.   Neurological: Negative for dizziness, tremors, syncope, weakness, light-headedness, numbness and headaches.   Hematological: Negative  for adenopathy. Does not bruise/bleed easily.   Psychiatric/Behavioral: Negative for confusion, sleep disturbance and suicidal ideas. The patient is nervous/anxious.          Current Outpatient Medications:   •  atorvastatin (LIPITOR) 20 MG tablet, Take 1 tablet by mouth Every Night., Disp: 30 tablet, Rfl: 0  •  diphenhydrAMINE (BENADRYL) 50 MG tablet, Take 1 tablet by mouth Take As Directed. 1 hour prior to scan, Disp: 1 tablet, Rfl: 0  •  fluticasone (FLONASE) 50 MCG/ACT nasal spray, As Needed., Disp: , Rfl:   •  furosemide (LASIX) 40 MG tablet, Take 1 tablet by mouth Every Other Day., Disp: 12 tablet, Rfl: 0  •  metoprolol succinate XL (TOPROL-XL) 50 MG 24 hr tablet, Take 1 tablet by mouth Daily., Disp: 30 tablet, Rfl: 0  •  ondansetron ODT (ZOFRAN-ODT) 4 MG disintegrating tablet, Take 1 tablet by mouth Every 12 (Twelve) Hours As Needed for Nausea or Vomiting., Disp: 10 tablet, Rfl: 0  •  oxyCODONE-acetaminophen (PERCOCET)  MG per tablet, Take 1 tablet by mouth Every 6 (Six) Hours As Needed for Moderate Pain ., Disp: , Rfl:   •  pantoprazole (PROTONIX) 40 MG EC tablet, Take 1 tablet by mouth Daily., Disp: 30 tablet, Rfl: 0  •  polyethylene glycol (MIRALAX) powder, Dissolve 1 capful in your choice of beverage and drink once daily, Disp: 510 g, Rfl: 30  •  predniSONE (DELTASONE) 50 MG tablet, Take 1 tablet by mouth Daily. 1 tab 13 hours before scan, 1 tab 7 hours before, and 1 tab 1 hour before scan, Disp: 3 tablet, Rfl: 0  •  sacubitril-valsartan (ENTRESTO) 24-26 MG tablet, Take 1 tablet by mouth Every 12 (Twelve) Hours., Disp: 60 tablet, Rfl: 0  •  sertraline (ZOLOFT) 100 MG tablet, Take 100 mg by mouth Every Night. Total 150 mg daily, Disp: , Rfl:   •  sertraline (ZOLOFT) 50 MG tablet, Take 50 mg by mouth Daily. Total 150 MG daily dose, Disp: , Rfl:   •  spironolactone (ALDACTONE) 25 MG tablet, Take 1 tablet by mouth Daily., Disp: 30 tablet, Rfl: 0    PHYSICAL EXAMINATION:   /68   Pulse 93   Temp  "97 °F (36.1 °C) (Temporal)   Resp 19   Ht 172.7 cm (67.99\")   Wt 122 kg (269 lb)   BMI 40.91 kg/m²    ECOG Performance Status: 1 - Symptomatic but completely ambulatory  General Appearance:  alert, cooperative, no apparent distress and appears stated age   Neurologic/Psychiatric: A&O x 3, gait steady, appropriate affect, strength 5/5 in all muscle groups   HEENT:  Normocephalic, without obvious abnormality, mucous membranes moist   Neck: Supple, symmetrical, trachea midline, no adenopathy;  No thyromegaly, masses, or tenderness   Lungs:   Clear to auscultation bilaterally; respirations regular, even, and unlabored bilaterally   Heart:  Regular rate and rhythm, no murmurs appreciated   Abdomen:   Soft, non-tender, non-distended and no organomegaly   Lymph nodes: No cervical, supraclavicular, inguinal or axillary adenopathy noted   Extremities: Normal, atraumatic; no clubbing, cyanosis, or edema    Skin: No rashes, ulcers, or suspicious lesions noted         Glucose 74 - 98 mg/dL 123   133   127   122      BUN 7 - 20 mg/dL 10  18  11  6      Creatinine 0.60 - 1.30 mg/dL 0.60  0.50   0.50   0.50      Sodium 137 - 145 mmol/L 142  141  143  142     Potassium 3.5 - 5.1 mmol/L 3.6  3.4   3.3   3.6     Chloride 98 - 107 mmol/L 106  104  106  105     CO2 26.0 - 30.0 mmol/L 25.0   28.0  27.0  26.0     Calcium 8.4 - 10.2 mg/dL 8.9  9.1  8.9  8.4     Total Protein 6.3 - 8.2 g/dL 7.3  7.8  8.0  8.6      Albumin 3.50 - 5.00 g/dL 4.10  4.40  3.90  3.80     ALT (SGPT) 13 - 69 U/L 25  26  29  35     AST (SGOT) 15 - 46 U/L 26  25  24  24     Alkaline Phosphatase 38 - 126 U/L 51  54  59  70     Total Bilirubin 0.2 - 1.3 mg/dL 0.5  0.3  0.3  0.4     eGFR Non African Amer >60 mL/min/1.73 115  143  143  143     Globulin gm/dL 3.2  3.4  4.1  4.8     A/G Ratio 1.0 - 2.0 g/dL 1.3  1.3  1.0  0.8      BUN/Creatinine Ratio 7.1 - 23.5 16.7  36.0   22.0  12.0     Anion Gap 10.0 - 20.0 mmol/L 14.6  12.4  13.3  14.6    Resulting Agency   [facility]  [facility]     CBC Auto Differential   Order: [id number] - Part of Panel Order [id number]   Status:  Final result   Visible to patient:  Yes (MyChart) Dx:  Anaplastic ALK-positive large cell ly...     Ref Range & Units 3wk ago  (7/11/18) 1mo ago  (6/13/18) 2mo ago  (5/24/18) 2mo ago  (5/4/18)    WBC 4.80 - 10.80 10*3/mm3 5.19  6.39  6.51  3.46      RBC 4.20 - 5.40 10*6/mm3 3.61   3.70   3.76   3.62      Hemoglobin 12.0 - 16.0 g/dL 9.8   10.0   9.9   9.5      Hematocrit 37.0 - 47.0 % 30.5   30.8   31.0   29.9      MCV 81.0 - 99.0 fL 84.5  83.2  82.4  82.6     MCH 27.0 - 31.0 pg 27.1  27.0  26.3   26.2      MCHC 30.0 - 37.0 g/dL 32.1  32.5  31.9  31.8     RDW 11.5 - 14.5 % 19.3   19.7   17.1   16.0      RDW-SD 37.0 - 54.0 fl 59.7   58.8   49.5  48.4     MPV 6.0 - 12.0 fL 8.2  8.1  8.0  8.5     Platelets 130 - 400 10*3/mm3 257  348  358  218     Neutrophil % 37.0 - 80.0 % 55.4  67.9  63.7  49.1     Lymphocyte % 10.0 - 50.0 % 28.3  20.5  25.7  33.2     Monocyte % 0.0 - 12.0 % 10.4  9.2  6.9  13.9      Eosinophil % 0.0 - 7.0 % 3.5  0.5  1.1  2.0     Basophil % 0.0 - 2.5 % 1.2  0.8  1.1  1.2     Immature Grans % 0.0 - 0.6 % 1.2   1.1   1.5   0.6     Neutrophils, Absolute 2.00 - 6.90 10*3/mm3 2.88  4.34  4.15  1.70      Lymphocytes, Absolute 0.60 - 3.40 10*3/mm3 1.47  1.31  1.67  1.15     Monocytes, Absolute 0.00 - 0.90 10*3/mm3 0.54  0.59  0.45  0.48     Eosinophils, Absolute 0.00 - 0.70 10*3/mm3 0.18  0.03  0.07  0.07     Basophils, Absolute 0.00 - 0.20 10*3/mm3 0.06  0.05  0.07  0.04     Immature Grans, Absolute 0.00 - 0.06 10*3/mm3 0.06  0.07   0.10   0.02     nRBC 0.0 - 0.0 /100 WBC 0.0  0.0  0.0  0.0    Resulting Agency   [facility]      Specimen Collected: 07/11/18 10:49 Last Resulted: 07/11/18 10:56                         Ct Chest With Contrast    Result Date: 6/3/2019  Narrative: PROCEDURE: CT CHEST W CONTRAST-, CT ABDOMEN PELVIS W CONTRAST-  HISTORY: Follow  up Anaplastic large T-cell lymphoma, ALK positive:; 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  PROCEDURE: Axial images were obtained from the thoracic inlet through the pubic symphysis following the administration of Isovue 300 contrast.   FINDINGS:  CHEST: There is no evidence of mediastinal or axillary adenopathy. Streaky soft tissue density in the anterior mediastinum is unchanged and likely reflects residual thymic tissue. Heart size is normal. There are no pleural or pericardial effusions. Lung windows reveal no focal opacities or suspicious pulmonary nodules. Bone windows reveal no lytic or destructive lesions.  ABDOMEN: The liver is homogeneous in appearance with no focal lesions. The spleen is unremarkable. No adrenal mass is present.  The pancreas is normal. The kidneys are unremarkable. The aorta is normal in caliber. There is no free fluid or adenopathy. The abdominal portions of the GI tract are unremarkable.  PELVIS: The appendix is normal. The urinary bladder is unremarkable. There is no significant free fluid or adenopathy. Bone windows reveal no lytic or destructive lesions.      Impression: No evidence of metastatic disease involving the chest, abdomen or pelvis.  This study was performed with techniques to keep radiation doses as low as reasonably achievable (ALARA). Individualized dose reduction techniques using automated exposure control or adjustment of mA and/or kV according to the patient size were employed.  This report was finalized on 6/3/2019 3:53 PM by Mukesh Hong M.D..    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  PROCEDURE: Axial images were obtained from the thoracic inlet through the pubic symphysis following  the administration of Isovue 300 contrast.   FINDINGS:  CHEST: There is no evidence of mediastinal or axillary adenopathy. Streaky soft tissue density in the anterior mediastinum is unchanged and likely reflects residual thymic tissue. Heart size is normal. There are no pleural or pericardial effusions. Lung windows reveal no focal opacities or suspicious pulmonary nodules. Bone windows reveal no lytic or destructive lesions.  ABDOMEN: The liver is homogeneous in appearance with no focal lesions. The spleen is unremarkable. No adrenal mass is present.  The pancreas is normal. The kidneys are unremarkable. The aorta is normal in caliber. There is no free fluid or adenopathy. The abdominal portions of the GI tract are unremarkable.  PELVIS: The appendix is normal. The urinary bladder is unremarkable. There is no significant free fluid or adenopathy. Bone windows reveal no lytic or destructive lesions.      Impression: No evidence of metastatic disease involving the chest, abdomen or pelvis.  This study was performed with techniques to keep radiation doses as low as reasonably achievable (ALARA). Individualized dose reduction techniques using automated exposure control or adjustment of mA and/or kV according to the patient size were employed.  This report was finalized on 6/3/2019 3:53 PM by Mukesh Hong M.D..      ASSESSMENT: The patient is a very pleasant 33 y.o. female  with anaplastic large T-cell lymphoma, ALK positive    PROBLEM LIST:  1. Anaplastic large T-cell lymphoma, ALK positive:  A. Incidentally found LAD on MRI done for low back and hip pain done 9/5/2017.   B. Follow up CAT scan confirmed pelvic adenopathy extending from the distal aortic region through the left iliac region.   C. Status post FNA to left iliac lymph node done 9/29/2017. Pathology revealed benign lymphoid cell groups.   D. Repeat CT done 3/15/2018 revealed increased size of left iliac adenopathy with mild splenomegaly.   E.  CT-guided biopsy done on April 19, 2018 showed anaplastic large T-cell lymphoma ALK+  F. whole body PET scan done on May 1, 2018 revealed disease above and below the diaphragm with hypermetabolic active lymph nodes in the supraclavicular area as well as retroperitoneum.  G. Started chemotherapy using CHOP May 2, 2018, status post 6 cycles, completed August 22, 2018.   2. Mild splenomegaly   A. Incidentally found 9/5/2017.  B. Progressive size on repeat imaging done 3/15/2018.  3. Left hip and low back pain  A. Under care of pain management with use of Norco.   4. History of anoxic brain injury following cardiac arrest post-partum.   5. Postpartum cardiomyopathy.   6. Anxiety and depression  7.  Constipation  8.  Chemotherapy used nausea.  9.  Hypertension  10.  Systolic cardiomyopathy:  8.  Ejection fraction dropped from 60% on May 2 2:30 percent on October 19, 2018.    PLAN:  1.   We discussed Ct scan of chest, Abdomen and pelvis showed No evidence of recurrent or metastatic disease within the chest, abdomen or pelvis.   2.  The patient will follow-up in 6 months with a CT of chest, abdomen, and pelvis.   3.  The patient will continue to follow-up with Dr. Curtis from cardiology.  Her cardiomyopathy could be induced by previous chemotherapy including Adriamycin.  4.  We'll continue Port-A-Cath care.  We will continue EMLA cream.  If the patient next scans are negative we will arrange for port removal.  5. I will continue the patient on Colace daily as well as lactulose as needed for constipation.  6. I will continue Zoloft for depression.  7. I will order labs for today to check hgb and HCt. If Hgb is less than 8 we will plan to transfuse with 2 units.     Carlo Rice MD  6/5/2019

## 2019-06-22 ENCOUNTER — HOSPITAL ENCOUNTER (EMERGENCY)
Facility: HOSPITAL | Age: 34
Discharge: HOME | End: 2019-06-23
Attending: EMERGENCY MEDICINE | Admitting: EMERGENCY MEDICINE

## 2019-06-22 DIAGNOSIS — J06.9 VIRAL UPPER RESPIRATORY TRACT INFECTION: Primary | ICD-10-CM

## 2019-06-22 PROCEDURE — 93005 ELECTROCARDIOGRAM TRACING: CPT | Performed by: EMERGENCY MEDICINE

## 2019-06-22 PROCEDURE — 99283 EMERGENCY DEPT VISIT LOW MDM: CPT

## 2019-06-22 PROCEDURE — 93005 ELECTROCARDIOGRAM TRACING: CPT

## 2019-06-23 ENCOUNTER — APPOINTMENT (OUTPATIENT)
Dept: GENERAL RADIOLOGY | Facility: HOSPITAL | Age: 34
End: 2019-06-23

## 2019-06-23 VITALS
SYSTOLIC BLOOD PRESSURE: 112 MMHG | BODY MASS INDEX: 44.13 KG/M2 | HEIGHT: 67 IN | OXYGEN SATURATION: 96 % | WEIGHT: 281.2 LBS | HEART RATE: 90 BPM | DIASTOLIC BLOOD PRESSURE: 74 MMHG | TEMPERATURE: 98.1 F | RESPIRATION RATE: 20 BRPM

## 2019-06-23 PROCEDURE — 63710000001 PROMETHAZINE PER 12.5 MG: Performed by: EMERGENCY MEDICINE

## 2019-06-23 PROCEDURE — 71046 X-RAY EXAM CHEST 2 VIEWS: CPT

## 2019-06-23 PROCEDURE — 63710000001 PREDNISONE PER 5 MG: Performed by: EMERGENCY MEDICINE

## 2019-06-23 RX ORDER — PREDNISONE 20 MG/1
TABLET ORAL
Qty: 10 TABLET | Refills: 0 | Status: SHIPPED | OUTPATIENT
Start: 2019-06-23 | End: 2019-10-14 | Stop reason: HOSPADM

## 2019-06-23 RX ORDER — DEXTROMETHORPHAN HYDROBROMIDE AND PROMETHAZINE HYDROCHLORIDE 15; 6.25 MG/5ML; MG/5ML
5 SYRUP ORAL 4 TIMES DAILY PRN
Qty: 120 ML | Refills: 0 | Status: SHIPPED | OUTPATIENT
Start: 2019-06-23 | End: 2019-10-14 | Stop reason: HOSPADM

## 2019-06-23 RX ORDER — GUAIFENESIN/DEXTROMETHORPHAN 100-10MG/5
10 SYRUP ORAL ONCE
Status: COMPLETED | OUTPATIENT
Start: 2019-06-23 | End: 2019-06-23

## 2019-06-23 RX ORDER — PROMETHAZINE HYDROCHLORIDE 12.5 MG/1
12.5 TABLET ORAL ONCE
Status: COMPLETED | OUTPATIENT
Start: 2019-06-23 | End: 2019-06-23

## 2019-06-23 RX ADMIN — GUAIFENESIN AND DEXTROMETHORPHAN 10 ML: 100; 10 SYRUP ORAL at 01:52

## 2019-06-23 RX ADMIN — PREDNISONE 60 MG: 10 TABLET ORAL at 01:52

## 2019-06-23 RX ADMIN — PROMETHAZINE HYDROCHLORIDE 12.5 MG: 12.5 TABLET ORAL at 01:52

## 2019-08-02 ENCOUNTER — APPOINTMENT (OUTPATIENT)
Dept: CT IMAGING | Facility: HOSPITAL | Age: 34
End: 2019-08-02

## 2019-08-02 ENCOUNTER — HOSPITAL ENCOUNTER (EMERGENCY)
Facility: HOSPITAL | Age: 34
Discharge: HOME | End: 2019-08-02
Attending: EMERGENCY MEDICINE | Admitting: EMERGENCY MEDICINE

## 2019-08-02 VITALS
TEMPERATURE: 98.1 F | OXYGEN SATURATION: 94 % | SYSTOLIC BLOOD PRESSURE: 108 MMHG | WEIGHT: 287.8 LBS | HEIGHT: 67 IN | RESPIRATION RATE: 20 BRPM | DIASTOLIC BLOOD PRESSURE: 82 MMHG | BODY MASS INDEX: 45.17 KG/M2 | HEART RATE: 94 BPM

## 2019-08-02 DIAGNOSIS — K62.5 BRBPR (BRIGHT RED BLOOD PER RECTUM): Primary | ICD-10-CM

## 2019-08-02 DIAGNOSIS — R10.9 ABDOMINAL PAIN, UNSPECIFIED ABDOMINAL LOCATION: ICD-10-CM

## 2019-08-02 LAB
ALBUMIN SERPL-MCNC: 4 G/DL (ref 3.5–5)
ALBUMIN/GLOB SERPL: 1.5 G/DL (ref 1–2)
ALP SERPL-CCNC: 40 U/L (ref 38–126)
ALT SERPL W P-5'-P-CCNC: 30 U/L (ref 13–69)
ANION GAP SERPL CALCULATED.3IONS-SCNC: 16.4 MMOL/L (ref 10–20)
AST SERPL-CCNC: 26 U/L (ref 15–46)
BASOPHILS # BLD AUTO: 0.03 10*3/MM3 (ref 0–0.2)
BASOPHILS NFR BLD AUTO: 0.2 % (ref 0–1.5)
BILIRUB SERPL-MCNC: 0.7 MG/DL (ref 0.2–1.3)
BUN BLD-MCNC: 21 MG/DL (ref 7–20)
BUN/CREAT SERPL: 30 (ref 7.1–23.5)
CALCIUM SPEC-SCNC: 8.7 MG/DL (ref 8.4–10.2)
CHLORIDE SERPL-SCNC: 106 MMOL/L (ref 98–107)
CO2 SERPL-SCNC: 21 MMOL/L (ref 26–30)
CREAT BLD-MCNC: 0.7 MG/DL (ref 0.6–1.3)
DEPRECATED RDW RBC AUTO: 53.4 FL (ref 37–54)
EOSINOPHIL # BLD AUTO: 0.01 10*3/MM3 (ref 0–0.4)
EOSINOPHIL NFR BLD AUTO: 0.1 % (ref 0.3–6.2)
ERYTHROCYTE [DISTWIDTH] IN BLOOD BY AUTOMATED COUNT: 18.2 % (ref 12.3–15.4)
GFR SERPL CREATININE-BSD FRML MDRD: 96 ML/MIN/1.73
GLOBULIN UR ELPH-MCNC: 2.6 GM/DL
GLUCOSE BLD-MCNC: 130 MG/DL (ref 74–98)
HCT VFR BLD AUTO: 38.8 % (ref 34–46.6)
HGB BLD-MCNC: 12.2 G/DL (ref 12–15.9)
IMM GRANULOCYTES # BLD AUTO: 0.08 10*3/MM3 (ref 0–0.05)
IMM GRANULOCYTES NFR BLD AUTO: 0.7 % (ref 0–0.5)
LIPASE SERPL-CCNC: 130 U/L (ref 23–300)
LYMPHOCYTES # BLD AUTO: 2.63 10*3/MM3 (ref 0.7–3.1)
LYMPHOCYTES NFR BLD AUTO: 21.7 % (ref 19.6–45.3)
MCH RBC QN AUTO: 27 PG (ref 26.6–33)
MCHC RBC AUTO-ENTMCNC: 31.4 G/DL (ref 31.5–35.7)
MCV RBC AUTO: 85.8 FL (ref 79–97)
MONOCYTES # BLD AUTO: 0.74 10*3/MM3 (ref 0.1–0.9)
MONOCYTES NFR BLD AUTO: 6.1 % (ref 5–12)
NEUTROPHILS # BLD AUTO: 8.64 10*3/MM3 (ref 1.7–7)
NEUTROPHILS NFR BLD AUTO: 71.2 % (ref 42.7–76)
NRBC BLD AUTO-RTO: 0.2 /100 WBC (ref 0–0.2)
PLATELET # BLD AUTO: 242 10*3/MM3 (ref 140–450)
PMV BLD AUTO: 10.4 FL (ref 6–12)
POTASSIUM BLD-SCNC: 4.4 MMOL/L (ref 3.5–5.1)
PROT SERPL-MCNC: 6.6 G/DL (ref 6.3–8.2)
RBC # BLD AUTO: 4.52 10*6/MM3 (ref 3.77–5.28)
SODIUM BLD-SCNC: 139 MMOL/L (ref 137–145)
WBC NRBC COR # BLD: 12.13 10*3/MM3 (ref 3.4–10.8)

## 2019-08-02 PROCEDURE — 85025 COMPLETE CBC W/AUTO DIFF WBC: CPT | Performed by: EMERGENCY MEDICINE

## 2019-08-02 PROCEDURE — 83690 ASSAY OF LIPASE: CPT | Performed by: EMERGENCY MEDICINE

## 2019-08-02 PROCEDURE — 74176 CT ABD & PELVIS W/O CONTRAST: CPT

## 2019-08-02 PROCEDURE — 96360 HYDRATION IV INFUSION INIT: CPT

## 2019-08-02 PROCEDURE — 80053 COMPREHEN METABOLIC PANEL: CPT | Performed by: EMERGENCY MEDICINE

## 2019-08-02 PROCEDURE — 99283 EMERGENCY DEPT VISIT LOW MDM: CPT

## 2019-08-02 RX ORDER — SODIUM CHLORIDE 0.9 % (FLUSH) 0.9 %
10 SYRINGE (ML) INJECTION AS NEEDED
Status: DISCONTINUED | OUTPATIENT
Start: 2019-08-02 | End: 2019-08-03 | Stop reason: HOSPADM

## 2019-08-02 RX ADMIN — SODIUM CHLORIDE 500 ML: 9 INJECTION, SOLUTION INTRAVENOUS at 21:42

## 2019-08-03 NOTE — ED PROVIDER NOTES
"Subjective   34-year-old female presents to the ED with chief complaint of bright red blood per rectum.  Patient states that for the last 2 days she has had rectal bleeding with bowel movements.  She states that earlier today she had passage of large amount of bright red blood and clots into the toilet.  Patient has had this before and had significant bleeding that required 4 units of PRBCs.  She then underwent a hemorrhoidectomy.  This recurrence was in February of this year.  She has had no further issues until the last 2 days.  Complaining of generalized lower abdominal cramping and aching.  She denies vomiting diarrhea or constipation.  No lightheadedness dizziness or fatigue.  No chest pain or shortness of breath.  No fever or chills.  No other complaints at this time.            Review of Systems   Constitutional: Negative for fatigue and fever.   Gastrointestinal: Positive for abdominal pain, anal bleeding and blood in stool. Negative for diarrhea, nausea and vomiting.   All other systems reviewed and are negative.      Past Medical History:   Diagnosis Date   • Anesthesia     pt reports \"screamed for my  and wouldnt calm down when anesthesia gas used\".   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   • Cancer (CMS/HCC)     LYMPHOMA STAGE 3   • Cardiac arrest (CMS/HCC)     dionisio partum cardiomyopathy   • Cardiomyopathy (CMS/HCC)    • CHF (congestive heart failure) (CMS/HCC)    • Dermatitis     chronic on face   • Fatty liver    • Full dentures     DOESNT WEAR   • H/O chronic ear infection    • Headaches due to old head trauma    • Heavy menses    • Hemorrhoids    • Hip pain, left    • Hyperlipidemia    • Hypertension    • Short-term memory loss    • Stuttering in conditions classified elsewhere     IF PT GETS TOO NERVOUS OR EXCITED HAS STUTTERING   • 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   • Macrobid [Nitrofurantoin Monohyd Macro] Other (See Comments)     Pt does not know   • Contrast Dye Itching       Past Surgical History:   Procedure Laterality Date   • CARDIAC CATHETERIZATION N/A 10/22/2018    Procedure: Left Heart Cath;  Surgeon: Zheng Curtis MD;  Location: [facility] CATH INVASIVE LOCATION;  Service: Cardiology   • CARDIAC CATHETERIZATION N/A 10/22/2018    Procedure: Coronary angiography;  Surgeon: Zheng Curtis MD;  Location: [facility] CATH INVASIVE LOCATION;  Service: Cardiology   • CARDIAC CATHETERIZATION N/A 10/22/2018    Procedure: Left ventriculography;  Surgeon: Zheng Curtis MD;  Location: [facility] CATH INVASIVE LOCATION;  Service: Cardiology   •  SECTION     • COLONOSCOPY N/A 2019    Procedure: COLONOSCOPY;  Surgeon: Venkata Wiley MD;  Location: [facility] ENDOSCOPY;  Service: Gastroenterology   • ENDOMETRIAL ABLATION     • ENDOSCOPY N/A 2019    Procedure: ESOPHAGOGASTRODUODENOSCOPY WITH COLD FORCEP BIOPSY;  Surgeon: Venkata Wiley MD;  Location: [facility] ENDOSCOPY;  Service: Gastroenterology   • FEMORAL LYMPH NODE BIOPSY/EXCISON Left     lt illiac    • HEMORRHOIDECTOMY N/A 2019    Procedure: HEMORRHOIDECTOMY;  Surgeon: Venkata Wiley MD;  Location: [facility] OR;  Service: General   • PORTACATH PLACEMENT N/A 2018    Procedure: INSERTION OF PORTACATH right subclavian;  Surgeon: Shelley Brock MD;  Location: [facility] OR;  Service: General   • TUBAL ABDOMINAL LIGATION         Family History   Problem Relation Age of Onset   • Hypertension Father    • Diabetes Father    • Diabetes Paternal Grandmother        Social History     Socioeconomic History   • Marital status:      Spouse name: Not on file   • Number of children: Not on file   • Years of education: Not on file   • Highest education level: Not on file   Tobacco Use   • Smoking status: Never Smoker   •  Smokeless tobacco: Never Used   Substance and Sexual Activity   • Alcohol use: Yes     Comment: OCCASIONALLY   • Drug use: No   • Sexual activity: Defer           Objective   Physical Exam   Constitutional: She is oriented to person, place, and time. No distress.   Chronically ill-appearing.   HENT:   Head: Normocephalic and atraumatic.   Nose: Nose normal.   Eyes: Conjunctivae and EOM are normal.   Cardiovascular: Normal rate, regular rhythm and intact distal pulses.   Pulmonary/Chest: Effort normal and breath sounds normal. No respiratory distress.   Abdominal: Soft. She exhibits no distension. There is tenderness. There is no guarding.   Generalized lower abdominal tenderness to palpation.   Musculoskeletal: She exhibits no edema or deformity.   Neurological: She is alert and oriented to person, place, and time. No cranial nerve deficit.   Skin: She is not diaphoretic.   Nursing note and vitals reviewed.      Procedures           ED Course        She presented with bright red blood per rectum with bowel movements x2 days.  Hemodynamic with stable.  Hemoglobin greater than 12.  Patient had a recent anoscopy and EGD that showed hemorrhoids and had a hemorrhoidectomy.  Patient likely bleeding from hemorrhoids.  She had no further bloody bowel movements while here in the ED.  She was hemodynamically stable though time.  She does not take any anticoagulants or blood thinners.  Given this the patient will be discharged to follow-up with her general surgeon.  Strict return precautions given.  Patient is agreeable to this plan.          MDM      Final diagnoses:   BRBPR (bright red blood per rectum)   Abdominal pain, unspecified abdominal location            Tom Girard, DO  08/03/19 0147

## 2019-09-17 ENCOUNTER — TELEPHONE (OUTPATIENT)
Dept: SURGERY | Facility: CLINIC | Age: 34
End: 2019-09-17

## 2019-09-25 ENCOUNTER — TRANSCRIBE ORDERS (OUTPATIENT)
Dept: ADMINISTRATIVE | Facility: HOSPITAL | Age: 34
End: 2019-09-25

## 2019-09-25 DIAGNOSIS — J42 CHRONIC BRONCHITIS, UNSPECIFIED CHRONIC BRONCHITIS TYPE (HCC): Primary | ICD-10-CM

## 2019-10-02 ENCOUNTER — HOSPITAL ENCOUNTER (OUTPATIENT)
Dept: PULMONOLOGY | Facility: HOSPITAL | Age: 34
Discharge: HOME | End: 2019-10-02
Admitting: NURSE PRACTITIONER

## 2019-10-02 DIAGNOSIS — J42 CHRONIC BRONCHITIS, UNSPECIFIED CHRONIC BRONCHITIS TYPE (HCC): ICD-10-CM

## 2019-10-02 PROCEDURE — 94640 AIRWAY INHALATION TREATMENT: CPT

## 2019-10-02 PROCEDURE — 94060 EVALUATION OF WHEEZING: CPT

## 2019-10-02 RX ORDER — ALBUTEROL SULFATE 2.5 MG/3ML
2.5 SOLUTION RESPIRATORY (INHALATION) ONCE
Status: COMPLETED | OUTPATIENT
Start: 2019-10-02 | End: 2019-10-02

## 2019-10-02 RX ADMIN — ALBUTEROL SULFATE 2.5 MG: 2.5 SOLUTION RESPIRATORY (INHALATION) at 12:56

## 2019-10-09 ENCOUNTER — TRANSCRIBE ORDERS (OUTPATIENT)
Dept: GENERAL RADIOLOGY | Facility: HOSPITAL | Age: 34
End: 2019-10-09

## 2019-10-09 ENCOUNTER — HOSPITAL ENCOUNTER (OUTPATIENT)
Dept: GENERAL RADIOLOGY | Facility: HOSPITAL | Age: 34
Discharge: HOME | End: 2019-10-09
Admitting: FAMILY MEDICINE

## 2019-10-09 DIAGNOSIS — R05.9 COUGH: Primary | ICD-10-CM

## 2019-10-09 PROCEDURE — 71046 X-RAY EXAM CHEST 2 VIEWS: CPT

## 2019-10-12 ENCOUNTER — APPOINTMENT (OUTPATIENT)
Dept: GENERAL RADIOLOGY | Facility: HOSPITAL | Age: 34
End: 2019-10-12

## 2019-10-12 ENCOUNTER — HOSPITAL ENCOUNTER (INPATIENT)
Facility: HOSPITAL | Age: 34
LOS: 2 days | Discharge: HOME | End: 2019-10-14
Attending: EMERGENCY MEDICINE | Admitting: FAMILY MEDICINE

## 2019-10-12 DIAGNOSIS — I48.91 ATRIAL FIBRILLATION WITH RAPID VENTRICULAR RESPONSE (HCC): Primary | ICD-10-CM

## 2019-10-12 DIAGNOSIS — J81.0 ACUTE PULMONARY EDEMA (HCC): ICD-10-CM

## 2019-10-12 LAB
ALBUMIN SERPL-MCNC: 3.9 G/DL (ref 3.5–5.2)
ALBUMIN SERPL-MCNC: 4 G/DL (ref 3.5–5.2)
ALBUMIN/GLOB SERPL: 1.3 G/DL
ALBUMIN/GLOB SERPL: 1.4 G/DL
ALP SERPL-CCNC: 66 U/L (ref 39–117)
ALP SERPL-CCNC: 68 U/L (ref 39–117)
ALT SERPL W P-5'-P-CCNC: 16 U/L (ref 1–33)
ALT SERPL W P-5'-P-CCNC: 17 U/L (ref 1–33)
ANION GAP SERPL CALCULATED.3IONS-SCNC: 12.8 MMOL/L (ref 5–15)
ANION GAP SERPL CALCULATED.3IONS-SCNC: 14 MMOL/L (ref 5–15)
AST SERPL-CCNC: 17 U/L (ref 1–32)
AST SERPL-CCNC: 18 U/L (ref 1–32)
BASOPHILS # BLD AUTO: 0.04 10*3/MM3 (ref 0–0.2)
BASOPHILS # BLD AUTO: 0.06 10*3/MM3 (ref 0–0.2)
BASOPHILS NFR BLD AUTO: 0.5 % (ref 0–1.5)
BASOPHILS NFR BLD AUTO: 0.7 % (ref 0–1.5)
BILIRUB SERPL-MCNC: 0.8 MG/DL (ref 0.2–1.2)
BILIRUB SERPL-MCNC: 0.9 MG/DL (ref 0.2–1.2)
BUN BLD-MCNC: 15 MG/DL (ref 6–20)
BUN BLD-MCNC: 16 MG/DL (ref 6–20)
BUN/CREAT SERPL: 16.3 (ref 7–25)
BUN/CREAT SERPL: 17.4 (ref 7–25)
CALCIUM SPEC-SCNC: 8.4 MG/DL (ref 8.6–10.5)
CALCIUM SPEC-SCNC: 8.6 MG/DL (ref 8.6–10.5)
CHLORIDE SERPL-SCNC: 106 MMOL/L (ref 98–107)
CHLORIDE SERPL-SCNC: 107 MMOL/L (ref 98–107)
CO2 SERPL-SCNC: 21.2 MMOL/L (ref 22–29)
CO2 SERPL-SCNC: 22 MMOL/L (ref 22–29)
CREAT BLD-MCNC: 0.92 MG/DL (ref 0.57–1)
CREAT BLD-MCNC: 0.92 MG/DL (ref 0.57–1)
D-LACTATE SERPL-SCNC: 1.1 MMOL/L (ref 0.5–2)
DEPRECATED RDW RBC AUTO: 52.6 FL (ref 37–54)
DEPRECATED RDW RBC AUTO: 53.1 FL (ref 37–54)
EOSINOPHIL # BLD AUTO: 0.06 10*3/MM3 (ref 0–0.4)
EOSINOPHIL # BLD AUTO: 0.07 10*3/MM3 (ref 0–0.4)
EOSINOPHIL NFR BLD AUTO: 0.7 % (ref 0.3–6.2)
EOSINOPHIL NFR BLD AUTO: 0.8 % (ref 0.3–6.2)
ERYTHROCYTE [DISTWIDTH] IN BLOOD BY AUTOMATED COUNT: 15.9 % (ref 12.3–15.4)
ERYTHROCYTE [DISTWIDTH] IN BLOOD BY AUTOMATED COUNT: 16 % (ref 12.3–15.4)
GFR SERPL CREATININE-BSD FRML MDRD: 70 ML/MIN/1.73
GFR SERPL CREATININE-BSD FRML MDRD: 70 ML/MIN/1.73
GLOBULIN UR ELPH-MCNC: 2.8 GM/DL
GLOBULIN UR ELPH-MCNC: 2.9 GM/DL
GLUCOSE BLD-MCNC: 113 MG/DL (ref 65–99)
GLUCOSE BLD-MCNC: 121 MG/DL (ref 65–99)
HCG SERPL QL: NEGATIVE
HCT VFR BLD AUTO: 39.2 % (ref 34–46.6)
HCT VFR BLD AUTO: 39.7 % (ref 34–46.6)
HGB BLD-MCNC: 12 G/DL (ref 12–15.9)
HGB BLD-MCNC: 12 G/DL (ref 12–15.9)
HOLD SPECIMEN: NORMAL
HOLD SPECIMEN: NORMAL
IMM GRANULOCYTES # BLD AUTO: 0.03 10*3/MM3 (ref 0–0.05)
IMM GRANULOCYTES # BLD AUTO: 0.05 10*3/MM3 (ref 0–0.05)
IMM GRANULOCYTES NFR BLD AUTO: 0.3 % (ref 0–0.5)
IMM GRANULOCYTES NFR BLD AUTO: 0.6 % (ref 0–0.5)
LYMPHOCYTES # BLD AUTO: 1.69 10*3/MM3 (ref 0.7–3.1)
LYMPHOCYTES # BLD AUTO: 2.1 10*3/MM3 (ref 0.7–3.1)
LYMPHOCYTES NFR BLD AUTO: 19.2 % (ref 19.6–45.3)
LYMPHOCYTES NFR BLD AUTO: 24.8 % (ref 19.6–45.3)
MCH RBC QN AUTO: 27.1 PG (ref 26.6–33)
MCH RBC QN AUTO: 27.8 PG (ref 26.6–33)
MCHC RBC AUTO-ENTMCNC: 30.2 G/DL (ref 31.5–35.7)
MCHC RBC AUTO-ENTMCNC: 30.6 G/DL (ref 31.5–35.7)
MCV RBC AUTO: 89.8 FL (ref 79–97)
MCV RBC AUTO: 91 FL (ref 79–97)
MONOCYTES # BLD AUTO: 0.45 10*3/MM3 (ref 0.1–0.9)
MONOCYTES # BLD AUTO: 0.51 10*3/MM3 (ref 0.1–0.9)
MONOCYTES NFR BLD AUTO: 5.3 % (ref 5–12)
MONOCYTES NFR BLD AUTO: 5.8 % (ref 5–12)
NEUTROPHILS # BLD AUTO: 5.76 10*3/MM3 (ref 1.7–7)
NEUTROPHILS # BLD AUTO: 6.42 10*3/MM3 (ref 1.7–7)
NEUTROPHILS NFR BLD AUTO: 68.1 % (ref 42.7–76)
NEUTROPHILS NFR BLD AUTO: 73.2 % (ref 42.7–76)
NRBC BLD AUTO-RTO: 0 /100 WBC (ref 0–0.2)
NRBC BLD AUTO-RTO: 0 /100 WBC (ref 0–0.2)
NT-PROBNP SERPL-MCNC: 8607 PG/ML (ref 5–450)
PLATELET # BLD AUTO: 205 10*3/MM3 (ref 140–450)
PLATELET # BLD AUTO: 215 10*3/MM3 (ref 140–450)
PMV BLD AUTO: 10.5 FL (ref 6–12)
PMV BLD AUTO: 10.5 FL (ref 6–12)
POTASSIUM BLD-SCNC: 3.4 MMOL/L (ref 3.5–5.2)
POTASSIUM BLD-SCNC: 3.6 MMOL/L (ref 3.5–5.2)
PROCALCITONIN SERPL-MCNC: 0.05 NG/ML (ref 0.1–0.25)
PROT SERPL-MCNC: 6.8 G/DL (ref 6–8.5)
PROT SERPL-MCNC: 6.8 G/DL (ref 6–8.5)
RBC # BLD AUTO: 4.31 10*6/MM3 (ref 3.77–5.28)
RBC # BLD AUTO: 4.42 10*6/MM3 (ref 3.77–5.28)
SODIUM BLD-SCNC: 141 MMOL/L (ref 136–145)
SODIUM BLD-SCNC: 142 MMOL/L (ref 136–145)
TROPONIN T SERPL-MCNC: <0.01 NG/ML (ref 0–0.03)
TSH SERPL DL<=0.05 MIU/L-ACNC: 3.52 UIU/ML (ref 0.27–4.2)
WBC NRBC COR # BLD: 8.46 10*3/MM3 (ref 3.4–10.8)
WBC NRBC COR # BLD: 8.78 10*3/MM3 (ref 3.4–10.8)
WHOLE BLOOD HOLD SPECIMEN: NORMAL
WHOLE BLOOD HOLD SPECIMEN: NORMAL

## 2019-10-12 PROCEDURE — 80053 COMPREHEN METABOLIC PANEL: CPT | Performed by: FAMILY MEDICINE

## 2019-10-12 PROCEDURE — 25010000002 FUROSEMIDE PER 20 MG: Performed by: EMERGENCY MEDICINE

## 2019-10-12 PROCEDURE — 25010000002 FUROSEMIDE PER 20 MG: Performed by: FAMILY MEDICINE

## 2019-10-12 PROCEDURE — 99223 1ST HOSP IP/OBS HIGH 75: CPT | Performed by: FAMILY MEDICINE

## 2019-10-12 PROCEDURE — 71046 X-RAY EXAM CHEST 2 VIEWS: CPT

## 2019-10-12 PROCEDURE — 93005 ELECTROCARDIOGRAM TRACING: CPT

## 2019-10-12 PROCEDURE — 84145 PROCALCITONIN (PCT): CPT | Performed by: EMERGENCY MEDICINE

## 2019-10-12 PROCEDURE — G0378 HOSPITAL OBSERVATION PER HR: HCPCS

## 2019-10-12 PROCEDURE — 85025 COMPLETE CBC W/AUTO DIFF WBC: CPT | Performed by: FAMILY MEDICINE

## 2019-10-12 PROCEDURE — 99284 EMERGENCY DEPT VISIT MOD MDM: CPT

## 2019-10-12 PROCEDURE — 25010000002 MAGNESIUM SULFATE 2 GM/50ML SOLUTION: Performed by: INTERNAL MEDICINE

## 2019-10-12 PROCEDURE — 84484 ASSAY OF TROPONIN QUANT: CPT

## 2019-10-12 PROCEDURE — 83605 ASSAY OF LACTIC ACID: CPT | Performed by: EMERGENCY MEDICINE

## 2019-10-12 PROCEDURE — 25010000002 AMIODARONE IN DEXTROSE 5% 360-4.14 MG/200ML-% SOLUTION: Performed by: INTERNAL MEDICINE

## 2019-10-12 PROCEDURE — 80050 GENERAL HEALTH PANEL: CPT

## 2019-10-12 PROCEDURE — 84703 CHORIONIC GONADOTROPIN ASSAY: CPT

## 2019-10-12 PROCEDURE — 83880 ASSAY OF NATRIURETIC PEPTIDE: CPT

## 2019-10-12 PROCEDURE — 25010000002 AMIODARONE IN DEXTROSE 5% 150-4.21 MG/100ML-% SOLUTION: Performed by: INTERNAL MEDICINE

## 2019-10-12 PROCEDURE — 25010000002 ENOXAPARIN PER 10 MG: Performed by: FAMILY MEDICINE

## 2019-10-12 RX ORDER — CALCIUM CARBONATE 200(500)MG
1 TABLET,CHEWABLE ORAL AS NEEDED
Status: DISCONTINUED | OUTPATIENT
Start: 2019-10-12 | End: 2019-10-14 | Stop reason: HOSPADM

## 2019-10-12 RX ORDER — OXYCODONE AND ACETAMINOPHEN 10; 325 MG/1; MG/1
1 TABLET ORAL EVERY 6 HOURS PRN
Status: DISCONTINUED | OUTPATIENT
Start: 2019-10-12 | End: 2019-10-14 | Stop reason: HOSPADM

## 2019-10-12 RX ORDER — FLUTICASONE PROPIONATE 50 MCG
1 SPRAY, SUSPENSION (ML) NASAL DAILY
Status: DISCONTINUED | OUTPATIENT
Start: 2019-10-12 | End: 2019-10-14 | Stop reason: HOSPADM

## 2019-10-12 RX ORDER — SODIUM CHLORIDE 0.9 % (FLUSH) 0.9 %
10 SYRINGE (ML) INJECTION EVERY 12 HOURS SCHEDULED
Status: DISCONTINUED | OUTPATIENT
Start: 2019-10-12 | End: 2019-10-14 | Stop reason: HOSPADM

## 2019-10-12 RX ORDER — DILTIAZEM HYDROCHLORIDE 180 MG/1
180 CAPSULE, COATED, EXTENDED RELEASE ORAL ONCE
Status: COMPLETED | OUTPATIENT
Start: 2019-10-12 | End: 2019-10-12

## 2019-10-12 RX ORDER — FUROSEMIDE 10 MG/ML
40 INJECTION INTRAMUSCULAR; INTRAVENOUS ONCE
Status: COMPLETED | OUTPATIENT
Start: 2019-10-12 | End: 2019-10-12

## 2019-10-12 RX ORDER — SODIUM CHLORIDE 0.9 % (FLUSH) 0.9 %
10 SYRINGE (ML) INJECTION AS NEEDED
Status: DISCONTINUED | OUTPATIENT
Start: 2019-10-12 | End: 2019-10-14 | Stop reason: HOSPADM

## 2019-10-12 RX ORDER — POLYETHYLENE GLYCOL 3350 17 G/17G
17 POWDER, FOR SOLUTION ORAL DAILY PRN
Status: DISCONTINUED | OUTPATIENT
Start: 2019-10-12 | End: 2019-10-13

## 2019-10-12 RX ORDER — METOPROLOL SUCCINATE 50 MG/1
50 TABLET, EXTENDED RELEASE ORAL
Status: DISCONTINUED | OUTPATIENT
Start: 2019-10-12 | End: 2019-10-13

## 2019-10-12 RX ORDER — POTASSIUM CHLORIDE 20 MEQ/1
40 TABLET, EXTENDED RELEASE ORAL 2 TIMES DAILY WITH MEALS
Status: COMPLETED | OUTPATIENT
Start: 2019-10-12 | End: 2019-10-12

## 2019-10-12 RX ORDER — SPIRONOLACTONE 25 MG/1
25 TABLET ORAL DAILY
Status: DISCONTINUED | OUTPATIENT
Start: 2019-10-12 | End: 2019-10-14 | Stop reason: HOSPADM

## 2019-10-12 RX ORDER — PANTOPRAZOLE SODIUM 40 MG/1
40 TABLET, DELAYED RELEASE ORAL DAILY
Status: DISCONTINUED | OUTPATIENT
Start: 2019-10-12 | End: 2019-10-14 | Stop reason: HOSPADM

## 2019-10-12 RX ORDER — ASPIRIN 81 MG/1
81 TABLET ORAL DAILY
Status: DISCONTINUED | OUTPATIENT
Start: 2019-10-12 | End: 2019-10-14

## 2019-10-12 RX ORDER — AMIODARONE HYDROCHLORIDE 200 MG/1
200 TABLET ORAL
Status: DISCONTINUED | OUTPATIENT
Start: 2019-10-14 | End: 2019-10-14 | Stop reason: HOSPADM

## 2019-10-12 RX ORDER — DILTIAZEM HYDROCHLORIDE 5 MG/ML
20 INJECTION INTRAVENOUS ONCE
Status: DISCONTINUED | OUTPATIENT
Start: 2019-10-12 | End: 2019-10-12

## 2019-10-12 RX ORDER — MAGNESIUM SULFATE HEPTAHYDRATE 40 MG/ML
2 INJECTION, SOLUTION INTRAVENOUS ONCE
Status: COMPLETED | OUTPATIENT
Start: 2019-10-12 | End: 2019-10-12

## 2019-10-12 RX ORDER — FUROSEMIDE 10 MG/ML
20 INJECTION INTRAMUSCULAR; INTRAVENOUS EVERY 12 HOURS
Status: DISCONTINUED | OUTPATIENT
Start: 2019-10-12 | End: 2019-10-13

## 2019-10-12 RX ORDER — ATORVASTATIN CALCIUM 20 MG/1
20 TABLET, FILM COATED ORAL NIGHTLY
Status: DISCONTINUED | OUTPATIENT
Start: 2019-10-12 | End: 2019-10-14 | Stop reason: HOSPADM

## 2019-10-12 RX ADMIN — ENOXAPARIN SODIUM 130 MG: 150 INJECTION SUBCUTANEOUS at 09:30

## 2019-10-12 RX ADMIN — SPIRONOLACTONE 25 MG: 25 TABLET ORAL at 09:49

## 2019-10-12 RX ADMIN — ATORVASTATIN CALCIUM 20 MG: 20 TABLET, FILM COATED ORAL at 20:20

## 2019-10-12 RX ADMIN — POLYETHYLENE GLYCOL 3350 17 G: 17 POWDER, FOR SOLUTION ORAL at 18:50

## 2019-10-12 RX ADMIN — AMIODARONE HYDROCHLORIDE 1 MG/MIN: 1.8 INJECTION, SOLUTION INTRAVENOUS at 11:55

## 2019-10-12 RX ADMIN — METOPROLOL SUCCINATE 50 MG: 50 TABLET, EXTENDED RELEASE ORAL at 09:49

## 2019-10-12 RX ADMIN — MAGNESIUM SULFATE HEPTAHYDRATE 2 G: 40 INJECTION, SOLUTION INTRAVENOUS at 13:25

## 2019-10-12 RX ADMIN — PANTOPRAZOLE SODIUM 40 MG: 40 TABLET, DELAYED RELEASE ORAL at 09:33

## 2019-10-12 RX ADMIN — FUROSEMIDE 20 MG: 10 INJECTION, SOLUTION INTRAMUSCULAR; INTRAVENOUS at 09:49

## 2019-10-12 RX ADMIN — SERTRALINE HYDROCHLORIDE 100 MG: 50 TABLET ORAL at 20:20

## 2019-10-12 RX ADMIN — SODIUM CHLORIDE, PRESERVATIVE FREE 10 ML: 5 INJECTION INTRAVENOUS at 09:50

## 2019-10-12 RX ADMIN — POTASSIUM CHLORIDE 40 MEQ: 1500 TABLET, EXTENDED RELEASE ORAL at 12:26

## 2019-10-12 RX ADMIN — SACUBITRIL AND VALSARTAN 1 TABLET: 24; 26 TABLET, FILM COATED ORAL at 09:49

## 2019-10-12 RX ADMIN — AMIODARONE HYDROCHLORIDE 0.5 MG/MIN: 1.8 INJECTION, SOLUTION INTRAVENOUS at 18:16

## 2019-10-12 RX ADMIN — FUROSEMIDE 40 MG: 10 INJECTION, SOLUTION INTRAMUSCULAR; INTRAVENOUS at 02:47

## 2019-10-12 RX ADMIN — POTASSIUM CHLORIDE 40 MEQ: 1500 TABLET, EXTENDED RELEASE ORAL at 18:15

## 2019-10-12 RX ADMIN — SACUBITRIL AND VALSARTAN 1 TABLET: 24; 26 TABLET, FILM COATED ORAL at 20:20

## 2019-10-12 RX ADMIN — SODIUM CHLORIDE, PRESERVATIVE FREE 10 ML: 5 INJECTION INTRAVENOUS at 20:19

## 2019-10-12 RX ADMIN — AMIODARONE HYDROCHLORIDE 150 MG: 1.5 INJECTION, SOLUTION INTRAVENOUS at 11:44

## 2019-10-12 RX ADMIN — ASPIRIN 81 MG: 81 TABLET, COATED ORAL at 12:26

## 2019-10-12 RX ADMIN — FUROSEMIDE 20 MG: 10 INJECTION, SOLUTION INTRAMUSCULAR; INTRAVENOUS at 20:19

## 2019-10-12 RX ADMIN — ENOXAPARIN SODIUM 130 MG: 150 INJECTION SUBCUTANEOUS at 20:19

## 2019-10-12 RX ADMIN — CALCIUM CARBONATE (ANTACID) CHEW TAB 500 MG 1 TABLET: 500 CHEW TAB at 21:15

## 2019-10-12 RX ADMIN — DILTIAZEM HYDROCHLORIDE 180 MG: 180 CAPSULE, COATED, EXTENDED RELEASE ORAL at 02:46

## 2019-10-12 NOTE — ED NOTES
Patient will be going to room 311 tele. GILBERTO Jeffrey notified.      Queenie Luu  10/12/19 0416

## 2019-10-12 NOTE — PLAN OF CARE
Problem: Fall Risk (Adult)  Goal: Identify Related Risk Factors and Signs and Symptoms  Outcome: Outcome(s) achieved Date Met: 10/12/19    Goal: Absence of Fall  Outcome: Ongoing (interventions implemented as appropriate)      Problem: Pain, Chronic (Adult)  Goal: Identify Related Risk Factors and Signs and Symptoms  Outcome: Outcome(s) achieved Date Met: 10/12/19    Goal: Acceptable Pain/Comfort Level and Functional Ability  Outcome: Ongoing (interventions implemented as appropriate)      Problem: Patient Care Overview  Goal: Plan of Care Review  Outcome: Ongoing (interventions implemented as appropriate)   10/12/19 0500   Coping/Psychosocial   Plan of Care Reviewed With patient   Plan of Care Review   Progress no change   OTHER   Outcome Summary new admit from ED-Dr. Morrison saw patient in ED-orders noted and implemented-will continue to monitor labs and patient     Goal: Individualization and Mutuality  Outcome: Ongoing (interventions implemented as appropriate)    Goal: Discharge Needs Assessment  Outcome: Ongoing (interventions implemented as appropriate)    Goal: Interprofessional Rounds/Family Conf  Outcome: Ongoing (interventions implemented as appropriate)      Problem: Skin Injury Risk (Adult)  Goal: Identify Related Risk Factors and Signs and Symptoms  Outcome: Outcome(s) achieved Date Met: 10/12/19    Goal: Skin Health and Integrity  Outcome: Ongoing (interventions implemented as appropriate)      Problem: Cardiac Output Decreased (Adult)  Goal: Identify Related Risk Factors and Signs and Symptoms  Outcome: Outcome(s) achieved Date Met: 10/12/19    Goal: Effective Tissue Perfusion  Outcome: Ongoing (interventions implemented as appropriate)

## 2019-10-12 NOTE — PROGRESS NOTES
Discharge Planning Assessment  [facility]     Patient Name: Johny Hearn  MRN: [id number]  Today's Date: 10/12/2019    Admit Date: 10/12/2019    Discharge Needs Assessment     Row Name 10/12/19 1218       Living Environment    Lives With  spouse    Name(s) of Who Lives With Patient  To Hearn    Current Living Arrangements  home/apartment/condo    Primary Care Provided by  self;spouse/significant other    Provides Primary Care For  no one    Family Caregiver if Needed  spouse    Family Caregiver Names  To    Quality of Family Relationships  supportive;involved;helpful    Able to Return to Prior Arrangements  yes       Resource/Environmental Concerns    Resource/Environmental Concerns  none       Transition Planning    Patient/Family Anticipates Transition to  home with family    Patient/Family Anticipated Services at Transition  none    Transportation Anticipated  family or friend will provide       Discharge Needs Assessment    Readmission Within the Last 30 Days  no previous admission in last 30 days    Concerns to be Addressed  no discharge needs identified;decision making    Concerns Comments  Pt is interested in completing a Living Will    Equipment Currently Used at Home  bipap/cpap;cane, straight;nebulizer has a visual aid cane    Anticipated Changes Related to Illness  none        Discharge Plan     Row Name 10/12/19 1223       Plan    Plan  discharge planning    Patient/Family in Agreement with Plan  yes    Plan Comments  SW met with pt at bedside for an initial d/c plan. Pt was on the phone with her  and states that she prefers to have him on the phone when talking with anyone as she tends to forget information. Pt lives with her  and does require assistance with ADL's. She has a CPAP provided by [employer], a visual aid cane, and nebulizer. Both her and her  does not anticipate that she will have any needs at d/c and he will provide transportation home. Pt is interested in  completing a Living Will this admission and this SW informed that I would return to assist her with this. No further d/c needs identified at this time. Case management will continue to follow and assist with dcp.    Final Discharge Disposition Code  01 - home or self-care        Destination      No service coordination in this encounter.      Durable Medical Equipment      No service coordination in this encounter.      Dialysis/Infusion      No service coordination in this encounter.      Home Medical Care      No service coordination in this encounter.      Therapy      No service coordination in this encounter.      Community Resources      No service coordination in this encounter.          Demographic Summary    No documentation.       Functional Status     Row Name 10/12/19 1222       Functional Status, IADL    Medications  --    Meal Preparation  --    Housekeeping  --    Laundry  --    Shopping  --    Row Name 10/12/19 1217       Functional Status    Usual Activity Tolerance  good;moderate    Current Activity Tolerance  moderate       Functional Status, IADL    Medications  assistive equipment;assistive person    Meal Preparation  assistive person;assistive equipment    Housekeeping  assistive person;assistive equipment    Laundry  assistive person;assistive equipment    Shopping  assistive person;assistive equipment       Employment/    Employment Status  disabled           Current or Previous  Service  none        Psychosocial    No documentation.       Abuse/Neglect    No documentation.       Legal    No documentation.       Substance Abuse    No documentation.       Patient Forms    No documentation.           BLADIMIR Stewart, VIANNEY  10/12/19  12:29 PM

## 2019-10-12 NOTE — ED NOTES
House Supervisor called about bed placement for admit. She stated she would call back to get information.      Queenie Luu  10/12/19 [id number]

## 2019-10-12 NOTE — H&P
"    [facility]      Name:  Johny Hearn   Age:  34 y.o.  Sex:  female  :  1985  MRN:  [id number]   Visit Number:  [id number]  Admission Date:  10/12/2019  Date Of Service:  10/14/19  Primary Care Physician:  Malia Powers APRN    Chief Complaint:     Shortness of breath, palpitations    History Of Presenting Illness:        Review Of Systems:     General: Denies any fevers, chills or loss of consciousness.   Psych: No history of any hallucinations and delusions.  Ophth: No history of any diplopia or transient loss of vision.  ENT: No history of sore throat, nasal congestion or ear pain.   Allergy/immuno: No history of rash or itching.  Hem/lymph: No history of neck swelling or easy bleeding.  Endo: No history of any recent unintentional weight gain or loss.  Resp: Positive shortness of breath and cough  Card: Positive palpitations, no chest pains  GI: No history of nausea, vomiting, diarrhea. Denies any abdominal pain.   : No history of dysuria or hematuria.  MSK: No muscle pain. No calf pain.   Neuro: No history of any focal weakness. No loss of consciousness. Denies any numbness.  Derm:: No history of any redness or pruritis.     Past Medical History:    Past Medical History:   Diagnosis Date   • Anesthesia     pt reports \"screamed for my  and wouldnt calm down when anesthesia gas used\".   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   • Cancer (CMS/HCC)     LYMPHOMA STAGE 3   • Cardiac arrest (CMS/HCC)     dionisio partum cardiomyopathy   • Cardiomyopathy (CMS/HCC)    • CHF (congestive heart failure) (CMS/HCC)    • Dermatitis     chronic on face   • Fatty liver    • Full dentures     DOESNT WEAR   • H/O chronic ear infection    • Headaches due to old head trauma    • Heavy menses    • Hemorrhoids    • Hip pain, left    • Hyperlipidemia    • Hypertension    • Short-term " memory loss    • Stuttering in conditions classified elsewhere     IF PT GETS TOO NERVOUS OR EXCITED HAS STUTTERING   • 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       Past Surgical history:    Past Surgical History:   Procedure Laterality Date   • CARDIAC CATHETERIZATION N/A 10/22/2018    Procedure: Left Heart Cath;  Surgeon: Zheng Curtis MD;  Location: [facility] CATH INVASIVE LOCATION;  Service: Cardiology   • CARDIAC CATHETERIZATION N/A 10/22/2018    Procedure: Coronary angiography;  Surgeon: Zheng Curtis MD;  Location: [facility] CATH INVASIVE LOCATION;  Service: Cardiology   • CARDIAC CATHETERIZATION N/A 10/22/2018    Procedure: Left ventriculography;  Surgeon: Zheng Curtis MD;  Location: [facility] CATH INVASIVE LOCATION;  Service: Cardiology   •  SECTION     • COLONOSCOPY N/A 2019    Procedure: COLONOSCOPY;  Surgeon: Venkata Wiley MD;  Location: [facility] ENDOSCOPY;  Service: Gastroenterology   • ENDOMETRIAL ABLATION     • ENDOSCOPY N/A 2019    Procedure: ESOPHAGOGASTRODUODENOSCOPY WITH COLD FORCEP BIOPSY;  Surgeon: Venkata Wiley MD;  Location: [facility] ENDOSCOPY;  Service: Gastroenterology   • FEMORAL LYMPH NODE BIOPSY/EXCISON Left     lt illiac    • HEMORRHOIDECTOMY N/A 2019    Procedure: HEMORRHOIDECTOMY;  Surgeon: Venkata Wiley MD;  Location: [facility] OR;  Service: General   • PORTACATH PLACEMENT N/A 2018    Procedure: INSERTION OF PORTACATH right subclavian;  Surgeon: Shelley Brock MD;  Location: [facility] OR;  Service: General   • TUBAL ABDOMINAL LIGATION         Social History:    Social History     Socioeconomic History   • Marital status:      Spouse name: Not on file   • Number of children: Not on file   • Years of education: Not on file   • Highest education level: Not on file   Tobacco Use   • Smoking status: Never Smoker   • Smokeless tobacco: Never Used   Substance and Sexual Activity   • Alcohol use: No      Frequency: Never   • Drug use: No   • Sexual activity: Defer       Family History:    Family History   Problem Relation Age of Onset   • Hypertension Father    • Diabetes Father    • Diabetes Paternal Grandmother        Allergies:      Erythromycin; Macrobid [nitrofurantoin monohyd macro]; and Contrast dye    Home Medications:    Prior to Admission Medications     Prescriptions Last Dose Informant Patient Reported? Taking?    atorvastatin (LIPITOR) 20 MG tablet   No No    Take 1 tablet by mouth Every Night.    diphenhydrAMINE (BENADRYL) 50 MG tablet   No No    Take 1 tablet by mouth Take As Directed. 1 hour prior to scan    fluticasone (FLONASE) 50 MCG/ACT nasal spray   Yes No    As Needed.    furosemide (LASIX) 40 MG tablet   No No    Take 1 tablet by mouth Every Other Day.    metoprolol succinate XL (TOPROL-XL) 50 MG 24 hr tablet   No No    Take 1 tablet by mouth Daily.    ondansetron ODT (ZOFRAN-ODT) 4 MG disintegrating tablet   No No    Take 1 tablet by mouth Every 12 (Twelve) Hours As Needed for Nausea or Vomiting.    oxyCODONE-acetaminophen (PERCOCET)  MG per tablet   Yes No    Take 1 tablet by mouth Every 6 (Six) Hours As Needed for Moderate Pain .    pantoprazole (PROTONIX) 40 MG EC tablet   No No    Take 1 tablet by mouth Daily.    polyethylene glycol (MIRALAX) powder   No No    Dissolve 1 capful in your choice of beverage and drink once daily    predniSONE (DELTASONE) 20 MG tablet   No No    Take 2 tablets daily.    predniSONE (DELTASONE) 50 MG tablet   No No    Take 1 tablet by mouth Daily. 1 tab 13 hours before scan, 1 tab 7 hours before, and 1 tab 1 hour before scan    promethazine-dextromethorphan (PROMETHAZINE-DM) 6.25-15 MG/5ML syrup   No No    Take 5 mL by mouth 4 (Four) Times a Day As Needed for Cough.    sacubitril-valsartan (ENTRESTO) 24-26 MG tablet   No No    Take 1 tablet by mouth Every 12 (Twelve) Hours.    sertraline (ZOLOFT) 100 MG tablet   Yes No    Take 100 mg by mouth Every Night.  Total 150 mg daily    sertraline (ZOLOFT) 50 MG tablet   Yes No    Take 50 mg by mouth Daily. Total 150 MG daily dose    spironolactone (ALDACTONE) 25 MG tablet   No No    Take 1 tablet by mouth Daily.             ED Medications:    Medications   sodium chloride 0.9 % flush 10 mL (not administered)   atorvastatin (LIPITOR) tablet 20 mg (20 mg Oral Given 10/13/19 2038)   fluticasone (FLONASE) 50 MCG/ACT nasal spray 1 spray (1 spray Each Nare Given 10/14/19 0949)   sertraline (ZOLOFT) tablet 100 mg (100 mg Oral Given 10/13/19 2038)   spironolactone (ALDACTONE) tablet 25 mg (25 mg Oral Given 10/14/19 0950)   sacubitril-valsartan (ENTRESTO) 24-26 MG tablet 1 tablet (1 tablet Oral Given 10/14/19 0950)   pantoprazole (PROTONIX) EC tablet 40 mg (40 mg Oral Given 10/14/19 0950)   oxyCODONE-acetaminophen (PERCOCET)  MG per tablet 1 tablet (not administered)   sodium chloride 0.9 % flush 10 mL (10 mL Intravenous Given 10/14/19 0954)   sodium chloride 0.9 % flush 10 mL (not administered)   Pharmacy to Dose enoxaparin (LOVENOX) (not administered)   amiodarone in dextrose 5% (NEXTERONE) loading dose 150mg/100mL (0 mg Intravenous Stopped 10/12/19 1154)     Followed by   amiodarone (NEXTERONE) 360 mg/200 mL (1.8 mg/mL) infusion (0 mg/min Intravenous Stopped 10/12/19 1816)     Followed by   amiodarone (NEXTERONE) 360 mg/200 mL (1.8 mg/mL) infusion (0.5 mg/min Intravenous Currently Infusing 10/13/19 1130)     Followed by   amiodarone (PACERONE) tablet 200 mg (200 mg Oral Given 10/14/19 0950)   calcium carbonate (TUMS) chewable tablet 500 mg (200 mg elemental) ( Oral Return to Cabinet 10/12/19 2230)   furosemide (LASIX) tablet 20 mg (20 mg Oral Given 10/14/19 0950)   metoprolol succinate XL (TOPROL-XL) 24 hr tablet 50 mg (50 mg Oral Given 10/14/19 0950)   bisacodyl (DULCOLAX) suppository 10 mg (10 mg Rectal Not Given 10/14/19 0948)   docusate sodium (COLACE) capsule 100 mg (100 mg Oral Not Given 10/14/19 0948)   hydrocortisone  (ANUSOL-HC) 2.5 % rectal cream ( Rectal Not Given 10/14/19 0951)   polyethylene glycol (MIRALAX) powder 17 g (17 g Oral Not Given 10/14/19 0949)   apixaban (ELIQUIS) tablet 5 mg (5 mg Oral Given 10/14/19 0954)   dilTIAZem CD (CARDIZEM CD) 24 hr capsule 180 mg (180 mg Oral Given 10/12/19 0246)   furosemide (LASIX) injection 40 mg (40 mg Intravenous Given 10/12/19 0247)   magnesium sulfate 2g/50 mL (PREMIX) infusion (0 g Intravenous Stopped 10/12/19 1725)   potassium chloride (K-DUR,KLOR-CON) CR tablet 40 mEq (40 mEq Oral Given 10/12/19 1815)       Vital Signs:    Temp:  [97.5 °F (36.4 °C)-98.2 °F (36.8 °C)] 98.2 °F (36.8 °C)  Heart Rate:  [74-84] 75  Resp:  [18] 18  BP: ()/(58-71) 99/62        10/12/19  0137 10/12/19  0455   Weight: 132 kg (290 lb 12.8 oz) 128 kg (281 lb 8 oz)       Body mass index is 44.09 kg/m².    Physical Exam:    General Appearance:  Alert and cooperative, not in any acute distress.   Head:  Atraumatic and normocephalic, without obvious abnormality.   Eyes:          PERRLA, conjunctivae and sclerae normal, no Icterus. No pallor. Extraocular movements are within normal limits.   Ears:  Ears appear intact with no abnormalities noted.   Throat: No oral lesions, no thrush, oral mucosa moist.   Neck: Supple, trachea midline, no thyromegaly, no carotid bruit.   Back:   No tenderness to palpation, range of motion normal.   Lungs:   Breath sounds heard bilaterally equally.  Faint crackles at bases, no wheezing. No Pleural rub or bronchial breathing.   Heart:   Irregularly irregular, normal S1 and S2, no murmur, no gallop, no rub.  Mild JVD.   Abdomen:   Normal bowel sounds, no masses, no organomegaly. Soft, non-tender, non-distended, no guarding, no rebound tenderness.   Extremities:  1-2+ lower extremity edema bilaterally.   Pulses: Pulses palpable and equal bilaterally.   Skin: No bleeding, bruising or rash.   Neurologic: Alert and oriented x 3. Moves all four limbs equally. No tremors. No  facial asymmetry.     Laboratory data:    I have reviewed the labs done in the emergency room.    Results from last 7 days   Lab Units 10/14/19  0524 10/13/19  0557 10/12/19  0826 10/12/19  0158   SODIUM mmol/L 139 141 142 141   POTASSIUM mmol/L 4.2 3.9 3.4* 3.6   CHLORIDE mmol/L 104 103 106 107   CO2 mmol/L 22.9 22.0 22.0 21.2*   BUN mg/dL 12 17 15 16   CREATININE mg/dL 0.78 1.03* 0.92 0.92   CALCIUM mg/dL 8.8 8.8 8.6 8.4*   BILIRUBIN mg/dL  --   --  0.9 0.8   ALK PHOS U/L  --   --  66 68   ALT (SGPT) U/L  --   --  17 16   AST (SGOT) U/L  --   --  17 18   GLUCOSE mg/dL 117* 128* 121* 113*     Results from last 7 days   Lab Units 10/14/19  0524 10/13/19  0556 10/12/19  0826   WBC 10*3/mm3 7.30 8.77 8.78   HEMOGLOBIN g/dL 12.1 12.7 12.0   HEMATOCRIT % 40.0 42.0 39.7   PLATELETS 10*3/mm3 203 268 205         Results from last 7 days   Lab Units 10/12/19  0158   TROPONIN T ng/mL <0.010     Results from last 7 days   Lab Units 10/14/19  0940   PROBNP pg/mL 4,038.0*                       Invalid input(s): USDES,  BLOODU, NITRITITE, BACT, EP  Pain Management Panel     Pain Management Panel Latest Ref Rng & Units 4/5/2018 1/28/2016    CREATININE UR Not Estab. mg/dL 70.9 122.0              EKG:      EKG with appears to show atrial fibrillation, rate of 101, nonspecific T wave abnormalities.    Radiology:    Imaging Results (last 72 hours)     Procedure Component Value Units Date/Time    XR Chest 2 View [[id number]] Updated:  10/12/19 0212      Chest x-ray appears to show cardiomegaly and pulmonary edema.      Malignant lymphoma, non-Hodgkin's (CMS/HCC)    Sinus tachycardia    Atrial fibrillation with rapid ventricular response (CMS/HCC)    NICM (nonischemic cardiomyopathy) (CMS/HCC)    Paroxysmal atrial fibrillation (CMS/HCC)    Systolic CHF, acute on chronic (CMS/HCC)    Anoxic brain injury (CMS/HCC)      Assessment:    1.  Atrial fibrillation with rapid ventricular response, present on admission, acute  2.  Acute on chronic  combined heart failure exacerbation, present on admission, acute  3.  History of anaplastic ALK positive large cell lymphoma, present on admission, chronic  4.  History of RUFINA, present on admission, chronic  5.  Hypertension, present on admission, chronic  6.  Peripartum cardiomyopathy, present on admission, chronic  7.  Anxiety and depression, present admission, chronic  8.  Traumatic brain injury, present admission, chronic  9.  History of GI bleed, present on admission, likely secondary to hemorrhoids.    Plan:    Patient is hemodynamically stable and heart rate has improved with oral diltiazem.  In setting of patient with significant underlying cardiomyopathy, feel she would benefit from further work-up.  Most recent echo from 2018 with EF approximately 30%, global hypokinesis of the left ventricle, with mild left atrial enlargement.  Cardiac cath from 2018 with normal coronary arteriogram with moderate reduction in LV systolic function.  She is already diuresed some with Lasix given in ER.  Suspect this will need to be added in addition to her spironolactone.  Continue metoprolol and Entresto.  Will start therapeutic Lovenox for now.  Will consult cardiology in the morning for further recommendations and management.  Have ordered repeat 2D echo.    Patient currently meets observation level of care secondary to A. fib with RVR and acute on chronic heart failure exacerbation.  Anticipate discharge within 24 hours if continues to improve.    Elza Pang, DREW  10/14/19  11:05 AM    Dictated utilizing Dragon dictation.

## 2019-10-12 NOTE — ED PROVIDER NOTES
"Subjective   34-year-old female presenting with cough.  She states that for the last 2 weeks she has had cough productive of yellow sputum.  Has also had mild chest pain.  No specific alleviating or aggravating factors.  She denies any fevers, nausea, vomiting.  Came to the hospital 3 days ago to have chest x-ray done and was told that she had fluid around her heart.            Review of Systems   Constitutional: Negative.    HENT: Negative.    Eyes: Negative.    Respiratory: Positive for cough and shortness of breath.    Cardiovascular: Positive for chest pain and leg swelling. Negative for palpitations.   Gastrointestinal: Negative.    Genitourinary: Negative.    Musculoskeletal: Negative.    Skin: Negative.    Neurological: Negative.    Psychiatric/Behavioral: Negative.        Past Medical History:   Diagnosis Date   • Anesthesia     pt reports \"screamed for my  and wouldnt calm down when anesthesia gas used\".   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   • Cancer (CMS/HCC)     LYMPHOMA STAGE 3   • Cardiac arrest (CMS/HCC)     dionisio partum cardiomyopathy   • Cardiomyopathy (CMS/HCC)    • CHF (congestive heart failure) (CMS/HCC)    • Dermatitis     chronic on face   • Fatty liver    • Full dentures     DOESNT WEAR   • H/O chronic ear infection    • Headaches due to old head trauma    • Heavy menses    • Hemorrhoids    • Hip pain, left    • Hyperlipidemia    • Hypertension    • Short-term memory loss    • Stuttering in conditions classified elsewhere     IF PT GETS TOO NERVOUS OR EXCITED HAS STUTTERING   • 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   • Macrobid [Nitrofurantoin Monohyd Macro] Other (See Comments)     Pt does not know   • Contrast Dye Itching       Past Surgical History:   Procedure Laterality Date   • CARDIAC " CATHETERIZATION N/A 10/22/2018    Procedure: Left Heart Cath;  Surgeon: Zheng Curtis MD;  Location: [facility] CATH INVASIVE LOCATION;  Service: Cardiology   • CARDIAC CATHETERIZATION N/A 10/22/2018    Procedure: Coronary angiography;  Surgeon: Zheng Curtis MD;  Location: [facility] CATH INVASIVE LOCATION;  Service: Cardiology   • CARDIAC CATHETERIZATION N/A 10/22/2018    Procedure: Left ventriculography;  Surgeon: Zheng Curtis MD;  Location: [facility] CATH INVASIVE LOCATION;  Service: Cardiology   •  SECTION     • COLONOSCOPY N/A 2019    Procedure: COLONOSCOPY;  Surgeon: Venkata Wiley MD;  Location: [facility] ENDOSCOPY;  Service: Gastroenterology   • ENDOMETRIAL ABLATION     • ENDOSCOPY N/A 2019    Procedure: ESOPHAGOGASTRODUODENOSCOPY WITH COLD FORCEP BIOPSY;  Surgeon: Venkata Wiley MD;  Location: [facility] ENDOSCOPY;  Service: Gastroenterology   • FEMORAL LYMPH NODE BIOPSY/EXCISON Left     lt illiac    • HEMORRHOIDECTOMY N/A 2019    Procedure: HEMORRHOIDECTOMY;  Surgeon: Venkata Wiley MD;  Location: [facility] OR;  Service: General   • PORTACATH PLACEMENT N/A 2018    Procedure: INSERTION OF PORTACATH right subclavian;  Surgeon: Shelley Brock MD;  Location: [facility] OR;  Service: General   • TUBAL ABDOMINAL LIGATION         Family History   Problem Relation Age of Onset   • Hypertension Father    • Diabetes Father    • Diabetes Paternal Grandmother        Social History     Socioeconomic History   • Marital status:      Spouse name: Not on file   • Number of children: Not on file   • Years of education: Not on file   • Highest education level: Not on file   Tobacco Use   • Smoking status: Never Smoker   • Smokeless tobacco: Never Used   Substance and Sexual Activity   • Alcohol use: No     Frequency: Never   • Drug use: No   • Sexual activity: Defer           Objective   Physical Exam   Constitutional: She is oriented to person, place, and  time. She appears well-developed and well-nourished. No distress.   HENT:   Head: Normocephalic and atraumatic.   Right Ear: External ear normal.   Left Ear: External ear normal.   Nose: Nose normal.   Mouth/Throat: Oropharynx is clear and moist.   Eyes: Conjunctivae and EOM are normal. Pupils are equal, round, and reactive to light.   Neck: Normal range of motion. Neck supple.   Cardiovascular: Normal heart sounds and intact distal pulses.   Irregularly irregular, rates occasionally into the 120s to 130s   Pulmonary/Chest: Effort normal and breath sounds normal. No stridor. No respiratory distress. She has no wheezes. She has no rales.   Abdominal: Soft. Bowel sounds are normal. She exhibits no distension. There is no tenderness. There is no rebound and no guarding.   Musculoskeletal: Normal range of motion. She exhibits no tenderness or deformity.   Lower extremity edema   Neurological: She is alert and oriented to person, place, and time.   Skin: Skin is warm and dry. No rash noted.   Psychiatric: She has a normal mood and affect. Her behavior is normal.   Nursing note and vitals reviewed.      Procedures           ED Course                  MDM  Number of Diagnoses or Management Options  Acute pulmonary edema (CMS/HCC):   Atrial fibrillation with rapid ventricular response (CMS/HCC):   Diagnosis management comments: 34-year-old female with shortness of breath, cough.  Well-developed, well-nourished female in no distress with exam as above.  She is notably hypertensive and in atrial fibrillation with rapid rates.  Will treat with Cardizem, check labs, EKG and chest x-ray.  Disposition is likely to the hospital.    DDX: Atrial fibrillation, ACS, CHF, pneumonia    EKG interpreted by me: Atrial fibrillation, rate 101, some nonspecific T wave changes, this is an abnormal EKG, compared to previous the atrial fibrillation is new    Work-up notable for elevated BNP.  Lactate and procalcitonin are both normal.  She  received a p.o. dose of diltiazem, heart rate is improved.  Chest x-ray per my interpretation reveals worsening of her interstitial edema compared to x-ray 3 days ago.  I have given her a dose of Lasix and she is had decent urinary output already.  Given the worsening pulmonary edema and new onset atrial fibrillation recommended admission which she is agreeable to.  Discussed with Dr. Morrison for admission.       Amount and/or Complexity of Data Reviewed  Decide to obtain previous medical records or to obtain history from someone other than the patient: yes        Final diagnoses:   Atrial fibrillation with rapid ventricular response (CMS/HCC)   Acute pulmonary edema (CMS/HCC)              Jose G Tomlinson MD  10/12/19 9165

## 2019-10-12 NOTE — PLAN OF CARE
Problem: Fall Risk (Adult)  Goal: Absence of Fall  Outcome: Ongoing (interventions implemented as appropriate)      Problem: Pain, Chronic (Adult)  Goal: Acceptable Pain/Comfort Level and Functional Ability  Outcome: Ongoing (interventions implemented as appropriate)      Problem: Skin Injury Risk (Adult)  Goal: Skin Health and Integrity  Outcome: Ongoing (interventions implemented as appropriate)      Problem: Cardiac Output Decreased (Adult)  Goal: Effective Tissue Perfusion  Outcome: Ongoing (interventions implemented as appropriate)

## 2019-10-12 NOTE — CONSULTS
Referring Provider:Dr Morrison   Reason for Consultation: afib     Patient Care Team:  Malia Powers APRN as PCP - General (Family Medicine)  Shelley Brock MD as Consulting Physician (General Surgery)  Cirilo Deshpande II, MD (Pain Medicine)          History of present illness: Middle-aged lady with known nonischemic cardiomyopathy initially started as postpartum with recurrence post chemotherapy comes back in with complaints of shortness of breath for the last 3 weeks.  Apparently has been given steroids in the interim.  Her dose of Toprol has been decreased also.  Patient has been feeling very short of breath on minimal amount of activity.  Did not have any chest pains.    EKG done at the emergency room was read by the computer as atrial fibrillation.  On second view this is still sinus rhythm with frequent PACs.  Since being in the hospital has had paroxysmal runs of SVT.  These have been nonsustained.  This is similar to what she had in the past.    Compliance is a significant issue with the patient.  She also has significant memory issues.  She has not been very active for quite some time now.  Has gained about 20 to 30 pounds over the last 2 to 3 months.      Review of Systems   Pertinent items are noted in HPI  Review of Systems      History    Baseline EKG: Sinus rhythm OK interval of 280 ms rate of 90 beats a minute T wave inversion and ST depression inferior and lateral leads.    LV function assessment postpartum cardiomyopathy 2003 with full recovery-EF 60%- 5/18 .  Recurrent drop in LV function 10/18 ( Related to  Chemo?)  last check EF 40 to 45% echo 7/19.    CAD [id number] cath 10/18 normal coronaries.    Mitral insufficiency moderate echo 10/18 unchanged echo 7/19.    Pulmonary hypertension PA systolic of 45 mmHg echo 7/19.      Dyslipidemia    Lymphoma T-cell on chemotherapy.2018     Anemia chronic recurrent hemorrhoidal bleed needing transfusion 4 units  .    Obesity.    Medications at home:  Toprol-XL 50 daily, Lasix 40 mg every other day, prednisone tablets spironolactone 25 mg daily.  Entresto  1 tablet twice daily.  Will call    Personal history:    Non-smoker does not drink alcohol does not abuse drugs functional status of the patient has been limited.  Does not exercise on a regular basis.    Family history:    Noncontributory.    Review of symptoms:    No cough cold fever chills nausea vomiting diarrhea no abdominal pain loss of consciousness PND orthopnea stroke weakness joint swelling rest of review symptoms are negative.    Past Surgical History:   Procedure Laterality Date   • CARDIAC CATHETERIZATION N/A 10/22/2018    Procedure: Left Heart Cath;  Surgeon: Zheng Curtis MD;  Location: [facility] CATH INVASIVE LOCATION;  Service: Cardiology   • CARDIAC CATHETERIZATION N/A 10/22/2018    Procedure: Coronary angiography;  Surgeon: Zheng Curtis MD;  Location: [facility] CATH INVASIVE LOCATION;  Service: Cardiology   • CARDIAC CATHETERIZATION N/A 10/22/2018    Procedure: Left ventriculography;  Surgeon: Zheng Curtis MD;  Location: [facility] CATH INVASIVE LOCATION;  Service: Cardiology   •  SECTION     • COLONOSCOPY N/A 2019    Procedure: COLONOSCOPY;  Surgeon: Venkata Wiley MD;  Location: [facility] ENDOSCOPY;  Service: Gastroenterology   • ENDOMETRIAL ABLATION     • ENDOSCOPY N/A 2019    Procedure: ESOPHAGOGASTRODUODENOSCOPY WITH COLD FORCEP BIOPSY;  Surgeon: Venkata Wiley MD;  Location: [facility] ENDOSCOPY;  Service: Gastroenterology   • FEMORAL LYMPH NODE BIOPSY/EXCISON Left     lt illiac    • HEMORRHOIDECTOMY N/A 2019    Procedure: HEMORRHOIDECTOMY;  Surgeon: Venkata Wiley MD;  Location: [facility] OR;  Service: General   • PORTACATH PLACEMENT N/A 2018    Procedure: INSERTION OF PORTACATH right subclavian;  Surgeon: Shelley Brock MD;  Location: [facility] OR;  Service: General   • TUBAL  ABDOMINAL LIGATION     , Family History   Problem Relation Age of Onset   • Hypertension Father    • Diabetes Father    • Diabetes Paternal Grandmother    , Social History     Tobacco Use   • Smoking status: Never Smoker   • Smokeless tobacco: Never Used   Substance Use Topics   • Alcohol use: No     Frequency: Never   • Drug use: No   , Medications Prior to Admission   Medication Sig Dispense Refill Last Dose   • atorvastatin (LIPITOR) 20 MG tablet Take 1 tablet by mouth Every Night. 30 tablet 0 1/30/2019 at Unknown time   • diphenhydrAMINE (BENADRYL) 50 MG tablet Take 1 tablet by mouth Take As Directed. 1 hour prior to scan 1 tablet 0    • fluticasone (FLONASE) 50 MCG/ACT nasal spray As Needed.   More than a month at Unknown time   • furosemide (LASIX) 40 MG tablet Take 1 tablet by mouth Every Other Day. 12 tablet 0    • metoprolol succinate XL (TOPROL-XL) 50 MG 24 hr tablet Take 1 tablet by mouth Daily. 30 tablet 0 1/31/2019 at Unknown time   • ondansetron ODT (ZOFRAN-ODT) 4 MG disintegrating tablet Take 1 tablet by mouth Every 12 (Twelve) Hours As Needed for Nausea or Vomiting. 10 tablet 0 More than a month at Unknown time   • oxyCODONE-acetaminophen (PERCOCET)  MG per tablet Take 1 tablet by mouth Every 6 (Six) Hours As Needed for Moderate Pain .      • pantoprazole (PROTONIX) 40 MG EC tablet Take 1 tablet by mouth Daily. 30 tablet 0    • polyethylene glycol (MIRALAX) powder Dissolve 1 capful in your choice of beverage and drink once daily 510 g 30    • predniSONE (DELTASONE) 20 MG tablet Take 2 tablets daily. 10 tablet 0    • predniSONE (DELTASONE) 50 MG tablet Take 1 tablet by mouth Daily. 1 tab 13 hours before scan, 1 tab 7 hours before, and 1 tab 1 hour before scan 3 tablet 0    • promethazine-dextromethorphan (PROMETHAZINE-DM) 6.25-15 MG/5ML syrup Take 5 mL by mouth 4 (Four) Times a Day As Needed for Cough. 120 mL 0    • sacubitril-valsartan (ENTRESTO) 24-26 MG tablet Take 1 tablet by mouth Every 12  "(Twelve) Hours. 60 tablet 0 1/31/2019 at Unknown time   • sertraline (ZOLOFT) 100 MG tablet Take 100 mg by mouth Every Night. Total 150 mg daily   1/30/2019 at Unknown time   • sertraline (ZOLOFT) 50 MG tablet Take 50 mg by mouth Daily. Total 150 MG daily dose   1/30/2019 at Unknown time   • spironolactone (ALDACTONE) 25 MG tablet Take 1 tablet by mouth Daily. 30 tablet 0 1/31/2019 at Unknown time   , Scheduled Meds:    atorvastatin 20 mg Oral Nightly   enoxaparin 1 mg/kg Subcutaneous Q12H   fluticasone 1 spray Each Nare Daily   furosemide 20 mg Intravenous Q12H   metoprolol succinate XL 50 mg Oral Q24H   pantoprazole 40 mg Oral Daily   sacubitril-valsartan 1 tablet Oral Q12H   sertraline 100 mg Oral Nightly   sodium chloride 10 mL Intravenous Q12H   spironolactone 25 mg Oral Daily   , Continuous Infusions:    Pharmacy to Dose enoxaparin (LOVENOX)    , PRN Meds:  oxyCODONE-acetaminophen  •  Pharmacy to Dose enoxaparin (LOVENOX)  •  sodium chloride  •  sodium chloride, Allergies:  Erythromycin; Macrobid [nitrofurantoin monohyd macro]; and Contrast dye     OBJECTIVE:    Vital Sign Min/Max for last 24 hours  Temp  Min: 97.7 °F (36.5 °C)  Max: 98.2 °F (36.8 °C)   BP  Min: 100/83  Max: 196/119   Pulse  Min: 80  Max: 101   Resp  Min: 19  Max: 20   SpO2  Min: 89 %  Max: 98 %   No Data Recorded   Weight  Min: 128 kg (281 lb 8 oz)  Max: 132 kg (290 lb 12.8 oz)     Flowsheet Rows      First Filed Value   Admission Height  170.2 cm (67\") Documented at 10/12/2019 0137   Admission Weight  132 kg (290 lb 12.8 oz) Documented at 10/12/2019 0137             Echo EF Estimated  Lab Results   Component Value Date    ECHOEFEST 60 05/02/2018         Physical Exam:     General Appearance:    Alert, cooperative, in no acute distress   Head:    Normocephalic, without obvious abnormality, atraumatic   Eyes:            Lids and lashes normal, conjunctivae and sclerae normal, no   icterus, no pallor, corneas clear, PERRLA   Ears:    Ears " appear intact with no abnormalities noted   Throat:   No oral lesions, no thrush, oral mucosa moist   Neck:   No adenopathy, supple, trachea midline, no thyromegaly, no     carotid bruit, no JVD   Back:     No kyphosis present, no scoliosis present, no skin lesions,       erythema or scars, no tenderness to percussion or                   palpation,   range of motion normal   Lungs:     Clear to auscultation,respirations regular, even and                   unlabored    Heart:    Regular rhythm and normal rate, normal S1 and S2, no            murmur, no gallop, no rub, no click   Breast Exam:    Deferred   Abdomen:     Normal bowel sounds, no masses, no organomegaly, soft        non-tender, non-distended, no guarding, no rebound                 tenderness   Genitalia:    Deferred   Extremities:   Moves all extremities well, no edema, no cyanosis, no              redness   Pulses:   Pulses palpable and equal bilaterally   Skin:   No bleeding, bruising or rash   Lymph nodes:   No palpable adenopathy   Neurologic:   Cranial nerves 2 - 12 grossly intact, sensation intact, DTR        present and equal bilaterally           EKG: Sinus rhythm NM interval of 170 ms with frequent PACs.    Telemetry frequent runs of nonsustained SVT noted with rapid ventricular rate.  LAB DATA :           WBC   Date Value Ref Range Status   10/12/2019 8.78 3.40 - 10.80 10*3/mm3 Final     RBC   Date Value Ref Range Status   10/12/2019 4.42 3.77 - 5.28 10*6/mm3 Final     Hemoglobin   Date Value Ref Range Status   10/12/2019 12.0 12.0 - 15.9 g/dL Final     Hematocrit   Date Value Ref Range Status   10/12/2019 39.7 34.0 - 46.6 % Final     MCV   Date Value Ref Range Status   10/12/2019 89.8 79.0 - 97.0 fL Final     MCH   Date Value Ref Range Status   10/12/2019 27.1 26.6 - 33.0 pg Final     MCHC   Date Value Ref Range Status   10/12/2019 30.2 (L) 31.5 - 35.7 g/dL Final     RDW   Date Value Ref Range Status   10/12/2019 15.9 (H) 12.3 - 15.4 % Final      RDW-SD   Date Value Ref Range Status   10/12/2019 52.6 37.0 - 54.0 fl Final     MPV   Date Value Ref Range Status   10/12/2019 10.5 6.0 - 12.0 fL Final     Platelets   Date Value Ref Range Status   10/12/2019 205 140 - 450 10*3/mm3 Final     Neutrophil %   Date Value Ref Range Status   10/12/2019 73.2 42.7 - 76.0 % Final     Lymphocyte %   Date Value Ref Range Status   10/12/2019 19.2 (L) 19.6 - 45.3 % Final     Monocyte %   Date Value Ref Range Status   10/12/2019 5.8 5.0 - 12.0 % Final     Eosinophil %   Date Value Ref Range Status   10/12/2019 0.8 0.3 - 6.2 % Final     Basophil %   Date Value Ref Range Status   10/12/2019 0.7 0.0 - 1.5 % Final     Immature Grans %   Date Value Ref Range Status   10/12/2019 0.3 0.0 - 0.5 % Final     Neutrophils, Absolute   Date Value Ref Range Status   10/12/2019 6.42 1.70 - 7.00 10*3/mm3 Final     Lymphocytes, Absolute   Date Value Ref Range Status   10/12/2019 1.69 0.70 - 3.10 10*3/mm3 Final     Monocytes, Absolute   Date Value Ref Range Status   10/12/2019 0.51 0.10 - 0.90 10*3/mm3 Final     Eosinophils, Absolute   Date Value Ref Range Status   10/12/2019 0.07 0.00 - 0.40 10*3/mm3 Final     Basophils, Absolute   Date Value Ref Range Status   10/12/2019 0.06 0.00 - 0.20 10*3/mm3 Final     Immature Grans, Absolute   Date Value Ref Range Status   10/12/2019 0.03 0.00 - 0.05 10*3/mm3 Final     nRBC   Date Value Ref Range Status   10/12/2019 0.0 0.0 - 0.2 /100 WBC Final       Lab Results   Component Value Date    GLUCOSE 121 (H) 10/12/2019    BUN 15 10/12/2019    CREATININE 0.92 10/12/2019    EGFRIFNONA 70 10/12/2019    BCR 16.3 10/12/2019    CO2 22.0 10/12/2019    CALCIUM 8.6 10/12/2019    ALBUMIN 3.90 10/12/2019    LABIL2 1.4 01/27/2016    AST 17 10/12/2019    ALT 17 10/12/2019       Lab Results   Component Value Date    TROPONINI <0.012 01/31/2019    TROPONINT <0.010 10/12/2019       No results found for: DDIMER    No results found for: SITE, ALLENTEST, PHART, OXH5MVC,  PO2ART, WVP1BFR, BASEEXCESS, T8VGKNHR, HGBBG, HCTABG, OXYHEMOGLOBI, METHHGBN, CARBOXYHGB, CO2CT, BAROMETRIC, MODALITY, FIO2  Lab Results   Component Value Date    HGBA1C 5.3 10/19/2018     Results from last 7 days   Lab Units 10/12/19  0158   TSH uIU/mL 3.520     Lab Results   Component Value Date    LIPASE 130 08/02/2019       IMAGING DATA:     Xr Chest 2 View    Result Date: 10/12/2019  Narrative: PROCEDURE: XR CHEST 2 VW-  INDICATION: SOA Triage Protocol  COMPARISON:  10/09/2019  FINDINGS: PA and lateral views of the chest were obtained.   Right Port-A-Cath is unchanged. The heart is mildly enlarged, but stable. Mediastinal contour is within normal limits.   Discoid opacity in the right upper lobe is favored to be atelectasis. This is new. The lungs are otherwise clear.  There is no pneumothorax or pleural effusion.  No acute osseous abnormality is identified.      Impression: 1. Stable cardiomegaly. 2. New discoid opacity in the right upper lobe, favor atelectasis. Consider short follow-up.      This report was finalized on 10/12/2019 8:06 AM by Elham Carbajal MD.    Xr Chest 2 View    Result Date: 10/9/2019  Narrative: PROCEDURE: XR CHEST 2 VW-    HISTORY: cough; R05-Cough  COMPARISON: 06/23/2019.  FINDINGS: A right subclavian Port-A-Cath is in place with the tip in the SVC. The heart is enlarged. The mediastinum is unremarkable. There is mild interstitial pulmonary edema. There is no pneumothorax. There are no acute osseous abnormalities.      Impression: Cardiomegaly and mild interstitial pulmonary edema.    This report was finalized on 10/9/2019 3:37 PM by Mukesh Hong M.D..        DIAGNOSIS     #1 CHF:  Patient has history of chronic systolic and diastolic LV dysfunction last evaluation of the echocardiogram revealed an EF of 40 to 45%.  Has come in with decompensation.  She has concomitant mitral insufficiency.  Most probably rate related issues lead to decompensation of her diastolic function with  pulmonary edema.  We will continue low-dose diuretic therapy and continue to maximize rate control issues.  A 2D echocardiogram is being obtained to follow-up on her LV systolic function.    2.  SVT:  Patient has had nonsustained runs of SVT even in the past.  Even now I do not see a sustained run of A. fib.  Has multiple runs of nonsustained runs more likely than not atrial fibrillation.  Reasonable probability this was the etiology for the presenting symptoms.  Given the overall clinical scenario her best immediate option might be amiodarone infusion will initiate the same.  Risks of been explained.  This could stabilize her rate and possibly the rhythm.    3.  Anticoagulation therapy:  By the chads 2 vascular score patient would be still kept on aspirin daily.    #4 mitral insufficiency:  High probability this is the reason for her presentation.  Exacerbation of the same secondary to prednisone intake and weight gain appears to be the inciting factors.  Would continue low-dose diuretic therapy rate and blood pressure controls at this time.            Atrial fibrillation with rapid ventricular response (CMS/HCC)        I discussed the patients findings and my recommendations with patient    Zheng Curtis MD  10/12/19  10:19 AM      Please note that portions of this note may have been completed with a voice recognition program. Efforts were made to edit the dictations, but occasionally words are mistranscribed.

## 2019-10-12 NOTE — PHARMACY RECOMMENDATION
"Pharmacy Consult for Pharmacokinetics and Stewardship of Anticoagulation Drugs     Johny Hearn is a  34 y.o. female.  Height - 170.2 cm (67\")  Weight - 128 kg (281 lb 8 oz)    BMI (Body Mass Index) = 44.1 kg/m²    Pharmacy consulted for dosing of Enoxaparin.  Indication for use: Atrial fibrillation.    Labs  Results from last 7 days   Lab Units 10/12/19  0158   HEMOGLOBIN g/dL 12.0   HEMATOCRIT % 39.2   PLATELETS 10*3/mm3 215   CREATININE mg/dL 0.92   Estimated Creatinine Clearance: 120 mL/min (by C-G formula based on SCr of 0.92 mg/dL).    Assessment/Plan:  Initiated Enoxaparin 130 mg (1 mg/kg) subq q 12 hrs for Atrial fibrillation; patient requiring full anticoagulation.      Thank you for the opportunity to consult on this patient.    Tripp Esqudea, Pharm.D.  10/12/19  8:24 AM    "

## 2019-10-13 LAB
ALBUMIN SERPL-MCNC: 4.4 G/DL (ref 3.5–5.2)
ANION GAP SERPL CALCULATED.3IONS-SCNC: 16 MMOL/L (ref 5–15)
BUN BLD-MCNC: 17 MG/DL (ref 6–20)
BUN/CREAT SERPL: 16.5 (ref 7–25)
CALCIUM SPEC-SCNC: 8.8 MG/DL (ref 8.6–10.5)
CHLORIDE SERPL-SCNC: 103 MMOL/L (ref 98–107)
CO2 SERPL-SCNC: 22 MMOL/L (ref 22–29)
CREAT BLD-MCNC: 1.03 MG/DL (ref 0.57–1)
DEPRECATED RDW RBC AUTO: 54.8 FL (ref 37–54)
ERYTHROCYTE [DISTWIDTH] IN BLOOD BY AUTOMATED COUNT: 16.4 % (ref 12.3–15.4)
GFR SERPL CREATININE-BSD FRML MDRD: 61 ML/MIN/1.73
GLUCOSE BLD-MCNC: 128 MG/DL (ref 65–99)
HCT VFR BLD AUTO: 42 % (ref 34–46.6)
HGB BLD-MCNC: 12.7 G/DL (ref 12–15.9)
MCH RBC QN AUTO: 27.7 PG (ref 26.6–33)
MCHC RBC AUTO-ENTMCNC: 30.2 G/DL (ref 31.5–35.7)
MCV RBC AUTO: 91.5 FL (ref 79–97)
PHOSPHATE SERPL-MCNC: 2.7 MG/DL (ref 2.5–4.5)
PLATELET # BLD AUTO: 268 10*3/MM3 (ref 140–450)
PMV BLD AUTO: 10.6 FL (ref 6–12)
POTASSIUM BLD-SCNC: 3.9 MMOL/L (ref 3.5–5.2)
RBC # BLD AUTO: 4.59 10*6/MM3 (ref 3.77–5.28)
SODIUM BLD-SCNC: 141 MMOL/L (ref 136–145)
WBC NRBC COR # BLD: 8.77 10*3/MM3 (ref 3.4–10.8)

## 2019-10-13 PROCEDURE — 80069 RENAL FUNCTION PANEL: CPT | Performed by: INTERNAL MEDICINE

## 2019-10-13 PROCEDURE — 25010000002 AMIODARONE IN DEXTROSE 5% 360-4.14 MG/200ML-% SOLUTION: Performed by: INTERNAL MEDICINE

## 2019-10-13 PROCEDURE — 85027 COMPLETE CBC AUTOMATED: CPT | Performed by: INTERNAL MEDICINE

## 2019-10-13 PROCEDURE — 99232 SBSQ HOSP IP/OBS MODERATE 35: CPT | Performed by: INTERNAL MEDICINE

## 2019-10-13 RX ORDER — DOCUSATE SODIUM 100 MG/1
100 CAPSULE, LIQUID FILLED ORAL 2 TIMES DAILY
Status: DISCONTINUED | OUTPATIENT
Start: 2019-10-13 | End: 2019-10-14 | Stop reason: HOSPADM

## 2019-10-13 RX ORDER — BISACODYL 10 MG
10 SUPPOSITORY, RECTAL RECTAL DAILY
Status: DISCONTINUED | OUTPATIENT
Start: 2019-10-13 | End: 2019-10-14 | Stop reason: HOSPADM

## 2019-10-13 RX ORDER — FUROSEMIDE 20 MG/1
20 TABLET ORAL DAILY
Status: DISCONTINUED | OUTPATIENT
Start: 2019-10-13 | End: 2019-10-14 | Stop reason: HOSPADM

## 2019-10-13 RX ORDER — METOPROLOL SUCCINATE 50 MG/1
50 TABLET, EXTENDED RELEASE ORAL EVERY 12 HOURS SCHEDULED
Status: DISCONTINUED | OUTPATIENT
Start: 2019-10-13 | End: 2019-10-14 | Stop reason: HOSPADM

## 2019-10-13 RX ORDER — POLYETHYLENE GLYCOL 3350 17 G/17G
17 POWDER, FOR SOLUTION ORAL DAILY
Status: DISCONTINUED | OUTPATIENT
Start: 2019-10-14 | End: 2019-10-14 | Stop reason: HOSPADM

## 2019-10-13 RX ADMIN — FLUTICASONE PROPIONATE 1 SPRAY: 50 SPRAY, METERED NASAL at 08:59

## 2019-10-13 RX ADMIN — SPIRONOLACTONE 25 MG: 25 TABLET ORAL at 08:59

## 2019-10-13 RX ADMIN — FUROSEMIDE 20 MG: 20 TABLET ORAL at 08:59

## 2019-10-13 RX ADMIN — DOCUSATE SODIUM 100 MG: 100 CAPSULE, LIQUID FILLED ORAL at 08:59

## 2019-10-13 RX ADMIN — SACUBITRIL AND VALSARTAN 1 TABLET: 24; 26 TABLET, FILM COATED ORAL at 20:38

## 2019-10-13 RX ADMIN — SODIUM CHLORIDE, PRESERVATIVE FREE 10 ML: 5 INJECTION INTRAVENOUS at 20:40

## 2019-10-13 RX ADMIN — AMIODARONE HYDROCHLORIDE 0.5 MG/MIN: 1.8 INJECTION, SOLUTION INTRAVENOUS at 07:00

## 2019-10-13 RX ADMIN — SODIUM CHLORIDE, PRESERVATIVE FREE 10 ML: 5 INJECTION INTRAVENOUS at 08:59

## 2019-10-13 RX ADMIN — POLYETHYLENE GLYCOL 3350 17 G: 17 POWDER, FOR SOLUTION ORAL at 08:58

## 2019-10-13 RX ADMIN — HYDROCORTISONE: 25 CREAM TOPICAL at 11:32

## 2019-10-13 RX ADMIN — METOPROLOL SUCCINATE 50 MG: 50 TABLET, EXTENDED RELEASE ORAL at 08:59

## 2019-10-13 RX ADMIN — SACUBITRIL AND VALSARTAN 1 TABLET: 24; 26 TABLET, FILM COATED ORAL at 08:58

## 2019-10-13 RX ADMIN — SERTRALINE HYDROCHLORIDE 100 MG: 50 TABLET ORAL at 20:38

## 2019-10-13 RX ADMIN — PANTOPRAZOLE SODIUM 40 MG: 40 TABLET, DELAYED RELEASE ORAL at 08:58

## 2019-10-13 RX ADMIN — ATORVASTATIN CALCIUM 20 MG: 20 TABLET, FILM COATED ORAL at 20:38

## 2019-10-13 RX ADMIN — METOPROLOL SUCCINATE 50 MG: 50 TABLET, EXTENDED RELEASE ORAL at 20:38

## 2019-10-13 RX ADMIN — BISACODYL 10 MG: 10 SUPPOSITORY RECTAL at 08:59

## 2019-10-13 RX ADMIN — DOCUSATE SODIUM 100 MG: 100 CAPSULE, LIQUID FILLED ORAL at 20:38

## 2019-10-13 RX ADMIN — ASPIRIN 81 MG: 81 TABLET, COATED ORAL at 08:59

## 2019-10-13 NOTE — PROGRESS NOTES
"   LOS: 0 days   Patient Care Team:  Malia Powers APRN as PCP - General (Family Medicine)  Shelley Brock MD as Consulting Physician (General Surgery)  Cirilo Deshpande II, MD (Pain Medicine)        Interval History:     Patient doing significantly better feels better and has been able to breathe better.  Sleep was much more restful.    Review of Systems:   Pertinent items are noted in HPI.      OBJECTIVE:    Vital Sign Min/Max for last 24 hours  Temp  Min: 97.3 °F (36.3 °C)  Max: 98.2 °F (36.8 °C)   BP  Min: 92/74  Max: 139/91   Pulse  Min: 76  Max: 98   Resp  Min: 16  Max: 18   SpO2  Min: 92 %  Max: 98 %   No Data Recorded   No Data Recorded     Flowsheet Rows      First Filed Value   Admission Height  170.2 cm (67\") Documented at 10/12/2019 0137   Admission Weight  132 kg (290 lb 12.8 oz) Documented at 10/12/2019 0137          Physical Exam:     General Appearance:    Alert, cooperative, in no acute distress   Head:    Normocephalic, without obvious abnormality, atraumatic   Eyes:            Lids and lashes normal, conjunctivae and sclerae normal, no   icterus, no pallor, corneas clear, PERRLA   Ears:    Ears appear intact with no abnormalities noted   Throat:   No oral lesions, no thrush, oral mucosa moist   Neck:   No adenopathy, supple, trachea midline, no thyromegaly, no     carotid bruit, no JVD   Back:     No kyphosis present, no scoliosis present, no skin lesions,       erythema or scars, no tenderness to percussion or                   palpation,   range of motion normal   Lungs:     Clear to auscultation,respirations regular, even and                   unlabored    Heart:    Regular rhythm and normal rate, normal S1 and S2, no            murmur, no gallop, no rub, no click   Breast Exam:    Deferred   Abdomen:     Normal bowel sounds, no masses, no organomegaly, soft        non-tender, non-distended, no guarding, no rebound                 tenderness   Genitalia:    Deferred   Extremities:   " Moves all extremities well, no edema, no cyanosis, no              redness   Pulses:   Pulses palpable and equal bilaterally   Skin:   No bleeding, bruising or rash   Lymph nodes:   No palpable adenopathy   Neurologic:   Cranial nerves 2 - 12 grossly intact, sensation intact, DTR        present and equal bilaterally        Telemetry: Average heart rate in the 80s.  No runs of SVT noted.    LAB DATA :           Laboratory results:    Results from last 7 days   Lab Units 10/13/19  0557 10/12/19  0826 10/12/19  0158   SODIUM mmol/L 141 142 141   POTASSIUM mmol/L 3.9 3.4* 3.6   CHLORIDE mmol/L 103 106 107   CO2 mmol/L 22.0 22.0 21.2*   BUN mg/dL 17 15 16   CREATININE mg/dL 1.03* 0.92 0.92   CALCIUM mg/dL 8.8 8.6 8.4*   BILIRUBIN mg/dL  --  0.9 0.8   ALK PHOS U/L  --  66 68   ALT (SGPT) U/L  --  17 16   AST (SGOT) U/L  --  17 18   GLUCOSE mg/dL 128* 121* 113*     Results from last 7 days   Lab Units 10/13/19  0556 10/12/19  0826 10/12/19  0158   WBC 10*3/mm3 8.77 8.78 8.46   HEMOGLOBIN g/dL 12.7 12.0 12.0   HEMATOCRIT % 42.0 39.7 39.2   PLATELETS 10*3/mm3 268 205 215                 Results from last 7 days   Lab Units 10/12/19  0158   TROPONIN T ng/mL <0.010                 Lab Results   Component Value Date    HGBA1C 5.3 10/19/2018     Results from last 7 days   Lab Units 10/12/19  0158   TSH uIU/mL 3.520           IMAGING DATA:     Xr Chest 2 View    Result Date: 10/12/2019  Narrative: PROCEDURE: XR CHEST 2 VW-  INDICATION: SOA Triage Protocol  COMPARISON:  10/09/2019  FINDINGS: PA and lateral views of the chest were obtained.   Right Port-A-Cath is unchanged. The heart is mildly enlarged, but stable. Mediastinal contour is within normal limits.   Discoid opacity in the right upper lobe is favored to be atelectasis. This is new. The lungs are otherwise clear.  There is no pneumothorax or pleural effusion.  No acute osseous abnormality is identified.      Impression: 1. Stable cardiomegaly. 2. New discoid opacity in  the right upper lobe, favor atelectasis. Consider short follow-up.      This report was finalized on 10/12/2019 8:06 AM by Elham Carbajal MD.    Xr Chest 2 View    Result Date: 10/9/2019  Narrative: PROCEDURE: XR CHEST 2 VW-    HISTORY: cough; R05-Cough  COMPARISON: 06/23/2019.  FINDINGS: A right subclavian Port-A-Cath is in place with the tip in the SVC. The heart is enlarged. The mediastinum is unremarkable. There is mild interstitial pulmonary edema. There is no pneumothorax. There are no acute osseous abnormalities.      Impression: Cardiomegaly and mild interstitial pulmonary edema.    This report was finalized on 10/9/2019 3:37 PM by Mukesh Hong M.D..            ASSESSMENT PLAN:    #1 CHF:  Patient doing significantly better.  Would back off on IV Lasix and switch to p.o.  This should give her some leeway with respect to her blood pressure also.  Continue guideline directed medical therapy.  Preferably stay overnight and get an echocardiogram in the morning prior to discharge.    #2 SVT:  High probability paroxysmal A. fib nonsustained.  Has responded very well to amiodarone therapy.  Still significant changes in the heart rate on change of posture noted.  She used to be on beta-blocker at 100 mg daily.  We will try to advance the same.  Once on completion of the IV amiodarone will switch to p.o.    #3 mitral insufficiency:  We will keep on low-dose diuretic therapy.  Evaluate the same in the morning with an echocardiogram.  Medical therapy for now.    #4 coronary artery disease: No evidence for the same.    5.  Compliance issues:  Would prefer that she avoid steroids at any cost.  Needs to increase her activity and pursue weight loss also.    #6 electro lyte abnormalities:  Seem to be addressed with a normal potassium at this time.        Atrial fibrillation with rapid ventricular response (CMS/HCC)          Zheng Curtis MD  10/13/19  7:38 AM

## 2019-10-13 NOTE — PAYOR COMM NOTE
"Please review for inpatient auth  [place] [facility] [phone], fax [phone]  Provider ID:  [id number]    Johny Hearn (34 y.o. Female)     Date of Birth Social Security Number Address Home Phone MRN    1985  [address] [address] [phone] [id number]    Tenriism Marital Status          None        Admission Date Admission Type Admitting Provider Attending Provider Department, Room/Bed    10/12/19 Emergency Rukhsana Morrison DO Majumder, Mosumi, MD [facility] MED SURG  3, /    Discharge Date Discharge Disposition Discharge Destination                       Attending Provider:  Brenda Orozco MD    Allergies:  Erythromycin, Macrobid [Nitrofurantoin Monohyd Macro], Contrast Dye    Isolation:  None   Infection:  None   Code Status:  CPR    Ht:  170.2 cm (67\")   Wt:  128 kg (281 lb 8 oz)    Admission Cmt:  None   Principal Problem:  None                Active Insurance as of 10/12/2019     Primary Coverage     Payor Plan Insurance Group Employer/Plan Group    [employer] [place] [employer] [place]ICA[place]      Payor Plan Address Payor Plan Phone Number Payor Plan Fax Number Effective Dates    [address] [address] [phone]  2017 - None Entered    [facility] [id number]       Subscriber Name Subscriber Birth Date Member ID       JOHNY HEARN 1985 [id number]                 Emergency Contacts      (Rel.) Home Phone Work Phone Mobile Phone    To Hearn (Spouse) [phone] -- [phone]    Trino Hearn (Relative) [phone] -- [phone]    Cleo Luna (Mother) [phone] -- [phone]               History & Physical      Rukhsana Morrison DO at 10/12/19 0400              [facility] HOSPITALIST   HISTORY AND PHYSICAL      Name:  Johny Hearn   Age:  34 y.o.  Sex:  female  :  1985  MRN:  [id number]   Visit Number:  [id number]  Admission Date:  10/12/2019  Date Of Service:  10/12/19  Primary Care " Physician:  Malia Powers APRN    Chief Complaint:     Shortness of breath, palpitations    History Of Presenting Illness:      34-year-old female presents emergency room with complaints of increased shortness of breath and palpitations.  Patient with medical history significant for history of peripartum cardiomyopathy, heart failure, history of cardiac arrest, hypertension, hyperlipidemia, anxiety, depression, non-Hodgkin's lymphoma in remission, fatty liver disease.  Patient states that over the last 2 to 3 weeks she has had increased shortness of breath.  She has noticed palpitations with this.  She notes symptoms are worse with exertion.  She states she is currently taking spironolactone, but has not been on Lasix in a long time.  She is also on metoprolol and Entresto.  She follows with Dr. Curtis here.  She denies any chest pain with this.  He notes she has noticed some increased swelling in her legs as well.  She denies any fevers or chills.  Denies any changes in diet.  She does not take any anticoagulants.  Denies any history of atrial fibrillation.    In the ER, patient's initial vitals show temp 98.1, heart rate 101, respirate 19, and /119 with oxygen sats at 97% on room air.  CBC with white blood cell count of 8400, pro-Lance 0.05, troponin negative, proBNP 8600, CMP unremarkable.  Lactate 1.1.  Chest x-ray with what appears to show cardiomegaly and pulmonary edema.  EKG showed atrial fibrillation and nonspecific T wave changes.  She had evidence of RVR with heart rates up into the 1 20-1 30 range.  She was given oral diltiazem as well as Lasix.  Due to her new onset A. fib and heart failure, we were asked to admit for further management.    Review Of Systems:     General: Denies any fevers, chills or loss of consciousness.   Psych: No history of any hallucinations and delusions.  Ophth: No history of any diplopia or transient loss of vision.  ENT: No history of sore throat, nasal congestion or  "ear pain.   Allergy/immuno: No history of rash or itching.  Hem/lymph: No history of neck swelling or easy bleeding.  Endo: No history of any recent unintentional weight gain or loss.  Resp: Positive shortness of breath and cough  Card: Positive palpitations, no chest pains  GI: No history of nausea, vomiting, diarrhea. Denies any abdominal pain.   : No history of dysuria or hematuria.  MSK: No muscle pain. No calf pain.   Neuro: No history of any focal weakness. No loss of consciousness. Denies any numbness.  Derm:: No history of any redness or pruritis.     Past Medical History:    Past Medical History:   Diagnosis Date   • Anesthesia     pt reports \"screamed for my  and wouldnt calm down when anesthesia gas used\".   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   • Cancer (CMS/HCC)     LYMPHOMA STAGE 3   • Cardiac arrest (CMS/HCC)     dionisio partum cardiomyopathy   • Cardiomyopathy (CMS/HCC)    • CHF (congestive heart failure) (CMS/HCC)    • Dermatitis     chronic on face   • Fatty liver    • Full dentures     DOESNT WEAR   • H/O chronic ear infection    • Headaches due to old head trauma    • Heavy menses    • Hemorrhoids    • Hip pain, left    • Hyperlipidemia    • Hypertension    • Short-term memory loss    • Stuttering in conditions classified elsewhere     IF PT GETS TOO NERVOUS OR EXCITED HAS STUTTERING   • 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       Past Surgical history:    Past Surgical History:   Procedure Laterality Date   • CARDIAC CATHETERIZATION N/A 10/22/2018    Procedure: Left Heart Cath;  Surgeon: Zheng Curtis MD;  Location:  [facility] CATH INVASIVE LOCATION;  Service: Cardiology   • CARDIAC CATHETERIZATION N/A 10/22/2018    Procedure: Coronary angiography;  Surgeon: Zheng Curtis MD;  Location:  [facility] CATH INVASIVE LOCATION;  Service: Cardiology   • CARDIAC CATHETERIZATION N/A " 10/22/2018    Procedure: Left ventriculography;  Surgeon: Zheng Curtis MD;  Location: [facility] CATH INVASIVE LOCATION;  Service: Cardiology   •  SECTION     • COLONOSCOPY N/A 2019    Procedure: COLONOSCOPY;  Surgeon: Venkata Wiley MD;  Location: [facility] ENDOSCOPY;  Service: Gastroenterology   • ENDOMETRIAL ABLATION     • ENDOSCOPY N/A 2019    Procedure: ESOPHAGOGASTRODUODENOSCOPY WITH COLD FORCEP BIOPSY;  Surgeon: Venkata Wiley MD;  Location: [facility] ENDOSCOPY;  Service: Gastroenterology   • FEMORAL LYMPH NODE BIOPSY/EXCISON Left     lt illiac    • HEMORRHOIDECTOMY N/A 2019    Procedure: HEMORRHOIDECTOMY;  Surgeon: Venkata Wiley MD;  Location: [facility] OR;  Service: General   • PORTACATH PLACEMENT N/A 2018    Procedure: INSERTION OF PORTACATH right subclavian;  Surgeon: Shelley Brock MD;  Location: [facility] OR;  Service: General   • TUBAL ABDOMINAL LIGATION         Social History:    Social History     Socioeconomic History   • Marital status:      Spouse name: Not on file   • Number of children: Not on file   • Years of education: Not on file   • Highest education level: Not on file   Tobacco Use   • Smoking status: Never Smoker   • Smokeless tobacco: Never Used   Substance and Sexual Activity   • Alcohol use: No     Frequency: Never   • Drug use: No   • Sexual activity: Defer       Family History:    Family History   Problem Relation Age of Onset   • Hypertension Father    • Diabetes Father    • Diabetes Paternal Grandmother        Allergies:      Erythromycin; Macrobid [nitrofurantoin monohyd macro]; and Contrast dye    Home Medications:    Prior to Admission Medications     Prescriptions Last Dose Informant Patient Reported? Taking?    atorvastatin (LIPITOR) 20 MG tablet   No No    Take 1 tablet by mouth Every Night.    diphenhydrAMINE (BENADRYL) 50 MG tablet   No No    Take 1 tablet by mouth Take As Directed. 1 hour prior to scan    fluticasone  (FLONASE) 50 MCG/ACT nasal spray   Yes No    As Needed.    furosemide (LASIX) 40 MG tablet   No No    Take 1 tablet by mouth Every Other Day.    metoprolol succinate XL (TOPROL-XL) 50 MG 24 hr tablet   No No    Take 1 tablet by mouth Daily.    ondansetron ODT (ZOFRAN-ODT) 4 MG disintegrating tablet   No No    Take 1 tablet by mouth Every 12 (Twelve) Hours As Needed for Nausea or Vomiting.    oxyCODONE-acetaminophen (PERCOCET)  MG per tablet   Yes No    Take 1 tablet by mouth Every 6 (Six) Hours As Needed for Moderate Pain .    pantoprazole (PROTONIX) 40 MG EC tablet   No No    Take 1 tablet by mouth Daily.    polyethylene glycol (MIRALAX) powder   No No    Dissolve 1 capful in your choice of beverage and drink once daily    predniSONE (DELTASONE) 20 MG tablet   No No    Take 2 tablets daily.    predniSONE (DELTASONE) 50 MG tablet   No No    Take 1 tablet by mouth Daily. 1 tab 13 hours before scan, 1 tab 7 hours before, and 1 tab 1 hour before scan    promethazine-dextromethorphan (PROMETHAZINE-DM) 6.25-15 MG/5ML syrup   No No    Take 5 mL by mouth 4 (Four) Times a Day As Needed for Cough.    sacubitril-valsartan (ENTRESTO) 24-26 MG tablet   No No    Take 1 tablet by mouth Every 12 (Twelve) Hours.    sertraline (ZOLOFT) 100 MG tablet   Yes No    Take 100 mg by mouth Every Night. Total 150 mg daily    sertraline (ZOLOFT) 50 MG tablet   Yes No    Take 50 mg by mouth Daily. Total 150 MG daily dose    spironolactone (ALDACTONE) 25 MG tablet   No No    Take 1 tablet by mouth Daily.             ED Medications:    Medications   sodium chloride 0.9 % flush 10 mL (not administered)   dilTIAZem CD (CARDIZEM CD) 24 hr capsule 180 mg (180 mg Oral Given 10/12/19 0246)   furosemide (LASIX) injection 40 mg (40 mg Intravenous Given 10/12/19 0247)       Vital Signs:    Temp:  [98.1 °F (36.7 °C)] 98.1 °F (36.7 °C)  Heart Rate:  [] 83  Resp:  [19] 19  BP: (102-196)/() 103/79        10/12/19  0137   Weight: 132 kg (290  lb 12.8 oz)       Body mass index is 45.55 kg/m².    Physical Exam:    General Appearance:  Alert and cooperative, not in any acute distress.   Head:  Atraumatic and normocephalic, without obvious abnormality.   Eyes:          PERRLA, conjunctivae and sclerae normal, no Icterus. No pallor. Extraocular movements are within normal limits.   Ears:  Ears appear intact with no abnormalities noted.   Throat: No oral lesions, no thrush, oral mucosa moist.   Neck: Supple, trachea midline, no thyromegaly, no carotid bruit.   Back:   No tenderness to palpation, range of motion normal.   Lungs:   Breath sounds heard bilaterally equally.  Faint crackles at bases, no wheezing. No Pleural rub or bronchial breathing.   Heart:   Irregularly irregular, normal S1 and S2, no murmur, no gallop, no rub.  Mild JVD.   Abdomen:   Normal bowel sounds, no masses, no organomegaly. Soft, non-tender, non-distended, no guarding, no rebound tenderness.   Extremities:  1-2+ lower extremity edema bilaterally.   Pulses: Pulses palpable and equal bilaterally.   Skin: No bleeding, bruising or rash.   Neurologic: Alert and oriented x 3. Moves all four limbs equally. No tremors. No facial asymmetry.     Laboratory data:    I have reviewed the labs done in the emergency room.    Results from last 7 days   Lab Units 10/12/19  0158   SODIUM mmol/L 141   POTASSIUM mmol/L 3.6   CHLORIDE mmol/L 107   CO2 mmol/L 21.2*   BUN mg/dL 16   CREATININE mg/dL 0.92   CALCIUM mg/dL 8.4*   BILIRUBIN mg/dL 0.8   ALK PHOS U/L 68   ALT (SGPT) U/L 16   AST (SGOT) U/L 18   GLUCOSE mg/dL 113*     Results from last 7 days   Lab Units 10/12/19  0158   WBC 10*3/mm3 8.46   HEMOGLOBIN g/dL 12.0   HEMATOCRIT % 39.2   PLATELETS 10*3/mm3 215         Results from last 7 days   Lab Units 10/12/19  0158   TROPONIN T ng/mL <0.010     Results from last 7 days   Lab Units 10/12/19  0158   PROBNP pg/mL 8,607.0*                       Invalid input(s): USDES,  BLOODU, NITRITITE, BACT, EP  Pain  Management Panel     Pain Management Panel Latest Ref Rng & Units 4/5/2018 1/28/2016    CREATININE UR Not Estab. mg/dL 70.9 122.0              EKG:      EKG with appears to show atrial fibrillation, rate of 101, nonspecific T wave abnormalities.    Radiology:    Imaging Results (last 72 hours)     Procedure Component Value Units Date/Time    XR Chest 2 View [[id number]] Updated:  10/12/19 0212      Chest x-ray appears to show cardiomegaly and pulmonary edema.      Atrial fibrillation with rapid ventricular response (CMS/HCC)      Assessment:    1.  Atrial fibrillation with rapid ventricular response, present on admission, acute  2.  Acute on chronic combined heart failure exacerbation, present on admission, acute  3.  History of anaplastic ALK positive large cell lymphoma, present on admission, chronic  4.  History of RUFINA, present on admission, chronic  5.  Hypertension, present on admission, chronic  6.  Peripartum cardiomyopathy, present on admission, chronic  7.  Anxiety and depression, present admission, chronic  8.  Traumatic brain injury, present admission, chronic  9.  History of GI bleed, present on admission, likely secondary to hemorrhoids.    Plan:    Patient is hemodynamically stable and heart rate has improved with oral diltiazem.  In setting of patient with significant underlying cardiomyopathy, feel she would benefit from further work-up.  Most recent echo from 2018 with EF approximately 30%, global hypokinesis of the left ventricle, with mild left atrial enlargement.  Cardiac cath from 2018 with normal coronary arteriogram with moderate reduction in LV systolic function.  She is already diuresed some with Lasix given in ER.  Suspect this will need to be added in addition to her spironolactone.  Continue metoprolol and Entresto.  Will start therapeutic Lovenox for now.  Will consult cardiology in the morning for further recommendations and management.  Have ordered repeat 2D echo.    Patient currently  "meets observation level of care secondary to A. fib with RVR and acute on chronic heart failure exacerbation.  Anticipate discharge within 24 hours if continues to improve.    Rukhsana Morrison DO  10/12/19  4:01 AM    Dictated utilizing Dragon dictation.    Electronically signed by Rukhsana Morrison DO at 10/12/19 0422          Emergency Department Notes      Jose G Tomlinson MD at 10/12/19 0202          Subjective   34-year-old female presenting with cough.  She states that for the last 2 weeks she has had cough productive of yellow sputum.  Has also had mild chest pain.  No specific alleviating or aggravating factors.  She denies any fevers, nausea, vomiting.  Came to the hospital 3 days ago to have chest x-ray done and was told that she had fluid around her heart.            Review of Systems   Constitutional: Negative.    HENT: Negative.    Eyes: Negative.    Respiratory: Positive for cough and shortness of breath.    Cardiovascular: Positive for chest pain and leg swelling. Negative for palpitations.   Gastrointestinal: Negative.    Genitourinary: Negative.    Musculoskeletal: Negative.    Skin: Negative.    Neurological: Negative.    Psychiatric/Behavioral: Negative.        Past Medical History:   Diagnosis Date   • Anesthesia     pt reports \"screamed for my  and wouldnt calm down when anesthesia gas used\".   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   • Cancer (CMS/HCC)     LYMPHOMA STAGE 3   • Cardiac arrest (CMS/HCC)     dionisio partum cardiomyopathy   • Cardiomyopathy (CMS/HCC)    • CHF (congestive heart failure) (CMS/HCC)    • Dermatitis     chronic on face   • Fatty liver    • Full dentures     DOESNT WEAR   • H/O chronic ear infection    • Headaches due to old head trauma    • Heavy menses    • Hemorrhoids    • Hip pain, left    • Hyperlipidemia    • Hypertension    • Short-term memory loss    • Stuttering in " conditions classified elsewhere     IF PT GETS TOO NERVOUS OR EXCITED HAS STUTTERING   • 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   • Macrobid [Nitrofurantoin Monohyd Macro] Other (See Comments)     Pt does not know   • Contrast Dye Itching       Past Surgical History:   Procedure Laterality Date   • CARDIAC CATHETERIZATION N/A 10/22/2018    Procedure: Left Heart Cath;  Surgeon: Zheng Curtis MD;  Location: [facility] CATH INVASIVE LOCATION;  Service: Cardiology   • CARDIAC CATHETERIZATION N/A 10/22/2018    Procedure: Coronary angiography;  Surgeon: Zheng Curtis MD;  Location: [facility] CATH INVASIVE LOCATION;  Service: Cardiology   • CARDIAC CATHETERIZATION N/A 10/22/2018    Procedure: Left ventriculography;  Surgeon: Zheng Curtis MD;  Location: [facility] CATH INVASIVE LOCATION;  Service: Cardiology   •  SECTION     • COLONOSCOPY N/A 2019    Procedure: COLONOSCOPY;  Surgeon: Venkata Wiley MD;  Location: [facility] ENDOSCOPY;  Service: Gastroenterology   • ENDOMETRIAL ABLATION     • ENDOSCOPY N/A 2019    Procedure: ESOPHAGOGASTRODUODENOSCOPY WITH COLD FORCEP BIOPSY;  Surgeon: Venkata Wiley MD;  Location: [facility] ENDOSCOPY;  Service: Gastroenterology   • FEMORAL LYMPH NODE BIOPSY/EXCISON Left     lt [facility]    • HEMORRHOIDECTOMY N/A 2019    Procedure: HEMORRHOIDECTOMY;  Surgeon: Venkata Wiley MD;  Location: [facility] OR;  Service: General   • PORTACATH PLACEMENT N/A 2018    Procedure: INSERTION OF PORTACATH right subclavian;  Surgeon: Shelley Brock MD;  Location: [facility] OR;  Service: General   • TUBAL ABDOMINAL LIGATION         Family History   Problem Relation Age of Onset   • Hypertension Father    • Diabetes Father    • Diabetes Paternal Grandmother        Social History     Socioeconomic History   • Marital status:      Spouse name: Not  on file   • Number of children: Not on file   • Years of education: Not on file   • Highest education level: Not on file   Tobacco Use   • Smoking status: Never Smoker   • Smokeless tobacco: Never Used   Substance and Sexual Activity   • Alcohol use: No     Frequency: Never   • Drug use: No   • Sexual activity: Defer           Objective   Physical Exam   Constitutional: She is oriented to person, place, and time. She appears well-developed and well-nourished. No distress.   HENT:   Head: Normocephalic and atraumatic.   Right Ear: External ear normal.   Left Ear: External ear normal.   Nose: Nose normal.   Mouth/Throat: Oropharynx is clear and moist.   Eyes: Conjunctivae and EOM are normal. Pupils are equal, round, and reactive to light.   Neck: Normal range of motion. Neck supple.   Cardiovascular: Normal heart sounds and intact distal pulses.   Irregularly irregular, rates occasionally into the 120s to 130s   Pulmonary/Chest: Effort normal and breath sounds normal. No stridor. No respiratory distress. She has no wheezes. She has no rales.   Abdominal: Soft. Bowel sounds are normal. She exhibits no distension. There is no tenderness. There is no rebound and no guarding.   Musculoskeletal: Normal range of motion. She exhibits no tenderness or deformity.   Lower extremity edema   Neurological: She is alert and oriented to person, place, and time.   Skin: Skin is warm and dry. No rash noted.   Psychiatric: She has a normal mood and affect. Her behavior is normal.   Nursing note and vitals reviewed.      Procedures          ED Course                  MDM  Number of Diagnoses or Management Options  Acute pulmonary edema (CMS/HCC):   Atrial fibrillation with rapid ventricular response (CMS/HCC):   Diagnosis management comments: 34-year-old female with shortness of breath, cough.  Well-developed, well-nourished female in no distress with exam as above.  She is notably hypertensive and in atrial fibrillation with rapid  rates.  Will treat with Cardizem, check labs, EKG and chest x-ray.  Disposition is likely to the hospital.    DDX: Atrial fibrillation, ACS, CHF, pneumonia    EKG interpreted by me: Atrial fibrillation, rate 101, some nonspecific T wave changes, this is an abnormal EKG, compared to previous the atrial fibrillation is new    Work-up notable for elevated BNP.  Lactate and procalcitonin are both normal.  She received a p.o. dose of diltiazem, heart rate is improved.  Chest x-ray per my interpretation reveals worsening of her interstitial edema compared to x-ray 3 days ago.  I have given her a dose of Lasix and she is had decent urinary output already.  Given the worsening pulmonary edema and new onset atrial fibrillation recommended admission which she is agreeable to.  Discussed with Dr. Morrison for admission.       Amount and/or Complexity of Data Reviewed  Decide to obtain previous medical records or to obtain history from someone other than the patient: yes        Final diagnoses:   Atrial fibrillation with rapid ventricular response (CMS/HCC)   Acute pulmonary edema (CMS/HCC)              Jose G Tomlinson MD  10/12/19 0349      Electronically signed by Jose G Tomlinson MD at 10/12/19 0349     Queenie Luu at 10/12/19 0349        House Supervisor called about bed placement for admit. She stated she would call back to get information.      Queenie Luu  10/12/19 0359      Electronically signed by Queenie Luu at 10/12/19 0359     Queenie Luu at 10/12/19 0415        Patient will be going to room 311 tele. GILBERTO Jeffrey notified.      Queenie Luu  10/12/19 0416      Electronically signed by Queenie Luu at 10/12/19 0416       Hospital Medications (all)       Dose Frequency Start End    amiodarone (NEXTERONE) 360 mg/200 mL (1.8 mg/mL) infusion 1 mg/min Continuous 10/12/2019 10/12/2019    Sig - Route: Infuse 1 mg/min into a venous catheter Continuous. - Intravenous  "   Linked Group 1:  \"Followed by\" Linked Group Details        amiodarone (NEXTERONE) 360 mg/200 mL (1.8 mg/mL) infusion 0.5 mg/min Continuous 10/12/2019 10/13/2019    Sig - Route: Infuse 0.5 mg/min into a venous catheter Continuous. - Intravenous    Linked Group 1:  \"Followed by\" Linked Group Details        amiodarone (PACERONE) tablet 200 mg 200 mg Every 24 Hours Scheduled 10/14/2019     Sig - Route: Take 1 tablet by mouth Daily. - Oral    Linked Group 1:  \"Followed by\" Linked Group Details        amiodarone in dextrose 5% (NEXTERONE) loading dose 150mg/100mL 150 mg Once 10/12/2019 10/12/2019    Sig - Route: Infuse 100 mL into a venous catheter 1 (One) Time. - Intravenous    Linked Group 1:  \"Followed by\" Linked Group Details        aspirin EC tablet 81 mg 81 mg Daily 10/12/2019     Sig - Route: Take 1 tablet by mouth Daily. - Oral    atorvastatin (LIPITOR) tablet 20 mg 20 mg Nightly 10/12/2019     Sig - Route: Take 1 tablet by mouth Every Night. - Oral    bisacodyl (DULCOLAX) suppository 10 mg 10 mg Daily 10/13/2019     Sig - Route: Insert 1 suppository into the rectum Daily. - Rectal    calcium carbonate (TUMS) chewable tablet 500 mg (200 mg elemental) 1 tablet As Needed 10/12/2019     Sig - Route: Chew 500 mg As Needed for Indigestion or Heartburn. - Oral    dilTIAZem CD (CARDIZEM CD) 24 hr capsule 180 mg 180 mg Once 10/12/2019 10/12/2019    Sig - Route: Take 1 capsule by mouth 1 (One) Time. - Oral    docusate sodium (COLACE) capsule 100 mg 100 mg 2 Times Daily 10/13/2019     Sig - Route: Take 1 capsule by mouth 2 (Two) Times a Day. - Oral    fluticasone (FLONASE) 50 MCG/ACT nasal spray 1 spray 1 spray Daily 10/12/2019     Sig - Route: 1 spray by Each Nare route Daily. - Each Nare    furosemide (LASIX) injection 40 mg 40 mg Once 10/12/2019 10/12/2019    Sig - Route: Infuse 4 mL into a venous catheter 1 (One) Time. - Intravenous    furosemide (LASIX) tablet 20 mg 20 mg Daily 10/13/2019     Sig - Route: Take 1 " tablet by mouth Daily. - Oral    hydrocortisone (ANUSOL-HC) 2.5 % rectal cream  2 Times Daily 10/13/2019     Sig - Route: Insert  into the rectum 2 (Two) Times a Day. - Rectal    magnesium sulfate 2g/50 mL (PREMIX) infusion 2 g Once 10/12/2019 10/12/2019    Sig - Route: Infuse 50 mL into a venous catheter 1 (One) Time. - Intravenous    metoprolol succinate XL (TOPROL-XL) 24 hr tablet 50 mg 50 mg Every 12 Hours Scheduled 10/13/2019     Sig - Route: Take 1 tablet by mouth Every 12 (Twelve) Hours. - Oral    oxyCODONE-acetaminophen (PERCOCET)  MG per tablet 1 tablet 1 tablet Every 6 Hours PRN 10/12/2019     Sig - Route: Take 1 tablet by mouth Every 6 (Six) Hours As Needed for Moderate Pain . - Oral    pantoprazole (PROTONIX) EC tablet 40 mg 40 mg Daily 10/12/2019     Sig - Route: Take 1 tablet by mouth Daily. - Oral    Pharmacy to Dose enoxaparin (LOVENOX)  Continuous PRN 10/12/2019     Sig - Route: Continuous As Needed for Consult. - Does not apply    polyethylene glycol (MIRALAX) powder 17 g 17 g Daily 10/14/2019     Sig - Route: Take 17 g by mouth Daily. - Oral    potassium chloride (K-DUR,KLOR-CON) CR tablet 40 mEq 40 mEq 2 Times Daily With Meals 10/12/2019 10/12/2019    Sig - Route: Take 2 tablets by mouth 2 (Two) Times a Day With Meals. - Oral    Notes to Pharmacy: 2 doses only    sacubitril-valsartan (ENTRESTO) 24-26 MG tablet 1 tablet 1 tablet Every 12 Hours Scheduled 10/12/2019     Sig - Route: Take 1 tablet by mouth Every 12 (Twelve) Hours. - Oral    sertraline (ZOLOFT) tablet 100 mg 100 mg Nightly 10/12/2019     Sig - Route: Take 2 tablets by mouth Every Night. - Oral    sodium chloride 0.9 % flush 10 mL 10 mL As Needed 10/12/2019     Sig - Route: Infuse 10 mL into a venous catheter As Needed for Line Care. - Intravenous    Cosign for Ordering: Accepted by Jose G Tomlinson MD on 10/12/2019  8:12 PM    sodium chloride 0.9 % flush 10 mL 10 mL Every 12 Hours Scheduled 10/12/2019     Sig - Route:  Infuse 10 mL into a venous catheter Every 12 (Twelve) Hours. - Intravenous    sodium chloride 0.9 % flush 10 mL 10 mL As Needed 10/12/2019     Sig - Route: Infuse 10 mL into a venous catheter As Needed for Line Care. - Intravenous    spironolactone (ALDACTONE) tablet 25 mg 25 mg Daily 10/12/2019     Sig - Route: Take 1 tablet by mouth Daily. - Oral    dilTIAZem (CARDIZEM) injection 20 mg (Discontinued) 20 mg Once 10/12/2019 10/12/2019    Sig - Route: Infuse 4 mL into a venous catheter 1 (One) Time. - Intravenous    enoxaparin (LOVENOX) injection 130 mg (Discontinued) 1 mg/kg × 128 kg Every 12 Hours Scheduled 10/12/2019 10/13/2019    Sig - Route: Inject 0.87 mL under the skin into the appropriate area as directed Every 12 (Twelve) Hours. - Subcutaneous    enoxaparin (LOVENOX) syringe 40 mg (Discontinued) 40 mg Every 24 Hours 10/13/2019 10/13/2019    Sig - Route: Inject 0.4 mL under the skin into the appropriate area as directed Daily. - Subcutaneous    furosemide (LASIX) injection 20 mg (Discontinued) 20 mg Every 12 Hours 10/12/2019 10/13/2019    Sig - Route: Infuse 2 mL into a venous catheter Every 12 (Twelve) Hours. - Intravenous    metoprolol succinate XL (TOPROL-XL) 24 hr tablet 50 mg (Discontinued) 50 mg Every 24 Hours Scheduled 10/12/2019 10/13/2019    Sig - Route: Take 1 tablet by mouth Daily. - Oral    nitroglycerin (NITROSTAT) ointment 1 inch (Discontinued) 1 inch Once 10/12/2019 10/12/2019    Sig - Route: Apply 1 inch topically to the appropriate area as directed 1 (One) Time. - Topical    polyethylene glycol (MIRALAX) powder 17 g (Discontinued) 17 g Daily PRN 10/12/2019 10/13/2019    Sig - Route: Take 17 g by mouth Daily As Needed for Constipation. - Oral          Lab Results (last 48 hours)     Procedure Component Value Units Date/Time    Renal Function Panel [[id number]]  (Abnormal) Collected:  10/13/19 0557    Specimen:  Blood Updated:  10/13/19 0642     Glucose 128 mg/dL      BUN 17 mg/dL       Creatinine 1.03 mg/dL      Sodium 141 mmol/L      Potassium 3.9 mmol/L      Chloride 103 mmol/L      CO2 22.0 mmol/L      Calcium 8.8 mg/dL      Albumin 4.40 g/dL      Phosphorus 2.7 mg/dL      Anion Gap 16.0 mmol/L      BUN/Creatinine Ratio 16.5     eGFR Non African Amer 61 mL/min/1.73     Narrative:       GFR Normal >60  Chronic Kidney Disease <60  Kidney Failure <15    CBC (No Diff) [[id number]]  (Abnormal) Collected:  10/13/19 0556    Specimen:  Blood Updated:  10/13/19 0626     WBC 8.77 10*3/mm3      RBC 4.59 10*6/mm3      Hemoglobin 12.7 g/dL      Hematocrit 42.0 %      MCV 91.5 fL      MCH 27.7 pg      MCHC 30.2 g/dL      RDW 16.4 %      RDW-SD 54.8 fl      MPV 10.6 fL      Platelets 268 10*3/mm3     TSH [[id number]]  (Normal) Collected:  10/12/19 0158    Specimen:  Blood Updated:  10/12/19 1003     TSH 3.520 uIU/mL     Comprehensive Metabolic Panel [[id number]]  (Abnormal) Collected:  10/12/19 0826    Specimen:  Blood Updated:  10/12/19 0900     Glucose 121 mg/dL      BUN 15 mg/dL      Creatinine 0.92 mg/dL      Sodium 142 mmol/L      Potassium 3.4 mmol/L      Chloride 106 mmol/L      CO2 22.0 mmol/L      Calcium 8.6 mg/dL      Total Protein 6.8 g/dL      Albumin 3.90 g/dL      ALT (SGPT) 17 U/L      AST (SGOT) 17 U/L      Alkaline Phosphatase 66 U/L      Total Bilirubin 0.9 mg/dL      eGFR Non African Amer 70 mL/min/1.73      Globulin 2.9 gm/dL      A/G Ratio 1.3 g/dL      BUN/Creatinine Ratio 16.3     Anion Gap 14.0 mmol/L     Narrative:       GFR Normal >60  Chronic Kidney Disease <60  Kidney Failure <15    CBC Auto Differential [[id number]]  (Abnormal) Collected:  10/12/19 0826    Specimen:  Blood Updated:  10/12/19 0838     WBC 8.78 10*3/mm3      RBC 4.42 10*6/mm3      Hemoglobin 12.0 g/dL      Hematocrit 39.7 %      MCV 89.8 fL      MCH 27.1 pg      MCHC 30.2 g/dL      RDW 15.9 %      RDW-SD 52.6 fl      MPV 10.5 fL      Platelets 205 10*3/mm3      Neutrophil % 73.2 %      Lymphocyte % 19.2 %      Monocyte  % 5.8 %      Eosinophil % 0.8 %      Basophil % 0.7 %      Immature Grans % 0.3 %      Neutrophils, Absolute 6.42 10*3/mm3      Lymphocytes, Absolute 1.69 10*3/mm3      Monocytes, Absolute 0.51 10*3/mm3      Eosinophils, Absolute 0.07 10*3/mm3      Basophils, Absolute 0.06 10*3/mm3      Immature Grans, Absolute 0.03 10*3/mm3      nRBC 0.0 /100 WBC     [place] Draw [[id number]] Collected:  10/12/19 0158    Specimen:  Blood Updated:  10/12/19 0308    Narrative:       The following orders were created for panel order [place] Draw.  Procedure                               Abnormality         Status                     ---------                               -----------         ------                     Light Blue Top[[id number]]                                   Final result               Green Top (Gel)[[id number]]                                  Final result               Lavender Top[[id number]]                                     Final result               Gold Top - SST[[id number]]                                   Final result               Green Top (No Gel)[[id number]]                               In process                   Please view results for these tests on the individual orders.    Light Blue Top [[id number]] Collected:  10/12/19 0158    Specimen:  Blood Updated:  10/12/19 0308     Extra Tube hold for add-on     Comment: Auto resulted       Gold Top - SST [[id number]] Collected:  10/12/19 0158    Specimen:  Blood Updated:  10/12/19 0308     Extra Tube Hold for add-ons.     Comment: Auto resulted.       Green Top (Gel) [[id number]] Collected:  10/12/19 0158    Specimen:  Blood Updated:  10/12/19 0308     Extra Tube Hold for add-ons.     Comment: Auto resulted.       Lavender Top [[id number]] Collected:  10/12/19 0158    Specimen:  Blood Updated:  10/12/19 0308     Extra Tube hold for add-on     Comment: Auto resulted       Lactic Acid, Plasma [[id number]]  (Normal) Collected:  10/12/19 0238    Specimen:  Blood  "Updated:  10/12/19 0308     Lactate 1.1 mmol/L     Procalcitonin [[id number]]  (Abnormal) Collected:  10/12/19 0158    Specimen:  Blood Updated:  10/12/19 0259     Procalcitonin 0.05 ng/mL     Narrative:       As a Marker for Sepsis (Non-Neonates):   1. <0.5 ng/mL represents a low risk of severe sepsis and/or septic shock.  1. >2 ng/mL represents a high risk of severe sepsis and/or septic shock.    As a Marker for Lower Respiratory Tract Infections that require antibiotic therapy:  PCT on Admission     Antibiotic Therapy             6-12 Hrs later  > 0.5                Strongly Recommended            >0.25 - <0.5         Recommended  0.1 - 0.25           Discouraged                   Remeasure/reassess PCT  <0.1                 Strongly Discouraged          Remeasure/reassess PCT      As 28 day mortality risk marker: \"Change in Procalcitonin Result\" (> 80 % or <=80 %) if Day 0 (or Day 1) and Day 4 values are available. Refer to [email or web address]   Change in PCT <=80 %   A decrease of PCT levels below or equal to 80 % defines a positive change in PCT test result representing a higher risk for 28-day all-cause mortality of patients diagnosed with severe sepsis or septic shock.  Change in PCT > 80 %   A decrease of PCT levels of more than 80 % defines a negative change in PCT result representing a lower risk for 28-day all-cause mortality of patients diagnosed with severe sepsis or septic shock.                hCG, Serum, Qualitative [[id number]]  (Normal) Collected:  10/12/19 0158    Specimen:  Blood Updated:  10/12/19 0243     HCG Qualitative Negative    BNP [[id number]]  (Abnormal) Collected:  10/12/19 0158    Specimen:  Blood Updated:  10/12/19 0232     proBNP 8,607.0 pg/mL     Narrative:       Among patients with dyspnea, NT-proBNP is highly sensitive for the detection of acute congestive heart failure. In addition NT-proBNP of <300 pg/ml effectively rules out acute congestive heart failure with " 99% negative predictive value.    Troponin [[id number]]  (Normal) Collected:  10/12/19 0158    Specimen:  Blood Updated:  10/12/19 0231     Troponin T <0.010 ng/mL     Narrative:       Troponin T Reference Range:  <= 0.03 ng/mL-   Negative for AMI  >0.03 ng/mL-     Abnormal for myocardial necrosis.  Clinicians would have to utilize clinical acumen, EKG, Troponin and serial changes to determine if it is an Acute Myocardial Infarction or myocardial injury due to an underlying chronic condition.     Comprehensive Metabolic Panel [[id number]]  (Abnormal) Collected:  10/12/19 0158    Specimen:  Blood Updated:  10/12/19 0229     Glucose 113 mg/dL      BUN 16 mg/dL      Creatinine 0.92 mg/dL      Sodium 141 mmol/L      Potassium 3.6 mmol/L      Chloride 107 mmol/L      CO2 21.2 mmol/L      Calcium 8.4 mg/dL      Total Protein 6.8 g/dL      Albumin 4.00 g/dL      ALT (SGPT) 16 U/L      AST (SGOT) 18 U/L      Alkaline Phosphatase 68 U/L      Total Bilirubin 0.8 mg/dL      eGFR Non African Amer 70 mL/min/1.73      Globulin 2.8 gm/dL      A/G Ratio 1.4 g/dL      BUN/Creatinine Ratio 17.4     Anion Gap 12.8 mmol/L     Narrative:       GFR Normal >60  Chronic Kidney Disease <60  Kidney Failure <15    CBC & Differential [[id number]] Collected:  10/12/19 0158    Specimen:  Blood Updated:  10/12/19 0207    Narrative:       The following orders were created for panel order CBC & Differential.  Procedure                               Abnormality         Status                     ---------                               -----------         ------                     CBC Auto Differential[[id number]]        Abnormal            Final result                 Please view results for these tests on the individual orders.    CBC Auto Differential [[id number]]  (Abnormal) Collected:  10/12/19 0158    Specimen:  Blood Updated:  10/12/19 0207     WBC 8.46 10*3/mm3      RBC 4.31 10*6/mm3      Hemoglobin 12.0 g/dL      Hematocrit 39.2 %      MCV 91.0  fL      MCH 27.8 pg      MCHC 30.6 g/dL      RDW 16.0 %      RDW-SD 53.1 fl      MPV 10.5 fL      Platelets 215 10*3/mm3      Neutrophil % 68.1 %      Lymphocyte % 24.8 %      Monocyte % 5.3 %      Eosinophil % 0.7 %      Basophil % 0.5 %      Immature Grans % 0.6 %      Neutrophils, Absolute 5.76 10*3/mm3      Lymphocytes, Absolute 2.10 10*3/mm3      Monocytes, Absolute 0.45 10*3/mm3      Eosinophils, Absolute 0.06 10*3/mm3      Basophils, Absolute 0.04 10*3/mm3      Immature Grans, Absolute 0.05 10*3/mm3      nRBC 0.0 /100 WBC     Green Top (No Gel) [[id number]] Collected:  10/12/19 0158    Specimen:  Blood Updated:  10/12/19 0204          Lab Results (last 48 hours)     Procedure Component Value Units Date/Time    Renal Function Panel [[id number]]  (Abnormal) Collected:  10/13/19 0557    Specimen:  Blood Updated:  10/13/19 0642     Glucose 128 mg/dL      BUN 17 mg/dL      Creatinine 1.03 mg/dL      Sodium 141 mmol/L      Potassium 3.9 mmol/L      Chloride 103 mmol/L      CO2 22.0 mmol/L      Calcium 8.8 mg/dL      Albumin 4.40 g/dL      Phosphorus 2.7 mg/dL      Anion Gap 16.0 mmol/L      BUN/Creatinine Ratio 16.5     eGFR Non African Amer 61 mL/min/1.73     Narrative:       GFR Normal >60  Chronic Kidney Disease <60  Kidney Failure <15    CBC (No Diff) [[id number]]  (Abnormal) Collected:  10/13/19 0556    Specimen:  Blood Updated:  10/13/19 0626     WBC 8.77 10*3/mm3      RBC 4.59 10*6/mm3      Hemoglobin 12.7 g/dL      Hematocrit 42.0 %      MCV 91.5 fL      MCH 27.7 pg      MCHC 30.2 g/dL      RDW 16.4 %      RDW-SD 54.8 fl      MPV 10.6 fL      Platelets 268 10*3/mm3     TSH [[id number]]  (Normal) Collected:  10/12/19 0158    Specimen:  Blood Updated:  10/12/19 1003     TSH 3.520 uIU/mL     Comprehensive Metabolic Panel [[id number]]  (Abnormal) Collected:  10/12/19 0826    Specimen:  Blood Updated:  10/12/19 0900     Glucose 121 mg/dL      BUN 15 mg/dL      Creatinine 0.92 mg/dL      Sodium 142 mmol/L       Potassium 3.4 mmol/L      Chloride 106 mmol/L      CO2 22.0 mmol/L      Calcium 8.6 mg/dL      Total Protein 6.8 g/dL      Albumin 3.90 g/dL      ALT (SGPT) 17 U/L      AST (SGOT) 17 U/L      Alkaline Phosphatase 66 U/L      Total Bilirubin 0.9 mg/dL      eGFR Non African Amer 70 mL/min/1.73      Globulin 2.9 gm/dL      A/G Ratio 1.3 g/dL      BUN/Creatinine Ratio 16.3     Anion Gap 14.0 mmol/L     Narrative:       GFR Normal >60  Chronic Kidney Disease <60  Kidney Failure <15    CBC Auto Differential [[id number]]  (Abnormal) Collected:  10/12/19 0826    Specimen:  Blood Updated:  10/12/19 0838     WBC 8.78 10*3/mm3      RBC 4.42 10*6/mm3      Hemoglobin 12.0 g/dL      Hematocrit 39.7 %      MCV 89.8 fL      MCH 27.1 pg      MCHC 30.2 g/dL      RDW 15.9 %      RDW-SD 52.6 fl      MPV 10.5 fL      Platelets 205 10*3/mm3      Neutrophil % 73.2 %      Lymphocyte % 19.2 %      Monocyte % 5.8 %      Eosinophil % 0.8 %      Basophil % 0.7 %      Immature Grans % 0.3 %      Neutrophils, Absolute 6.42 10*3/mm3      Lymphocytes, Absolute 1.69 10*3/mm3      Monocytes, Absolute 0.51 10*3/mm3      Eosinophils, Absolute 0.07 10*3/mm3      Basophils, Absolute 0.06 10*3/mm3      Immature Grans, Absolute 0.03 10*3/mm3      nRBC 0.0 /100 WBC     [place] Draw [[id number]] Collected:  10/12/19 0158    Specimen:  Blood Updated:  10/12/19 0308    Narrative:       The following orders were created for panel order [place] Draw.  Procedure                               Abnormality         Status                     ---------                               -----------         ------                     Light Blue Top[[id number]]                                   Final result               Green Top (Gel)[[id number]]                                  Final result               Lavender Top[[id number]]                                     Final result               Gold Top - SST[[id number]]                                   Final result              "  Green Top (No Gel)[[id number]]                               In process                   Please view results for these tests on the individual orders.    Light Blue Top [[id number]] Collected:  10/12/19 0158    Specimen:  Blood Updated:  10/12/19 0308     Extra Tube hold for add-on     Comment: Auto resulted       Gold Top - SST [[id number]] Collected:  10/12/19 0158    Specimen:  Blood Updated:  10/12/19 0308     Extra Tube Hold for add-ons.     Comment: Auto resulted.       Green Top (Gel) [[id number]] Collected:  10/12/19 0158    Specimen:  Blood Updated:  10/12/19 0308     Extra Tube Hold for add-ons.     Comment: Auto resulted.       Lavender Top [[id number]] Collected:  10/12/19 0158    Specimen:  Blood Updated:  10/12/19 0308     Extra Tube hold for add-on     Comment: Auto resulted       Lactic Acid, Plasma [[id number]]  (Normal) Collected:  10/12/19 0238    Specimen:  Blood Updated:  10/12/19 0308     Lactate 1.1 mmol/L     Procalcitonin [[id number]]  (Abnormal) Collected:  10/12/19 0158    Specimen:  Blood Updated:  10/12/19 0259     Procalcitonin 0.05 ng/mL     Narrative:       As a Marker for Sepsis (Non-Neonates):   1. <0.5 ng/mL represents a low risk of severe sepsis and/or septic shock.  1. >2 ng/mL represents a high risk of severe sepsis and/or septic shock.    As a Marker for Lower Respiratory Tract Infections that require antibiotic therapy:  PCT on Admission     Antibiotic Therapy             6-12 Hrs later  > 0.5                Strongly Recommended            >0.25 - <0.5         Recommended  0.1 - 0.25           Discouraged                   Remeasure/reassess PCT  <0.1                 Strongly Discouraged          Remeasure/reassess PCT      As 28 day mortality risk marker: \"Change in Procalcitonin Result\" (> 80 % or <=80 %) if Day 0 (or Day 1) and Day 4 values are available. Refer to [email or web address]-pct-[email or web address]/   Change in PCT <=80 %   A decrease of PCT levels below or equal to 80 % " defines a positive change in PCT test result representing a higher risk for 28-day all-cause mortality of patients diagnosed with severe sepsis or septic shock.  Change in PCT > 80 %   A decrease of PCT levels of more than 80 % defines a negative change in PCT result representing a lower risk for 28-day all-cause mortality of patients diagnosed with severe sepsis or septic shock.                hCG, Serum, Qualitative [[id number]]  (Normal) Collected:  10/12/19 0158    Specimen:  Blood Updated:  10/12/19 0243     HCG Qualitative Negative    BNP [[id number]]  (Abnormal) Collected:  10/12/19 0158    Specimen:  Blood Updated:  10/12/19 0232     proBNP 8,607.0 pg/mL     Narrative:       Among patients with dyspnea, NT-proBNP is highly sensitive for the detection of acute congestive heart failure. In addition NT-proBNP of <300 pg/ml effectively rules out acute congestive heart failure with 99% negative predictive value.    Troponin [[id number]]  (Normal) Collected:  10/12/19 0158    Specimen:  Blood Updated:  10/12/19 0231     Troponin T <0.010 ng/mL     Narrative:       Troponin T Reference Range:  <= 0.03 ng/mL-   Negative for AMI  >0.03 ng/mL-     Abnormal for myocardial necrosis.  Clinicians would have to utilize clinical acumen, EKG, Troponin and serial changes to determine if it is an Acute Myocardial Infarction or myocardial injury due to an underlying chronic condition.     Comprehensive Metabolic Panel [[id number]]  (Abnormal) Collected:  10/12/19 0158    Specimen:  Blood Updated:  10/12/19 0229     Glucose 113 mg/dL      BUN 16 mg/dL      Creatinine 0.92 mg/dL      Sodium 141 mmol/L      Potassium 3.6 mmol/L      Chloride 107 mmol/L      CO2 21.2 mmol/L      Calcium 8.4 mg/dL      Total Protein 6.8 g/dL      Albumin 4.00 g/dL      ALT (SGPT) 16 U/L      AST (SGOT) 18 U/L      Alkaline Phosphatase 68 U/L      Total Bilirubin 0.8 mg/dL      eGFR Non African Amer 70 mL/min/1.73      Globulin 2.8 gm/dL      A/G Ratio  1.4 g/dL      BUN/Creatinine Ratio 17.4     Anion Gap 12.8 mmol/L     Narrative:       GFR Normal >60  Chronic Kidney Disease <60  Kidney Failure <15    CBC & Differential [[id number]] Collected:  10/12/19 0158    Specimen:  Blood Updated:  10/12/19 0207    Narrative:       The following orders were created for panel order CBC & Differential.  Procedure                               Abnormality         Status                     ---------                               -----------         ------                     CBC Auto Differential[[id number]]        Abnormal            Final result                 Please view results for these tests on the individual orders.    CBC Auto Differential [[id number]]  (Abnormal) Collected:  10/12/19 0158    Specimen:  Blood Updated:  10/12/19 0207     WBC 8.46 10*3/mm3      RBC 4.31 10*6/mm3      Hemoglobin 12.0 g/dL      Hematocrit 39.2 %      MCV 91.0 fL      MCH 27.8 pg      MCHC 30.6 g/dL      RDW 16.0 %      RDW-SD 53.1 fl      MPV 10.5 fL      Platelets 215 10*3/mm3      Neutrophil % 68.1 %      Lymphocyte % 24.8 %      Monocyte % 5.3 %      Eosinophil % 0.7 %      Basophil % 0.5 %      Immature Grans % 0.6 %      Neutrophils, Absolute 5.76 10*3/mm3      Lymphocytes, Absolute 2.10 10*3/mm3      Monocytes, Absolute 0.45 10*3/mm3      Eosinophils, Absolute 0.06 10*3/mm3      Basophils, Absolute 0.04 10*3/mm3      Immature Grans, Absolute 0.05 10*3/mm3      nRBC 0.0 /100 WBC     Green Top (No Gel) [[id number]] Collected:  10/12/19 0158    Specimen:  Blood Updated:  10/12/19 0204        ECG/EMG Results (last 48 hours)     ** No results found for the last 48 hours. **        Consult Notes (last 24 hours) (Notes from 10/12/19 1345 through 10/13/19 1345)     No notes of this type exist for this encounter.

## 2019-10-13 NOTE — PLAN OF CARE
Problem: Patient Care Overview  Goal: Plan of Care Review  Outcome: Ongoing (interventions implemented as appropriate)   10/13/19 0472   Coping/Psychosocial   Plan of Care Reviewed With patient   Plan of Care Review   Progress improving   OTHER   Outcome Summary Probable discharge tomorrow

## 2019-10-13 NOTE — PLAN OF CARE
Problem: Fall Risk (Adult)  Goal: Absence of Fall  Outcome: Ongoing (interventions implemented as appropriate)      Problem: Pain, Chronic (Adult)  Goal: Acceptable Pain/Comfort Level and Functional Ability  Outcome: Ongoing (interventions implemented as appropriate)      Problem: Patient Care Overview  Goal: Plan of Care Review  Outcome: Ongoing (interventions implemented as appropriate)   10/13/19 0424   Coping/Psychosocial   Plan of Care Reviewed With patient   Plan of Care Review   Progress improving   OTHER   Outcome Summary No acute events overnight. Patient has responded well to Amiodorone drip and has remained in a normal sinus rhythm. Continue to monitor.        Problem: Skin Injury Risk (Adult)  Goal: Skin Health and Integrity  Outcome: Ongoing (interventions implemented as appropriate)      Problem: Cardiac Output Decreased (Adult)  Goal: Effective Tissue Perfusion  Outcome: Ongoing (interventions implemented as appropriate)      Problem: Arrhythmia/Dysrhythmia (Symptomatic) (Adult)  Goal: Signs and Symptoms of Listed Potential Problems Will be Absent, Minimized or Managed (Arrhythmia/Dysrhythmia)  Outcome: Ongoing (interventions implemented as appropriate)

## 2019-10-13 NOTE — PROGRESS NOTES
Hospitalist Progress Note.    LOS: 0 days    Patient Care Team:  Malia Powers APRN as PCP - General (Family Medicine)  Shelley Brock MD as Consulting Physician (General Surgery)  Cirilo Deshpande II, MD (Pain Medicine)    Chief Complaint:    Shortness of breath and palpitation    SUBJECTIVE:  Patient seen and examined this morning.  Events from last night noted.  She currently reports feeling slightly better.  Does have intermittent shortness of breath but denies any chest pain or palpitations.  Currently, her biggest complaint is inability to have a normal bowel movement due to her stool being too hard.  Per nursing she is having some hemorrhoidal bleeding as well.  Telemetry review shows maintenance of sinus rhythm at this time.  She remains on amiodarone drip which should finish out this afternoon.    Patient is a 34-year-old female with medical history of nonischemic cardiomyopathy with an ejection fraction of 30%, T-cell lymphoma status post chemotherapy, anemia, obesity and hyperlipidemia who presented to the ER on 10/12/2019 with complaints of palpitations x3 weeks.  On arrival she was hypertensive with blood pressure 196/119 with heart rate of 101.  Labs notable for proBNP 8600.  Troponin negative.  Chest x-ray revealed cardiomegaly with mild pulmonary edema.  EKG was concerning for A. fib with RVR.  She was given oral diltiazem and Lasix in the ER and admitted to the hospitalist service for further management.  Dr. Curtis was consulted.  Dr. Curtis reviewed her admission EKG and did not think that she was in A. fib but her telemetry did show short runs of paroxysmal SVT.  Patient was placed on amiodarone drip and her beta-blocker therapy was reinstituted.  It should be noted that the patient complains of a history of hospitalization secondary to hemorrhoidal bleed requiring transfusion and ligation.  Patient does suffer from chronic constipation but does not seem to be taking anything for it other  "than MiraLAX as needed.    Review of Systems:    The pertinent  ROS was done and it is noted above, rest  was negative.    OBJECTIVE:    Vital Signs  /66 (BP Location: Left arm, Patient Position: Lying)   Pulse 80   Temp 98.2 °F (36.8 °C) (Oral)   Resp 16   Ht 170.2 cm (67\")   Wt 128 kg (281 lb 8 oz)   SpO2 94%   BMI 44.09 kg/m²       I/O this shift:  In: 360 [P.O.:360]  Out: -     Intake/Output Summary (Last 24 hours) at 10/13/2019 1004  Last data filed at 10/13/2019 0824  Gross per 24 hour   Intake 1349.48 ml   Output --   Net 1349.48 ml       Physical Exam:    General Appearance: alert, oriented x 3, no acute distress, pleasant  HEENT: pupils round and reactive to light, oral mucosa dry, extraocular movements intact.  Neck: supple, no JVD, trachea midline  Lungs: Clear to Auscultation, unlabored breathing effort  Heart: RRR, normal S1 and S2, no S3, no rub  Abdomen: soft, non-tender, no palpable bladder, present bowel sounds to auscultation  Extremities: no edema, cyanosis or clubbing.   Neuro: normal speech and mental status, grossly non focal.     Results Review:    Results from last 7 days   Lab Units 10/13/19  0557 10/12/19  0826 10/12/19  0158   SODIUM mmol/L 141 142 141   POTASSIUM mmol/L 3.9 3.4* 3.6   CHLORIDE mmol/L 103 106 107   CO2 mmol/L 22.0 22.0 21.2*   BUN mg/dL 17 15 16   CREATININE mg/dL 1.03* 0.92 0.92   CALCIUM mg/dL 8.8 8.6 8.4*   BILIRUBIN mg/dL  --  0.9 0.8   ALK PHOS U/L  --  66 68   ALT (SGPT) U/L  --  17 16   AST (SGOT) U/L  --  17 18   GLUCOSE mg/dL 128* 121* 113*       Estimated Creatinine Clearance: 107.2 mL/min (A) (by C-G formula based on SCr of 1.03 mg/dL (H)).    Results from last 7 days   Lab Units 10/13/19  0557   PHOSPHORUS mg/dL 2.7             Results from last 7 days   Lab Units 10/13/19  0556 10/12/19  0826 10/12/19  0158   WBC 10*3/mm3 8.77 8.78 8.46   HEMOGLOBIN g/dL 12.7 12.0 12.0   PLATELETS 10*3/mm3 268 205 215               Imaging Results (last 24 hours)  "    ** No results found for the last 24 hours. **          [START ON 10/14/2019] amiodarone 200 mg Oral Q24H   aspirin 81 mg Oral Daily   atorvastatin 20 mg Oral Nightly   bisacodyl 10 mg Rectal Daily   docusate sodium 100 mg Oral BID   enoxaparin 40 mg Subcutaneous Q24H   fluticasone 1 spray Each Nare Daily   furosemide 20 mg Oral Daily   hydrocortisone  Rectal BID   metoprolol succinate XL 50 mg Oral Q12H   pantoprazole 40 mg Oral Daily   [START ON 10/14/2019] polyethylene glycol 17 g Oral Daily   sacubitril-valsartan 1 tablet Oral Q12H   sertraline 100 mg Oral Nightly   sodium chloride 10 mL Intravenous Q12H   spironolactone 25 mg Oral Daily       amiodarone 0.5 mg/min Last Rate: 0.5 mg/min (10/13/19 0914)   Pharmacy to Dose enoxaparin (LOVENOX)         Medication Review:   Current Facility-Administered Medications   Medication Dose Route Frequency Provider Last Rate Last Dose   • amiodarone (NEXTERONE) 360 mg/200 mL (1.8 mg/mL) infusion  0.5 mg/min Intravenous Continuous Zheng Curtis MD 16.67 mL/hr at 10/13/19 0914 0.5 mg/min at 10/13/19 0914    Followed by   • [START ON 10/14/2019] amiodarone (PACERONE) tablet 200 mg  200 mg Oral Q24H Zheng Curtis MD       • aspirin EC tablet 81 mg  81 mg Oral Daily Zheng Curtis MD   81 mg at 10/13/19 0859   • atorvastatin (LIPITOR) tablet 20 mg  20 mg Oral Nightly Rukhsana Morrison DO   20 mg at 10/12/19 2020   • bisacodyl (DULCOLAX) suppository 10 mg  10 mg Rectal Daily Brenda Orozco MD   10 mg at 10/13/19 0859   • calcium carbonate (TUMS) chewable tablet 500 mg (200 mg elemental)  1 tablet Oral PRN Rukhsana Morrison DO   1 tablet at 10/12/19 2115   • docusate sodium (COLACE) capsule 100 mg  100 mg Oral BID Brenda Orozco MD   100 mg at 10/13/19 0859   • enoxaparin (LOVENOX) syringe 40 mg  40 mg Subcutaneous Q24H Zheng Curtis MD       • fluticasone (FLONASE) 50 MCG/ACT nasal spray 1 spray  1 spray Each Nare  Daily Rukhsana Morrison DO   1 spray at 10/13/19 0859   • furosemide (LASIX) tablet 20 mg  20 mg Oral Daily Zheng Curtis MD   20 mg at 10/13/19 0859   • hydrocortisone (ANUSOL-HC) 2.5 % rectal cream   Rectal BID Brenda Orozco MD       • metoprolol succinate XL (TOPROL-XL) 24 hr tablet 50 mg  50 mg Oral Q12H Zheng Curtis MD   50 mg at 10/13/19 0859   • oxyCODONE-acetaminophen (PERCOCET)  MG per tablet 1 tablet  1 tablet Oral Q6H PRN Rukhsana Morrison DO       • pantoprazole (PROTONIX) EC tablet 40 mg  40 mg Oral Daily Rukhsana Morrison DO   40 mg at 10/13/19 0858   • Pharmacy to Dose enoxaparin (LOVENOX)   Does not apply Continuous PRN Rukhsana Morrison DO       • [START ON 10/14/2019] polyethylene glycol (MIRALAX) powder 17 g  17 g Oral Daily Brenda Orozco MD       • sacubitril-valsartan (ENTRESTO) 24-26 MG tablet 1 tablet  1 tablet Oral Q12H Rukhsana Morrison DO   1 tablet at 10/13/19 0858   • sertraline (ZOLOFT) tablet 100 mg  100 mg Oral Nightly Rukhsana Morrison DO   100 mg at 10/12/19 2020   • sodium chloride 0.9 % flush 10 mL  10 mL Intravenous PRN Emergency, Triage Protocol, MD       • sodium chloride 0.9 % flush 10 mL  10 mL Intravenous Q12H Rukhsana Morrison DO   10 mL at 10/13/19 0859   • sodium chloride 0.9 % flush 10 mL  10 mL Intravenous PRN Rukhsana Morrison DO       • spironolactone (ALDACTONE) tablet 25 mg  25 mg Oral Daily Rukhsana Morrison DO   25 mg at 10/13/19 0859       ASSESSMENT/PLAN:    Atrial fibrillation with rapid ventricular response (CMS/HCC)    1.  Paroxysmal A. fib, present on admission, now in sinus rhythm  2.  Acute exacerbation of chronic systolic heart failure due to nonischemic cardiomyopathy with an EF of 30%, present on admission, improved, due to #1  3.  Chronic mitral insufficiency  4.  History of T-cell lymphoma status post chemotherapy  5.  Obstructive sleep apnea  6.   Hypertension  7.  Anxiety  8.  History of traumatic brain injury  9.  History of GI bleed secondary to hemorrhoids  10.  Chronic constipation    Ms. Hearn is currently in sinus rhythm.  Her blood pressure is borderline low with amiodarone and beta-blocker therapy.  This will need to be monitored closely.  She will be transition to oral amiodarone and continued on metoprolol 50 mg daily and uptitrate back to her home dose of 100 mg as her blood pressure tolerates.  Lasix has been decreased to 20 mg oral daily.  She is currently pending a 2D echocardiogram to evaluate her current LV function and the status of her mitral insufficiency.  Hopefully, this will be done tomorrow and she should be able to be discharged home after.    In regards to her chronic constipation, I have advised the patient to take a stool softener in addition to a laxative.  I have ordered for Colace and Dulcolax suppository.  I have also ordered for hemorrhoidal cream which the patient stated she had difficulty applying due to her poor eyesight.  I have advised her to practice with the help of nursing and to continue using it to prevent further irritation.  Patient has also been advised on diet and lifestyle modification to help lose weight and to keep her bowels regular.  Verbalized understanding.    Anticipate discharge tomorrow a.m.  Details were discussed with the patient as well as her  on the phone..   I also discussed the details with the nursing staff.  Rest as ordered.    Addendum:  Patient continues to have hemorrhoid bleeding with bright red blood in the toilet. Will DC lovenox. Sitz baths and ice packs to rectum as tolerated.     Brenda Orozco MD  10/13/19  10:04 AM    Please note that portions of this note may have been completed with a voice recognition program. Efforts were made to edit the dictations, but occasionally words are mistranscribed.

## 2019-10-14 ENCOUNTER — APPOINTMENT (OUTPATIENT)
Dept: CARDIOLOGY | Facility: HOSPITAL | Age: 34
End: 2019-10-14

## 2019-10-14 VITALS
TEMPERATURE: 98.1 F | RESPIRATION RATE: 18 BRPM | WEIGHT: 281.5 LBS | BODY MASS INDEX: 44.18 KG/M2 | OXYGEN SATURATION: 97 % | SYSTOLIC BLOOD PRESSURE: 109 MMHG | HEART RATE: 82 BPM | HEIGHT: 67 IN | DIASTOLIC BLOOD PRESSURE: 75 MMHG

## 2019-10-14 PROBLEM — I48.0 PAROXYSMAL ATRIAL FIBRILLATION: Status: ACTIVE | Noted: 2019-10-14

## 2019-10-14 PROBLEM — G93.1 ANOXIC BRAIN INJURY (HCC): Status: ACTIVE | Noted: 2019-10-14

## 2019-10-14 PROBLEM — I50.23 SYSTOLIC CHF, ACUTE ON CHRONIC: Status: ACTIVE | Noted: 2019-10-14

## 2019-10-14 PROBLEM — I42.8 NICM (NONISCHEMIC CARDIOMYOPATHY): Status: ACTIVE | Noted: 2019-10-14

## 2019-10-14 PROBLEM — K64.9 HEMORRHOIDS: Status: ACTIVE | Noted: 2019-10-14

## 2019-10-14 LAB
ALBUMIN SERPL-MCNC: 3.7 G/DL (ref 3.5–5.2)
ANION GAP SERPL CALCULATED.3IONS-SCNC: 12.1 MMOL/L (ref 5–15)
BUN BLD-MCNC: 12 MG/DL (ref 6–20)
BUN/CREAT SERPL: 15.4 (ref 7–25)
CALCIUM SPEC-SCNC: 8.8 MG/DL (ref 8.6–10.5)
CHLORIDE SERPL-SCNC: 104 MMOL/L (ref 98–107)
CO2 SERPL-SCNC: 22.9 MMOL/L (ref 22–29)
CREAT BLD-MCNC: 0.78 MG/DL (ref 0.57–1)
DEPRECATED RDW RBC AUTO: 54.9 FL (ref 37–54)
ERYTHROCYTE [DISTWIDTH] IN BLOOD BY AUTOMATED COUNT: 16.2 % (ref 12.3–15.4)
GFR SERPL CREATININE-BSD FRML MDRD: 85 ML/MIN/1.73
GLUCOSE BLD-MCNC: 117 MG/DL (ref 65–99)
HCT VFR BLD AUTO: 40 % (ref 34–46.6)
HGB BLD-MCNC: 12.1 G/DL (ref 12–15.9)
MAXIMAL PREDICTED HEART RATE: 186 BPM
MCH RBC QN AUTO: 27.7 PG (ref 26.6–33)
MCHC RBC AUTO-ENTMCNC: 30.3 G/DL (ref 31.5–35.7)
MCV RBC AUTO: 91.5 FL (ref 79–97)
NT-PROBNP SERPL-MCNC: 4038 PG/ML (ref 5–450)
PHOSPHATE SERPL-MCNC: 3.3 MG/DL (ref 2.5–4.5)
PLATELET # BLD AUTO: 203 10*3/MM3 (ref 140–450)
PMV BLD AUTO: 10.7 FL (ref 6–12)
POTASSIUM BLD-SCNC: 4.2 MMOL/L (ref 3.5–5.2)
RBC # BLD AUTO: 4.37 10*6/MM3 (ref 3.77–5.28)
SODIUM BLD-SCNC: 139 MMOL/L (ref 136–145)
STRESS TARGET HR: 158 BPM
WBC NRBC COR # BLD: 7.3 10*3/MM3 (ref 3.4–10.8)

## 2019-10-14 PROCEDURE — 80069 RENAL FUNCTION PANEL: CPT | Performed by: INTERNAL MEDICINE

## 2019-10-14 PROCEDURE — 93306 TTE W/DOPPLER COMPLETE: CPT

## 2019-10-14 PROCEDURE — 93005 ELECTROCARDIOGRAM TRACING: CPT | Performed by: INTERNAL MEDICINE

## 2019-10-14 PROCEDURE — 99239 HOSP IP/OBS DSCHRG MGMT >30: CPT | Performed by: NURSE PRACTITIONER

## 2019-10-14 PROCEDURE — 85027 COMPLETE CBC AUTOMATED: CPT | Performed by: INTERNAL MEDICINE

## 2019-10-14 PROCEDURE — 83880 ASSAY OF NATRIURETIC PEPTIDE: CPT | Performed by: INTERNAL MEDICINE

## 2019-10-14 RX ORDER — AMIODARONE HYDROCHLORIDE 200 MG/1
200 TABLET ORAL
Qty: 30 TABLET | Refills: 0 | Status: SHIPPED | OUTPATIENT
Start: 2019-10-15 | End: 2021-10-06 | Stop reason: ALTCHOICE

## 2019-10-14 RX ORDER — METOPROLOL SUCCINATE 100 MG/1
50 TABLET, EXTENDED RELEASE ORAL EVERY 12 HOURS
Qty: 30 TABLET | Refills: 0 | Status: SHIPPED | OUTPATIENT
Start: 2019-10-14 | End: 2022-09-28 | Stop reason: SDUPTHER

## 2019-10-14 RX ORDER — FUROSEMIDE 20 MG/1
20 TABLET ORAL DAILY
Qty: 30 TABLET | Refills: 0 | Status: SHIPPED | OUTPATIENT
Start: 2019-10-14 | End: 2022-08-08 | Stop reason: DRUGHIGH

## 2019-10-14 RX ADMIN — HEPARIN 300 UNITS: 100 SYRINGE at 14:00

## 2019-10-14 RX ADMIN — FLUTICASONE PROPIONATE 1 SPRAY: 50 SPRAY, METERED NASAL at 09:49

## 2019-10-14 RX ADMIN — AMIODARONE HYDROCHLORIDE 200 MG: 200 TABLET ORAL at 09:50

## 2019-10-14 RX ADMIN — SPIRONOLACTONE 25 MG: 25 TABLET ORAL at 09:50

## 2019-10-14 RX ADMIN — SACUBITRIL AND VALSARTAN 1 TABLET: 24; 26 TABLET, FILM COATED ORAL at 09:50

## 2019-10-14 RX ADMIN — FUROSEMIDE 20 MG: 20 TABLET ORAL at 09:50

## 2019-10-14 RX ADMIN — APIXABAN 5 MG: 5 TABLET, FILM COATED ORAL at 09:54

## 2019-10-14 RX ADMIN — PANTOPRAZOLE SODIUM 40 MG: 40 TABLET, DELAYED RELEASE ORAL at 09:50

## 2019-10-14 RX ADMIN — SODIUM CHLORIDE, PRESERVATIVE FREE 10 ML: 5 INJECTION INTRAVENOUS at 09:54

## 2019-10-14 RX ADMIN — METOPROLOL SUCCINATE 50 MG: 50 TABLET, EXTENDED RELEASE ORAL at 09:50

## 2019-10-14 NOTE — PLAN OF CARE
Problem: Fall Risk (Adult)  Goal: Absence of Fall  Outcome: Ongoing (interventions implemented as appropriate)      Problem: Pain, Chronic (Adult)  Goal: Acceptable Pain/Comfort Level and Functional Ability  Outcome: Ongoing (interventions implemented as appropriate)      Problem: Patient Care Overview  Goal: Plan of Care Review  Outcome: Ongoing (interventions implemented as appropriate)      Problem: Skin Injury Risk (Adult)  Goal: Skin Health and Integrity  Outcome: Ongoing (interventions implemented as appropriate)      Problem: Cardiac Output Decreased (Adult)  Goal: Effective Tissue Perfusion  Outcome: Ongoing (interventions implemented as appropriate)

## 2019-10-14 NOTE — DISCHARGE INSTRUCTIONS
Discoid opacity in right upper lobe on chest xray favored to be atectasis however follow up chest xray recommended in 2-4 weeks.  Defer to PCP to arrange.

## 2019-10-14 NOTE — PROGRESS NOTES
"   LOS: 1 day   Patient Care Team:  Malia Powers APRN as PCP - General (Family Medicine)  Shelley Brock MD as Consulting Physician (General Surgery)  Cirilo Deshpande II, MD (Pain Medicine)    Patient feels a lot better.  Has slept better.  He is eager to go home.  Does not feel a short winded as before.    Interval History:         Review of Systems:   Pertinent items are noted in HPI.      OBJECTIVE:    Vital Sign Min/Max for last 24 hours  Temp  Min: 97.5 °F (36.4 °C)  Max: 98.2 °F (36.8 °C)   BP  Min: 99/62  Max: 106/71   Pulse  Min: 74  Max: 84   Resp  Min: 16  Max: 18   SpO2  Min: 96 %  Max: 98 %   No Data Recorded   No Data Recorded     Flowsheet Rows      First Filed Value   Admission Height  170.2 cm (67\") Documented at 10/12/2019 0137   Admission Weight  132 kg (290 lb 12.8 oz) Documented at 10/12/2019 0137          Physical Exam:     General Appearance:    Alert, cooperative, in no acute distress   Head:    Normocephalic, without obvious abnormality, atraumatic   Eyes:            Lids and lashes normal, conjunctivae and sclerae normal, no   icterus, no pallor, corneas clear, PERRLA   Ears:    Ears appear intact with no abnormalities noted   Throat:   No oral lesions, no thrush, oral mucosa moist   Neck:   No adenopathy, supple, trachea midline, no thyromegaly, no     carotid bruit, no JVD   Back:     No kyphosis present, no scoliosis present, no skin lesions,       erythema or scars, no tenderness to percussion or                   palpation,   range of motion normal   Lungs:     Clear to auscultation,respirations regular, even and                   unlabored    Heart:    Regular rhythm and normal rate, normal S1 and S2, no            murmur, no gallop, no rub, no click   Breast Exam:    Deferred   Abdomen:     Normal bowel sounds, no masses, no organomegaly, soft        non-tender, non-distended, no guarding, no rebound                 tenderness   Genitalia:    Deferred   Extremities:   Moves " all extremities well, no edema, no cyanosis, no              redness   Pulses:   Pulses palpable and equal bilaterally   Skin:   No bleeding, bruising or rash   Lymph nodes:   No palpable adenopathy   Neurologic:   Cranial nerves 2 - 12 grossly intact, sensation intact, DTR        present and equal bilaterally     Telemetry: Average heart rate in the 70s and 80s.  No runs of sustained A. fib noted.    LAB DATA :           Laboratory results:    Results from last 7 days   Lab Units 10/14/19  0524 10/13/19  0557 10/12/19  0826 10/12/19  0158   SODIUM mmol/L 139 141 142 141   POTASSIUM mmol/L 4.2 3.9 3.4* 3.6   CHLORIDE mmol/L 104 103 106 107   CO2 mmol/L 22.9 22.0 22.0 21.2*   BUN mg/dL 12 17 15 16   CREATININE mg/dL 0.78 1.03* 0.92 0.92   CALCIUM mg/dL 8.8 8.8 8.6 8.4*   BILIRUBIN mg/dL  --   --  0.9 0.8   ALK PHOS U/L  --   --  66 68   ALT (SGPT) U/L  --   --  17 16   AST (SGOT) U/L  --   --  17 18   GLUCOSE mg/dL 117* 128* 121* 113*     Results from last 7 days   Lab Units 10/14/19  0524 10/13/19  0556 10/12/19  0826 10/12/19  0158   WBC 10*3/mm3 7.30 8.77 8.78 8.46   HEMOGLOBIN g/dL 12.1 12.7 12.0 12.0   HEMATOCRIT % 40.0 42.0 39.7 39.2   PLATELETS 10*3/mm3 203 268 205 215                 Results from last 7 days   Lab Units 10/12/19  0158   TROPONIN T ng/mL <0.010                 Lab Results   Component Value Date    HGBA1C 5.3 10/19/2018     Results from last 7 days   Lab Units 10/12/19  0158   TSH uIU/mL 3.520           IMAGING DATA:     Xr Chest 2 View    Result Date: 10/12/2019  Narrative: PROCEDURE: XR CHEST 2 VW-  INDICATION: SOA Triage Protocol  COMPARISON:  10/09/2019  FINDINGS: PA and lateral views of the chest were obtained.   Right Port-A-Cath is unchanged. The heart is mildly enlarged, but stable. Mediastinal contour is within normal limits.   Discoid opacity in the right upper lobe is favored to be atelectasis. This is new. The lungs are otherwise clear.  There is no pneumothorax or pleural effusion.   No acute osseous abnormality is identified.      Impression: 1. Stable cardiomegaly. 2. New discoid opacity in the right upper lobe, favor atelectasis. Consider short follow-up.      This report was finalized on 10/12/2019 8:06 AM by Elham Carbajal MD.    Xr Chest 2 View    Result Date: 10/9/2019  Narrative: PROCEDURE: XR CHEST 2 VW-    HISTORY: cough; R05-Cough  COMPARISON: 06/23/2019.  FINDINGS: A right subclavian Port-A-Cath is in place with the tip in the SVC. The heart is enlarged. The mediastinum is unremarkable. There is mild interstitial pulmonary edema. There is no pneumothorax. There are no acute osseous abnormalities.      Impression: Cardiomegaly and mild interstitial pulmonary edema.    This report was finalized on 10/9/2019 3:37 PM by Mukesh Hong M.D..            ASSESSMENT PLAN:    #1 atrial fibrillation:  High probability for the same with frequent PACs and nonsustained runs of SVT.  Has been no noted on amiodarone therapy.  Has tolerated it well.  Control has improved significantly.  Is being discharged on the same.    2.  Anticoagulation therapy:  Chads 2 score is 2 will start her on eliquis 5 mg bid    3.  Mitral insufficiency:  Moderate to severe.  Intervention for the same would be counterproductive for her at this time given her LV function.  We will try to keep him on guideline directed medical therapy and then pursue therapy for mitral insufficiency based on her clinical status.  Keep her on low-dose diuretic therapy.    4.  CHF:  Has further worsening of her LV systolic function appears to be biventricular at this time.  On guideline directed therapy as of now.  Continue the same.  Needs to follow-up with me in a couple of weeks.    #5  Electro lyte abnormality:  Seems to be doing better.  Continue present therapy.    Suggested cardiac medications on discharge:  Entresto 24/26 1 twice daily:, Lasix 20 daily, Aldactone 25 daily, Toprol-XL 50 mg twice daily, Lipitor 20 nightly Eliquis  5 mg bid  amiodarone 200 mg p.o. daily.    Patient to follow-up with me in 2 weeks time.        Atrial fibrillation with rapid ventricular response (CMS/HCC)            Zheng Curtis MD  10/14/19  9:31 AM

## 2019-10-14 NOTE — DISCHARGE SUMMARY
"    [facility]    Name:  Johny Hearn   Age:  34 y.o.  Sex:  female  :  1985  MRN:  [id number]   Visit Number:  [id number]  Primary Care Physician:  Malia Powers APRN  Date of Discharge:  10/14/2019  Admission Date:  10/12/2019    Discharge Instructions:  1.  Follow up with Dr. Curtis in 2 weeks   Entresto  1 twice daily:,    Lasix 20 daily    Aldactone 25 daily   Toprol-XL 50 mg twice daily   Lipitor 20 nightly    Eliquis 5 mg bid     Amiodarone 200 mg p.o. Daily.      2. Discoid opacity in right upper lobe on chest xray favored to be atectasis however follow up chest xray recommended in 2-4 weeks.  Defer to PCP to arrange.           Presenting Problem:    Atrial fibrillation with rapid ventricular response (CMS/HCC) [I48.91]  Atrial fibrillation with rapid ventricular response (CMS/HCC) [I48.91]       Discharge Diagnosis:       Atrial fibrillation with rapid ventricular response (CMS/HCC)    Paroxysmal atrial fibrillation (CMS/HCC)    Systolic CHF, acute on chronic (CMS/HCC)    Malignant lymphoma, non-Hodgkin's (CMS/HCC)    Sinus tachycardia    NICM (nonischemic cardiomyopathy) (CMS/HCC)    Anoxic brain injury (CMS/HCC)    Hemorrhoids        Past Medical History:  Past Medical History:   Diagnosis Date   • Anesthesia     pt reports \"screamed for my  and wouldnt calm down when anesthesia gas used\".   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   • Cancer (CMS/HCC)     LYMPHOMA STAGE 3   • Cardiac arrest (CMS/HCC)     dionisio partum cardiomyopathy   • Cardiomyopathy (CMS/HCC)    • CHF (congestive heart failure) (CMS/HCC)    • Dermatitis     chronic on face   • Fatty liver    • Full dentures     DOESNT WEAR   • H/O chronic ear infection    • Headaches due to old head trauma    • Heavy menses    • Hemorrhoids    • Hip pain, left    • Hyperlipidemia    • Hypertension    • " Short-term memory loss    • Stuttering in conditions classified elsewhere     IF PT GETS TOO NERVOUS OR EXCITED HAS STUTTERING   • 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         Consults:     Consults     Date and Time Order Name Status Description    10/12/2019 0745 Inpatient Cardiology Consult            Procedures Performed:  None             History of presenting illness/Hospital Course:    This is a 34-year-old female with past medical history significant for peripartum cardiomyopathy, systolic congestive heart failure with last known ejection fraction 30%, history of cardiac arrest,Hypertension, dyslipidemia, anxiety, non-Hodgkin lymphoma in remission, fatty liver who presented to the emergency room with complaints of shortness of breath and palpitations.  According to the patient over the past 2 to 3 weeks prior to admission she had increasing shortness of breath and palpitations.  It was worse with exertion.  She is followed by Dr. Curtis with cardiology.  Upon evaluation in the emergency room troponin was negative BNP was 8600 chest x-ray showed cardiomegaly with pulmonary edema EKG showed atrial fibrillation with nonspecific T wave changes.  She did have evidence of rapid ventricular rate with heart rates up to 120 to 130 bpm.  She was given oral Cardizem in the emergency room as well as Lasix and admitted to the hospitalist service.     Upon admission to the hospitalist service patient was started on Lasix IV and Dr. Curtis with cardiology was consulted.  He did see and evaluate the patient and feels that she does have a high probability of paroxysmal atrial fibrillation nonsustained.  She is also had some runs of SVT as well.  She was placed on amiodarone therapy for which she has responded well to.  She was initially placed on IV amiodarone and has been transitioned to oral.  She does have a history of mitral insufficiency for which cardiology recommends keeping her on a low-dose diuretic  therapy.  She is noted to have a chads score of 2 and was initiated on Eliquis 5 mg twice daily.  She does have moderate to severe mitral insufficiency for which Dr. Curtis with cardiology does have her on guideline directed medical therapy.  He also wants to keep her on a low-dose diuretic.  She has had further worsening of her LV systolic function and it appears to be biventricular on the echocardiogram done today.  He was noted to have an LV systolic function ejection fraction of 25%, moderate to severe mitral regurgitation, mild dilation of the RV with reduced RV systolic function and global severe hypokinesis of the LV and akinesis of the septum.  Radiology has recommended increasing her Entresto to twice daily, added Lasix 20 mg daily, Aldactone 25 mg daily, Toprol-XL 50 mg twice daily, Lipitor 50 mg nightly and Eliquis 5 mg twice daily as well as amiodarone 200 mg daily and will follow her up in the office in 2 weeks.     I did see and evaluate patient at bedside and she states she is feeling much better at this time.  She is anxious to get to be discharged home today.  We will arrange for her to follow-up with Dr. Curtis as an outpatient.  She denies any chest pain, shortness of breath, abdominal pain nausea or vomiting at this time.  Symptomatically she does appear to be improving and she is hemodynamically stable however she does have a poor long-term prognosis.  Does have a discoid opacity in the right upper lobe on her chest x-ray which is favored by radiology to be atelectasis however they do recommend a follow-up chest x-ray just to ensure stability for which I do recommend in the next 2 to 4 weeks.  She can follow-up with her primary care provider and have this arranged as an outpatient.  Patient does need to avoid steroids at all costs due to increasing weight gain and worsening of her cardiac status.  This has been recommended per cardiology.      Vital Signs:    Temp:  [97.5 °F (36.4 °C)-98.2 °F  (36.8 °C)] 98.1 °F (36.7 °C)  Heart Rate:  [74-84] 82  Resp:  [18] 18  BP: ()/(58-75) 109/75    Physical Exam:  General Appearance:    Alert and cooperative, not in any acute distress.   Head:    Atraumatic and normocephalic, without obvious abnormality.   Eyes:            PERRLA, conjunctivae and sclerae normal, no Icterus. No pallor. Extra-occular movements are within normal limits.   Ears:    Ears appear intact with no abnormalities noted.   Throat:   No oral lesions, no thrush, oral mucosa moist.   Neck:   Supple, trachea midline, no thyromegaly, no carotid bruit.   Back:     No kyphoscoliosis present. No tenderness to palpation,   range of motion normal.   Lungs:     Chest shape is normal. Breath sounds heard bilaterally equally.  No crackles or wheezing. No Pleural rub or bronchial breathing.    Heart:    Normal S1 and S2, no murmur, no gallop, no rub. No JVD   Abdomen:     Normal bowel sounds, no masses, no organomegaly. Soft        non-tender, non-distended, no guarding, no rebound                tenderness   Extremities:   Moves all extremities well, no edema, no cyanosis, no             clubbing   Pulses:   Pulses palpable and equal bilaterally   Skin:   No bleeding, bruising or rash   Lymph nodes:  Psychiatric/Behavior:    No palpable adenopathy    Normal mood, normal behavior   Neurologic:   Cranial nerves 2 - 12 grossly intact, sensation intact, Motor power is normal and equal bilaterally.           Pertinent Lab Results:     Results from last 7 days   Lab Units 10/14/19  0524 10/13/19  0557 10/12/19  0826 10/12/19  0158   SODIUM mmol/L 139 141 142 141   POTASSIUM mmol/L 4.2 3.9 3.4* 3.6   CHLORIDE mmol/L 104 103 106 107   CO2 mmol/L 22.9 22.0 22.0 21.2*   BUN mg/dL 12 17 15 16   CREATININE mg/dL 0.78 1.03* 0.92 0.92   CALCIUM mg/dL 8.8 8.8 8.6 8.4*   BILIRUBIN mg/dL  --   --  0.9 0.8   ALK PHOS U/L  --   --  66 68   ALT (SGPT) U/L  --   --  17 16   AST (SGOT) U/L  --   --  17 18   GLUCOSE mg/dL  117* 128* 121* 113*     Results from last 7 days   Lab Units 10/14/19  0524 10/13/19  0556 10/12/19  0826   WBC 10*3/mm3 7.30 8.77 8.78   HEMOGLOBIN g/dL 12.1 12.7 12.0   HEMATOCRIT % 40.0 42.0 39.7   PLATELETS 10*3/mm3 203 268 205                Pertinent Radiology Results:    Imaging Results (all)     Procedure Component Value Units Date/Time    XR Chest 2 View [[id number]] Collected:  10/12/19 0805     Updated:  10/12/19 0808    Narrative:       PROCEDURE: XR CHEST 2 VW-     INDICATION: SOA Triage Protocol     COMPARISON:  10/09/2019     FINDINGS: PA and lateral views of the chest were obtained.   Right  Port-A-Cath is unchanged. The heart is mildly enlarged, but stable.  Mediastinal contour is within normal limits.   Discoid opacity in the  right upper lobe is favored to be atelectasis. This is new. The lungs  are otherwise clear.  There is no pneumothorax or pleural effusion.  No  acute osseous abnormality is identified.       Impression:       1. Stable cardiomegaly.  2. New discoid opacity in the right upper lobe, favor atelectasis.  Consider short follow-up.                 This report was finalized on 10/12/2019 8:06 AM by Elham Carbajal MD.          Condition on Discharge:    Stable        Discharge Disposition:        Discharge Medication:       Discharge Medications      New Medications      Instructions Start Date   amiodarone 200 MG tablet  Commonly known as:  PACERONE   200 mg, Oral, Every 24 Hours Scheduled   Start Date:  10/15/2019     apixaban 5 MG tablet tablet  Commonly known as:  ELIQUIS   5 mg, Oral, Every 12 Hours Scheduled      hydrocortisone 2.5 % rectal cream  Commonly known as:  ANUSOL-HC   Rectal, 2 Times Daily         Changes to Medications      Instructions Start Date   furosemide 20 MG tablet  Commonly known as:  LASIX  What changed:    · medication strength  · how much to take  · when to take this   20 mg, Oral, Daily      metoprolol succinate XL 50 MG 24 hr tablet  Commonly known as:   TOPROL-XL  What changed:  when to take this   50 mg, Oral, Every 12 Hours         Continue These Medications      Instructions Start Date   ondansetron ODT 4 MG disintegrating tablet  Commonly known as:  ZOFRAN-ODT   4 mg, Oral, Every 12 Hours PRN      oxyCODONE-acetaminophen  MG per tablet  Commonly known as:  PERCOCET   1 tablet, Oral, Every 6 Hours PRN      pantoprazole 40 MG EC tablet  Commonly known as:  PROTONIX   40 mg, Oral, Daily      polyethylene glycol powder  Commonly known as:  MIRALAX   17 g, Oral, Daily      sacubitril-valsartan 24-26 MG tablet  Commonly known as:  ENTRESTO   1 tablet, Oral, Every 12 Hours Scheduled      sertraline 50 MG tablet  Commonly known as:  ZOLOFT   50 mg, Oral, Daily, Total 150 MG daily dose       sertraline 100 MG tablet  Commonly known as:  ZOLOFT   100 mg, Oral, Nightly, Total 150 mg daily      spironolactone 25 MG tablet  Commonly known as:  ALDACTONE   25 mg, Oral, Daily         Stop These Medications    predniSONE 20 MG tablet  Commonly known as:  DELTASONE     predniSONE 50 MG tablet  Commonly known as:  DELTASONE     promethazine-dextromethorphan 6.25-15 MG/5ML syrup  Commonly known as:  PROMETHAZINE-DM        ASK your doctor about these medications      Instructions Start Date   atorvastatin 20 MG tablet  Commonly known as:  LIPITOR   20 mg, Oral, Nightly      diphenhydrAMINE 50 MG tablet  Commonly known as:  BENADRYL   50 mg, Oral, Take As Directed, 1 hour prior to scan      fluticasone 50 MCG/ACT nasal spray  Commonly known as:  FLONASE   As Needed             Discharge Diet:     Diet Instructions     Diet: Consistent Carbohydrate; Thin      Discharge Diet:  Consistent Carbohydrate    Fluid Consistency:  Thin          Activity at Discharge:     Activity Instructions     Discharge Activity      As tolerates          Follow-up Appointments:    Future Appointments   Date Time Provider Department Center   12/11/2019 10:30 AM Laurel Preston APRN [facility]    12/11/2019 11:00 AM [facility] - [facility]         Test Results Pending at Discharge:     Order Current Status    Green Top (No Gel) In process    [place] Draw In process             DREW Barkley  10/14/19  11:43 AM    Time:  38 minutes were spent reviewing labs, history, evaluating patient and discharge planning.

## 2019-10-15 ENCOUNTER — READMISSION MANAGEMENT (OUTPATIENT)
Dept: CALL CENTER | Facility: HOSPITAL | Age: 34
End: 2019-10-15

## 2019-10-16 NOTE — OUTREACH NOTE
Prep Survey      Responses   Facility patient discharged from?  [facility]   Is patient eligible?  Yes   Discharge diagnosis  Atrial fibrillation with rapid ventricular response  CHF,  Malignant lymphoma  Anoxic brain injury     Does the patient have one of the following disease processes/diagnoses(primary or secondary)?  CHF   Does the patient have Home health ordered?  No   Is there a DME ordered?  No   Prep survey completed?  Yes          More Hamilton RN

## 2019-10-17 ENCOUNTER — READMISSION MANAGEMENT (OUTPATIENT)
Dept: CALL CENTER | Facility: HOSPITAL | Age: 34
End: 2019-10-17

## 2019-10-17 NOTE — OUTREACH NOTE
CHF Week 1 Survey      Responses   Facility patient discharged from?  [facility]   Does the patient have one of the following disease processes/diagnoses(primary or secondary)?  CHF   Is there a successful TCM telephone encounter documented?  No   CHF Week 1 attempt successful?  Yes   Call start time  1509   Call end time  1523   General alerts for this patient  Patient with anoxic brain injury   Discharge diagnosis  Atrial fibrillation with rapid ventricular response  CHF,  Malignant lymphoma  Anoxic brain injury     Meds reviewed with patient/caregiver?  Yes   Is the patient having any side effects they believe may be caused by any medication additions or changes?  No   Does the patient have all medications ordered at discharge?  Yes   Is the patient taking all medications as directed (includes completed medication regime)?  Yes   Does the patient have a primary care provider?   Yes   Does the patient have an appointment with their PCP within 7 days of discharge?  Yes   Comments regarding PCP  has followup on 10/21/2019   Has the patient kept scheduled appointments due by today?  N/A   Has home health visited the patient within 72 hours of discharge?  N/A   Psychosocial issues?  No   Did the patient receive a copy of their discharge instructions?  Yes   Nursing interventions  Reviewed instructions with patient   What is the patient's perception of their health status since discharge?  Improving   Nursing interventions  Nurse provided patient education   Is the patient weighing daily?  No [Patient cannot afford scales. Case management notified. ]   Does the patient have scales?  No   Daily weight interventions  Education provided on importance of daily weight   Is the patient able to teach back Heart Failure diet management?  Yes   Is the patient able to teach back Heart Failure Zones?  Yes   Is the patient able to teach back signs and symptoms of worsening condition? (i.e. weight gain, shortness of air, etc.)  Yes    Is the patient/caregiver able to teach back the hierarchy of who to call/visit for symptoms/problems? PCP, Specialist, Home health nurse, Urgent Care, ED, 911  Yes   Additional teach back comments  Reviewed heart failure zones with patient.    Notified Case Management  Equipment/scale needs    CHF Week 1 call completed?  Yes          Mark Pantoja RN

## 2019-10-21 ENCOUNTER — APPOINTMENT (OUTPATIENT)
Dept: GENERAL RADIOLOGY | Facility: HOSPITAL | Age: 34
End: 2019-10-21

## 2019-10-21 ENCOUNTER — READMISSION MANAGEMENT (OUTPATIENT)
Dept: CALL CENTER | Facility: HOSPITAL | Age: 34
End: 2019-10-21

## 2019-10-21 ENCOUNTER — HOSPITAL ENCOUNTER (OUTPATIENT)
Facility: HOSPITAL | Age: 34
Discharge: HOME | End: 2019-10-23
Attending: EMERGENCY MEDICINE | Admitting: INTERNAL MEDICINE

## 2019-10-21 ENCOUNTER — TELEPHONE (OUTPATIENT)
Dept: SURGERY | Facility: CLINIC | Age: 34
End: 2019-10-21

## 2019-10-21 DIAGNOSIS — K62.5 RECTAL BLEEDING: Primary | ICD-10-CM

## 2019-10-21 DIAGNOSIS — K64.1 GRADE II HEMORRHOIDS: ICD-10-CM

## 2019-10-21 DIAGNOSIS — D64.9 ANEMIA, UNSPECIFIED TYPE: ICD-10-CM

## 2019-10-21 PROBLEM — D50.0 BLOOD LOSS ANEMIA: Status: ACTIVE | Noted: 2019-10-21

## 2019-10-21 LAB
ABO GROUP BLD: NORMAL
ALBUMIN SERPL-MCNC: 4 G/DL (ref 3.5–5.2)
ALBUMIN/GLOB SERPL: 1.5 G/DL
ALP SERPL-CCNC: 56 U/L (ref 39–117)
ALT SERPL W P-5'-P-CCNC: 13 U/L (ref 1–33)
ANION GAP SERPL CALCULATED.3IONS-SCNC: 14 MMOL/L (ref 5–15)
APTT PPP: 23.9 SECONDS (ref 24.5–37.2)
AST SERPL-CCNC: 15 U/L (ref 1–32)
BASOPHILS # BLD AUTO: 0.03 10*3/MM3 (ref 0–0.2)
BASOPHILS NFR BLD AUTO: 0.5 % (ref 0–1.5)
BILIRUB SERPL-MCNC: 0.4 MG/DL (ref 0.2–1.2)
BILIRUB UR QL STRIP: NEGATIVE
BLD GP AB SCN SERPL QL: NEGATIVE
BUN BLD-MCNC: 17 MG/DL (ref 6–20)
BUN/CREAT SERPL: 19.1 (ref 7–25)
CALCIUM SPEC-SCNC: 9.1 MG/DL (ref 8.6–10.5)
CHLORIDE SERPL-SCNC: 106 MMOL/L (ref 98–107)
CLARITY UR: ABNORMAL
CO2 SERPL-SCNC: 21 MMOL/L (ref 22–29)
COLOR UR: YELLOW
CREAT BLD-MCNC: 0.89 MG/DL (ref 0.57–1)
DEPRECATED RDW RBC AUTO: 50.6 FL (ref 37–54)
EOSINOPHIL # BLD AUTO: 0.04 10*3/MM3 (ref 0–0.4)
EOSINOPHIL NFR BLD AUTO: 0.6 % (ref 0.3–6.2)
ERYTHROCYTE [DISTWIDTH] IN BLOOD BY AUTOMATED COUNT: 15.3 % (ref 12.3–15.4)
GFR SERPL CREATININE-BSD FRML MDRD: 73 ML/MIN/1.73
GLOBULIN UR ELPH-MCNC: 2.6 GM/DL
GLUCOSE BLD-MCNC: 114 MG/DL (ref 65–99)
GLUCOSE UR STRIP-MCNC: NEGATIVE MG/DL
HCT VFR BLD AUTO: 32.9 % (ref 34–46.6)
HCT VFR BLD AUTO: 36.1 % (ref 34–46.6)
HGB BLD-MCNC: 10.1 G/DL (ref 12–15.9)
HGB BLD-MCNC: 11 G/DL (ref 12–15.9)
HGB UR QL STRIP.AUTO: NEGATIVE
HOLD SPECIMEN: NORMAL
HOLD SPECIMEN: NORMAL
IMM GRANULOCYTES # BLD AUTO: 0.03 10*3/MM3 (ref 0–0.05)
IMM GRANULOCYTES NFR BLD AUTO: 0.5 % (ref 0–0.5)
INR PPP: 1.18 (ref 0.9–1.1)
KETONES UR QL STRIP: NEGATIVE
LEUKOCYTE ESTERASE UR QL STRIP.AUTO: NEGATIVE
LYMPHOCYTES # BLD AUTO: 1.53 10*3/MM3 (ref 0.7–3.1)
LYMPHOCYTES NFR BLD AUTO: 23.2 % (ref 19.6–45.3)
MCH RBC QN AUTO: 27.7 PG (ref 26.6–33)
MCHC RBC AUTO-ENTMCNC: 30.7 G/DL (ref 31.5–35.7)
MCV RBC AUTO: 90.4 FL (ref 79–97)
MONOCYTES # BLD AUTO: 0.51 10*3/MM3 (ref 0.1–0.9)
MONOCYTES NFR BLD AUTO: 7.7 % (ref 5–12)
NEUTROPHILS # BLD AUTO: 4.45 10*3/MM3 (ref 1.7–7)
NEUTROPHILS NFR BLD AUTO: 67.5 % (ref 42.7–76)
NITRITE UR QL STRIP: NEGATIVE
NRBC BLD AUTO-RTO: 0 /100 WBC (ref 0–0.2)
PH UR STRIP.AUTO: 7.5 [PH] (ref 5–8)
PLATELET # BLD AUTO: 281 10*3/MM3 (ref 140–450)
PMV BLD AUTO: 10.4 FL (ref 6–12)
POTASSIUM BLD-SCNC: 4.6 MMOL/L (ref 3.5–5.2)
PROT SERPL-MCNC: 6.6 G/DL (ref 6–8.5)
PROT UR QL STRIP: NEGATIVE
PROTHROMBIN TIME: 15.4 SECONDS (ref 12–15.1)
RBC # BLD AUTO: 3.64 10*6/MM3 (ref 3.77–5.28)
RH BLD: POSITIVE
SODIUM BLD-SCNC: 141 MMOL/L (ref 136–145)
SP GR UR STRIP: 1.01 (ref 1–1.03)
T&S EXPIRATION DATE: NORMAL
UROBILINOGEN UR QL STRIP: ABNORMAL
WBC NRBC COR # BLD: 6.59 10*3/MM3 (ref 3.4–10.8)
WHOLE BLOOD HOLD SPECIMEN: NORMAL
WHOLE BLOOD HOLD SPECIMEN: NORMAL

## 2019-10-21 PROCEDURE — 85014 HEMATOCRIT: CPT | Performed by: INTERNAL MEDICINE

## 2019-10-21 PROCEDURE — 99219 PR INITIAL OBSERVATION CARE/DAY 50 MINUTES: CPT | Performed by: NURSE PRACTITIONER

## 2019-10-21 PROCEDURE — 86900 BLOOD TYPING SEROLOGIC ABO: CPT

## 2019-10-21 PROCEDURE — 85025 COMPLETE CBC W/AUTO DIFF WBC: CPT | Performed by: EMERGENCY MEDICINE

## 2019-10-21 PROCEDURE — 86900 BLOOD TYPING SEROLOGIC ABO: CPT | Performed by: EMERGENCY MEDICINE

## 2019-10-21 PROCEDURE — 99254 IP/OBS CNSLTJ NEW/EST MOD 60: CPT | Performed by: SURGERY

## 2019-10-21 PROCEDURE — G0378 HOSPITAL OBSERVATION PER HR: HCPCS

## 2019-10-21 PROCEDURE — P9016 RBC LEUKOCYTES REDUCED: HCPCS

## 2019-10-21 PROCEDURE — 81003 URINALYSIS AUTO W/O SCOPE: CPT | Performed by: EMERGENCY MEDICINE

## 2019-10-21 PROCEDURE — 85018 HEMOGLOBIN: CPT | Performed by: INTERNAL MEDICINE

## 2019-10-21 PROCEDURE — 86920 COMPATIBILITY TEST SPIN: CPT

## 2019-10-21 PROCEDURE — 86901 BLOOD TYPING SEROLOGIC RH(D): CPT | Performed by: EMERGENCY MEDICINE

## 2019-10-21 PROCEDURE — 96366 THER/PROPH/DIAG IV INF ADDON: CPT

## 2019-10-21 PROCEDURE — 71045 X-RAY EXAM CHEST 1 VIEW: CPT

## 2019-10-21 PROCEDURE — 86850 RBC ANTIBODY SCREEN: CPT | Performed by: EMERGENCY MEDICINE

## 2019-10-21 PROCEDURE — 80053 COMPREHEN METABOLIC PANEL: CPT | Performed by: EMERGENCY MEDICINE

## 2019-10-21 PROCEDURE — 85730 THROMBOPLASTIN TIME PARTIAL: CPT | Performed by: EMERGENCY MEDICINE

## 2019-10-21 PROCEDURE — 25010000002 FUROSEMIDE PER 20 MG: Performed by: NURSE PRACTITIONER

## 2019-10-21 PROCEDURE — 99284 EMERGENCY DEPT VISIT MOD MDM: CPT

## 2019-10-21 PROCEDURE — 85610 PROTHROMBIN TIME: CPT | Performed by: EMERGENCY MEDICINE

## 2019-10-21 PROCEDURE — 96365 THER/PROPH/DIAG IV INF INIT: CPT

## 2019-10-21 PROCEDURE — 36430 TRANSFUSION BLD/BLD COMPNT: CPT

## 2019-10-21 RX ORDER — METOPROLOL SUCCINATE 50 MG/1
50 TABLET, EXTENDED RELEASE ORAL EVERY 12 HOURS
Status: DISCONTINUED | OUTPATIENT
Start: 2019-10-22 | End: 2019-10-22

## 2019-10-21 RX ORDER — SODIUM CHLORIDE 0.9 % (FLUSH) 0.9 %
10 SYRINGE (ML) INJECTION AS NEEDED
Status: DISCONTINUED | OUTPATIENT
Start: 2019-10-21 | End: 2019-10-23 | Stop reason: HOSPADM

## 2019-10-21 RX ORDER — POLYETHYLENE GLYCOL 3350 17 G/17G
255 POWDER, FOR SOLUTION ORAL ONCE
Status: COMPLETED | OUTPATIENT
Start: 2019-10-21 | End: 2019-10-21

## 2019-10-21 RX ORDER — METOPROLOL SUCCINATE 50 MG/1
50 TABLET, EXTENDED RELEASE ORAL EVERY 12 HOURS
Status: DISCONTINUED | OUTPATIENT
Start: 2019-10-21 | End: 2019-10-21

## 2019-10-21 RX ORDER — ACETAMINOPHEN 325 MG/1
650 TABLET ORAL EVERY 4 HOURS PRN
Status: DISCONTINUED | OUTPATIENT
Start: 2019-10-21 | End: 2019-10-23 | Stop reason: HOSPADM

## 2019-10-21 RX ORDER — FUROSEMIDE 10 MG/ML
20 INJECTION INTRAMUSCULAR; INTRAVENOUS ONCE
Status: DISCONTINUED | OUTPATIENT
Start: 2019-10-21 | End: 2019-10-21

## 2019-10-21 RX ORDER — LIDOCAINE AND PRILOCAINE 25; 25 MG/G; MG/G
CREAM TOPICAL ONCE
Status: COMPLETED | OUTPATIENT
Start: 2019-10-21 | End: 2019-10-21

## 2019-10-21 RX ORDER — FUROSEMIDE 10 MG/ML
20 INJECTION INTRAMUSCULAR; INTRAVENOUS ONCE
Status: COMPLETED | OUTPATIENT
Start: 2019-10-21 | End: 2019-10-21

## 2019-10-21 RX ORDER — ACETAMINOPHEN 650 MG/1
650 SUPPOSITORY RECTAL EVERY 4 HOURS PRN
Status: DISCONTINUED | OUTPATIENT
Start: 2019-10-21 | End: 2019-10-23 | Stop reason: HOSPADM

## 2019-10-21 RX ORDER — FLUTICASONE PROPIONATE 50 MCG
2 SPRAY, SUSPENSION (ML) NASAL DAILY
Status: DISCONTINUED | OUTPATIENT
Start: 2019-10-21 | End: 2019-10-23 | Stop reason: HOSPADM

## 2019-10-21 RX ORDER — SODIUM CHLORIDE 0.9 % (FLUSH) 0.9 %
10 SYRINGE (ML) INJECTION EVERY 12 HOURS SCHEDULED
Status: DISCONTINUED | OUTPATIENT
Start: 2019-10-21 | End: 2019-10-23 | Stop reason: HOSPADM

## 2019-10-21 RX ORDER — AMIODARONE HYDROCHLORIDE 200 MG/1
200 TABLET ORAL
Status: DISCONTINUED | OUTPATIENT
Start: 2019-10-21 | End: 2019-10-23 | Stop reason: HOSPADM

## 2019-10-21 RX ORDER — SPIRONOLACTONE 25 MG/1
25 TABLET ORAL DAILY
Status: DISCONTINUED | OUTPATIENT
Start: 2019-10-21 | End: 2019-10-23 | Stop reason: HOSPADM

## 2019-10-21 RX ORDER — ACETAMINOPHEN 160 MG/5ML
650 SOLUTION ORAL EVERY 4 HOURS PRN
Status: DISCONTINUED | OUTPATIENT
Start: 2019-10-21 | End: 2019-10-23 | Stop reason: HOSPADM

## 2019-10-21 RX ORDER — ATORVASTATIN CALCIUM 20 MG/1
20 TABLET, FILM COATED ORAL NIGHTLY
Status: DISCONTINUED | OUTPATIENT
Start: 2019-10-21 | End: 2019-10-23 | Stop reason: HOSPADM

## 2019-10-21 RX ORDER — ONDANSETRON 2 MG/ML
4 INJECTION INTRAMUSCULAR; INTRAVENOUS EVERY 6 HOURS PRN
Status: DISCONTINUED | OUTPATIENT
Start: 2019-10-21 | End: 2019-10-23 | Stop reason: HOSPADM

## 2019-10-21 RX ORDER — OXYCODONE AND ACETAMINOPHEN 10; 325 MG/1; MG/1
1 TABLET ORAL EVERY 6 HOURS PRN
Status: DISCONTINUED | OUTPATIENT
Start: 2019-10-21 | End: 2019-10-23 | Stop reason: HOSPADM

## 2019-10-21 RX ORDER — FUROSEMIDE 20 MG/1
20 TABLET ORAL DAILY
Status: DISCONTINUED | OUTPATIENT
Start: 2019-10-21 | End: 2019-10-23 | Stop reason: HOSPADM

## 2019-10-21 RX ORDER — POLYETHYLENE GLYCOL 3350 17 G/17G
255 POWDER, FOR SOLUTION ORAL DAILY
Status: DISCONTINUED | OUTPATIENT
Start: 2019-10-21 | End: 2019-10-21

## 2019-10-21 RX ORDER — SODIUM CHLORIDE 9 MG/ML
50 INJECTION, SOLUTION INTRAVENOUS CONTINUOUS
Status: DISCONTINUED | OUTPATIENT
Start: 2019-10-21 | End: 2019-10-22

## 2019-10-21 RX ADMIN — ATORVASTATIN CALCIUM 20 MG: 20 TABLET, FILM COATED ORAL at 20:44

## 2019-10-21 RX ADMIN — LIDOCAINE AND PRILOCAINE 1 APPLICATION: 25; 25 CREAM TOPICAL at 16:13

## 2019-10-21 RX ADMIN — SODIUM CHLORIDE 8 MG/HR: 9 INJECTION, SOLUTION INTRAVENOUS at 14:41

## 2019-10-21 RX ADMIN — SERTRALINE HYDROCHLORIDE 100 MG: 50 TABLET ORAL at 20:44

## 2019-10-21 RX ADMIN — OXYCODONE HYDROCHLORIDE AND ACETAMINOPHEN 1 TABLET: 10; 325 TABLET ORAL at 21:17

## 2019-10-21 RX ADMIN — SODIUM CHLORIDE 75 ML/HR: 9 INJECTION, SOLUTION INTRAVENOUS at 14:09

## 2019-10-21 RX ADMIN — SACUBITRIL AND VALSARTAN 1 TABLET: 24; 26 TABLET, FILM COATED ORAL at 20:44

## 2019-10-21 RX ADMIN — SODIUM CHLORIDE 8 MG/HR: 9 INJECTION, SOLUTION INTRAVENOUS at 18:59

## 2019-10-21 RX ADMIN — FUROSEMIDE 20 MG: 10 INJECTION, SOLUTION INTRAMUSCULAR; INTRAVENOUS at 21:04

## 2019-10-21 RX ADMIN — POLYETHYLENE GLYCOL 3350 255 G: 17 POWDER, FOR SOLUTION ORAL at 16:46

## 2019-10-21 RX ADMIN — SODIUM CHLORIDE, PRESERVATIVE FREE 10 ML: 5 INJECTION INTRAVENOUS at 21:03

## 2019-10-21 RX ADMIN — SODIUM CHLORIDE 1000 ML: 9 INJECTION, SOLUTION INTRAVENOUS at 12:21

## 2019-10-21 NOTE — OUTREACH NOTE
CHF Week 2 Survey      Responses   Facility patient discharged from?  [facility]   Does the patient have one of the following disease processes/diagnoses(primary or secondary)?  CHF   Week 2 attempt successful?  No   Revoke  Readmitted          Thao English RN

## 2019-10-21 NOTE — PROGRESS NOTES
Continued Stay Note  [facility]     Patient Name: Johny Hearn  MRN: [id number]  Today's Date: 10/21/2019    Admit Date: 10/12/2019    Discharge Plan     Row Name 10/21/19 1157       Plan    Final Note Late entry from 10/18/19 - Received message from call center that pt could not afford to buy scales.  Called pt.  Pt informed that the hospital could provide a scale and could  from 3rd floor nursing station.  Pt states she will come by sometime today.  Scale with pt's name on it placed at 3rd floor nursing station.  clerk Mayra updated..        Discharge Codes    No documentation.       Expected Discharge Date and Time     Expected Discharge Date Expected Discharge Time    Oct 14, 2019             Lubna Medina RN

## 2019-10-22 ENCOUNTER — TRANSCRIBE ORDERS (OUTPATIENT)
Dept: ADMINISTRATIVE | Facility: HOSPITAL | Age: 34
End: 2019-10-22

## 2019-10-22 ENCOUNTER — ANESTHESIA EVENT (OUTPATIENT)
Dept: GASTROENTEROLOGY | Facility: HOSPITAL | Age: 34
End: 2019-10-22

## 2019-10-22 ENCOUNTER — ANESTHESIA (OUTPATIENT)
Dept: GASTROENTEROLOGY | Facility: HOSPITAL | Age: 34
End: 2019-10-22

## 2019-10-22 DIAGNOSIS — N64.4 BREAST PAIN: Primary | ICD-10-CM

## 2019-10-22 LAB
ABO + RH BLD: NORMAL
ANION GAP SERPL CALCULATED.3IONS-SCNC: 14.9 MMOL/L (ref 5–15)
ANISOCYTOSIS BLD QL: NORMAL
B-HCG UR QL: NEGATIVE
BASOPHILS # BLD AUTO: 0.04 10*3/MM3 (ref 0–0.2)
BASOPHILS NFR BLD AUTO: 0.7 % (ref 0–1.5)
BH BB BLOOD EXPIRATION DATE: NORMAL
BH BB BLOOD TYPE BARCODE: 6200
BH BB DISPENSE STATUS: NORMAL
BH BB PRODUCT CODE: NORMAL
BH BB UNIT NUMBER: NORMAL
BUN BLD-MCNC: 13 MG/DL (ref 6–20)
BUN/CREAT SERPL: 14.4 (ref 7–25)
CALCIUM SPEC-SCNC: 8.7 MG/DL (ref 8.6–10.5)
CHLORIDE SERPL-SCNC: 109 MMOL/L (ref 98–107)
CO2 SERPL-SCNC: 20.1 MMOL/L (ref 22–29)
CREAT BLD-MCNC: 0.9 MG/DL (ref 0.57–1)
CROSSMATCH INTERPRETATION: NORMAL
DACRYOCYTES BLD QL SMEAR: NORMAL
DEPRECATED RDW RBC AUTO: 51.2 FL (ref 37–54)
EOSINOPHIL # BLD AUTO: 0.06 10*3/MM3 (ref 0–0.4)
EOSINOPHIL NFR BLD AUTO: 1 % (ref 0.3–6.2)
ERYTHROCYTE [DISTWIDTH] IN BLOOD BY AUTOMATED COUNT: 15.6 % (ref 12.3–15.4)
GFR SERPL CREATININE-BSD FRML MDRD: 72 ML/MIN/1.73
GLUCOSE BLD-MCNC: 104 MG/DL (ref 65–99)
HCT VFR BLD AUTO: 35.6 % (ref 34–46.6)
HGB BLD-MCNC: 10.6 G/DL (ref 12–15.9)
HYPOCHROMIA BLD QL: NORMAL
IMM GRANULOCYTES # BLD AUTO: 0.02 10*3/MM3 (ref 0–0.05)
IMM GRANULOCYTES NFR BLD AUTO: 0.3 % (ref 0–0.5)
INTERNAL NEGATIVE CONTROL: NEGATIVE
INTERNAL POSITIVE CONTROL: POSITIVE
LYMPHOCYTES # BLD AUTO: 1.58 10*3/MM3 (ref 0.7–3.1)
LYMPHOCYTES NFR BLD AUTO: 27.4 % (ref 19.6–45.3)
Lab: NORMAL
MCH RBC QN AUTO: 27 PG (ref 26.6–33)
MCHC RBC AUTO-ENTMCNC: 29.8 G/DL (ref 31.5–35.7)
MCV RBC AUTO: 90.8 FL (ref 79–97)
MONOCYTES # BLD AUTO: 0.35 10*3/MM3 (ref 0.1–0.9)
MONOCYTES NFR BLD AUTO: 6.1 % (ref 5–12)
NEUTROPHILS # BLD AUTO: 3.72 10*3/MM3 (ref 1.7–7)
NEUTROPHILS NFR BLD AUTO: 64.5 % (ref 42.7–76)
NRBC BLD AUTO-RTO: 0 /100 WBC (ref 0–0.2)
PLATELET # BLD AUTO: 251 10*3/MM3 (ref 140–450)
PMV BLD AUTO: 9.8 FL (ref 6–12)
POTASSIUM BLD-SCNC: 4.2 MMOL/L (ref 3.5–5.2)
RBC # BLD AUTO: 3.92 10*6/MM3 (ref 3.77–5.28)
SMALL PLATELETS BLD QL SMEAR: ADEQUATE
SODIUM BLD-SCNC: 144 MMOL/L (ref 136–145)
UNIT  ABO: NORMAL
UNIT  RH: NORMAL
WBC MORPH BLD: NORMAL
WBC NRBC COR # BLD: 5.77 10*3/MM3 (ref 3.4–10.8)

## 2019-10-22 PROCEDURE — 94799 UNLISTED PULMONARY SVC/PX: CPT

## 2019-10-22 PROCEDURE — 99225 PR SBSQ OBSERVATION CARE/DAY 25 MINUTES: CPT | Performed by: NURSE PRACTITIONER

## 2019-10-22 PROCEDURE — 85007 BL SMEAR W/DIFF WBC COUNT: CPT | Performed by: NURSE PRACTITIONER

## 2019-10-22 PROCEDURE — 96366 THER/PROPH/DIAG IV INF ADDON: CPT

## 2019-10-22 PROCEDURE — 80048 BASIC METABOLIC PNL TOTAL CA: CPT | Performed by: NURSE PRACTITIONER

## 2019-10-22 PROCEDURE — G0378 HOSPITAL OBSERVATION PER HR: HCPCS

## 2019-10-22 PROCEDURE — 81025 URINE PREGNANCY TEST: CPT | Performed by: SURGERY

## 2019-10-22 PROCEDURE — 85025 COMPLETE CBC W/AUTO DIFF WBC: CPT | Performed by: NURSE PRACTITIONER

## 2019-10-22 PROCEDURE — 25010000002 PROPOFOL 200 MG/20ML EMULSION: Performed by: NURSE ANESTHETIST, CERTIFIED REGISTERED

## 2019-10-22 RX ORDER — CALCIUM POLYCARBOPHIL 625 MG 625 MG/1
625 TABLET ORAL DAILY
Status: DISCONTINUED | OUTPATIENT
Start: 2019-10-22 | End: 2019-10-23 | Stop reason: HOSPADM

## 2019-10-22 RX ORDER — SODIUM CHLORIDE 0.9 % (FLUSH) 0.9 %
3 SYRINGE (ML) INJECTION EVERY 12 HOURS SCHEDULED
Status: DISCONTINUED | OUTPATIENT
Start: 2019-10-22 | End: 2019-10-23 | Stop reason: HOSPADM

## 2019-10-22 RX ORDER — PROPOFOL 10 MG/ML
INJECTION, EMULSION INTRAVENOUS AS NEEDED
Status: DISCONTINUED | OUTPATIENT
Start: 2019-10-22 | End: 2019-10-22 | Stop reason: SURG

## 2019-10-22 RX ORDER — DEXTROSE AND SODIUM CHLORIDE 5; .45 G/100ML; G/100ML
100 INJECTION, SOLUTION INTRAVENOUS CONTINUOUS
Status: DISCONTINUED | OUTPATIENT
Start: 2019-10-22 | End: 2019-10-22

## 2019-10-22 RX ORDER — SODIUM CHLORIDE 9 MG/ML
75 INJECTION, SOLUTION INTRAVENOUS CONTINUOUS
Status: DISCONTINUED | OUTPATIENT
Start: 2019-10-22 | End: 2019-10-22

## 2019-10-22 RX ORDER — PANTOPRAZOLE SODIUM 40 MG/1
40 TABLET, DELAYED RELEASE ORAL
Status: DISCONTINUED | OUTPATIENT
Start: 2019-10-23 | End: 2019-10-23 | Stop reason: HOSPADM

## 2019-10-22 RX ORDER — SODIUM CHLORIDE 0.9 % (FLUSH) 0.9 %
10 SYRINGE (ML) INJECTION AS NEEDED
Status: DISCONTINUED | OUTPATIENT
Start: 2019-10-22 | End: 2019-10-22 | Stop reason: HOSPADM

## 2019-10-22 RX ORDER — SODIUM CHLORIDE, SODIUM LACTATE, POTASSIUM CHLORIDE, CALCIUM CHLORIDE 600; 310; 30; 20 MG/100ML; MG/100ML; MG/100ML; MG/100ML
1000 INJECTION, SOLUTION INTRAVENOUS CONTINUOUS
Status: DISCONTINUED | OUTPATIENT
Start: 2019-10-22 | End: 2019-10-22

## 2019-10-22 RX ORDER — SODIUM CHLORIDE 0.9 % (FLUSH) 0.9 %
3-10 SYRINGE (ML) INJECTION AS NEEDED
Status: DISCONTINUED | OUTPATIENT
Start: 2019-10-22 | End: 2019-10-23 | Stop reason: HOSPADM

## 2019-10-22 RX ORDER — KETAMINE HCL IN NACL, ISO-OSM 100MG/10ML
SYRINGE (ML) INJECTION AS NEEDED
Status: DISCONTINUED | OUTPATIENT
Start: 2019-10-22 | End: 2019-10-22 | Stop reason: SURG

## 2019-10-22 RX ADMIN — PROPOFOL 50 MG: 10 INJECTION, EMULSION INTRAVENOUS at 09:10

## 2019-10-22 RX ADMIN — ATORVASTATIN CALCIUM 20 MG: 20 TABLET, FILM COATED ORAL at 20:04

## 2019-10-22 RX ADMIN — POLYETHYLENE GLYCOL 3350 17 G: 17 POWDER, FOR SOLUTION ORAL at 18:16

## 2019-10-22 RX ADMIN — SERTRALINE HYDROCHLORIDE 100 MG: 50 TABLET ORAL at 20:04

## 2019-10-22 RX ADMIN — FUROSEMIDE 20 MG: 20 TABLET ORAL at 11:37

## 2019-10-22 RX ADMIN — SACUBITRIL AND VALSARTAN 1 TABLET: 24; 26 TABLET, FILM COATED ORAL at 11:37

## 2019-10-22 RX ADMIN — SPIRONOLACTONE 25 MG: 25 TABLET ORAL at 11:36

## 2019-10-22 RX ADMIN — PROPOFOL 50 MG: 10 INJECTION, EMULSION INTRAVENOUS at 09:19

## 2019-10-22 RX ADMIN — PROPOFOL 50 MG: 10 INJECTION, EMULSION INTRAVENOUS at 09:29

## 2019-10-22 RX ADMIN — Medication 20 MG: at 09:10

## 2019-10-22 RX ADMIN — SODIUM CHLORIDE, POTASSIUM CHLORIDE, SODIUM LACTATE AND CALCIUM CHLORIDE 1000 ML: 600; 310; 30; 20 INJECTION, SOLUTION INTRAVENOUS at 08:39

## 2019-10-22 RX ADMIN — SODIUM CHLORIDE 75 ML/HR: 9 INJECTION, SOLUTION INTRAVENOUS at 11:30

## 2019-10-22 RX ADMIN — SODIUM CHLORIDE 8 MG/HR: 9 INJECTION, SOLUTION INTRAVENOUS at 13:50

## 2019-10-22 RX ADMIN — SODIUM CHLORIDE, PRESERVATIVE FREE 10 ML: 5 INJECTION INTRAVENOUS at 20:06

## 2019-10-22 RX ADMIN — SODIUM CHLORIDE, PRESERVATIVE FREE 3 ML: 5 INJECTION INTRAVENOUS at 11:21

## 2019-10-22 RX ADMIN — PROPOFOL 50 MG: 10 INJECTION, EMULSION INTRAVENOUS at 09:24

## 2019-10-22 RX ADMIN — PROPOFOL 50 MG: 10 INJECTION, EMULSION INTRAVENOUS at 09:15

## 2019-10-22 RX ADMIN — AMIODARONE HYDROCHLORIDE 200 MG: 200 TABLET ORAL at 11:40

## 2019-10-22 RX ADMIN — SACUBITRIL AND VALSARTAN 1 TABLET: 24; 26 TABLET, FILM COATED ORAL at 20:04

## 2019-10-22 RX ADMIN — SERTRALINE HYDROCHLORIDE 50 MG: 50 TABLET ORAL at 11:40

## 2019-10-22 RX ADMIN — SODIUM CHLORIDE 8 MG/HR: 9 INJECTION, SOLUTION INTRAVENOUS at 05:43

## 2019-10-22 RX ADMIN — CALCIUM POLYCARBOPHIL 625 MG: 625 TABLET, FILM COATED ORAL at 18:16

## 2019-10-22 RX ADMIN — SODIUM CHLORIDE 8 MG/HR: 9 INJECTION, SOLUTION INTRAVENOUS at 00:33

## 2019-10-22 NOTE — ANESTHESIA PREPROCEDURE EVALUATION
Anesthesia Evaluation     Patient summary reviewed and Nursing notes reviewed   NPO Solid Status: > 8 hours  NPO Liquid Status: > 8 hours           Airway   Mallampati: II  TM distance: >3 FB  Neck ROM: full  Possible difficult intubation  Dental      Pulmonary    (+) sleep apnea,   Cardiovascular   Exercise tolerance: poor (<4 METS)    (+) hypertension, past MI , dysrhythmias, CHF, hyperlipidemia,       Neuro/Psych  (+) headaches, numbness, psychiatric history,     GI/Hepatic/Renal/Endo    (+) obesity, morbid obesity, GI bleeding, liver disease,     Musculoskeletal     Abdominal    Substance History      OB/GYN          Other   (+) arthritis   history of cancer                    Anesthesia Plan    ASA 4     MAC     intravenous induction   Anesthetic plan, all risks, benefits, and alternatives have been provided, discussed and informed consent has been obtained with: patient.    Plan discussed with CRNA.

## 2019-10-22 NOTE — ANESTHESIA POSTPROCEDURE EVALUATION
Patient: Johny Hearn    Procedure Summary     Date:  10/22/19 Room / Location:  [facility] ENDOSCOPY 3 / [facility] ENDOSCOPY    Anesthesia Start:  0903 Anesthesia Stop:  0930    Procedures:       ESOPHAGOGASTRODUODENOSCOPY W/ COLD FORCEP BIOPSY (N/A Esophagus)      COLONOSCOPY WITH HOT FORCEP POYLPECTOMY (N/A ) Diagnosis:       Anemia, unspecified type      Grade II hemorrhoids      (Anemia, unspecified type [D64.9])      (Grade II hemorrhoids [K64.1])    Surgeon:  Rob Tyson MD Provider:  Cedric Sweeney CRNA    Anesthesia Type:  MAC ASA Status:  4          Anesthesia Type: MAC  Last vitals  BP   93/74 (10/22/19 0732)   Temp   97.7 °F (36.5 °C) (10/22/19 0732)   Pulse   78 (10/22/19 0732)   Resp   18 (10/22/19 0732)     SpO2   95 % (10/22/19 0732)     Post Anesthesia Care and Evaluation    Patient location during evaluation: bedside  Patient participation: complete - patient participated  Level of consciousness: awake and alert  Pain score: 0  Pain management: adequate  Airway patency: patent  Anesthetic complications: No anesthetic complications  PONV Status: none  Cardiovascular status: acceptable  Respiratory status: acceptable  Hydration status: acceptable

## 2019-10-23 VITALS
HEART RATE: 86 BPM | WEIGHT: 281 LBS | TEMPERATURE: 97.5 F | RESPIRATION RATE: 18 BRPM | OXYGEN SATURATION: 96 % | SYSTOLIC BLOOD PRESSURE: 108 MMHG | HEIGHT: 67 IN | DIASTOLIC BLOOD PRESSURE: 69 MMHG | BODY MASS INDEX: 44.1 KG/M2

## 2019-10-23 PROBLEM — K60.2 ANAL FISSURE: Status: ACTIVE | Noted: 2019-10-23

## 2019-10-23 LAB
ANION GAP SERPL CALCULATED.3IONS-SCNC: 12.7 MMOL/L (ref 5–15)
BUN BLD-MCNC: 16 MG/DL (ref 6–20)
BUN/CREAT SERPL: 17.8 (ref 7–25)
CALCIUM SPEC-SCNC: 8.7 MG/DL (ref 8.6–10.5)
CHLORIDE SERPL-SCNC: 106 MMOL/L (ref 98–107)
CO2 SERPL-SCNC: 20.3 MMOL/L (ref 22–29)
CREAT BLD-MCNC: 0.9 MG/DL (ref 0.57–1)
DEPRECATED RDW RBC AUTO: 51.2 FL (ref 37–54)
ERYTHROCYTE [DISTWIDTH] IN BLOOD BY AUTOMATED COUNT: 15.3 % (ref 12.3–15.4)
GFR SERPL CREATININE-BSD FRML MDRD: 72 ML/MIN/1.73
GLUCOSE BLD-MCNC: 147 MG/DL (ref 65–99)
HCT VFR BLD AUTO: 34.5 % (ref 34–46.6)
HGB BLD-MCNC: 10.5 G/DL (ref 12–15.9)
MCH RBC QN AUTO: 27.5 PG (ref 26.6–33)
MCHC RBC AUTO-ENTMCNC: 30.4 G/DL (ref 31.5–35.7)
MCV RBC AUTO: 90.3 FL (ref 79–97)
PLATELET # BLD AUTO: 233 10*3/MM3 (ref 140–450)
PMV BLD AUTO: 9.6 FL (ref 6–12)
POTASSIUM BLD-SCNC: 4.1 MMOL/L (ref 3.5–5.2)
RBC # BLD AUTO: 3.82 10*6/MM3 (ref 3.77–5.28)
SODIUM BLD-SCNC: 139 MMOL/L (ref 136–145)
WBC NRBC COR # BLD: 6.24 10*3/MM3 (ref 3.4–10.8)

## 2019-10-23 PROCEDURE — 85027 COMPLETE CBC AUTOMATED: CPT | Performed by: NURSE PRACTITIONER

## 2019-10-23 PROCEDURE — 99217 PR OBSERVATION CARE DISCHARGE MANAGEMENT: CPT | Performed by: NURSE PRACTITIONER

## 2019-10-23 PROCEDURE — G0378 HOSPITAL OBSERVATION PER HR: HCPCS

## 2019-10-23 PROCEDURE — 80048 BASIC METABOLIC PNL TOTAL CA: CPT | Performed by: NURSE PRACTITIONER

## 2019-10-23 RX ADMIN — AMIODARONE HYDROCHLORIDE 200 MG: 200 TABLET ORAL at 09:00

## 2019-10-23 RX ADMIN — HEPARIN 300 UNITS: 100 SYRINGE at 14:03

## 2019-10-23 RX ADMIN — SODIUM CHLORIDE, PRESERVATIVE FREE 3 ML: 5 INJECTION INTRAVENOUS at 09:03

## 2019-10-23 RX ADMIN — FUROSEMIDE 20 MG: 20 TABLET ORAL at 09:01

## 2019-10-23 RX ADMIN — CALCIUM POLYCARBOPHIL 625 MG: 625 TABLET, FILM COATED ORAL at 09:15

## 2019-10-23 RX ADMIN — FLUTICASONE PROPIONATE 2 SPRAY: 50 SPRAY, METERED NASAL at 09:15

## 2019-10-23 RX ADMIN — SPIRONOLACTONE 25 MG: 25 TABLET ORAL at 09:01

## 2019-10-23 RX ADMIN — SACUBITRIL AND VALSARTAN 1 TABLET: 24; 26 TABLET, FILM COATED ORAL at 09:01

## 2019-10-23 RX ADMIN — POLYETHYLENE GLYCOL 3350 17 G: 17 POWDER, FOR SOLUTION ORAL at 09:01

## 2019-10-23 RX ADMIN — SODIUM CHLORIDE, PRESERVATIVE FREE 10 ML: 5 INJECTION INTRAVENOUS at 09:01

## 2019-10-23 RX ADMIN — PANTOPRAZOLE SODIUM 40 MG: 40 TABLET, DELAYED RELEASE ORAL at 05:55

## 2019-10-24 ENCOUNTER — READMISSION MANAGEMENT (OUTPATIENT)
Dept: CALL CENTER | Facility: HOSPITAL | Age: 34
End: 2019-10-24

## 2019-10-24 ENCOUNTER — HOSPITAL ENCOUNTER (OUTPATIENT)
Dept: ULTRASOUND IMAGING | Facility: HOSPITAL | Age: 34
Discharge: HOME | End: 2019-10-24
Admitting: NURSE PRACTITIONER

## 2019-10-24 DIAGNOSIS — N64.4 BREAST PAIN: ICD-10-CM

## 2019-10-24 LAB
LAB AP CASE REPORT: NORMAL
PATH REPORT.FINAL DX SPEC: NORMAL

## 2019-10-24 PROCEDURE — 76641 ULTRASOUND BREAST COMPLETE: CPT

## 2019-10-24 NOTE — OUTREACH NOTE
Prep Survey      Responses   Facility patient discharged from?  [facility]   Is patient eligible?  Yes   Discharge diagnosis    Rectal bleeding   Does the patient have one of the following disease processes/diagnoses(primary or secondary)?  Other   Does the patient have Home health ordered?  No   Is there a DME ordered?  No   Medication alerts for this patient  STOP-eliquis   General alerts for this patient  Hx: CHF    Prep survey completed?  Yes          Melissa Braun RN

## 2019-10-27 ENCOUNTER — READMISSION MANAGEMENT (OUTPATIENT)
Dept: CALL CENTER | Facility: HOSPITAL | Age: 34
End: 2019-10-27

## 2019-10-27 NOTE — OUTREACH NOTE
Medical Week 1 Survey      Responses   Facility patient discharged from?  [facility]   Does the patient have one of the following disease processes/diagnoses(primary or secondary)?  Other   Is there a successful TCM telephone encounter documented?  No   Week 1 attempt successful?  No   Unsuccessful attempts  Attempt 1          Libertad Shepherd RN

## 2019-10-28 ENCOUNTER — READMISSION MANAGEMENT (OUTPATIENT)
Dept: CALL CENTER | Facility: HOSPITAL | Age: 34
End: 2019-10-28

## 2019-10-28 NOTE — OUTREACH NOTE
Medical Week 1 Survey      Responses   Facility patient discharged from?  [facility]   Does the patient have one of the following disease processes/diagnoses(primary or secondary)?  Other   Is there a successful TCM telephone encounter documented?  No   Week 1 attempt successful?  Yes   Call start time  1710   Call end time  1715   General alerts for this patient  Hx: CHF    Discharge diagnosis    Rectal bleeding   Is patient permission given to speak with other caregiver?  Yes   List who call center can speak with  To, , he is medical POA    Medication alerts for this patient  STOP-eliquis   Meds reviewed with patient/caregiver?  Yes   Is the patient having any side effects they believe may be caused by any medication additions or changes?  No   Does the patient have all medications ordered at discharge?  Yes   Is the patient taking all medications as directed (includes completed medication regime)?  Yes   Does the patient have a primary care provider?   Yes   Does the patient have an appointment with their PCP within 7 days of discharge?  Yes   Has the patient kept scheduled appointments due by today?  N/A   Has home health visited the patient within 72 hours of discharge?  N/A   Psychosocial issues?  No   Did the patient receive a copy of their discharge instructions?  Yes   Nursing interventions  Reviewed instructions with patient   What is the patient's perception of their health status since discharge?  Improving   Is the patient/caregiver able to teach back signs and symptoms related to disease process for when to call PCP?  Yes   Is the patient/caregiver able to teach back signs and symptoms related to disease process for when to call 911?  Yes   Is the patient/caregiver able to teach back the hierarchy of who to call/visit for symptoms/problems? PCP, Specialist, Home health nurse, Urgent Care, ED, 911  Yes   Week 1 call completed?  Yes          Aida Zamarripa RN

## 2019-11-05 ENCOUNTER — READMISSION MANAGEMENT (OUTPATIENT)
Dept: CALL CENTER | Facility: HOSPITAL | Age: 34
End: 2019-11-05

## 2019-11-05 NOTE — OUTREACH NOTE
Medical Week 2 Survey      Responses   Facility patient discharged from?  [facility]   Does the patient have one of the following disease processes/diagnoses(primary or secondary)?  Other   Week 2 attempt successful?  Yes   Call start time  1703   General alerts for this patient  Hx: CHF    Discharge diagnosis    Rectal bleeding   Call end time  1705   Meds reviewed with patient/caregiver?  Yes   Is the patient having any side effects they believe may be caused by any medication additions or changes?  No   Does the patient have all medications ordered at discharge?  Yes   Is the patient taking all medications as directed (includes completed medication regime)?  Yes   Does the patient have a primary care provider?   Yes   Does the patient have an appointment with their PCP within 7 days of discharge?  Yes   Has the patient kept scheduled appointments due by today?  Yes   Has home health visited the patient within 72 hours of discharge?  N/A   Psychosocial issues?  No   Comments  Bleeding is getting somewhat worse. Knows where to seek medical attention PRN. Will get in touch with MD tomorrow.   Did the patient receive a copy of their discharge instructions?  Yes   Nursing interventions  Reviewed instructions with patient   What is the patient's perception of their health status since discharge?  Worsening   Is the patient/caregiver able to teach back signs and symptoms related to disease process for when to call PCP?  Yes   Is the patient/caregiver able to teach back signs and symptoms related to disease process for when to call 911?  Yes   Is the patient/caregiver able to teach back the hierarchy of who to call/visit for symptoms/problems? PCP, Specialist, Home health nurse, Urgent Care, ED, 911  Yes   Week 2 Call Completed?  Yes          Mic Soto RN

## 2019-11-13 ENCOUNTER — READMISSION MANAGEMENT (OUTPATIENT)
Dept: CALL CENTER | Facility: HOSPITAL | Age: 34
End: 2019-11-13

## 2019-11-13 NOTE — OUTREACH NOTE
Medical Week 3 Survey      Responses   Facility patient discharged from?  [facility]   Does the patient have one of the following disease processes/diagnoses(primary or secondary)?  Other   Week 3 attempt successful?  Yes   Call start time  1500   Call end time  1503   General alerts for this patient  Hx: CHF    Discharge diagnosis  Rectal bleeding   Meds reviewed with patient/caregiver?  Yes   Is the patient having any side effects they believe may be caused by any medication additions or changes?  No   Does the patient have all medications ordered at discharge?  Yes   Is the patient taking all medications as directed (includes completed medication regime)?  Yes   Does the patient have a primary care provider?   Yes   Does the patient have an appointment with their PCP within 7 days of discharge?  Yes   Has the patient kept scheduled appointments due by today?  Yes   Has home health visited the patient within 72 hours of discharge?  N/A   Psychosocial issues?  No   Did the patient receive a copy of their discharge instructions?  Yes   Nursing interventions  Reviewed instructions with patient   What is the patient's perception of their health status since discharge?  Worsening   Is the patient/caregiver able to teach back signs and symptoms related to disease process for when to call PCP?  Yes   Is the patient/caregiver able to teach back signs and symptoms related to disease process for when to call 911?  Yes   Is the patient/caregiver able to teach back the hierarchy of who to call/visit for symptoms/problems? PCP, Specialist, Home health nurse, Urgent Care, ED, 911  Yes   Additional teach back comments  Early signs of fluid buildup. Reports she will call Cards for appt. Advised to see PCP/UTC/ER PRN worsening s/s. Advised to do daily wts.   Week 3 Call Completed?  Yes          Mic Soto RN

## 2019-11-20 ENCOUNTER — READMISSION MANAGEMENT (OUTPATIENT)
Dept: CALL CENTER | Facility: HOSPITAL | Age: 34
End: 2019-11-20

## 2019-11-20 NOTE — OUTREACH NOTE
Medical Week 4 Survey      Responses   Facility patient discharged from?  [facility]   Does the patient have one of the following disease processes/diagnoses(primary or secondary)?  Other   Week 4 attempt successful?  No          Mic Soto RN

## 2019-12-09 ENCOUNTER — HOSPITAL ENCOUNTER (OUTPATIENT)
Dept: CT IMAGING | Facility: HOSPITAL | Age: 34
Discharge: HOME | End: 2019-12-09
Admitting: INTERNAL MEDICINE

## 2019-12-09 ENCOUNTER — HOSPITAL ENCOUNTER (OUTPATIENT)
Dept: CT IMAGING | Facility: HOSPITAL | Age: 34
Discharge: HOME | End: 2019-12-09

## 2019-12-09 DIAGNOSIS — C82.00 FOLLICULAR LYMPHOMA GRADE I, UNSPECIFIED BODY REGION (HCC): ICD-10-CM

## 2019-12-09 PROCEDURE — 74177 CT ABD & PELVIS W/CONTRAST: CPT

## 2019-12-09 PROCEDURE — 71260 CT THORAX DX C+: CPT

## 2019-12-09 PROCEDURE — 25010000002 IOPAMIDOL 61 % SOLUTION: Performed by: INTERNAL MEDICINE

## 2019-12-09 RX ADMIN — IOPAMIDOL 100 ML: 612 INJECTION, SOLUTION INTRAVENOUS at 09:59

## 2019-12-11 ENCOUNTER — INFUSION (OUTPATIENT)
Dept: ONCOLOGY | Facility: HOSPITAL | Age: 34
End: 2019-12-11

## 2019-12-11 ENCOUNTER — OFFICE VISIT (OUTPATIENT)
Dept: ONCOLOGY | Facility: CLINIC | Age: 34
End: 2019-12-11

## 2019-12-11 VITALS
OXYGEN SATURATION: 96 % | BODY MASS INDEX: 45.99 KG/M2 | TEMPERATURE: 97.2 F | HEART RATE: 91 BPM | HEIGHT: 67 IN | SYSTOLIC BLOOD PRESSURE: 133 MMHG | DIASTOLIC BLOOD PRESSURE: 79 MMHG | WEIGHT: 293 LBS

## 2019-12-11 DIAGNOSIS — I48.0 PAROXYSMAL ATRIAL FIBRILLATION (HCC): ICD-10-CM

## 2019-12-11 DIAGNOSIS — C82.00 FOLLICULAR LYMPHOMA GRADE I, UNSPECIFIED BODY REGION (HCC): Primary | ICD-10-CM

## 2019-12-11 LAB
ALBUMIN SERPL-MCNC: 3.8 G/DL (ref 3.5–5.2)
ALBUMIN/GLOB SERPL: 1.3 G/DL
ALP SERPL-CCNC: 62 U/L (ref 39–117)
ALT SERPL W P-5'-P-CCNC: 12 U/L (ref 1–33)
ANION GAP SERPL CALCULATED.3IONS-SCNC: 15.1 MMOL/L (ref 5–15)
AST SERPL-CCNC: 14 U/L (ref 1–32)
BASOPHILS # BLD AUTO: 0.06 10*3/MM3 (ref 0–0.2)
BASOPHILS NFR BLD AUTO: 0.7 % (ref 0–1.5)
BILIRUB SERPL-MCNC: 0.7 MG/DL (ref 0.2–1.2)
BUN BLD-MCNC: 12 MG/DL (ref 6–20)
BUN/CREAT SERPL: 14.5 (ref 7–25)
CALCIUM SPEC-SCNC: 8.8 MG/DL (ref 8.6–10.5)
CHLORIDE SERPL-SCNC: 99 MMOL/L (ref 98–107)
CO2 SERPL-SCNC: 23.9 MMOL/L (ref 22–29)
CREAT BLD-MCNC: 0.83 MG/DL (ref 0.57–1)
DEPRECATED RDW RBC AUTO: 60 FL (ref 37–54)
EOSINOPHIL # BLD AUTO: 0.04 10*3/MM3 (ref 0–0.4)
EOSINOPHIL NFR BLD AUTO: 0.5 % (ref 0.3–6.2)
ERYTHROCYTE [DISTWIDTH] IN BLOOD BY AUTOMATED COUNT: 17.7 % (ref 12.3–15.4)
GFR SERPL CREATININE-BSD FRML MDRD: 79 ML/MIN/1.73
GLOBULIN UR ELPH-MCNC: 2.9 GM/DL
GLUCOSE BLD-MCNC: 96 MG/DL (ref 65–99)
HCT VFR BLD AUTO: 40.6 % (ref 34–46.6)
HGB BLD-MCNC: 12.6 G/DL (ref 12–15.9)
IMM GRANULOCYTES # BLD AUTO: 0.03 10*3/MM3 (ref 0–0.05)
IMM GRANULOCYTES NFR BLD AUTO: 0.4 % (ref 0–0.5)
LYMPHOCYTES # BLD AUTO: 1.46 10*3/MM3 (ref 0.7–3.1)
LYMPHOCYTES NFR BLD AUTO: 17.7 % (ref 19.6–45.3)
MCH RBC QN AUTO: 28.6 PG (ref 26.6–33)
MCHC RBC AUTO-ENTMCNC: 31 G/DL (ref 31.5–35.7)
MCV RBC AUTO: 92.1 FL (ref 79–97)
MONOCYTES # BLD AUTO: 0.49 10*3/MM3 (ref 0.1–0.9)
MONOCYTES NFR BLD AUTO: 5.9 % (ref 5–12)
NEUTROPHILS # BLD AUTO: 6.16 10*3/MM3 (ref 1.7–7)
NEUTROPHILS NFR BLD AUTO: 74.8 % (ref 42.7–76)
NRBC BLD AUTO-RTO: 0 /100 WBC (ref 0–0.2)
PLATELET # BLD AUTO: 267 10*3/MM3 (ref 140–450)
PMV BLD AUTO: 10.3 FL (ref 6–12)
POTASSIUM BLD-SCNC: 3.6 MMOL/L (ref 3.5–5.2)
PROT SERPL-MCNC: 6.7 G/DL (ref 6–8.5)
RBC # BLD AUTO: 4.41 10*6/MM3 (ref 3.77–5.28)
SODIUM BLD-SCNC: 138 MMOL/L (ref 136–145)
WBC NRBC COR # BLD: 8.24 10*3/MM3 (ref 3.4–10.8)

## 2019-12-11 PROCEDURE — 80053 COMPREHEN METABOLIC PANEL: CPT | Performed by: NURSE PRACTITIONER

## 2019-12-11 PROCEDURE — 36415 COLL VENOUS BLD VENIPUNCTURE: CPT | Performed by: NURSE PRACTITIONER

## 2019-12-11 PROCEDURE — 25010000003 HEPARIN LOCK FLUCH PER 10 UNITS: Performed by: INTERNAL MEDICINE

## 2019-12-11 PROCEDURE — 36591 DRAW BLOOD OFF VENOUS DEVICE: CPT

## 2019-12-11 PROCEDURE — 99214 OFFICE O/P EST MOD 30 MIN: CPT | Performed by: NURSE PRACTITIONER

## 2019-12-11 PROCEDURE — 85025 COMPLETE CBC W/AUTO DIFF WBC: CPT | Performed by: NURSE PRACTITIONER

## 2019-12-11 RX ORDER — PREDNISONE 50 MG/1
50 TABLET ORAL TAKE AS DIRECTED
Qty: 3 TABLET | Refills: 0 | Status: SHIPPED | OUTPATIENT
Start: 2019-12-11 | End: 2020-06-12 | Stop reason: SDUPTHER

## 2019-12-11 RX ORDER — ZOLPIDEM TARTRATE 5 MG/1
TABLET ORAL
Refills: 0 | COMMUNITY
Start: 2019-09-09 | End: 2020-12-09

## 2019-12-11 RX ORDER — PNV NO.95/FERROUS FUM/FOLIC AC 28MG-0.8MG
1 TABLET ORAL 2 TIMES DAILY
Refills: 3 | COMMUNITY
Start: 2019-11-08 | End: 2022-09-28 | Stop reason: SDUPTHER

## 2019-12-11 RX ORDER — SODIUM CHLORIDE 0.9 % (FLUSH) 0.9 %
10 SYRINGE (ML) INJECTION AS NEEDED
Status: CANCELLED | OUTPATIENT
Start: 2019-12-11

## 2019-12-11 RX ORDER — LEVOTHYROXINE SODIUM 0.12 MG/1
125 TABLET ORAL DAILY
Refills: 1 | COMMUNITY
Start: 2019-11-07 | End: 2022-09-28

## 2019-12-11 RX ORDER — ALBUTEROL SULFATE 90 UG/1
2 AEROSOL, METERED RESPIRATORY (INHALATION)
Refills: 3 | COMMUNITY
Start: 2019-11-07

## 2019-12-11 RX ORDER — HEPARIN SODIUM (PORCINE) LOCK FLUSH IV SOLN 100 UNIT/ML 100 UNIT/ML
500 SOLUTION INTRAVENOUS AS NEEDED
Status: CANCELLED | OUTPATIENT
Start: 2019-12-11

## 2019-12-11 RX ORDER — OXYCODONE HYDROCHLORIDE 15 MG/1
15 TABLET ORAL EVERY 6 HOURS PRN
COMMUNITY
Start: 2019-10-05

## 2019-12-11 RX ORDER — SODIUM CHLORIDE 0.9 % (FLUSH) 0.9 %
10 SYRINGE (ML) INJECTION AS NEEDED
Status: DISCONTINUED | OUTPATIENT
Start: 2019-12-11 | End: 2019-12-11 | Stop reason: HOSPADM

## 2019-12-11 RX ORDER — ALBUTEROL SULFATE 2.5 MG/3ML
2.5 SOLUTION RESPIRATORY (INHALATION)
Refills: 3 | COMMUNITY
Start: 2019-09-06

## 2019-12-11 RX ADMIN — HEPARIN SODIUM (PORCINE) LOCK FLUSH IV SOLN 100 UNIT/ML 500 UNITS: 100 SOLUTION at 11:30

## 2019-12-11 RX ADMIN — SODIUM CHLORIDE, PRESERVATIVE FREE 10 ML: 5 INJECTION INTRAVENOUS at 11:30

## 2019-12-11 NOTE — PROGRESS NOTES
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     HISTORY OF PRESENT ILLNESS: The patient is a very pleasant 34 y.o. female with past medical history significant for anaplastic large T-cell lymphoma, ALK positive diagnosed in April 19, 2018 after CT-guided core biopsy of left iliac mass.  Whole body PET scan revealed disease above and below the diaphragm.  Bone marrow biopsy done on May 1, 2018, that failed to show any evidence of bone marrow involvement.  The patient was started on chemotherapy using CHOP May 2, 2018.  She completed 6 cycles of August 22, 2018.  The patient is here today for scheduled follow-up visit.     SUBJECTIVE: The patient is here today by herself. Since last visit she is in heart failure. She is being monitored by her cardiologist. She was admitted recently to the hospital with rectal bleed.  They stopped her eliquis and she has been doing much better.         PAST MEDICAL HISTORY/SOCIAL HISTORY/FAMILY HISTORY: Reviewed by me and unchanged from my documentation done on 08/22/18.    Review of Systems   Constitutional: Positive for fatigue. Negative for activity change, appetite change, chills, fever and unexpected weight change.   HENT: Negative for ear pain, hearing loss, mouth sores, nosebleeds, sore throat and trouble swallowing.    Eyes: Negative for visual disturbance.   Respiratory: Negative for cough, chest tightness, shortness of breath and wheezing.    Cardiovascular: Negative for chest pain, palpitations and leg swelling.   Gastrointestinal: Negative for abdominal distention, abdominal pain, blood in stool, constipation, diarrhea, nausea, rectal pain and vomiting.   Endocrine: Negative for cold intolerance and heat intolerance.   Genitourinary: Negative for difficulty urinating, dysuria, frequency and urgency.   Musculoskeletal: Negative for arthralgias, back pain, gait problem, joint swelling and myalgias.    Skin: Negative for rash.   Neurological: Negative for dizziness, tremors, syncope, weakness, light-headedness, numbness and headaches.   Hematological: Negative for adenopathy. Does not bruise/bleed easily.   Psychiatric/Behavioral: Negative for confusion, sleep disturbance and suicidal ideas. The patient is nervous/anxious.          Current Outpatient Medications:   •  amiodarone (PACERONE) 200 MG tablet, Take 1 tablet by mouth Daily., Disp: 30 tablet, Rfl: 0  •  atorvastatin (LIPITOR) 20 MG tablet, Take 1 tablet by mouth Every Night., Disp: 30 tablet, Rfl: 0  •  diphenhydrAMINE (BENADRYL) 50 MG tablet, Take 1 tablet by mouth Take As Directed. 1 hour prior to scan, Disp: 1 tablet, Rfl: 0  •  fluticasone (FLONASE) 50 MCG/ACT nasal spray, 1 spray into the nostril(s) as directed by provider Daily As Needed., Disp: , Rfl:   •  furosemide (LASIX) 20 MG tablet, Take 1 tablet by mouth Daily., Disp: 30 tablet, Rfl: 0  •  hydrocortisone (ANUSOL-HC) 2.5 % rectal cream, Insert rectally 2 (Two) Times a Day., Disp: 30 g, Rfl: 0  •  metoprolol succinate XL (TOPROL-XL) 100 MG 24 hr tablet, Take 1/2 tablet by mouth Every 12 (Twelve) Hours., Disp: 30 tablet, Rfl: 0  •  ondansetron ODT (ZOFRAN-ODT) 4 MG disintegrating tablet, Take 1 tablet by mouth Every 12 (Twelve) Hours As Needed for Nausea or Vomiting., Disp: 10 tablet, Rfl: 0  •  oxyCODONE-acetaminophen (PERCOCET)  MG per tablet, Take 1 tablet by mouth Every 6 (Six) Hours As Needed for Moderate Pain ., Disp: , Rfl:   •  pantoprazole (PROTONIX) 40 MG EC tablet, Take 1 tablet by mouth Daily., Disp: 30 tablet, Rfl: 0  •  polyethylene glycol (MIRALAX) powder, Dissolve 1 capful in your choice of beverage and drink once daily, Disp: 510 g, Rfl: 30  •  sacubitril-valsartan (ENTRESTO) 24-26 MG tablet, Take 1 tablet by mouth Every 12 (Twelve) Hours., Disp: 60 tablet, Rfl: 0  •  sertraline (ZOLOFT) 100 MG tablet, Take 100 mg by mouth Every Night. Total 150 mg daily, Disp: , Rfl:    •  sertraline (ZOLOFT) 50 MG tablet, Take 50 mg by mouth Daily. Total 150 MG daily dose, Disp: , Rfl:   •  spironolactone (ALDACTONE) 25 MG tablet, Take 1 tablet by mouth Daily., Disp: 30 tablet, Rfl: 0    PHYSICAL EXAMINATION:   There were no vitals taken for this visit.   ECOG Performance Status: 1 - Symptomatic but completely ambulatory  General Appearance:  alert, cooperative, no apparent distress and appears stated age   Neurologic/Psychiatric: A&O x 3, gait steady, appropriate affect, strength 5/5 in all muscle groups   HEENT:  Normocephalic, without obvious abnormality, mucous membranes moist   Neck: Supple, symmetrical, trachea midline, no adenopathy;  No thyromegaly, masses, or tenderness   Lungs:   Clear to auscultation bilaterally; respirations regular, even, and unlabored bilaterally   Heart:  Regular rate and rhythm, no murmurs appreciated   Abdomen:   Soft, non-tender, non-distended and no organomegaly   Lymph nodes: No cervical, supraclavicular, inguinal or axillary adenopathy noted   Extremities: Normal, atraumatic; no clubbing, cyanosis, or edema    Skin: No rashes, ulcers, or suspicious lesions noted         Glucose 74 - 98 mg/dL 123   133   127   122      BUN 7 - 20 mg/dL 10  18  11  6      Creatinine 0.60 - 1.30 mg/dL 0.60  0.50   0.50   0.50      Sodium 137 - 145 mmol/L 142  141  143  142     Potassium 3.5 - 5.1 mmol/L 3.6  3.4   3.3   3.6     Chloride 98 - 107 mmol/L 106  104  106  105     CO2 26.0 - 30.0 mmol/L 25.0   28.0  27.0  26.0     Calcium 8.4 - 10.2 mg/dL 8.9  9.1  8.9  8.4     Total Protein 6.3 - 8.2 g/dL 7.3  7.8  8.0  8.6      Albumin 3.50 - 5.00 g/dL 4.10  4.40  3.90  3.80     ALT (SGPT) 13 - 69 U/L 25  26  29  35     AST (SGOT) 15 - 46 U/L 26  25  24  24     Alkaline Phosphatase 38 - 126 U/L 51  54  59  70     Total Bilirubin 0.2 - 1.3 mg/dL 0.5  0.3  0.3  0.4     eGFR Non African Amer >60 mL/min/1.73 115  143  143  143     Globulin gm/dL 3.2  3.4  4.1  4.8     A/G Ratio 1.0 -  2.0 g/dL 1.3  1.3  1.0  0.8      BUN/Creatinine Ratio 7.1 - 23.5 16.7  36.0   22.0  12.0     Anion Gap 10.0 - 20.0 mmol/L 14.6  12.4  13.3  14.6    Resulting Agency   [facility]     CBC Auto Differential   Order: [id number] - Part of Panel Order [id number]   Status:  Final result   Visible to patient:  Yes (MyChart) Dx:  Anaplastic ALK-positive large cell ly...     Ref Range & Units 3wk ago  (7/11/18) 1mo ago  (6/13/18) 2mo ago  (5/24/18) 2mo ago  (5/4/18)    WBC 4.80 - 10.80 10*3/mm3 5.19  6.39  6.51  3.46      RBC 4.20 - 5.40 10*6/mm3 3.61   3.70   3.76   3.62      Hemoglobin 12.0 - 16.0 g/dL 9.8   10.0   9.9   9.5      Hematocrit 37.0 - 47.0 % 30.5   30.8   31.0   29.9      MCV 81.0 - 99.0 fL 84.5  83.2  82.4  82.6     MCH 27.0 - 31.0 pg 27.1  27.0  26.3   26.2      MCHC 30.0 - 37.0 g/dL 32.1  32.5  31.9  31.8     RDW 11.5 - 14.5 % 19.3   19.7   17.1   16.0      RDW-SD 37.0 - 54.0 fl 59.7   58.8   49.5  48.4     MPV 6.0 - 12.0 fL 8.2  8.1  8.0  8.5     Platelets 130 - 400 10*3/mm3 257  348  358  218     Neutrophil % 37.0 - 80.0 % 55.4  67.9  63.7  49.1     Lymphocyte % 10.0 - 50.0 % 28.3  20.5  25.7  33.2     Monocyte % 0.0 - 12.0 % 10.4  9.2  6.9  13.9      Eosinophil % 0.0 - 7.0 % 3.5  0.5  1.1  2.0     Basophil % 0.0 - 2.5 % 1.2  0.8  1.1  1.2     Immature Grans % 0.0 - 0.6 % 1.2   1.1   1.5   0.6     Neutrophils, Absolute 2.00 - 6.90 10*3/mm3 2.88  4.34  4.15  1.70      Lymphocytes, Absolute 0.60 - 3.40 10*3/mm3 1.47  1.31  1.67  1.15     Monocytes, Absolute 0.00 - 0.90 10*3/mm3 0.54  0.59  0.45  0.48     Eosinophils, Absolute 0.00 - 0.70 10*3/mm3 0.18  0.03  0.07  0.07     Basophils, Absolute 0.00 - 0.20 10*3/mm3 0.06  0.05  0.07  0.04     Immature Grans, Absolute 0.00 - 0.06 10*3/mm3 0.06  0.07   0.10   0.02     nRBC 0.0 - 0.0 /100 WBC 0.0  0.0  0.0  0.0    Resulting Agency  [facility]      Specimen Collected: 07/11/18 10:49 Last Resulted:  07/11/18 10:56                         Ct Chest With Contrast    Result Date: 12/9/2019  Narrative: PROCEDURE: CT CHEST W CONTRAST-, CT ABDOMEN PELVIS W CONTRAST-  HISTORY: f/u scan; 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  PROCEDURE: Axial images were obtained from the thoracic inlet through the pubic symphysis following the administration of Isovue 300 contrast.   FINDINGS:  CHEST: There is no evidence of mediastinal or axillary adenopathy. Heart size is enlarged. There are no pleural or pericardial effusions. Lung windows reveal no focal opacities or suspicious pulmonary nodules. Bone windows reveal no lytic or destructive lesions.  ABDOMEN: The liver is homogeneous in appearance with no focal lesions. The spleen is unremarkable. No adrenal mass is present.  The pancreas is normal. The kidneys are unremarkable. The aorta is normal in caliber. There is no free fluid or adenopathy. The abdominal portions of the GI tract are unremarkable.  PELVIS: The appendix is normal. The urinary bladder is unremarkable. There is a small amount of free fluid in the pelvis. There is no adenopathy. Bone windows reveal no lytic or destructive lesions.      Impression: 1. No evidence of recurrent or metastatic disease in the abdomen or pelvis. 2. Development of a small amount of free fluid in the pelvis which is a nonspecific finding. 3. Cardiomegaly.  This study was performed with techniques to keep radiation doses as low as reasonably achievable (ALARA). Individualized dose reduction techniques using automated exposure control or adjustment of mA and/or kV according to the patient size were employed.  This report was finalized on 12/9/2019 10:21 AM by Mukesh Hong M.D..    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  PROCEDURE: Axial images were obtained from the thoracic inlet through the pubic symphysis following the administration of Isovue 300 contrast.   FINDINGS:  CHEST: There is no evidence of mediastinal or axillary adenopathy. Heart size is enlarged. There are no pleural or pericardial effusions. Lung windows reveal no focal opacities or suspicious pulmonary nodules. Bone windows reveal no lytic or destructive lesions.  ABDOMEN: The liver is homogeneous in appearance with no focal lesions. The spleen is unremarkable. No adrenal mass is present.  The pancreas is normal. The kidneys are unremarkable. The aorta is normal in caliber. There is no free fluid or adenopathy. The abdominal portions of the GI tract are unremarkable.  PELVIS: The appendix is normal. The urinary bladder is unremarkable. There is a small amount of free fluid in the pelvis. There is no adenopathy. Bone windows reveal no lytic or destructive lesions.      Impression: 1. No evidence of recurrent or metastatic disease in the abdomen or pelvis. 2. Development of a small amount of free fluid in the pelvis which is a nonspecific finding. 3. Cardiomegaly.  This study was performed with techniques to keep radiation doses as low as reasonably achievable (ALARA). Individualized dose reduction techniques using automated exposure control or adjustment of mA and/or kV according to the patient size were employed.  This report was finalized on 12/9/2019 10:21 AM by Mukesh Hong M.D..      ASSESSMENT: The patient is a very pleasant 34 y.o. female  with anaplastic large T-cell lymphoma, ALK positive    PROBLEM LIST:  1. Anaplastic large T-cell lymphoma, ALK positive:  A. Incidentally found LAD on MRI done for low back and hip pain done 9/5/2017.   B. Follow up CAT scan confirmed pelvic adenopathy extending from the distal aortic region through the left iliac region.   C. Status post FNA to left iliac lymph node done 9/29/2017. Pathology revealed  benign lymphoid cell groups.   D. Repeat CT done 3/15/2018 revealed increased size of left iliac adenopathy with mild splenomegaly.   E. CT-guided biopsy done on April 19, 2018 showed anaplastic large T-cell lymphoma ALK+  F. whole body PET scan done on May 1, 2018 revealed disease above and below the diaphragm with hypermetabolic active lymph nodes in the supraclavicular area as well as retroperitoneum.  G. Started chemotherapy using CHOP May 2, 2018, status post 6 cycles, completed August 22, 2018.   2. Mild splenomegaly   A. Incidentally found 9/5/2017.  B. Progressive size on repeat imaging done 3/15/2018.  3. Left hip and low back pain  A. Under care of pain management with use of Norco.   4. History of anoxic brain injury following cardiac arrest post-partum.   5. Postpartum cardiomyopathy.   6. Anxiety and depression  7.  Constipation  8.  Chemotherapy used nausea.  9.  Hypertension  10.  Systolic cardiomyopathy:  8.  Ejection fraction dropped from 60% on May 2 2:30 percent on October 19, 2018. EF at 25% on 10/12/2019    PLAN:  1.   We discussed Ct scan of chest, Abdomen and pelvis showed no evidence of recurrent or metastatic disease within the chest, abdomen or pelvis. I reviewed the films myself.  2.  The patient will follow-up in 6 months with a CT of chest, abdomen, and pelvis. We will send in benadryl for premedication.   3.  The patient will continue to follow-up with Dr. Curtis from cardiology for atrial fibrillation and heart failure. Her cardiomyopathy could be induced by previous chemotherapy including Adriamycin.  4.  We'll continue Port-A-Cath care.  We will arrange for port removal. I will consult Dr. Brock.  5. I will continue the patient on Colace daily as well as lactulose as needed for constipation.  6. I will continue Zoloft for depression.  7. I will order labs for today to check hgb and HCt. If Hgb is less than 8 we will plan to transfuse with 2 units.     Laurel Preston,  APRN    12/11/2019

## 2020-01-08 ENCOUNTER — TELEPHONE (OUTPATIENT)
Dept: SURGERY | Facility: CLINIC | Age: 35
End: 2020-01-08

## 2020-01-08 NOTE — TELEPHONE ENCOUNTER
Patient decided she did not want her port a cath removed she would like to keep it placed for a little longer

## 2020-05-14 ENCOUNTER — TELEPHONE (OUTPATIENT)
Dept: ONCOLOGY | Facility: CLINIC | Age: 35
End: 2020-05-14

## 2020-05-14 RX ORDER — LIDOCAINE AND PRILOCAINE 25; 25 MG/G; MG/G
CREAM TOPICAL AS NEEDED
Qty: 30 G | Refills: 3 | Status: SHIPPED | OUTPATIENT
Start: 2020-05-14 | End: 2020-06-12 | Stop reason: SDUPTHER

## 2020-05-14 NOTE — TELEPHONE ENCOUNTER
PT CALLED WANTING SOME NUMBING CREAM SENT TO [facility] IN [place]. BEFORE SHE CAN HAVE PORT FLUSHED. PORT FLUSH IS SCHEDULED FOR 05/22. SHE R/S TODAY CAUSE SHE DIDN'T HAVE ANY THANKS

## 2020-05-21 ENCOUNTER — TELEPHONE (OUTPATIENT)
Dept: INFUSION THERAPY | Facility: HOSPITAL | Age: 35
End: 2020-05-21

## 2020-06-08 ENCOUNTER — HOSPITAL ENCOUNTER (OUTPATIENT)
Dept: CT IMAGING | Facility: HOSPITAL | Age: 35
Discharge: HOME | End: 2020-06-08
Admitting: NURSE PRACTITIONER

## 2020-06-08 ENCOUNTER — HOSPITAL ENCOUNTER (OUTPATIENT)
Dept: CT IMAGING | Facility: HOSPITAL | Age: 35
Discharge: HOME | End: 2020-06-08

## 2020-06-08 DIAGNOSIS — C82.00 FOLLICULAR LYMPHOMA GRADE I, UNSPECIFIED BODY REGION (HCC): ICD-10-CM

## 2020-06-08 PROCEDURE — 25010000002 IOPAMIDOL 61 % SOLUTION: Performed by: NURSE PRACTITIONER

## 2020-06-08 PROCEDURE — 74177 CT ABD & PELVIS W/CONTRAST: CPT

## 2020-06-08 PROCEDURE — 71260 CT THORAX DX C+: CPT

## 2020-06-08 RX ADMIN — IOPAMIDOL 100 ML: 612 INJECTION, SOLUTION INTRAVENOUS at 08:50

## 2020-06-11 ENCOUNTER — TELEPHONE (OUTPATIENT)
Dept: ONCOLOGY | Facility: CLINIC | Age: 35
End: 2020-06-11

## 2020-06-11 NOTE — TELEPHONE ENCOUNTER
----- Message from Avtar Esteban sent at 6/11/2020 10:08 AM EDT -----  Regarding: NUMBING CREAM  Pt needs numbing cream for port called in to [facility].   Thanks

## 2020-06-11 NOTE — TELEPHONE ENCOUNTER
Called patient and let her know she has 3 refills on her Emla cream.  She just needs to call to get ready.  Patient stated understanding.

## 2020-06-12 ENCOUNTER — INFUSION (OUTPATIENT)
Dept: ONCOLOGY | Facility: HOSPITAL | Age: 35
End: 2020-06-12

## 2020-06-12 ENCOUNTER — OFFICE VISIT (OUTPATIENT)
Dept: ONCOLOGY | Facility: CLINIC | Age: 35
End: 2020-06-12

## 2020-06-12 VITALS
HEART RATE: 75 BPM | DIASTOLIC BLOOD PRESSURE: 53 MMHG | OXYGEN SATURATION: 98 % | SYSTOLIC BLOOD PRESSURE: 114 MMHG | HEIGHT: 67 IN | RESPIRATION RATE: 16 BRPM | BODY MASS INDEX: 45.2 KG/M2 | WEIGHT: 288 LBS | TEMPERATURE: 96.9 F

## 2020-06-12 DIAGNOSIS — I42.9 CARDIOMYOPATHY, UNSPECIFIED TYPE (HCC): ICD-10-CM

## 2020-06-12 DIAGNOSIS — C82.00 FOLLICULAR LYMPHOMA GRADE I, UNSPECIFIED BODY REGION (HCC): Primary | ICD-10-CM

## 2020-06-12 DIAGNOSIS — E66.01 MORBIDLY OBESE (HCC): ICD-10-CM

## 2020-06-12 DIAGNOSIS — I48.0 PAROXYSMAL ATRIAL FIBRILLATION (HCC): ICD-10-CM

## 2020-06-12 PROBLEM — J96.01 ACUTE RESPIRATORY FAILURE WITH HYPOXIA (HCC): Status: RESOLVED | Noted: 2018-10-19 | Resolved: 2020-06-12

## 2020-06-12 LAB
ALBUMIN SERPL-MCNC: 4.4 G/DL (ref 3.5–5.2)
ALBUMIN/GLOB SERPL: 1.6 G/DL
ALP SERPL-CCNC: 73 U/L (ref 39–117)
ALT SERPL W P-5'-P-CCNC: 19 U/L (ref 1–33)
ANION GAP SERPL CALCULATED.3IONS-SCNC: 11.2 MMOL/L (ref 5–15)
AST SERPL-CCNC: 18 U/L (ref 1–32)
BASOPHILS # BLD AUTO: 0.04 10*3/MM3 (ref 0–0.2)
BASOPHILS NFR BLD AUTO: 0.5 % (ref 0–1.5)
BILIRUB SERPL-MCNC: 0.4 MG/DL (ref 0.2–1.2)
BUN BLD-MCNC: 24 MG/DL (ref 6–20)
BUN/CREAT SERPL: 25.3 (ref 7–25)
CALCIUM SPEC-SCNC: 9.1 MG/DL (ref 8.6–10.5)
CHLORIDE SERPL-SCNC: 100 MMOL/L (ref 98–107)
CO2 SERPL-SCNC: 25.8 MMOL/L (ref 22–29)
CREAT BLD-MCNC: 0.95 MG/DL (ref 0.57–1)
DEPRECATED RDW RBC AUTO: 43.8 FL (ref 37–54)
EOSINOPHIL # BLD AUTO: 0.09 10*3/MM3 (ref 0–0.4)
EOSINOPHIL NFR BLD AUTO: 1.1 % (ref 0.3–6.2)
ERYTHROCYTE [DISTWIDTH] IN BLOOD BY AUTOMATED COUNT: 13.4 % (ref 12.3–15.4)
GFR SERPL CREATININE-BSD FRML MDRD: 67 ML/MIN/1.73
GLOBULIN UR ELPH-MCNC: 2.7 GM/DL
GLUCOSE BLD-MCNC: 123 MG/DL (ref 65–99)
HCT VFR BLD AUTO: 38.3 % (ref 34–46.6)
HGB BLD-MCNC: 12.3 G/DL (ref 12–15.9)
IMM GRANULOCYTES # BLD AUTO: 0.04 10*3/MM3 (ref 0–0.05)
IMM GRANULOCYTES NFR BLD AUTO: 0.5 % (ref 0–0.5)
LDH SERPL-CCNC: 155 U/L (ref 135–214)
LYMPHOCYTES # BLD AUTO: 1.25 10*3/MM3 (ref 0.7–3.1)
LYMPHOCYTES NFR BLD AUTO: 15.5 % (ref 19.6–45.3)
MCH RBC QN AUTO: 28.5 PG (ref 26.6–33)
MCHC RBC AUTO-ENTMCNC: 32.1 G/DL (ref 31.5–35.7)
MCV RBC AUTO: 88.7 FL (ref 79–97)
MONOCYTES # BLD AUTO: 0.53 10*3/MM3 (ref 0.1–0.9)
MONOCYTES NFR BLD AUTO: 6.6 % (ref 5–12)
NEUTROPHILS # BLD AUTO: 6.13 10*3/MM3 (ref 1.7–7)
NEUTROPHILS NFR BLD AUTO: 75.8 % (ref 42.7–76)
NRBC BLD AUTO-RTO: 0 /100 WBC (ref 0–0.2)
PLATELET # BLD AUTO: 227 10*3/MM3 (ref 140–450)
PMV BLD AUTO: 8.6 FL (ref 6–12)
POTASSIUM BLD-SCNC: 3.7 MMOL/L (ref 3.5–5.2)
PROT SERPL-MCNC: 7.1 G/DL (ref 6–8.5)
RBC # BLD AUTO: 4.32 10*6/MM3 (ref 3.77–5.28)
SODIUM BLD-SCNC: 137 MMOL/L (ref 136–145)
WBC NRBC COR # BLD: 8.08 10*3/MM3 (ref 3.4–10.8)

## 2020-06-12 PROCEDURE — 99214 OFFICE O/P EST MOD 30 MIN: CPT | Performed by: NURSE PRACTITIONER

## 2020-06-12 PROCEDURE — 25010000003 HEPARIN LOCK FLUSH PER 10 UNITS: Performed by: INTERNAL MEDICINE

## 2020-06-12 PROCEDURE — 83615 LACTATE (LD) (LDH) ENZYME: CPT | Performed by: NURSE PRACTITIONER

## 2020-06-12 PROCEDURE — 85025 COMPLETE CBC W/AUTO DIFF WBC: CPT | Performed by: NURSE PRACTITIONER

## 2020-06-12 PROCEDURE — 36591 DRAW BLOOD OFF VENOUS DEVICE: CPT

## 2020-06-12 PROCEDURE — 36415 COLL VENOUS BLD VENIPUNCTURE: CPT | Performed by: NURSE PRACTITIONER

## 2020-06-12 PROCEDURE — 80053 COMPREHEN METABOLIC PANEL: CPT | Performed by: NURSE PRACTITIONER

## 2020-06-12 RX ORDER — SODIUM CHLORIDE 0.9 % (FLUSH) 0.9 %
10 SYRINGE (ML) INJECTION AS NEEDED
Status: DISCONTINUED | OUTPATIENT
Start: 2020-06-12 | End: 2020-06-12 | Stop reason: HOSPADM

## 2020-06-12 RX ORDER — POLYETHYLENE GLYCOL 3350 17 G/17G
POWDER, FOR SOLUTION ORAL
COMMUNITY
Start: 2020-05-26 | End: 2022-09-13 | Stop reason: SDUPTHER

## 2020-06-12 RX ORDER — CETIRIZINE HYDROCHLORIDE 10 MG/1
10 TABLET ORAL DAILY
COMMUNITY
Start: 2020-04-16 | End: 2022-09-28 | Stop reason: SDUPTHER

## 2020-06-12 RX ORDER — HEPARIN SODIUM (PORCINE) LOCK FLUSH IV SOLN 100 UNIT/ML 100 UNIT/ML
500 SOLUTION INTRAVENOUS AS NEEDED
Status: DISCONTINUED | OUTPATIENT
Start: 2020-06-12 | End: 2020-06-12 | Stop reason: HOSPADM

## 2020-06-12 RX ORDER — HEPARIN SODIUM (PORCINE) LOCK FLUSH IV SOLN 100 UNIT/ML 100 UNIT/ML
500 SOLUTION INTRAVENOUS AS NEEDED
Status: CANCELLED | OUTPATIENT
Start: 2020-06-12

## 2020-06-12 RX ORDER — PREDNISONE 50 MG/1
50 TABLET ORAL TAKE AS DIRECTED
Qty: 3 TABLET | Refills: 0 | Status: SHIPPED | OUTPATIENT
Start: 2020-06-12 | End: 2020-12-09 | Stop reason: SDUPTHER

## 2020-06-12 RX ORDER — LIDOCAINE AND PRILOCAINE 25; 25 MG/G; MG/G
CREAM TOPICAL AS NEEDED
Qty: 30 G | Refills: 3 | Status: SHIPPED | OUTPATIENT
Start: 2020-06-12 | End: 2022-04-18

## 2020-06-12 RX ORDER — SODIUM CHLORIDE 0.9 % (FLUSH) 0.9 %
10 SYRINGE (ML) INJECTION AS NEEDED
Status: CANCELLED | OUTPATIENT
Start: 2020-06-12

## 2020-06-12 RX ADMIN — SODIUM CHLORIDE, PRESERVATIVE FREE 10 ML: 5 INJECTION INTRAVENOUS at 11:06

## 2020-06-12 RX ADMIN — HEPARIN 500 UNITS: 100 SYRINGE at 11:06

## 2020-06-12 NOTE — PROGRESS NOTES
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     HISTORY OF PRESENT ILLNESS: The patient is a very pleasant 34 y.o. female with past medical history significant for anaplastic large T-cell lymphoma, ALK positive diagnosed in April 19, 2018 after CT-guided core biopsy of left iliac mass.  Whole body PET scan revealed disease above and below the diaphragm.  Bone marrow biopsy done on May 1, 2018, that failed to show any evidence of bone marrow involvement.  The patient was started on chemotherapy using CHOP May 2, 2018.  She completed 6 cycles of August 22, 2018.  The patient is here today for scheduled follow-up visit.     SUBJECTIVE: The patient is here today by herself. Her  is on the phone. She missed her appointment on Wednesday because she forgot, but did not want to wait till next week to get results of scan.  She continues to be monitored by her cardiologist. Denies any new pain, shortness of breath. She has decided that she would like to keep her port.         PAST MEDICAL HISTORY/SOCIAL HISTORY/FAMILY HISTORY: Reviewed by me and unchanged from my documentation done on 08/22/18.    Review of Systems   Constitutional: Positive for fatigue. Negative for activity change, appetite change, chills, fever and unexpected weight change.   HENT: Negative for ear pain, hearing loss, mouth sores, nosebleeds, sore throat and trouble swallowing.    Eyes: Negative for visual disturbance.   Respiratory: Negative for cough, chest tightness, shortness of breath and wheezing.    Cardiovascular: Negative for chest pain, palpitations and leg swelling.   Gastrointestinal: Negative for abdominal distention, abdominal pain, blood in stool, constipation, diarrhea, nausea, rectal pain and vomiting.   Endocrine: Negative for cold intolerance and heat intolerance.   Genitourinary: Negative for difficulty urinating, dysuria, frequency and urgency.    Musculoskeletal: Negative for arthralgias, back pain, gait problem, joint swelling and myalgias.   Skin: Negative for rash.   Neurological: Negative for dizziness, tremors, syncope, weakness, light-headedness, numbness and headaches.   Hematological: Negative for adenopathy. Does not bruise/bleed easily.   Psychiatric/Behavioral: Negative for confusion, sleep disturbance and suicidal ideas. The patient is nervous/anxious.          Current Outpatient Medications:   •  albuterol (PROVENTIL) (2.5 MG/3ML) 0.083% nebulizer solution, USE 1 VIAL IN NEBULIZER EVERY 4 TO 6 HOURS AS NEEDED FOR WHEEZING, Disp: , Rfl: 3  •  albuterol sulfate  (90 Base) MCG/ACT inhaler, INHALE 1 TO 2 PUFFS BY MOUTH EVERY 4 TO 6 HOURS AS NEEDED AND AS DIRECTED, Disp: , Rfl: 3  •  amiodarone (PACERONE) 200 MG tablet, Take 1 tablet by mouth Daily., Disp: 30 tablet, Rfl: 0  •  atorvastatin (LIPITOR) 20 MG tablet, Take 1 tablet by mouth Every Night., Disp: 30 tablet, Rfl: 0  •  cetirizine (zyrTEC) 10 MG tablet, , Disp: , Rfl:   •  diphenhydrAMINE (BENADRYL) 50 MG tablet, Take 1 tablet by mouth Take As Directed. 1 hour prior to scan, Disp: 1 tablet, Rfl: 0  •  Ferrous Sulfate (IRON) 325 (65 Fe) MG tablet, Take 1 tablet by mouth 2 (Two) Times a Day., Disp: , Rfl: 3  •  fluticasone (FLONASE) 50 MCG/ACT nasal spray, 1 spray into the nostril(s) as directed by provider Daily As Needed., Disp: , Rfl:   •  furosemide (LASIX) 20 MG tablet, Take 1 tablet by mouth Daily., Disp: 30 tablet, Rfl: 0  •  hydrocortisone (ANUSOL-HC) 2.5 % rectal cream, Insert rectally 2 (Two) Times a Day., Disp: 30 g, Rfl: 0  •  levothyroxine (SYNTHROID, LEVOTHROID) 125 MCG tablet, Take 125 mcg by mouth Daily., Disp: , Rfl: 1  •  lidocaine-prilocaine (EMLA) 2.5-2.5 % cream, Apply  topically to the appropriate area as directed As Needed (45-60 minutes prior to port access.  Cover with saran/plastic wrap.)., Disp: 30 g, Rfl: 3  •  metoprolol succinate XL (TOPROL-XL) 100 MG 24  "hr tablet, Take 1/2 tablet by mouth Every 12 (Twelve) Hours., Disp: 30 tablet, Rfl: 0  •  ondansetron ODT (ZOFRAN-ODT) 4 MG disintegrating tablet, Take 1 tablet by mouth Every 12 (Twelve) Hours As Needed for Nausea or Vomiting., Disp: 10 tablet, Rfl: 0  •  oxyCODONE (ROXICODONE) 15 MG immediate release tablet, , Disp: , Rfl:   •  oxyCODONE-acetaminophen (PERCOCET)  MG per tablet, Take 1 tablet by mouth Every 6 (Six) Hours As Needed for Moderate Pain ., Disp: , Rfl:   •  pantoprazole (PROTONIX) 40 MG EC tablet, Take 1 tablet by mouth Daily., Disp: 30 tablet, Rfl: 0  •  polyethylene glycol (MIRALAX) 17 g packet, DISSOLVE 1 PACKET IN 8 OUNCES OF LIQUID AND DRINK ONCE DAILY, Disp: , Rfl:   •  predniSONE (DELTASONE) 50 MG tablet, Take 1 tablet by mouth Take As Directed. 1 tab 13 hours before scan, 1 tab 7 hours before, and 1 tab 1 hour before scan, Disp: 3 tablet, Rfl: 0  •  sacubitril-valsartan (ENTRESTO) 24-26 MG tablet, Take 1 tablet by mouth Every 12 (Twelve) Hours., Disp: 60 tablet, Rfl: 0  •  sertraline (ZOLOFT) 100 MG tablet, Take 100 mg by mouth Every Night. Total 150 mg daily, Disp: , Rfl:   •  sertraline (ZOLOFT) 50 MG tablet, Take 50 mg by mouth Daily. Total 150 MG daily dose, Disp: , Rfl:   •  spironolactone (ALDACTONE) 25 MG tablet, Take 1 tablet by mouth Daily., Disp: 30 tablet, Rfl: 0  •  zolpidem (AMBIEN) 5 MG tablet, TAKE 1 TABLET BY MOUTH AT BEDTIME FOR SLEEP STUDY, Disp: , Rfl: 0    PHYSICAL EXAMINATION:   /53   Pulse 75   Temp 96.9 °F (36.1 °C) (Temporal)   Resp 16   Ht 170.2 cm (67.01\")   Wt 131 kg (288 lb)   SpO2 98%   BMI 45.09 kg/m²    ECOG Performance Status: 1 - Symptomatic but completely ambulatory  General Appearance:  alert, cooperative, no apparent distress and appears stated age   Neurologic/Psychiatric: A&O x 3, gait steady, appropriate affect, strength 5/5 in all muscle groups   HEENT:  Normocephalic, without obvious abnormality, mucous membranes moist   Neck: Supple, " symmetrical, trachea midline, no adenopathy;  No thyromegaly, masses, or tenderness   Lungs:   Clear to auscultation bilaterally; respirations regular, even, and unlabored bilaterally   Heart:  Regular rate and rhythm, no murmurs appreciated   Abdomen:   Soft, non-tender, non-distended and no organomegaly   Lymph nodes: No cervical, supraclavicular, inguinal or axillary adenopathy noted   Extremities: Normal, atraumatic; no clubbing, cyanosis, or edema    Skin: No rashes, ulcers, or suspicious lesions noted         Glucose 74 - 98 mg/dL 123   133   127   122      BUN 7 - 20 mg/dL 10  18  11  6      Creatinine 0.60 - 1.30 mg/dL 0.60  0.50   0.50   0.50      Sodium 137 - 145 mmol/L 142  141  143  142     Potassium 3.5 - 5.1 mmol/L 3.6  3.4   3.3   3.6     Chloride 98 - 107 mmol/L 106  104  106  105     CO2 26.0 - 30.0 mmol/L 25.0   28.0  27.0  26.0     Calcium 8.4 - 10.2 mg/dL 8.9  9.1  8.9  8.4     Total Protein 6.3 - 8.2 g/dL 7.3  7.8  8.0  8.6      Albumin 3.50 - 5.00 g/dL 4.10  4.40  3.90  3.80     ALT (SGPT) 13 - 69 U/L 25  26  29  35     AST (SGOT) 15 - 46 U/L 26  25  24  24     Alkaline Phosphatase 38 - 126 U/L 51  54  59  70     Total Bilirubin 0.2 - 1.3 mg/dL 0.5  0.3  0.3  0.4     eGFR Non African Amer >60 mL/min/1.73 115  143  143  143     Globulin gm/dL 3.2  3.4  4.1  4.8     A/G Ratio 1.0 - 2.0 g/dL 1.3  1.3  1.0  0.8      BUN/Creatinine Ratio 7.1 - 23.5 16.7  36.0   22.0  12.0     Anion Gap 10.0 - 20.0 mmol/L 14.6  12.4  13.3  14.6    Resulting Agency   [facility]     CBC Auto Differential   Order: [id number] - Part of Panel Order [id number]   Status:  Final result   Visible to patient:  Yes (MyChart) Dx:  Anaplastic ALK-positive large cell ly...     Ref Range & Units 3wk ago  (7/11/18) 1mo ago  (6/13/18) 2mo ago  (5/24/18) 2mo ago  (5/4/18)    WBC 4.80 - 10.80 10*3/mm3 5.19  6.39  6.51  3.46      RBC 4.20 - 5.40 10*6/mm3 3.61   3.70   3.76   3.62      Hemoglobin 12.0 - 16.0  g/dL 9.8   10.0   9.9   9.5      Hematocrit 37.0 - 47.0 % 30.5   30.8   31.0   29.9      MCV 81.0 - 99.0 fL 84.5  83.2  82.4  82.6     MCH 27.0 - 31.0 pg 27.1  27.0  26.3   26.2      MCHC 30.0 - 37.0 g/dL 32.1  32.5  31.9  31.8     RDW 11.5 - 14.5 % 19.3   19.7   17.1   16.0      RDW-SD 37.0 - 54.0 fl 59.7   58.8   49.5  48.4     MPV 6.0 - 12.0 fL 8.2  8.1  8.0  8.5     Platelets 130 - 400 10*3/mm3 257  348  358  218     Neutrophil % 37.0 - 80.0 % 55.4  67.9  63.7  49.1     Lymphocyte % 10.0 - 50.0 % 28.3  20.5  25.7  33.2     Monocyte % 0.0 - 12.0 % 10.4  9.2  6.9  13.9      Eosinophil % 0.0 - 7.0 % 3.5  0.5  1.1  2.0     Basophil % 0.0 - 2.5 % 1.2  0.8  1.1  1.2     Immature Grans % 0.0 - 0.6 % 1.2   1.1   1.5   0.6     Neutrophils, Absolute 2.00 - 6.90 10*3/mm3 2.88  4.34  4.15  1.70      Lymphocytes, Absolute 0.60 - 3.40 10*3/mm3 1.47  1.31  1.67  1.15     Monocytes, Absolute 0.00 - 0.90 10*3/mm3 0.54  0.59  0.45  0.48     Eosinophils, Absolute 0.00 - 0.70 10*3/mm3 0.18  0.03  0.07  0.07     Basophils, Absolute 0.00 - 0.20 10*3/mm3 0.06  0.05  0.07  0.04     Immature Grans, Absolute 0.00 - 0.06 10*3/mm3 0.06  0.07   0.10   0.02     nRBC 0.0 - 0.0 /100 WBC 0.0  0.0  0.0  0.0    Resulting Agency  [facility]  [facility]  [facility]      Specimen Collected: 07/11/18 10:49 Last Resulted: 07/11/18 10:56                         Ct Chest With Contrast    Result Date: 6/8/2020  Narrative: PROCEDURE: CT CHEST W CONTRAST-, CT ABDOMEN PELVIS W CONTRAST-  HISTORY: Follow up Anaplastic large T-cell lymphoma, ALK positive:; 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  PROCEDURE: Axial images were obtained from the thoracic inlet through the pubic symphysis following the administration of Isovue 300 contrast.   FINDINGS:  CHEST: There is no evidence of mediastinal or axillary adenopathy. Heart size is normal. There are no pleural or pericardial effusions. Lung windows reveal no focal  opacities or suspicious pulmonary nodules. Bone windows reveal no lytic or destructive lesions.  ABDOMEN: The liver is homogeneous in appearance with no focal lesions. The spleen is enlarged measuring 14 cm which is unchanged. No focal splenic lesions are identified. No adrenal mass is present.  The pancreas is normal. The kidneys are unremarkable. The aorta is normal in caliber. There is no free fluid or adenopathy. The abdominal portions of the GI tract are unremarkable.  PELVIS: The appendix is not identified. The urinary bladder is unremarkable. There is a small amount of free fluid in the pelvis. Bone windows reveal no lytic or destructive lesions.      Impression: No evidence of recurrent or metastatic disease within the chest, abdomen or pelvis.  This study was performed with techniques to keep radiation doses as low as reasonably achievable (ALARA). Individualized dose reduction techniques using automated exposure control or adjustment of mA and/or kV according to the patient size were employed.  This report was finalized on 6/8/2020 10:06 AM by Mukesh Hong M.D..    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  PROCEDURE: Axial images were obtained from the thoracic inlet through the pubic symphysis following the administration of Isovue 300 contrast.   FINDINGS:  CHEST: There is no evidence of mediastinal or axillary adenopathy. Heart size is normal. There are no pleural or pericardial effusions. Lung windows reveal no focal opacities or suspicious pulmonary nodules. Bone windows reveal no lytic or destructive lesions.  ABDOMEN: The liver is homogeneous in appearance with no focal lesions. The spleen is enlarged measuring 14 cm which is unchanged. No focal splenic  lesions are identified. No adrenal mass is present.  The pancreas is normal. The kidneys are unremarkable. The aorta is normal in caliber. There is no free fluid or adenopathy. The abdominal portions of the GI tract are unremarkable.  PELVIS: The appendix is not identified. The urinary bladder is unremarkable. There is a small amount of free fluid in the pelvis. Bone windows reveal no lytic or destructive lesions.      Impression: No evidence of recurrent or metastatic disease within the chest, abdomen or pelvis.  This study was performed with techniques to keep radiation doses as low as reasonably achievable (ALARA). Individualized dose reduction techniques using automated exposure control or adjustment of mA and/or kV according to the patient size were employed.  This report was finalized on 6/8/2020 10:06 AM by Mukesh Hong M.D..      ASSESSMENT: The patient is a very pleasant 34 y.o. female  with anaplastic large T-cell lymphoma, ALK positive    PROBLEM LIST:  1. Anaplastic large T-cell lymphoma, ALK positive:  A. Incidentally found LAD on MRI done for low back and hip pain done 9/5/2017.   B. Follow up CAT scan confirmed pelvic adenopathy extending from the distal aortic region through the left iliac region.   C. Status post FNA to left iliac lymph node done 9/29/2017. Pathology revealed benign lymphoid cell groups.   D. Repeat CT done 3/15/2018 revealed increased size of left iliac adenopathy with mild splenomegaly.   E. CT-guided biopsy done on April 19, 2018 showed anaplastic large T-cell lymphoma ALK+  F. whole body PET scan done on May 1, 2018 revealed disease above and below the diaphragm with hypermetabolic active lymph nodes in the supraclavicular area as well as retroperitoneum.  G. Started chemotherapy using CHOP May 2, 2018, status post 6 cycles, completed August 22, 2018.   2. Mild splenomegaly   A. Incidentally found 9/5/2017.  B. Progressive size on repeat imaging done 3/15/2018.  3. Left  hip and low back pain  A. Under care of pain management with use of Norco.   4. History of anoxic brain injury following cardiac arrest post-partum.   5. Postpartum cardiomyopathy.   6. Anxiety and depression  7.  Constipation  8.  Chemotherapy used nausea.  9.  Hypertension  10.  Systolic cardiomyopathy:  11.  Ejection fraction dropped from 60% on May 2 2:30 percent on October 19, 2018. EF at 25% on 10/12/2019  12. Obesity  13. Atrial Fibrillation      PLAN:  1.   We discussed Ct scan of chest, Abdomen and pelvis showed no evidence of recurrent or metastatic disease within the chest, abdomen or pelvis. I reviewed the films myself.  2.  The patient will follow-up in 6 months with a CT of chest, abdomen, and pelvis. We will send in benadryl and prednisone for premedication today.   3.  The patient will continue to follow-up with Dr. Curtis from cardiology for atrial fibrillation and heart failure. Her cardiomyopathy could be induced by previous chemotherapy including Adriamycin.  4.  We'll continue Port-A-Cath care.  Patient would like to keep her port active.   5. I will continue the patient on Colace daily as well as lactulose as needed for constipation.  6. I will continue Zoloft for depression.  7. I will order labs for today to check hgb and HCt. If Hgb is less than 8 we will plan to transfuse with 2 units.   8. I advised the patient to start eating a healthy diet and try to start to exercise.  We also discussed that obesity was a risk factor for cancer and should try to decrease her BMI.       Laurel Preston, APRN    6/12/2020

## 2020-09-11 ENCOUNTER — APPOINTMENT (OUTPATIENT)
Dept: INFUSION THERAPY | Facility: HOSPITAL | Age: 35
End: 2020-09-11

## 2020-09-15 ENCOUNTER — TELEPHONE (OUTPATIENT)
Dept: INFUSION THERAPY | Facility: HOSPITAL | Age: 35
End: 2020-09-15

## 2020-09-16 ENCOUNTER — APPOINTMENT (OUTPATIENT)
Dept: INFUSION THERAPY | Facility: HOSPITAL | Age: 35
End: 2020-09-16

## 2020-09-22 ENCOUNTER — HOSPITAL ENCOUNTER (OUTPATIENT)
Dept: INFUSION THERAPY | Facility: HOSPITAL | Age: 35
Discharge: HOME | End: 2020-09-22
Admitting: INTERNAL MEDICINE

## 2020-09-22 VITALS
DIASTOLIC BLOOD PRESSURE: 49 MMHG | HEART RATE: 78 BPM | RESPIRATION RATE: 16 BRPM | BODY MASS INDEX: 45.99 KG/M2 | SYSTOLIC BLOOD PRESSURE: 115 MMHG | TEMPERATURE: 97.3 F | HEIGHT: 67 IN | OXYGEN SATURATION: 98 % | WEIGHT: 293 LBS

## 2020-09-22 DIAGNOSIS — C82.00 FOLLICULAR LYMPHOMA GRADE I, UNSPECIFIED BODY REGION (HCC): Primary | ICD-10-CM

## 2020-09-22 PROCEDURE — 96523 IRRIG DRUG DELIVERY DEVICE: CPT

## 2020-09-22 PROCEDURE — 25010000003 HEPARIN LOCK FLUSH PER 10 UNITS: Performed by: INTERNAL MEDICINE

## 2020-09-22 RX ORDER — HEPARIN SODIUM (PORCINE) LOCK FLUSH IV SOLN 100 UNIT/ML 100 UNIT/ML
500 SOLUTION INTRAVENOUS AS NEEDED
Status: DISCONTINUED | OUTPATIENT
Start: 2020-09-22 | End: 2020-09-24 | Stop reason: HOSPADM

## 2020-09-22 RX ORDER — HEPARIN SODIUM (PORCINE) LOCK FLUSH IV SOLN 100 UNIT/ML 100 UNIT/ML
500 SOLUTION INTRAVENOUS AS NEEDED
Status: CANCELLED | OUTPATIENT
Start: 2020-09-22

## 2020-09-22 RX ORDER — SODIUM CHLORIDE 0.9 % (FLUSH) 0.9 %
10 SYRINGE (ML) INJECTION AS NEEDED
Status: CANCELLED | OUTPATIENT
Start: 2020-09-22

## 2020-09-22 RX ORDER — SODIUM CHLORIDE 0.9 % (FLUSH) 0.9 %
10 SYRINGE (ML) INJECTION AS NEEDED
Status: DISCONTINUED | OUTPATIENT
Start: 2020-09-22 | End: 2020-09-24 | Stop reason: HOSPADM

## 2020-09-22 RX ADMIN — HEPARIN 500 UNITS: 100 SYRINGE at 08:47

## 2020-09-22 RX ADMIN — SODIUM CHLORIDE, PRESERVATIVE FREE 10 ML: 5 INJECTION INTRAVENOUS at 08:47

## 2020-11-24 ENCOUNTER — TELEPHONE (OUTPATIENT)
Dept: ONCOLOGY | Facility: CLINIC | Age: 35
End: 2020-11-24

## 2020-11-24 NOTE — TELEPHONE ENCOUNTER
Called and let patient know we would try to move patients scans up and to go to er if it was worsening per Laurel Preston, aprn below.  Daya verbalized understanding.  Discussed with

## 2020-11-24 NOTE — TELEPHONE ENCOUNTER
----- Message from DREW Lindsey sent at 11/24/2020 11:29 AM EST -----  Regarding: RE: pt complaints/scans  That's fine.  If we are unable to move up to next week let patient know she can go to ED if pain is worsening. Thanks  ----- Message -----  From: Jasmyn Verdugo RN  Sent: 11/24/2020  11:26 AM EST  To: DREW Lindsey  Subject: pt complaints/scans                              Laurel,    Please see message I got from rn doing triage.  Her scans are already scheduled for 12/7/20.  Would you want us to try to get them sooner or leave as is?  I will have to check with Castro if so.  Just let me know.     Jasmyn Barrera  ----- Message -----  From: Miguelina Leon RN  Sent: 11/24/2020  11:14 AM EST  To: Jasmyn Verdugo RN        Patient called triage line reporting pain in her hips and belly asking for Dr. Rice to move scans up early as possible. Patient stating she doesn't know when they are order but she would like to discuss. Her number is [phone]

## 2020-12-01 ENCOUNTER — HOSPITAL ENCOUNTER (OUTPATIENT)
Dept: CT IMAGING | Facility: HOSPITAL | Age: 35
Discharge: HOME | End: 2020-12-01

## 2020-12-01 DIAGNOSIS — C82.00 FOLLICULAR LYMPHOMA GRADE I, UNSPECIFIED BODY REGION (HCC): ICD-10-CM

## 2020-12-01 PROCEDURE — 71260 CT THORAX DX C+: CPT

## 2020-12-01 PROCEDURE — 25010000002 IOPAMIDOL 61 % SOLUTION: Performed by: NURSE PRACTITIONER

## 2020-12-01 PROCEDURE — 74177 CT ABD & PELVIS W/CONTRAST: CPT

## 2020-12-01 RX ADMIN — IOPAMIDOL 100 ML: 612 INJECTION, SOLUTION INTRAVENOUS at 17:06

## 2020-12-07 ENCOUNTER — APPOINTMENT (OUTPATIENT)
Dept: CT IMAGING | Facility: HOSPITAL | Age: 35
End: 2020-12-07

## 2020-12-09 ENCOUNTER — APPOINTMENT (OUTPATIENT)
Dept: ONCOLOGY | Facility: HOSPITAL | Age: 35
End: 2020-12-09

## 2020-12-09 ENCOUNTER — OFFICE VISIT (OUTPATIENT)
Dept: ONCOLOGY | Facility: CLINIC | Age: 35
End: 2020-12-09

## 2020-12-09 VITALS
DIASTOLIC BLOOD PRESSURE: 70 MMHG | RESPIRATION RATE: 16 BRPM | HEART RATE: 89 BPM | WEIGHT: 293 LBS | SYSTOLIC BLOOD PRESSURE: 129 MMHG | TEMPERATURE: 96.9 F | HEIGHT: 67 IN | BODY MASS INDEX: 45.99 KG/M2 | OXYGEN SATURATION: 97 %

## 2020-12-09 DIAGNOSIS — C84.68 ANAPLASTIC ALK-POSITIVE LARGE CELL LYMPHOMA OF LYMPH NODES OF MULTIPLE REGIONS (HCC): Primary | ICD-10-CM

## 2020-12-09 PROCEDURE — 99214 OFFICE O/P EST MOD 30 MIN: CPT | Performed by: INTERNAL MEDICINE

## 2020-12-09 RX ORDER — PREDNISONE 50 MG/1
50 TABLET ORAL TAKE AS DIRECTED
Qty: 3 TABLET | Refills: 0 | Status: SHIPPED | OUTPATIENT
Start: 2020-12-09 | End: 2021-06-03 | Stop reason: SDUPTHER

## 2020-12-09 RX ORDER — CHOLECALCIFEROL (VITAMIN D3) 50 MCG
2000 TABLET ORAL DAILY
COMMUNITY
Start: 2020-10-22 | End: 2022-09-28 | Stop reason: SDUPTHER

## 2020-12-09 RX ORDER — LEVOTHYROXINE SODIUM 150 UG/1
150 TABLET ORAL DAILY
COMMUNITY
Start: 2020-11-29 | End: 2022-08-08

## 2020-12-09 RX ORDER — MULTIVITAMIN WITH FOLIC ACID 400 MCG
1 TABLET ORAL DAILY
COMMUNITY
Start: 2020-10-20 | End: 2020-12-09

## 2020-12-09 NOTE — PROGRESS NOTES
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     HISTORY OF PRESENT ILLNESS: The patient is a very pleasant 35 y.o. female with past medical history significant for anaplastic large T-cell lymphoma, ALK positive diagnosed in April 19, 2018 after CT-guided core biopsy of left iliac mass.  Whole body PET scan revealed disease above and below the diaphragm.  Bone marrow biopsy done on May 1, 2018, that failed to show any evidence of bone marrow involvement.  The patient was started on chemotherapy using CHOP May 2, 2018.  She completed 6 cycles of August 22, 2018.  The patient is here today for scheduled follow-up visit.     SUBJECTIVE: The patient is here today by herself.  She is doing fairly well.  She is anxious with the scan results but she does complain of chronic pain bilateral hips which is most consistent with osteoarthritis.    PAST MEDICAL HISTORY/SOCIAL HISTORY/FAMILY HISTORY: Reviewed by me and unchanged from my documentation done on 08/22/18.    Review of Systems   Constitutional: Positive for fatigue. Negative for activity change, appetite change, chills, fever and unexpected weight change.   HENT: Negative for ear pain, hearing loss, mouth sores, nosebleeds, sore throat and trouble swallowing.    Eyes: Negative for visual disturbance.   Respiratory: Negative for cough, chest tightness, shortness of breath and wheezing.    Cardiovascular: Negative for chest pain, palpitations and leg swelling.   Gastrointestinal: Negative for abdominal distention, abdominal pain, blood in stool, constipation, diarrhea, nausea, rectal pain and vomiting.   Endocrine: Negative for cold intolerance and heat intolerance.   Genitourinary: Negative for difficulty urinating, dysuria, frequency and urgency.   Musculoskeletal: Positive for arthralgias. Negative for back pain, gait problem, joint swelling and myalgias.   Skin: Negative for rash.    Neurological: Negative for dizziness, tremors, syncope, weakness, light-headedness, numbness and headaches.   Hematological: Negative for adenopathy. Does not bruise/bleed easily.   Psychiatric/Behavioral: Negative for confusion, sleep disturbance and suicidal ideas. The patient is nervous/anxious.          Current Outpatient Medications:   •  albuterol (PROVENTIL) (2.5 MG/3ML) 0.083% nebulizer solution, USE 1 VIAL IN NEBULIZER EVERY 4 TO 6 HOURS AS NEEDED FOR WHEEZING, Disp: , Rfl: 3  •  albuterol sulfate  (90 Base) MCG/ACT inhaler, INHALE 1 TO 2 PUFFS BY MOUTH EVERY 4 TO 6 HOURS AS NEEDED AND AS DIRECTED, Disp: , Rfl: 3  •  amiodarone (PACERONE) 200 MG tablet, Take 1 tablet by mouth Daily., Disp: 30 tablet, Rfl: 0  •  atorvastatin (LIPITOR) 20 MG tablet, Take 1 tablet by mouth Every Night., Disp: 30 tablet, Rfl: 0  •  cetirizine (zyrTEC) 10 MG tablet, , Disp: , Rfl:   •  diphenhydrAMINE (BENADRYL) 50 MG tablet, Take 1 tablet by mouth Take As Directed. 1 hour prior to scan, Disp: 1 tablet, Rfl: 0  •  Ferrous Sulfate (IRON) 325 (65 Fe) MG tablet, Take 1 tablet by mouth 2 (Two) Times a Day., Disp: , Rfl: 3  •  fluticasone (FLONASE) 50 MCG/ACT nasal spray, 1 spray into the nostril(s) as directed by provider Daily As Needed., Disp: , Rfl:   •  furosemide (LASIX) 20 MG tablet, Take 1 tablet by mouth Daily., Disp: 30 tablet, Rfl: 0  •  hydrocortisone (ANUSOL-HC) 2.5 % rectal cream, Insert rectally 2 (Two) Times a Day., Disp: 30 g, Rfl: 0  •  levothyroxine (SYNTHROID, LEVOTHROID) 125 MCG tablet, Take 125 mcg by mouth Daily., Disp: , Rfl: 1  •  lidocaine-prilocaine (EMLA) 2.5-2.5 % cream, Apply  topically to the appropriate area as directed As Needed (45-60 minutes prior to port access.  Cover with saran/plastic wrap.)., Disp: 30 g, Rfl: 3  •  metoprolol succinate XL (TOPROL-XL) 100 MG 24 hr tablet, Take 1/2 tablet by mouth Every 12 (Twelve) Hours., Disp: 30 tablet, Rfl: 0  •  ondansetron ODT (ZOFRAN-ODT) 4 MG  disintegrating tablet, Take 1 tablet by mouth Every 12 (Twelve) Hours As Needed for Nausea or Vomiting., Disp: 10 tablet, Rfl: 0  •  oxyCODONE (ROXICODONE) 15 MG immediate release tablet, , Disp: , Rfl:   •  oxyCODONE-acetaminophen (PERCOCET)  MG per tablet, Take 1 tablet by mouth Every 6 (Six) Hours As Needed for Moderate Pain ., Disp: , Rfl:   •  pantoprazole (PROTONIX) 40 MG EC tablet, Take 1 tablet by mouth Daily., Disp: 30 tablet, Rfl: 0  •  polyethylene glycol (MIRALAX) 17 g packet, DISSOLVE 1 PACKET IN 8 OUNCES OF LIQUID AND DRINK ONCE DAILY, Disp: , Rfl:   •  predniSONE (DELTASONE) 50 MG tablet, Take 1 tablet by mouth Take As Directed. 1 tab 13 hours before scan, 1 tab 7 hours before, and 1 tab 1 hour before scan, Disp: 3 tablet, Rfl: 0  •  sacubitril-valsartan (ENTRESTO) 24-26 MG tablet, Take 1 tablet by mouth Every 12 (Twelve) Hours., Disp: 60 tablet, Rfl: 0  •  sertraline (ZOLOFT) 100 MG tablet, Take 100 mg by mouth Every Night. Total 150 mg daily, Disp: , Rfl:   •  sertraline (ZOLOFT) 50 MG tablet, Take 50 mg by mouth Daily. Total 150 MG daily dose, Disp: , Rfl:   •  spironolactone (ALDACTONE) 25 MG tablet, Take 1 tablet by mouth Daily., Disp: 30 tablet, Rfl: 0  •  zolpidem (AMBIEN) 5 MG tablet, TAKE 1 TABLET BY MOUTH AT BEDTIME FOR SLEEP STUDY, Disp: , Rfl: 0    PHYSICAL EXAMINATION:   There were no vitals taken for this visit.   ECOG Performance Status: 1 - Symptomatic but completely ambulatory  General Appearance:  alert, cooperative, no apparent distress and appears stated age   Neurologic/Psychiatric: A&O x 3, gait steady, appropriate affect, strength 5/5 in all muscle groups   HEENT:  Normocephalic, without obvious abnormality, mucous membranes moist   Neck: Supple, symmetrical, trachea midline, no adenopathy;  No thyromegaly, masses, or tenderness   Lungs:   Clear to auscultation bilaterally; respirations regular, even, and unlabored bilaterally   Heart:  Regular rate and rhythm, no murmurs  appreciated   Abdomen:   Soft, non-tender, non-distended and no organomegaly   Lymph nodes: No cervical, supraclavicular, inguinal or axillary adenopathy noted   Extremities: Normal, atraumatic; no clubbing, cyanosis, or edema    Skin: No rashes, ulcers, or suspicious lesions noted         Glucose 74 - 98 mg/dL 123   133   127   122      BUN 7 - 20 mg/dL 10  18  11  6      Creatinine 0.60 - 1.30 mg/dL 0.60  0.50   0.50   0.50      Sodium 137 - 145 mmol/L 142  141  143  142     Potassium 3.5 - 5.1 mmol/L 3.6  3.4   3.3   3.6     Chloride 98 - 107 mmol/L 106  104  106  105     CO2 26.0 - 30.0 mmol/L 25.0   28.0  27.0  26.0     Calcium 8.4 - 10.2 mg/dL 8.9  9.1  8.9  8.4     Total Protein 6.3 - 8.2 g/dL 7.3  7.8  8.0  8.6      Albumin 3.50 - 5.00 g/dL 4.10  4.40  3.90  3.80     ALT (SGPT) 13 - 69 U/L 25  26  29  35     AST (SGOT) 15 - 46 U/L 26  25  24  24     Alkaline Phosphatase 38 - 126 U/L 51  54  59  70     Total Bilirubin 0.2 - 1.3 mg/dL 0.5  0.3  0.3  0.4     eGFR Non African Amer >60 mL/min/1.73 115  143  143  143     Globulin gm/dL 3.2  3.4  4.1  4.8     A/G Ratio 1.0 - 2.0 g/dL 1.3  1.3  1.0  0.8      BUN/Creatinine Ratio 7.1 - 23.5 16.7  36.0   22.0  12.0     Anion Gap 10.0 - 20.0 mmol/L 14.6  12.4  13.3  14.6    Resulting Agency   [facility]     CBC Auto Differential   Order: [id number] - Part of Panel Order [id number]   Status:  Final result   Visible to patient:  Yes (MyChart) Dx:  Anaplastic ALK-positive large cell ly...     Ref Range & Units 3wk ago  (7/11/18) 1mo ago  (6/13/18) 2mo ago  (5/24/18) 2mo ago  (5/4/18)    WBC 4.80 - 10.80 10*3/mm3 5.19  6.39  6.51  3.46      RBC 4.20 - 5.40 10*6/mm3 3.61   3.70   3.76   3.62      Hemoglobin 12.0 - 16.0 g/dL 9.8   10.0   9.9   9.5      Hematocrit 37.0 - 47.0 % 30.5   30.8   31.0   29.9      MCV 81.0 - 99.0 fL 84.5  83.2  82.4  82.6     MCH 27.0 - 31.0 pg 27.1  27.0  26.3   26.2      MCHC 30.0 - 37.0 g/dL 32.1  32.5  31.9  31.8      RDW 11.5 - 14.5 % 19.3   19.7   17.1   16.0      RDW-SD 37.0 - 54.0 fl 59.7   58.8   49.5  48.4     MPV 6.0 - 12.0 fL 8.2  8.1  8.0  8.5     Platelets 130 - 400 10*3/mm3 257  348  358  218     Neutrophil % 37.0 - 80.0 % 55.4  67.9  63.7  49.1     Lymphocyte % 10.0 - 50.0 % 28.3  20.5  25.7  33.2     Monocyte % 0.0 - 12.0 % 10.4  9.2  6.9  13.9      Eosinophil % 0.0 - 7.0 % 3.5  0.5  1.1  2.0     Basophil % 0.0 - 2.5 % 1.2  0.8  1.1  1.2     Immature Grans % 0.0 - 0.6 % 1.2   1.1   1.5   0.6     Neutrophils, Absolute 2.00 - 6.90 10*3/mm3 2.88  4.34  4.15  1.70      Lymphocytes, Absolute 0.60 - 3.40 10*3/mm3 1.47  1.31  1.67  1.15     Monocytes, Absolute 0.00 - 0.90 10*3/mm3 0.54  0.59  0.45  0.48     Eosinophils, Absolute 0.00 - 0.70 10*3/mm3 0.18  0.03  0.07  0.07     Basophils, Absolute 0.00 - 0.20 10*3/mm3 0.06  0.05  0.07  0.04     Immature Grans, Absolute 0.00 - 0.06 10*3/mm3 0.06  0.07   0.10   0.02     nRBC 0.0 - 0.0 /100 WBC 0.0  0.0  0.0  0.0    Resulting Agency   [facility]  [facility]  [facility]      Specimen Collected: 07/11/18 10:49 Last Resulted: 07/11/18 10:56                         Ct Chest With Contrast    Result Date: 12/2/2020  Narrative: PROCEDURE: CT CHEST W CONTRAST-, CT ABDOMEN PELVIS W CONTRAST-  HISTORY: Follow up T-cell lymphoma; C82.00-Follicular lymphoma grade I, unspecified site  COMPARISON: 06/08/2020 and 12/09/2019.  PROCEDURE: Axial images were obtained from the thoracic inlet through the pubic symphysis following the administration of Isovue 300 contrast.   FINDINGS:  CHEST: There is no evidence of mediastinal or axillary adenopathy. Heart size is normal. There are no pleural or pericardial effusions. Lung windows reveal no focal opacities or suspicious pulmonary nodules. Bone windows reveal no lytic or destructive lesions.  ABDOMEN: The liver is diffusely hypodense consistent with fatty infiltration. No focal liver lesions are identified. There is mild splenomegaly which  is unchanged. No adrenal mass is present.  The pancreas is normal. The kidneys are unremarkable. The aorta is normal in caliber. There is no free fluid or adenopathy. The abdominal portions of the GI tract are unremarkable.  PELVIS: The appendix is not identified. The urinary bladder is unremarkable. Shotty appearing lymph nodes are seen in the left common and external iliac chains which are unchanged. There is no adenopathy per size criteria. Bone windows reveal no lytic or destructive lesions.      Impression: No evidence of recurrent or metastatic disease within the chest, abdomen or pelvis.  This study was performed with techniques to keep radiation doses as low as reasonably achievable (ALARA). Individualized dose reduction techniques using automated exposure control or adjustment of mA and/or kV according to the patient size were employed.  This report was finalized on 12/2/2020 8:01 AM by Mukesh Hong M.D..    Ct Abdomen Pelvis With Contrast    Result Date: 12/2/2020  Narrative: PROCEDURE: CT CHEST W CONTRAST-, CT ABDOMEN PELVIS W CONTRAST-  HISTORY: Follow up T-cell lymphoma; C82.00-Follicular lymphoma grade I, unspecified site  COMPARISON: 06/08/2020 and 12/09/2019.  PROCEDURE: Axial images were obtained from the thoracic inlet through the pubic symphysis following the administration of Isovue 300 contrast.   FINDINGS:  CHEST: There is no evidence of mediastinal or axillary adenopathy. Heart size is normal. There are no pleural or pericardial effusions. Lung windows reveal no focal opacities or suspicious pulmonary nodules. Bone windows reveal no lytic or destructive lesions.  ABDOMEN: The liver is diffusely hypodense consistent with fatty infiltration. No focal liver lesions are identified. There is mild splenomegaly which is unchanged. No adrenal mass is present.  The pancreas is normal. The kidneys are unremarkable. The aorta is normal in caliber. There is no free fluid or adenopathy. The abdominal  portions of the GI tract are unremarkable.  PELVIS: The appendix is not identified. The urinary bladder is unremarkable. Shotty appearing lymph nodes are seen in the left common and external iliac chains which are unchanged. There is no adenopathy per size criteria. Bone windows reveal no lytic or destructive lesions.      Impression: No evidence of recurrent or metastatic disease within the chest, abdomen or pelvis.  This study was performed with techniques to keep radiation doses as low as reasonably achievable (ALARA). Individualized dose reduction techniques using automated exposure control or adjustment of mA and/or kV according to the patient size were employed.  This report was finalized on 12/2/2020 8:01 AM by Mukesh Hong M.D..      ASSESSMENT: The patient is a very pleasant 35 y.o. female  with anaplastic large T-cell lymphoma, ALK positive    PROBLEM LIST:  1. Anaplastic large T-cell lymphoma, ALK positive:  A. Incidentally found LAD on MRI done for low back and hip pain done 9/5/2017.   B. Follow up CAT scan confirmed pelvic adenopathy extending from the distal aortic region through the left iliac region.   C. Status post FNA to left iliac lymph node done 9/29/2017. Pathology revealed benign lymphoid cell groups.   D. Repeat CT done 3/15/2018 revealed increased size of left iliac adenopathy with mild splenomegaly.   E. CT-guided biopsy done on April 19, 2018 showed anaplastic large T-cell lymphoma ALK+  F. whole body PET scan done on May 1, 2018 revealed disease above and below the diaphragm with hypermetabolic active lymph nodes in the supraclavicular area as well as retroperitoneum.  G. Started chemotherapy using CHOP May 2, 2018, status post 6 cycles, completed August 22, 2018.   2. Mild splenomegaly   A. Incidentally found 9/5/2017.  B. Progressive size on repeat imaging done 3/15/2018.  3. Left hip and low back pain  A. Under care of pain management with use of Norco.   4. 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   5. Postpartum cardiomyopathy.   6. Anxiety and depression  7.  Constipation  8.  Chemotherapy used nausea.  9.  Hypertension  10.  Systolic cardiomyopathy:  11.  Ejection fraction dropped from 60% on May 2 2:30 percent on October 19, 2018. EF at 25% on 10/12/2019  12. Obesity  13. Atrial Fibrillation      PLAN:  1.   We discussed Ct scan of chest, Abdomen and pelvis showed no evidence of recurrent or metastatic disease within the chest, abdomen or pelvis. I reviewed the films myself.  2.  The patient will follow-up in 6 months with a CT of chest, abdomen, and pelvis. We will send in benadryl and prednisone for premedication today.   3.  The patient will continue to follow-up with Dr. Curtis from cardiology for atrial fibrillation and heart failure. Her cardiomyopathy could be induced by previous chemotherapy including Adriamycin.  4.  We'll continue Port-A-Cath care.  Patient would like to keep her port active.  She is a very poor IV access.  5. I will continue the patient on Colace daily as well as lactulose as needed for constipation.  6. I will continue Zoloft for depression.  7. I will order labs for today to check hgb and HCt. If Hgb is less than 8 we will plan to transfuse with 2 units.   8. I advised the patient to start eating a healthy diet and try to start to exercise.  We also discussed that obesity was a risk factor for cancer and should try to decrease her BMI.       Carlo Rice MD    12/9/2020

## 2021-03-10 ENCOUNTER — APPOINTMENT (OUTPATIENT)
Dept: GENERAL RADIOLOGY | Facility: HOSPITAL | Age: 36
End: 2021-03-10

## 2021-03-10 ENCOUNTER — HOSPITAL ENCOUNTER (EMERGENCY)
Facility: HOSPITAL | Age: 36
Discharge: HOME | End: 2021-03-10
Attending: EMERGENCY MEDICINE | Admitting: EMERGENCY MEDICINE

## 2021-03-10 VITALS
OXYGEN SATURATION: 96 % | DIASTOLIC BLOOD PRESSURE: 79 MMHG | WEIGHT: 293 LBS | SYSTOLIC BLOOD PRESSURE: 125 MMHG | TEMPERATURE: 98.4 F | RESPIRATION RATE: 16 BRPM | HEIGHT: 67 IN | BODY MASS INDEX: 45.99 KG/M2 | HEART RATE: 83 BPM

## 2021-03-10 DIAGNOSIS — S43.409A SPRAIN OF SHOULDER, UNSPECIFIED LATERALITY, UNSPECIFIED SHOULDER SPRAIN TYPE, INITIAL ENCOUNTER: Primary | ICD-10-CM

## 2021-03-10 PROCEDURE — 73030 X-RAY EXAM OF SHOULDER: CPT

## 2021-03-10 PROCEDURE — 99282 EMERGENCY DEPT VISIT SF MDM: CPT

## 2021-03-10 NOTE — ED PROVIDER NOTES
"Subjective   35-year-old female presents with bilateral shoulder pain, she fell onto outstretched arms and jammed her shoulders, this happened 2 days ago.  It hurts when she moves her shoulders.      History provided by:  Patient   used: No        Review of Systems   Musculoskeletal:        Bilateral shoulder pain   All other systems reviewed and are negative.      Past Medical History:   Diagnosis Date   • A-fib (CMS/HCC)    • Anesthesia     pt reports \"screamed for my  and wouldnt calm down when anesthesia gas used\".   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   • Cancer (CMS/HCC)     LYMPHOMA STAGE 3   • Cardiac arrest (CMS/HCC)     dionisio partum cardiomyopathy   • Cardiomyopathy (CMS/HCC)    • CHF (congestive heart failure) (CMS/HCC)    • Dermatitis     chronic on face   • Fatty liver    • Full dentures     DOESNT WEAR   • H/O chronic ear infection    • Headaches due to old head trauma    • Heavy menses    • Hemorrhoids    • Hip pain, left    • Hyperlipidemia    • Hypertension    • Short-term memory loss    • Stuttering in conditions classified elsewhere     IF PT GETS TOO NERVOUS OR EXCITED HAS STUTTERING   • 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   • Macrobid [Nitrofurantoin Monohyd Macro] Unknown - Low Severity     Pt does not know       Past Surgical History:   Procedure Laterality Date   • CARDIAC CATHETERIZATION N/A 10/22/2018    Procedure: Left Heart Cath;  Surgeon: Zheng Curtis MD;  Location:  [facility]ATH [facility];  Service: Cardiology   • CARDIAC CATHETERIZATION N/A 10/22/2018    Procedure: Coronary angiography;  Surgeon: Zheng Curtis MD;  Location: [facility] CATH INVASIVE LOCATION;  Service: Cardiology   • CARDIAC CATHETERIZATION N/A 10/22/2018    " Procedure: Left ventriculography;  Surgeon: Zheng Curtis MD;  Location: [facility] CATH INVASIVE LOCATION;  Service: Cardiology   •  SECTION     • COLONOSCOPY N/A 2019    Procedure: COLONOSCOPY;  Surgeon: Venkata Wiley MD;  Location: [facility] ENDOSCOPY;  Service: Gastroenterology   • COLONOSCOPY N/A 10/22/2019    Procedure: COLONOSCOPY WITH HOT FORCEP POYLPECTOMY;  Surgeon: Rob Tyson MD;  Location: [facility] ENDOSCOPY;  Service: Gastroenterology   • ENDOMETRIAL ABLATION     • ENDOSCOPY N/A 2019    Procedure: ESOPHAGOGASTRODUODENOSCOPY WITH COLD FORCEP BIOPSY;  Surgeon: Venkata Wiley MD;  Location: [facility] ENDOSCOPY;  Service: Gastroenterology   • ENDOSCOPY N/A 10/22/2019    Procedure: ESOPHAGOGASTRODUODENOSCOPY W/ COLD FORCEP BIOPSY;  Surgeon: Rob Tyson MD;  Location: [facility] ENDOSCOPY;  Service: Gastroenterology   • FEMORAL LYMPH NODE BIOPSY/EXCISON Left     lt illiac    • HEMORRHOIDECTOMY N/A 2019    Procedure: HEMORRHOIDECTOMY;  Surgeon: Venkata Wiley MD;  Location: [facility] OR;  Service: General   • PORTACATH PLACEMENT N/A 2018    Procedure: INSERTION OF PORTACATH right subclavian;  Surgeon: Shelley Brock MD;  Location: [facility] OR;  Service: General   • TUBAL ABDOMINAL LIGATION         Family History   Problem Relation Age of Onset   • Hypertension Father    • Diabetes Father    • Diabetes Paternal Grandmother        Social History     Socioeconomic History   • Marital status:      Spouse name: Not on file   • Number of children: Not on file   • Years of education: Not on file   • Highest education level: Not on file   Tobacco Use   • Smoking status: Never Smoker   • Smokeless tobacco: Never Used   Substance and Sexual Activity   • Alcohol use: No   • Drug use: No   • Sexual activity: Defer           Objective   Physical Exam  Vitals and nursing note reviewed.   Constitutional:       Appearance: She is well-developed.   HENT:      Head:  Normocephalic.   Cardiovascular:      Rate and Rhythm: Normal rate and regular rhythm.   Pulmonary:      Effort: Pulmonary effort is normal.   Musculoskeletal:         General: Tenderness present. No deformity.      Cervical back: Normal range of motion and neck supple.   Skin:     General: Skin is warm and dry.   Neurological:      Mental Status: She is alert and oriented to person, place, and time.      Deep Tendon Reflexes: Reflexes are normal and symmetric.         Procedures           ED Course                                           MDM  Number of Diagnoses or Management Options  Sprain of shoulder, unspecified laterality, unspecified shoulder sprain type, initial encounter: new and requires workup  Risk of Complications, Morbidity, and/or Mortality  Presenting problems: minimal  Management options: minimal    Patient Progress  Patient progress: stable      Final diagnoses:   Sprain of shoulder, unspecified laterality, unspecified shoulder sprain type, initial encounter            Godwin Zuñiga Jr., PA-C  03/10/21 1927

## 2021-06-02 ENCOUNTER — APPOINTMENT (OUTPATIENT)
Dept: CT IMAGING | Facility: HOSPITAL | Age: 36
End: 2021-06-02

## 2021-06-03 ENCOUNTER — HOSPITAL ENCOUNTER (OUTPATIENT)
Dept: CT IMAGING | Facility: HOSPITAL | Age: 36
Discharge: HOME | End: 2021-06-03

## 2021-06-03 DIAGNOSIS — C84.68 ANAPLASTIC ALK-POSITIVE LARGE CELL LYMPHOMA OF LYMPH NODES OF MULTIPLE REGIONS (HCC): ICD-10-CM

## 2021-06-03 RX ORDER — PREDNISONE 50 MG/1
50 TABLET ORAL TAKE AS DIRECTED
Qty: 3 TABLET | Refills: 0 | Status: SHIPPED | OUTPATIENT
Start: 2021-06-03 | End: 2021-10-06 | Stop reason: SDUPTHER

## 2021-06-03 NOTE — TELEPHONE ENCOUNTER
Received call from radiology that patient was there for scan but did not take her pre-meds due to allergy contrast and were uncomfortable giving her contrast with her allergy history.  Asking for us to reschedule and resend her pre-meds in as she did not take.  They advised they would let patient know we are sending new script to [facility] as previously and that our office would get scans rescheduled.  Routing script to NP to send in for premeds.

## 2021-06-08 ENCOUNTER — HOSPITAL ENCOUNTER (OUTPATIENT)
Dept: CT IMAGING | Facility: HOSPITAL | Age: 36
Discharge: HOME | End: 2021-06-08

## 2021-06-08 PROCEDURE — 71260 CT THORAX DX C+: CPT

## 2021-06-08 PROCEDURE — 74177 CT ABD & PELVIS W/CONTRAST: CPT

## 2021-06-08 PROCEDURE — 25010000002 IOPAMIDOL 61 % SOLUTION: Performed by: INTERNAL MEDICINE

## 2021-06-08 RX ADMIN — IOPAMIDOL 100 ML: 612 INJECTION, SOLUTION INTRAVENOUS at 17:45

## 2021-10-05 NOTE — PROGRESS NOTES
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     HISTORY OF PRESENT ILLNESS: The patient is a very pleasant 36 y.o. female with past medical history significant for anaplastic large T-cell lymphoma, ALK positive diagnosed in April 19, 2018 after CT-guided core biopsy of left iliac mass.  Whole body PET scan revealed disease above and below the diaphragm.  Bone marrow biopsy done on May 1, 2018, that failed to show any evidence of bone marrow involvement.  The patient was started on chemotherapy using CHOP May 2, 2018.  She completed 6 cycles of August 22, 2018.  The patient is here today for scheduled follow-up visit.     SUBJECTIVE: The patient is here today by herself.  She is doing fairly well.  No new complaints since the last visit.  She is anxious with the scan results.    PAST MEDICAL HISTORY/SOCIAL HISTORY/FAMILY HISTORY: Reviewed by me and unchanged from my documentation done on 08/22/18.    Review of Systems   Constitutional: Positive for fatigue. Negative for activity change, appetite change, chills, fever and unexpected weight change.   HENT: Negative for ear pain, hearing loss, mouth sores, nosebleeds, sore throat and trouble swallowing.    Eyes: Negative for visual disturbance.   Respiratory: Negative for cough, chest tightness, shortness of breath and wheezing.    Cardiovascular: Negative for chest pain, palpitations and leg swelling.   Gastrointestinal: Negative for abdominal distention, abdominal pain, blood in stool, constipation, diarrhea, nausea, rectal pain and vomiting.   Endocrine: Negative for cold intolerance and heat intolerance.   Genitourinary: Negative for difficulty urinating, dysuria, frequency and urgency.   Musculoskeletal: Positive for arthralgias. Negative for back pain, gait problem, joint swelling and myalgias.   Skin: Negative for rash.   Neurological: Negative for dizziness, tremors, syncope, weakness,  light-headedness, numbness and headaches.   Hematological: Negative for adenopathy. Does not bruise/bleed easily.   Psychiatric/Behavioral: Negative for confusion, sleep disturbance and suicidal ideas. The patient is nervous/anxious.          Current Outpatient Medications:   •  albuterol (PROVENTIL) (2.5 MG/3ML) 0.083% nebulizer solution, USE 1 VIAL IN NEBULIZER EVERY 4 TO 6 HOURS AS NEEDED FOR WHEEZING, Disp: , Rfl: 3  •  albuterol sulfate  (90 Base) MCG/ACT inhaler, INHALE 1 TO 2 PUFFS BY MOUTH EVERY 4 TO 6 HOURS AS NEEDED AND AS DIRECTED, Disp: , Rfl: 3  •  cetirizine (zyrTEC) 10 MG tablet, , Disp: , Rfl:   •  Cholecalciferol (Vitamin D) 50 MCG (2000 UT) tablet, Take  by mouth Daily., Disp: , Rfl:   •  diphenhydrAMINE (BENADRYL) 50 MG tablet, Take 1 tablet by mouth Take As Directed. 1 hour prior to scan, Disp: 1 tablet, Rfl: 0  •  Euthyrox 150 MCG tablet, Take 150 mcg by mouth Daily. as directed, Disp: , Rfl:   •  Ferrous Sulfate (IRON) 325 (65 Fe) MG tablet, Take 1 tablet by mouth 2 (Two) Times a Day., Disp: , Rfl: 3  •  fluticasone (FLONASE) 50 MCG/ACT nasal spray, 1 spray into the nostril(s) as directed by provider Daily As Needed., Disp: , Rfl:   •  furosemide (LASIX) 20 MG tablet, Take 1 tablet by mouth Daily., Disp: 30 tablet, Rfl: 0  •  hydrocortisone (ANUSOL-HC) 2.5 % rectal cream, Insert rectally 2 (Two) Times a Day., Disp: 30 g, Rfl: 0  •  levothyroxine (SYNTHROID, LEVOTHROID) 125 MCG tablet, Take 125 mcg by mouth Daily., Disp: , Rfl: 1  •  lidocaine-prilocaine (EMLA) 2.5-2.5 % cream, Apply  topically to the appropriate area as directed As Needed (45-60 minutes prior to port access.  Cover with saran/plastic wrap.)., Disp: 30 g, Rfl: 3  •  metoprolol succinate XL (TOPROL-XL) 100 MG 24 hr tablet, Take 1/2 tablet by mouth Every 12 (Twelve) Hours., Disp: 30 tablet, Rfl: 0  •  multivitamin with minerals tablet tablet, Take 1 tablet by mouth Daily., Disp: , Rfl:   •  ondansetron ODT (ZOFRAN-ODT) 4 MG  "disintegrating tablet, Take 1 tablet by mouth Every 12 (Twelve) Hours As Needed for Nausea or Vomiting., Disp: 10 tablet, Rfl: 0  •  oxyCODONE (ROXICODONE) 15 MG immediate release tablet, , Disp: , Rfl:   •  polyethylene glycol (MIRALAX) 17 g packet, DISSOLVE 1 PACKET IN 8 OUNCES OF LIQUID AND DRINK ONCE DAILY, Disp: , Rfl:   •  predniSONE (DELTASONE) 50 MG tablet, Take 1 tablet by mouth Take As Directed. 1 tab 13 hours before scan, 1 tab 7 hours before, and 1 tab 1 hour before scan, Disp: 3 tablet, Rfl: 0  •  sacubitril-valsartan (ENTRESTO) 24-26 MG tablet, Take 1 tablet by mouth Every 12 (Twelve) Hours., Disp: 60 tablet, Rfl: 0  •  sertraline (ZOLOFT) 100 MG tablet, Take 100 mg by mouth Every Night. Total 150 mg daily, Disp: , Rfl:   •  sertraline (ZOLOFT) 50 MG tablet, Take 50 mg by mouth Daily. Total 150 MG daily dose, Disp: , Rfl:   No current facility-administered medications for this visit.    Facility-Administered Medications Ordered in Other Visits:   •  heparin injection 500 Units, 500 Units, Intravenous, PRN, Laurel Preston APRN, 500 Units at 10/06/21 1526  •  sodium chloride 0.9 % flush 10 mL, 10 mL, Intravenous, PRN, Laurel Preston, APRN, 10 mL at 10/06/21 1526    PHYSICAL EXAMINATION:   /63   Pulse 89   Temp 97.3 °F (36.3 °C) (Temporal)   Resp 16   Ht 170.2 cm (67\")   Wt (!) 139 kg (306 lb)   SpO2 98%   BMI 47.93 kg/m²    ECOG Performance Status: 1 - Symptomatic but completely ambulatory  General Appearance:  alert, cooperative, no apparent distress and appears stated age   Neurologic/Psychiatric: A&O x 3, gait steady, appropriate affect, strength 5/5 in all muscle groups   HEENT:  Normocephalic, without obvious abnormality, mucous membranes moist   Neck: Supple, symmetrical, trachea midline, no adenopathy;  No thyromegaly, masses, or tenderness   Lungs:   Clear to auscultation bilaterally; respirations regular, even, and unlabored bilaterally   Heart:  Regular rate and rhythm, no murmurs " appreciated   Abdomen:   Soft, non-tender, non-distended and no organomegaly   Lymph nodes: No cervical, supraclavicular, inguinal or axillary adenopathy noted   Extremities: Normal, atraumatic; no clubbing, cyanosis, or edema    Skin: No rashes, ulcers, or suspicious lesions noted         Glucose 74 - 98 mg/dL 123   133   127   122      BUN 7 - 20 mg/dL 10  18  11  6      Creatinine 0.60 - 1.30 mg/dL 0.60  0.50   0.50   0.50      Sodium 137 - 145 mmol/L 142  141  143  142     Potassium 3.5 - 5.1 mmol/L 3.6  3.4   3.3   3.6     Chloride 98 - 107 mmol/L 106  104  106  105     CO2 26.0 - 30.0 mmol/L 25.0   28.0  27.0  26.0     Calcium 8.4 - 10.2 mg/dL 8.9  9.1  8.9  8.4     Total Protein 6.3 - 8.2 g/dL 7.3  7.8  8.0  8.6      Albumin 3.50 - 5.00 g/dL 4.10  4.40  3.90  3.80     ALT (SGPT) 13 - 69 U/L 25  26  29  35     AST (SGOT) 15 - 46 U/L 26  25  24  24     Alkaline Phosphatase 38 - 126 U/L 51  54  59  70     Total Bilirubin 0.2 - 1.3 mg/dL 0.5  0.3  0.3  0.4     eGFR Non African Amer >60 mL/min/1.73 115  143  143  143     Globulin gm/dL 3.2  3.4  4.1  4.8     A/G Ratio 1.0 - 2.0 g/dL 1.3  1.3  1.0  0.8      BUN/Creatinine Ratio 7.1 - 23.5 16.7  36.0   22.0  12.0     Anion Gap 10.0 - 20.0 mmol/L 14.6  12.4  13.3  14.6    Resulting Agency   [facility]     CBC Auto Differential   Order: [id number] - Part of Panel Order [id number]   Status:  Final result   Visible to patient:  Yes (MyChart) Dx:  Anaplastic ALK-positive large cell ly...     Ref Range & Units 3wk ago  (7/11/18) 1mo ago  (6/13/18) 2mo ago  (5/24/18) 2mo ago  (5/4/18)    WBC 4.80 - 10.80 10*3/mm3 5.19  6.39  6.51  3.46      RBC 4.20 - 5.40 10*6/mm3 3.61   3.70   3.76   3.62      Hemoglobin 12.0 - 16.0 g/dL 9.8   10.0   9.9   9.5      Hematocrit 37.0 - 47.0 % 30.5   30.8   31.0   29.9      MCV 81.0 - 99.0 fL 84.5  83.2  82.4  82.6     MCH 27.0 - 31.0 pg 27.1  27.0  26.3   26.2      MCHC 30.0 - 37.0 g/dL 32.1  32.5  31.9  31.8      RDW 11.5 - 14.5 % 19.3   19.7   17.1   16.0      RDW-SD 37.0 - 54.0 fl 59.7   58.8   49.5  48.4     MPV 6.0 - 12.0 fL 8.2  8.1  8.0  8.5     Platelets 130 - 400 10*3/mm3 257  348  358  218     Neutrophil % 37.0 - 80.0 % 55.4  67.9  63.7  49.1     Lymphocyte % 10.0 - 50.0 % 28.3  20.5  25.7  33.2     Monocyte % 0.0 - 12.0 % 10.4  9.2  6.9  13.9      Eosinophil % 0.0 - 7.0 % 3.5  0.5  1.1  2.0     Basophil % 0.0 - 2.5 % 1.2  0.8  1.1  1.2     Immature Grans % 0.0 - 0.6 % 1.2   1.1   1.5   0.6     Neutrophils, Absolute 2.00 - 6.90 10*3/mm3 2.88  4.34  4.15  1.70      Lymphocytes, Absolute 0.60 - 3.40 10*3/mm3 1.47  1.31  1.67  1.15     Monocytes, Absolute 0.00 - 0.90 10*3/mm3 0.54  0.59  0.45  0.48     Eosinophils, Absolute 0.00 - 0.70 10*3/mm3 0.18  0.03  0.07  0.07     Basophils, Absolute 0.00 - 0.20 10*3/mm3 0.06  0.05  0.07  0.04     Immature Grans, Absolute 0.00 - 0.06 10*3/mm3 0.06  0.07   0.10   0.02     nRBC 0.0 - 0.0 /100 WBC 0.0  0.0  0.0  0.0    Resulting Agency  [facility]      Specimen Collected: 07/11/18 10:49 Last Resulted: 07/11/18 10:56                         No results found.    ASSESSMENT: The patient is a very pleasant 36 y.o. female  with anaplastic large T-cell lymphoma, ALK positive    PROBLEM LIST:  1. Anaplastic large T-cell lymphoma, ALK positive:  A. Incidentally found LAD on MRI done for low back and hip pain done 9/5/2017.   B. Follow up CAT scan confirmed pelvic adenopathy extending from the distal aortic region through the left iliac region.   C. Status post FNA to left iliac lymph node done 9/29/2017. Pathology revealed benign lymphoid cell groups.   D. Repeat CT done 3/15/2018 revealed increased size of left iliac adenopathy with mild splenomegaly.   E. CT-guided biopsy done on April 19, 2018 showed anaplastic large T-cell lymphoma ALK+  F. whole body PET scan done on May 1, 2018 revealed disease above and below the diaphragm with hypermetabolic  active lymph nodes in the supraclavicular area as well as retroperitoneum.  G. Started chemotherapy using CHOP May 2, 2018, status post 6 cycles, completed August 22, 2018.   2. Mild splenomegaly   A. Incidentally found 9/5/2017.  B. Progressive size on repeat imaging done 3/15/2018.  3. Left hip and low back pain  A. Under care of pain management with use of Norco.   4. History of anoxic brain injury following cardiac arrest post-partum.   5. Postpartum cardiomyopathy.   6. Anxiety and depression  7.  Constipation  8.  Chemotherapy used nausea.  9.  Hypertension  10.  Systolic cardiomyopathy:  11.  Ejection fraction dropped from 60% on May 2 2:30 percent on October 19, 2018. EF at 25% on 10/12/2019  12. Obesity  13. Atrial Fibrillation      PLAN:  1.    I did go over the scan results from June 8, 2021 reassured the patient no evidence of recurrent or metastatic disease.  Her 6-month follow-up scans will be due December 2021. We will send in benadryl and prednisone for premedication today.   2.  The patient will follow up with me in 3 months with port flush.    3.  The patient will continue to follow-up with Dr. Curtis from cardiology for atrial fibrillation and heart failure. Her cardiomyopathy could be induced by previous chemotherapy including Adriamycin.  4.  We'll continue Port-A-Cath care.  Patient would like to keep her port active.  She is a very poor IV access.  5. I will continue the patient on Colace daily as well as lactulose as needed for constipation.  6. I will continue Zoloft for depression.  7. I will order labs for today to check hgb and HCt. If Hgb is less than 8 we will plan to transfuse with 2 units.   8. I advised the patient to start eating a healthy diet and try to start to exercise.  We also discussed that obesity was a risk factor for cancer and should try to decrease her BMI.       Carlo Rice MD    10/6/2021

## 2021-10-06 ENCOUNTER — OFFICE VISIT (OUTPATIENT)
Dept: ONCOLOGY | Facility: CLINIC | Age: 36
End: 2021-10-06

## 2021-10-06 ENCOUNTER — INFUSION (OUTPATIENT)
Dept: ONCOLOGY | Facility: HOSPITAL | Age: 36
End: 2021-10-06

## 2021-10-06 VITALS
DIASTOLIC BLOOD PRESSURE: 63 MMHG | RESPIRATION RATE: 16 BRPM | SYSTOLIC BLOOD PRESSURE: 122 MMHG | WEIGHT: 293 LBS | HEART RATE: 89 BPM | TEMPERATURE: 97.3 F | HEIGHT: 67 IN | BODY MASS INDEX: 45.99 KG/M2 | OXYGEN SATURATION: 98 %

## 2021-10-06 DIAGNOSIS — C84.68 ANAPLASTIC ALK-POSITIVE LARGE CELL LYMPHOMA OF LYMPH NODES OF MULTIPLE REGIONS (HCC): Primary | ICD-10-CM

## 2021-10-06 DIAGNOSIS — C82.00 FOLLICULAR LYMPHOMA GRADE I, UNSPECIFIED BODY REGION (HCC): Primary | ICD-10-CM

## 2021-10-06 DIAGNOSIS — C84.68 ANAPLASTIC ALK-POSITIVE LARGE CELL LYMPHOMA OF LYMPH NODES OF MULTIPLE REGIONS (HCC): ICD-10-CM

## 2021-10-06 DIAGNOSIS — C82.00 FOLLICULAR LYMPHOMA GRADE I, UNSPECIFIED BODY REGION (HCC): ICD-10-CM

## 2021-10-06 LAB
ALBUMIN SERPL-MCNC: 4.7 G/DL (ref 3.5–5.2)
ALBUMIN/GLOB SERPL: 1.7 G/DL
ALP SERPL-CCNC: 85 U/L (ref 39–117)
ALT SERPL W P-5'-P-CCNC: 38 U/L (ref 1–33)
ANION GAP SERPL CALCULATED.3IONS-SCNC: 9.7 MMOL/L (ref 5–15)
AST SERPL-CCNC: 36 U/L (ref 1–32)
BASOPHILS # BLD AUTO: 0.04 10*3/MM3 (ref 0–0.2)
BASOPHILS NFR BLD AUTO: 0.5 % (ref 0–1.5)
BILIRUB SERPL-MCNC: 0.4 MG/DL (ref 0–1.2)
BUN SERPL-MCNC: 12 MG/DL (ref 6–20)
BUN/CREAT SERPL: 15.6 (ref 7–25)
CALCIUM SPEC-SCNC: 9.1 MG/DL (ref 8.6–10.5)
CHLORIDE SERPL-SCNC: 102 MMOL/L (ref 98–107)
CO2 SERPL-SCNC: 27.3 MMOL/L (ref 22–29)
CREAT SERPL-MCNC: 0.77 MG/DL (ref 0.57–1)
DEPRECATED RDW RBC AUTO: 44.3 FL (ref 37–54)
EOSINOPHIL # BLD AUTO: 0.08 10*3/MM3 (ref 0–0.4)
EOSINOPHIL NFR BLD AUTO: 1.1 % (ref 0.3–6.2)
ERYTHROCYTE [DISTWIDTH] IN BLOOD BY AUTOMATED COUNT: 14.5 % (ref 12.3–15.4)
GFR SERPL CREATININE-BSD FRML MDRD: 85 ML/MIN/1.73
GLOBULIN UR ELPH-MCNC: 2.8 GM/DL
GLUCOSE SERPL-MCNC: 107 MG/DL (ref 65–99)
HCT VFR BLD AUTO: 38.9 % (ref 34–46.6)
HGB BLD-MCNC: 12.7 G/DL (ref 12–15.9)
IMM GRANULOCYTES # BLD AUTO: 0.05 10*3/MM3 (ref 0–0.05)
IMM GRANULOCYTES NFR BLD AUTO: 0.7 % (ref 0–0.5)
LDH SERPL-CCNC: 191 U/L (ref 135–214)
LYMPHOCYTES # BLD AUTO: 1.66 10*3/MM3 (ref 0.7–3.1)
LYMPHOCYTES NFR BLD AUTO: 22.7 % (ref 19.6–45.3)
MCH RBC QN AUTO: 27.5 PG (ref 26.6–33)
MCHC RBC AUTO-ENTMCNC: 32.6 G/DL (ref 31.5–35.7)
MCV RBC AUTO: 84.4 FL (ref 79–97)
MONOCYTES # BLD AUTO: 0.48 10*3/MM3 (ref 0.1–0.9)
MONOCYTES NFR BLD AUTO: 6.6 % (ref 5–12)
NEUTROPHILS NFR BLD AUTO: 5.01 10*3/MM3 (ref 1.7–7)
NEUTROPHILS NFR BLD AUTO: 68.4 % (ref 42.7–76)
NRBC BLD AUTO-RTO: 0 /100 WBC (ref 0–0.2)
PLATELET # BLD AUTO: 213 10*3/MM3 (ref 140–450)
PMV BLD AUTO: 8.5 FL (ref 6–12)
POTASSIUM SERPL-SCNC: 3.6 MMOL/L (ref 3.5–5.2)
PROT SERPL-MCNC: 7.5 G/DL (ref 6–8.5)
RBC # BLD AUTO: 4.61 10*6/MM3 (ref 3.77–5.28)
SODIUM SERPL-SCNC: 139 MMOL/L (ref 136–145)
WBC # BLD AUTO: 7.32 10*3/MM3 (ref 3.4–10.8)

## 2021-10-06 PROCEDURE — 36415 COLL VENOUS BLD VENIPUNCTURE: CPT

## 2021-10-06 PROCEDURE — 99214 OFFICE O/P EST MOD 30 MIN: CPT | Performed by: INTERNAL MEDICINE

## 2021-10-06 PROCEDURE — 80053 COMPREHEN METABOLIC PANEL: CPT

## 2021-10-06 PROCEDURE — 25010000002 HEPARIN LOCK FLUSH PER 10 UNITS: Performed by: NURSE PRACTITIONER

## 2021-10-06 PROCEDURE — 83615 LACTATE (LD) (LDH) ENZYME: CPT

## 2021-10-06 PROCEDURE — 85025 COMPLETE CBC W/AUTO DIFF WBC: CPT

## 2021-10-06 PROCEDURE — 96523 IRRIG DRUG DELIVERY DEVICE: CPT

## 2021-10-06 PROCEDURE — 36591 DRAW BLOOD OFF VENOUS DEVICE: CPT

## 2021-10-06 RX ORDER — HEPARIN SODIUM (PORCINE) LOCK FLUSH IV SOLN 100 UNIT/ML 100 UNIT/ML
500 SOLUTION INTRAVENOUS AS NEEDED
Status: DISCONTINUED | OUTPATIENT
Start: 2021-10-06 | End: 2021-10-06 | Stop reason: HOSPADM

## 2021-10-06 RX ORDER — SODIUM CHLORIDE 0.9 % (FLUSH) 0.9 %
10 SYRINGE (ML) INJECTION AS NEEDED
Status: CANCELLED | OUTPATIENT
Start: 2021-10-06

## 2021-10-06 RX ORDER — MULTIVITAMIN WITH FOLIC ACID 400 MCG
1 TABLET ORAL DAILY
COMMUNITY
Start: 2021-09-10

## 2021-10-06 RX ORDER — SODIUM CHLORIDE 0.9 % (FLUSH) 0.9 %
10 SYRINGE (ML) INJECTION AS NEEDED
Status: DISCONTINUED | OUTPATIENT
Start: 2021-10-06 | End: 2021-10-06 | Stop reason: HOSPADM

## 2021-10-06 RX ORDER — HEPARIN SODIUM (PORCINE) LOCK FLUSH IV SOLN 100 UNIT/ML 100 UNIT/ML
500 SOLUTION INTRAVENOUS AS NEEDED
Status: CANCELLED | OUTPATIENT
Start: 2021-10-06

## 2021-10-06 RX ORDER — PREDNISONE 50 MG/1
50 TABLET ORAL TAKE AS DIRECTED
Qty: 3 TABLET | Refills: 0 | Status: SHIPPED | OUTPATIENT
Start: 2021-10-06 | End: 2022-09-28

## 2021-10-06 RX ORDER — SODIUM CHLORIDE 0.9 % (FLUSH) 0.9 %
10 SYRINGE (ML) INJECTION AS NEEDED
OUTPATIENT
Start: 2021-10-06

## 2021-10-06 RX ORDER — HEPARIN SODIUM (PORCINE) LOCK FLUSH IV SOLN 100 UNIT/ML 100 UNIT/ML
500 SOLUTION INTRAVENOUS AS NEEDED
OUTPATIENT
Start: 2021-10-06

## 2021-10-06 RX ADMIN — SODIUM CHLORIDE, PRESERVATIVE FREE 10 ML: 5 INJECTION INTRAVENOUS at 15:26

## 2021-10-06 RX ADMIN — HEPARIN SODIUM (PORCINE) LOCK FLUSH IV SOLN 100 UNIT/ML 500 UNITS: 100 SOLUTION at 15:26

## 2022-01-03 ENCOUNTER — HOSPITAL ENCOUNTER (OUTPATIENT)
Dept: CT IMAGING | Facility: HOSPITAL | Age: 37
End: 2022-01-03

## 2022-01-03 ENCOUNTER — APPOINTMENT (OUTPATIENT)
Dept: CT IMAGING | Facility: HOSPITAL | Age: 37
End: 2022-01-03

## 2022-01-14 ENCOUNTER — HOSPITAL ENCOUNTER (OUTPATIENT)
Dept: CT IMAGING | Facility: HOSPITAL | Age: 37
Discharge: HOME | End: 2022-01-14

## 2022-01-14 DIAGNOSIS — C82.00 FOLLICULAR LYMPHOMA GRADE I, UNSPECIFIED BODY REGION: ICD-10-CM

## 2022-01-14 DIAGNOSIS — C84.68 ANAPLASTIC ALK-POSITIVE LARGE CELL LYMPHOMA OF LYMPH NODES OF MULTIPLE REGIONS: ICD-10-CM

## 2022-01-14 PROCEDURE — 71260 CT THORAX DX C+: CPT

## 2022-01-14 PROCEDURE — 74177 CT ABD & PELVIS W/CONTRAST: CPT

## 2022-01-14 PROCEDURE — 25010000002 IOPAMIDOL 61 % SOLUTION: Performed by: INTERNAL MEDICINE

## 2022-01-14 RX ADMIN — IOPAMIDOL 100 ML: 612 INJECTION, SOLUTION INTRAVENOUS at 07:59

## 2022-03-23 RX ORDER — ACETAMINOPHEN 160 MG
2000 TABLET,DISINTEGRATING ORAL DAILY
COMMUNITY
Start: 2021-12-14 | End: 2022-08-08

## 2022-04-18 ENCOUNTER — OFFICE VISIT (OUTPATIENT)
Dept: GASTROENTEROLOGY | Facility: CLINIC | Age: 37
End: 2022-04-18

## 2022-04-18 VITALS
WEIGHT: 293 LBS | SYSTOLIC BLOOD PRESSURE: 122 MMHG | BODY MASS INDEX: 45.99 KG/M2 | HEIGHT: 67 IN | DIASTOLIC BLOOD PRESSURE: 68 MMHG | TEMPERATURE: 98.2 F

## 2022-04-18 DIAGNOSIS — T40.2X5A THERAPEUTIC OPIOID INDUCED CONSTIPATION: Chronic | ICD-10-CM

## 2022-04-18 DIAGNOSIS — K58.1 IRRITABLE BOWEL SYNDROME WITH CONSTIPATION: ICD-10-CM

## 2022-04-18 DIAGNOSIS — R16.1 SPLENOMEGALY: Chronic | ICD-10-CM

## 2022-04-18 DIAGNOSIS — Z86.010 PERSONAL HISTORY OF COLONIC POLYPS: ICD-10-CM

## 2022-04-18 DIAGNOSIS — K59.00 CONSTIPATION, UNSPECIFIED CONSTIPATION TYPE: Primary | Chronic | ICD-10-CM

## 2022-04-18 DIAGNOSIS — K59.03 THERAPEUTIC OPIOID INDUCED CONSTIPATION: Chronic | ICD-10-CM

## 2022-04-18 DIAGNOSIS — R79.89 ELEVATED LIVER FUNCTION TESTS: Chronic | ICD-10-CM

## 2022-04-18 DIAGNOSIS — E66.01 CLASS 3 SEVERE OBESITY DUE TO EXCESS CALORIES WITH SERIOUS COMORBIDITY AND BODY MASS INDEX (BMI) OF 45.0 TO 49.9 IN ADULT: Chronic | ICD-10-CM

## 2022-04-18 DIAGNOSIS — K21.9 GASTROESOPHAGEAL REFLUX DISEASE WITHOUT ESOPHAGITIS: Chronic | ICD-10-CM

## 2022-04-18 DIAGNOSIS — K76.0 FATTY (CHANGE OF) LIVER, NOT ELSEWHERE CLASSIFIED: Chronic | ICD-10-CM

## 2022-04-18 DIAGNOSIS — R10.30 LOWER ABDOMINAL PAIN: Chronic | ICD-10-CM

## 2022-04-18 PROBLEM — Z86.0100 PERSONAL HISTORY OF COLONIC POLYPS: Status: ACTIVE | Noted: 2022-04-18

## 2022-04-18 PROBLEM — E66.813 CLASS 3 SEVERE OBESITY DUE TO EXCESS CALORIES WITH SERIOUS COMORBIDITY AND BODY MASS INDEX (BMI) OF 45.0 TO 49.9 IN ADULT: Status: ACTIVE | Noted: 2020-06-12

## 2022-04-18 PROCEDURE — 99214 OFFICE O/P EST MOD 30 MIN: CPT | Performed by: NURSE PRACTITIONER

## 2022-04-18 RX ORDER — SPIRONOLACTONE 25 MG/1
25 TABLET ORAL DAILY
COMMUNITY
Start: 2022-04-08

## 2022-04-18 RX ORDER — DOCUSATE SODIUM 100 MG/1
CAPSULE, LIQUID FILLED ORAL
COMMUNITY
Start: 2022-04-10

## 2022-04-18 RX ORDER — NICOTINE POLACRILEX 4 MG/1
GUM, CHEWING ORAL
Qty: 30 EACH | Refills: 3 | Status: SHIPPED | OUTPATIENT
Start: 2022-04-18 | End: 2022-09-28 | Stop reason: SDUPTHER

## 2022-04-18 RX ORDER — NALOXEGOL OXALATE 25 MG/1
25 TABLET, FILM COATED ORAL EVERY MORNING
Qty: 30 TABLET | Refills: 3 | Status: SHIPPED | OUTPATIENT
Start: 2022-04-18

## 2022-04-18 RX ORDER — OMEPRAZOLE 40 MG/1
40 CAPSULE, DELAYED RELEASE ORAL DAILY
COMMUNITY
Start: 2022-01-31 | End: 2022-04-18

## 2022-04-18 NOTE — PATIENT INSTRUCTIONS
Antireflux measures: Avoid fried, fatty foods, alcohol, chocolate, coffee, tea,  soft drinks, peppermint and spearmint, spicy foods, tomatoes and tomato based foods, onion based foods, and smoking.  Other antireflux measures include weight reduction if overweight, avoiding tight clothing around the abdomen, elevating the head of the bed 6 inches with blocks under the head board, and don't drink or eat before going to bed and avoid lying down immediately after meals.  Omeprazole 20 mg 1 po daily in the am 30 minutes before breakfast.  Recommended to take Levothyroxine first in the am upon waking, wait 30 minutes, then take Omeprazole 20 mg, wait 30 minutes, then eat breakfast and take other medications.   High fiber, low fat diet with liberal water intake.   Movantik 25 mg 1 po once a day.  Avoid all dairy.   Advised to exercise 30 minutes 4-5 days per week.   Advised to lose 20-25 pounds in the next 6-12 months.   Labs  Colonoscopy for surveillance in 2024 or sooner if needed.   Follow up: 3 months or sooner if needed    Heartburn  Heartburn is a type of pain or discomfort that can happen in the throat or chest. It is often described as a burning pain. It may also cause a bad, acid-like taste in the mouth. Heartburn may feel worse when you lie down or bend over, and it is often worse at night. Heartburn may be caused by stomach contents that move back up into the esophagus (reflux).  Follow these instructions at home:  Eating and drinking    Avoid certain foods and drinks as told by your health care provider. This may include:  Coffee and tea, with or without caffeine.  Drinks that contain alcohol.  Energy drinks and sports drinks.  Carbonated drinks or sodas.  Chocolate and cocoa.  Peppermint and mint flavorings.  Garlic and onions.  Horseradish.  Spicy and acidic foods, including peppers, chili powder, smith powder, vinegar, hot sauces, and barbecue sauce.  Citrus fruit juices and citrus fruits, such as oranges,  carlton, and limes.  Tomato-based foods, such as red sauce, chili, salsa, and pizza with red sauce.  Fried and fatty foods, such as donuts, french fries, potato chips, and high-fat dressings.  High-fat meats, such as hot dogs and fatty cuts of red and white meats, such as rib eye steak, sausage, ham, and shelley.  High-fat dairy items, such as whole milk, butter, and cream cheese.  Eat small, frequent meals instead of large meals.  Avoid drinking large amounts of liquid with your meals.  Avoid eating meals during the 2-3 hours before bedtime.  Avoid lying down right after you eat.  Do not exercise right after you eat.    Lifestyle         If you are overweight, reduce your weight to an amount that is healthy for you. Ask your health care provider for guidance about a safe weight loss goal.  Do not use any products that contain nicotine or tobacco. These products include cigarettes, chewing tobacco, and vaping devices, such as e-cigarettes. These can make symptoms worse. If you need help quitting, ask your health care provider.  Wear loose-fitting clothing. Do not wear anything tight around your waist that causes pressure on your abdomen.  Raise (elevate) the head of your bed about 6 inches (15 cm) when you sleep. You can use a wedge to do this.  Try to reduce your stress, such as with yoga or meditation. If you need help reducing stress, ask your health care provider.  Medicines  Take over-the-counter and prescription medicines only as told by your health care provider.  Do not take aspirin or NSAIDs, such as ibuprofen, unless your health care provider told you to do so.  Stop medicines only as told by your health care provider. If you stop taking some medicines too quickly, your symptoms may get worse.  General instructions  Pay attention to any changes in your symptoms.  Keep all follow-up visits. This is important.  Contact a health care provider if:  You have new symptoms.  You have unexplained weight loss.  You  have difficulty swallowing, or it hurts to swallow.  You have wheezing or a persistent cough.  Your symptoms do not improve with treatment.  You have frequent heartburn for more than 2 weeks.  Get help right away if:  You suddenly have pain in your arms, neck, jaw, teeth, or back.  You suddenly feel sweaty, dizzy, or light-headed.  You have chest pain or shortness of breath.  You vomit and your vomit looks like blood or coffee grounds.  Your stool is bloody or black.  These symptoms may represent a serious problem that is an emergency. Do not wait to see if the symptoms will go away. Get medical help right away. Call your local emergency services (911 in the [place] Do not drive yourself to the hospital.  Summary  Heartburn is a type of pain or discomfort that can happen in the throat or chest. It is often described as a burning pain. It may also cause a bad, acid-like taste in the mouth.  Avoid certain foods and drinks as told by your health care provider.  Take over-the-counter and prescription medicines only as told by your health care provider. Do not take aspirin or NSAIDs, such as ibuprofen, unless your health care provider told you to do so.  Contact a health care provider if your symptoms do not improve or they get worse.  This information is not intended to replace advice given to you by your health care provider. Make sure you discuss any questions you have with your health care provider.  Document Revised: 06/23/2021 Document Reviewed: 06/23/2021  [employer] Patient Education © 2021 [employer]

## 2022-04-18 NOTE — PROGRESS NOTES
"     New Patient Consult      Date: 2022   Patient Name: Johny Hearn  MRN: [id number]  : 1985     Primary Care Provider: Malia Powers APRN    Chief Complaint   Patient presents with   • Constipation     History of Present Illness: Johny Hearn is a 36 y.o. female who is here today to establish care with Gastroenterology for constipation.     She has a history of constipation for many years that worsened when she had chemo 5 years ago. She is taking Miralax and has 2-3 very small bowel movements per day. She had very large sized bowel movements in the past. She does not feel she is emptying her bowels. No rectal bleeding. She has lower abdominal pain that is worse with constipation.  No nausea or vomiting. She has tried Linzess in the past, but it worsened her symptoms.  She is taking Oxycodone 15 mg 3 times per day.    She has a history of reflux for several years. She is taking antacids over the counter but it does not help much. No difficulty swallowing.     There is no history of liver disease in the past. Labs with elevated liver enzymes. She had CTAP with fatty liver and splenomegaly that was noted to be stable. No personal or family history of liver disease.     She had colonoscopy and EGD in 2019 by Dr. Tyson. No family history of GI malignancy.     The patient had her  on speaker phone during the visit as she has impaired memory and wanted him to know what was said.    Subjective      Past Medical History:   Diagnosis Date   • A-fib (HCC)    • Abnormal TSH    • Allergic rhinitis    • Anemia    • Anesthesia     pt reports \"screamed for my  and wouldnt calm down when anesthesia gas used\".   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   • Cancer (HCC)     LYMPHOMA STAGE 3   • Cardiac arrest (HCC)     dionisio partum cardiomyopathy   • Cardiomyopathy (HCC)    • CHF (congestive heart " failure) (HCC)    • Chronic pain    • Constipation    • Dermatitis     chronic on face   • Fatty liver    • Full dentures     DOESNT WEAR   • GERD (gastroesophageal reflux disease)    • H/O chronic ear infection    • Headaches due to old head trauma    • Heartburn    • Heavy menses    • Hemorrhoids    • Hip pain, left    • Hyperlipidemia    • Hypertension    • Morbid obesity (HCC)    • Short-term memory loss    • Stuttering in conditions classified elsewhere     IF PT GETS TOO NERVOUS OR EXCITED HAS STUTTERING   • 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     Past Surgical History:   Procedure Laterality Date   • CARDIAC CATHETERIZATION N/A 10/22/2018    Procedure: Left Heart Cath;  Surgeon: Zheng Curtis MD;  Location: [facility] CATH INVASIVE LOCATION;  Service: Cardiology   • CARDIAC CATHETERIZATION N/A 10/22/2018    Procedure: Coronary angiography;  Surgeon: Zheng Curtis MD;  Location: [facility] CATH INVASIVE LOCATION;  Service: Cardiology   • CARDIAC CATHETERIZATION N/A 10/22/2018    Procedure: Left ventriculography;  Surgeon: Zheng Curtis MD;  Location: [facility] CATH INVASIVE LOCATION;  Service: Cardiology   •  SECTION     • COLONOSCOPY N/A 2019    Procedure: COLONOSCOPY;  Surgeon: Venkata Wiley MD;  Location: [facility] ENDOSCOPY;  Service: Gastroenterology   • COLONOSCOPY N/A 10/22/2019    Procedure: COLONOSCOPY WITH HOT FORCEP POYLPECTOMY;  Surgeon: Rob Tyson MD;  Location: [facility] ENDOSCOPY;  Service: Gastroenterology   • ENDOMETRIAL ABLATION     • ENDOSCOPY N/A 2019    Procedure: ESOPHAGOGASTRODUODENOSCOPY WITH COLD FORCEP BIOPSY;  Surgeon: Venkata Wiley MD;  Location: [facility] ENDOSCOPY;  Service: Gastroenterology   • ENDOSCOPY N/A 10/22/2019    Procedure: ESOPHAGOGASTRODUODENOSCOPY W/ COLD FORCEP BIOPSY;  Surgeon: Rob Tyson MD;  Location: [facility] ENDOSCOPY;  Service: Gastroenterology   • FEMORAL LYMPH NODE BIOPSY/EXCISON Left     lt  [facility] 2017/2018   • HEMORRHOIDECTOMY N/A 2/2/2019    Procedure: HEMORRHOIDECTOMY;  Surgeon: Venkata Wiley MD;  Location: [facility] OR;  Service: General   • PORTACATH PLACEMENT N/A 4/27/2018    Procedure: INSERTION OF PORTACATH right subclavian;  Surgeon: Shelley Brock MD;  Location: [facility] OR;  Service: General   • TUBAL ABDOMINAL LIGATION       Family History   Problem Relation Age of Onset   • Hypertension Father    • Diabetes Father    • Diabetes Paternal Grandmother    • Colon cancer Neg Hx    • Liver cancer Neg Hx    • Rectal cancer Neg Hx    • Stomach cancer Neg Hx      Social History     Socioeconomic History   • Marital status:    Tobacco Use   • Smoking status: Never Smoker   • Smokeless tobacco: Never Used   Vaping Use   • Vaping Use: Never used   Substance and Sexual Activity   • Alcohol use: No   • Drug use: No   • Sexual activity: Defer       Current Outpatient Medications:   •  albuterol (PROVENTIL) (2.5 MG/3ML) 0.083% nebulizer solution, Take 2.5 mg by nebulization 4 (Four) Times a Day., Disp: , Rfl: 3  •  albuterol sulfate  (90 Base) MCG/ACT inhaler, Inhale 2 puffs 4 (Four) Times a Day., Disp: , Rfl: 3  •  cetirizine (zyrTEC) 10 MG tablet, Take 10 mg by mouth Daily., Disp: , Rfl:   •  Cholecalciferol (Vitamin D) 50 MCG (2000 UT) tablet, Take 2,000 Units by mouth Daily., Disp: , Rfl:   •  Cholecalciferol (Vitamin D3) 50 MCG (2000 UT) capsule, Take 2,000 Units by mouth Daily., Disp: , Rfl:   •  diphenhydrAMINE (BENADRYL) 50 MG tablet, Take 1 tablet by mouth Take As Directed. 1 hour prior to scan, Disp: 1 tablet, Rfl: 0  •  docusate sodium (COLACE) 100 MG capsule, TAKE 1 CAPSULE BY MOUTH ONCE DAILY AS DIRECTED AS NEEDED, Disp: , Rfl:   •  Euthyrox 150 MCG tablet, Take 150 mcg by mouth Daily. as directed, Disp: , Rfl:   •  Ferrous Sulfate (IRON) 325 (65 Fe) MG tablet, Take 1 tablet by mouth 2 (Two) Times a Day., Disp: , Rfl: 3  •  furosemide (LASIX) 20 MG tablet, Take 1 tablet by  mouth Daily. (Patient taking differently: Take 40 mg by mouth Daily.), Disp: 30 tablet, Rfl: 0  •  levothyroxine (SYNTHROID, LEVOTHROID) 125 MCG tablet, Take 125 mcg by mouth Daily., Disp: , Rfl: 1  •  metoprolol succinate XL (TOPROL-XL) 100 MG 24 hr tablet, Take 1/2 tablet by mouth Every 12 (Twelve) Hours., Disp: 30 tablet, Rfl: 0  •  multivitamin with minerals tablet tablet, Take 1 tablet by mouth Daily., Disp: , Rfl:   •  oxyCODONE (ROXICODONE) 15 MG immediate release tablet, Take 15 mg by mouth Every 6 (Six) Hours As Needed., Disp: , Rfl:   •  polyethylene glycol (MIRALAX) 17 g packet, DISSOLVE 1 PACKET IN 8 OUNCES OF LIQUID AND DRINK ONCE DAILY, Disp: , Rfl:   •  predniSONE (DELTASONE) 50 MG tablet, Take 1 tablet by mouth Take As Directed. 1 tab 13 hours before scan, 1 tab 7 hours before, and 1 tab 1 hour before scan, Disp: 3 tablet, Rfl: 0  •  sacubitril-valsartan (ENTRESTO) 24-26 MG tablet, Take 1 tablet by mouth Every 12 (Twelve) Hours., Disp: 60 tablet, Rfl: 0  •  sertraline (ZOLOFT) 100 MG tablet, Take 100 mg by mouth Every Night. Total 150 mg daily, Disp: , Rfl:   •  sertraline (ZOLOFT) 50 MG tablet, Take 50 mg by mouth Daily. Total 150 MG daily dose, Disp: , Rfl:   •  spironolactone (ALDACTONE) 25 MG tablet, Take 25 mg by mouth Daily., Disp: , Rfl:   •  Naloxegol Oxalate (Movantik) 25 MG tablet, Take 1 tablet by mouth Every Morning., Disp: 30 tablet, Rfl: 3  •  Omeprazole 20 MG tablet delayed-release, 1 po in the am 30 minutes before breakfast., Disp: 30 each, Rfl: 3    Allergies   Allergen Reactions   • Contrast Dye Itching   • Erythromycin Unknown - Low Severity     Pt unsure.   • Macrobid [Nitrofurantoin Monohyd Macro] Unknown - Low Severity     Pt does not know     The following portions of the patient's history were reviewed and updated as appropriate: allergies, current medications, past family history, past medical history, past social history, past surgical history and problem list.    Objective  "    Physical Exam  Vitals and nursing note reviewed.   Constitutional:       General: She is not in acute distress.     Appearance: Normal appearance. She is well-developed.   HENT:      Head: Normocephalic and atraumatic.      Mouth/Throat:      Mouth: Mucous membranes are not pale, not dry and not cyanotic.   Eyes:      General: Lids are normal.   Neck:      Trachea: Trachea normal.   Cardiovascular:      Rate and Rhythm: Normal rate.   Pulmonary:      Effort: Pulmonary effort is normal. No respiratory distress.      Breath sounds: Normal breath sounds.   Abdominal:      General: Bowel sounds are normal.      Palpations: Abdomen is soft. There is no mass.      Tenderness: There is abdominal tenderness in the right lower quadrant, suprapubic area and left lower quadrant.      Hernia: No hernia is present.   Skin:     General: Skin is warm and dry.   Neurological:      Mental Status: She is alert and oriented to person, place, and time.   Psychiatric:         Mood and Affect: Mood normal.         Speech: Speech normal.         Behavior: Behavior normal. Behavior is cooperative.         Cognition and Memory: Memory is impaired.       Vitals:    04/18/22 1426   BP: 122/68   Temp: 98.2 °F (36.8 °C)   Weight: (!) 142 kg (313 lb)   Height: 170.2 cm (67\")     Body mass index is 49.02 kg/m².     Results Review:   I have reviewed the patient's new clinical and imaging results.    No visits with results within 90 Day(s) from this visit.   Latest known visit with results is:   Infusion on 10/06/2021   Component Date Value Ref Range Status   • Glucose 10/06/2021 107 (A) 65 - 99 mg/dL Final   • BUN 10/06/2021 12  6 - 20 mg/dL Final   • Creatinine 10/06/2021 0.77  0.57 - 1.00 mg/dL Final   • Sodium 10/06/2021 139  136 - 145 mmol/L Final   • Potassium 10/06/2021 3.6  3.5 - 5.2 mmol/L Final   • Chloride 10/06/2021 102  98 - 107 mmol/L Final   • CO2 10/06/2021 27.3  22.0 - 29.0 mmol/L Final   • Calcium 10/06/2021 9.1  8.6 - 10.5 " mg/dL Final   • Total Protein 10/06/2021 7.5  6.0 - 8.5 g/dL Final   • Albumin 10/06/2021 4.70  3.50 - 5.20 g/dL Final   • ALT (SGPT) 10/06/2021 38 (A) 1 - 33 U/L Final   • AST (SGOT) 10/06/2021 36 (A) 1 - 32 U/L Final   • Alkaline Phosphatase 10/06/2021 85  39 - 117 U/L Final   • Total Bilirubin 10/06/2021 0.4  0.0 - 1.2 mg/dL Final   • eGFR Non African Amer 10/06/2021 85  >60 mL/min/1.73 Final   • Globulin 10/06/2021 2.8  gm/dL Final   • A/G Ratio 10/06/2021 1.7  g/dL Final   • BUN/Creatinine Ratio 10/06/2021 15.6  7.0 - 25.0 Final   • Anion Gap 10/06/2021 9.7  5.0 - 15.0 mmol/L Final   • LDH 10/06/2021 191  135 - 214 U/L Final   • WBC 10/06/2021 7.32  3.40 - 10.80 10*3/mm3 Final   • RBC 10/06/2021 4.61  3.77 - 5.28 10*6/mm3 Final   • Hemoglobin 10/06/2021 12.7  12.0 - 15.9 g/dL Final   • Hematocrit 10/06/2021 38.9  34.0 - 46.6 % Final   • MCV 10/06/2021 84.4  79.0 - 97.0 fL Final   • MCH 10/06/2021 27.5  26.6 - 33.0 pg Final   • MCHC 10/06/2021 32.6  31.5 - 35.7 g/dL Final   • RDW 10/06/2021 14.5  12.3 - 15.4 % Final   • RDW-SD 10/06/2021 44.3  37.0 - 54.0 fl Final   • MPV 10/06/2021 8.5  6.0 - 12.0 fL Final   • Platelets 10/06/2021 213  140 - 450 10*3/mm3 Final   • Neutrophil % 10/06/2021 68.4  42.7 - 76.0 % Final   • Lymphocyte % 10/06/2021 22.7  19.6 - 45.3 % Final   • Monocyte % 10/06/2021 6.6  5.0 - 12.0 % Final   • Eosinophil % 10/06/2021 1.1  0.3 - 6.2 % Final   • Basophil % 10/06/2021 0.5  0.0 - 1.5 % Final   • Immature Grans % 10/06/2021 0.7 (A) 0.0 - 0.5 % Final   • Neutrophils, Absolute 10/06/2021 5.01  1.70 - 7.00 10*3/mm3 Final   • Lymphocytes, Absolute 10/06/2021 1.66  0.70 - 3.10 10*3/mm3 Final   • Monocytes, Absolute 10/06/2021 0.48  0.10 - 0.90 10*3/mm3 Final   • Eosinophils, Absolute 10/06/2021 0.08  0.00 - 0.40 10*3/mm3 Final   • Basophils, Absolute 10/06/2021 0.04  0.00 - 0.20 10*3/mm3 Final   • Immature Grans, Absolute 10/06/2021 0.05  0.00 - 0.05 10*3/mm3 Final   • nRBC 10/06/2021 0.0  0.0 -  0.2 /100 WBC Final      Dated 2/1/2022 albumin 4.5 total bilirubin 0.3 alkaline phosphatase 86 ALT 34 AST 27 hemoglobin 12.8 hematocrit 37.8 platelet count 225 TSH 1.630    CT chest, abdomen and pelvis with contrast dated 1/14/2022  No evidence of recurrent or metastatic disease within the chest, abdomen or pelvis.  This report was finalized on 1/14/2022 10:36 AM by Mukesh Hong M.D.    EGD dated 10/22/2019 per Dr. Rob Tyson  - Normal esophagus.  - Acute gastritis. Biopsied.  - Normal examined duodenum.  -Antrum biopsy with mild reactive gastropathy, no H. pylori.    Colonoscopy dated 10/22/2019 per Dr. Rob Tyson  - Anal fissure found on perianal exam.  - One 7 mm polyp in the sigmoid colon, removed with a hot biopsy forceps. Resected and retrieved.  -Sigmoid colon polyp biopsy with hyperplastic polyp.    Assessment / Plan      1. Constipation, unspecified constipation type  2. Lower abdominal pain  3. Therapeutic opioid induced constipation  4. Irritable bowel syndrome with constipation  Patient has a history of constipation for several years that worsened after having chemo 5 years ago.  She is taking MiraLAX and has 2-3 very small bowel movements per day.  She does not feel she is emptying her bowels.  Denies rectal bleeding.  She does have lower abdominal pain that worsens with with constipation.  The pain improves after having a bowel movement.  She has tried Linzess in the past but it worsened her symptoms.  Of interest, she is on oxycodone 15 mg 3 times per day.  TSH normal.  CTAP with fatty liver/enlarged spleen, otherwise unremarkable GI tract. Colonoscopy in October 2019 per Dr. Tyson with anal fissure and hyperplastic polyp removed, otherwise unremarkable. Suspect opioid induced constipation, possible overlapping IBS-C.  High-fiber, low-fat diet with liberal water intake.  Celiac panel.  Movantik 25 mg 1 p.o. daily.    - Naloxegol Oxalate (Movantik) 25 MG tablet; Take 1 tablet by mouth Every  Morning.  Dispense: 30 tablet; Refill: 3  - IgA; Future  - Tissue Transglutaminase, IgA; Future    5. Gastroesophageal reflux disease without esophagitis  She has a history of reflux for several years that is not controlled with over-the-counter antacids.  Patient denies difficulty swallowing.  EGD in 2019 per Dr. Tyson unremarkable.  Antireflux measures.  Omeprazole 20 mg 1 p.o. daily.    - Omeprazole 20 MG tablet delayed-release; 1 po in the am 30 minutes before breakfast.  Dispense: 30 each; Refill: 3    6. Fatty (change of) liver, not elsewhere classified  7. Splenomegaly  8. Elevated liver function tests  9. Class 3 severe obesity due to excess calories with serious comorbidity and body mass index (BMI) of 45.0 to 49.9 in adult (HCC)  BMI 49.02  Upon review of records she has a history of elevated liver enzymes. Fatty infiltration of liver and stable splenomegaly noted on CTAP in January 2022. No personal or family history of liver disease.  Advised low fat diet, exercise, weight reduction.  Noninvasive liver evaluation.    - CBC (No Diff); Future  - Comprehensive Metabolic Panel; Future  - Hepatitis A Antibody, Total; Future  - Hepatitis B Surf Antibody Quant; Future  - Hepatitis B Surface Antigen; Future  - Hepatitis B Core Antibody, Total; Future  - Hepatitis C Antibody; Future  - Protime-INR; Future  - Iron Profile; Future  - Ferritin; Future  - Antinuclear Antibodies Direct; Future  - Mitochondrial Antibodies, M2; Future  - Anti-Smooth Muscle Antibody Titer; Future  - Ceruloplasmin; Future  - CARTY Fibrosure; Future    10. Personal history of colon polyps  She had colonoscopy in 2019 per Dr. Tyson with polyps removed. She was recommended colonoscopy for surveillance in 5 years.   Colonoscopy for surveillance in 2024 or sooner if needed.    Patient Instructions   1. Antireflux measures: Avoid fried, fatty foods, alcohol, chocolate, coffee, tea,  soft drinks, peppermint and spearmint, spicy foods,  tomatoes and tomato based foods, onion based foods, and smoking.  2. Other antireflux measures include weight reduction if overweight, avoiding tight clothing around the abdomen, elevating the head of the bed 6 inches with blocks under the head board, and don't drink or eat before going to bed and avoid lying down immediately after meals.  3. Omeprazole 20 mg 1 po daily in the am 30 minutes before breakfast.  4. Recommended to take Levothyroxine first in the am upon waking, wait 30 minutes, then take Omeprazole 20 mg, wait 30 minutes, then eat breakfast and take other medications.   5. High fiber, low fat diet with liberal water intake.   6. Movantik 25 mg 1 po once a day.  7. Avoid all dairy.   8. Advised to exercise 30 minutes 4-5 days per week.   9. Advised to lose 20-25 pounds in the next 6-12 months.   10. Labs  11. Colonoscopy for surveillance in 2024 or sooner if needed.   12. Follow up: 3 months or sooner if needed    Heartburn  Heartburn is a type of pain or discomfort that can happen in the throat or chest. It is often described as a burning pain. It may also cause a bad, acid-like taste in the mouth. Heartburn may feel worse when you lie down or bend over, and it is often worse at night. Heartburn may be caused by stomach contents that move back up into the esophagus (reflux).  Follow these instructions at home:  Eating and drinking    1. Avoid certain foods and drinks as told by your health care provider. This may include:  ? Coffee and tea, with or without caffeine.  ? Drinks that contain alcohol.  ? Energy drinks and sports drinks.  ? Carbonated drinks or sodas.  ? Chocolate and cocoa.  ? Peppermint and mint flavorings.  ? Garlic and onions.  ? Horseradish.  ? Spicy and acidic foods, including peppers, chili powder, smith powder, vinegar, hot sauces, and barbecue sauce.  ? Citrus fruit juices and citrus fruits, such as oranges, carlton, and limes.  ? Tomato-based foods, such as red sauce, chili, salsa,  and pizza with red sauce.  ? Fried and fatty foods, such as donuts, french fries, potato chips, and high-fat dressings.  ? High-fat meats, such as hot dogs and fatty cuts of red and white meats, such as rib eye steak, sausage, ham, and shelley.  ? High-fat dairy items, such as whole milk, butter, and cream cheese.  2. Eat small, frequent meals instead of large meals.  3. Avoid drinking large amounts of liquid with your meals.  4. Avoid eating meals during the 2-3 hours before bedtime.  5. Avoid lying down right after you eat.  6. Do not exercise right after you eat.    Lifestyle         · If you are overweight, reduce your weight to an amount that is healthy for you. Ask your health care provider for guidance about a safe weight loss goal.  · Do not use any products that contain nicotine or tobacco. These products include cigarettes, chewing tobacco, and vaping devices, such as e-cigarettes. These can make symptoms worse. If you need help quitting, ask your health care provider.  · Wear loose-fitting clothing. Do not wear anything tight around your waist that causes pressure on your abdomen.  · Raise (elevate) the head of your bed about 6 inches (15 cm) when you sleep. You can use a wedge to do this.  · Try to reduce your stress, such as with yoga or meditation. If you need help reducing stress, ask your health care provider.  Medicines  · Take over-the-counter and prescription medicines only as told by your health care provider.  · Do not take aspirin or NSAIDs, such as ibuprofen, unless your health care provider told you to do so.  · Stop medicines only as told by your health care provider. If you stop taking some medicines too quickly, your symptoms may get worse.  General instructions  · Pay attention to any changes in your symptoms.  · Keep all follow-up visits. This is important.  Contact a health care provider if:  · You have new symptoms.  · You have unexplained weight loss.  · You have difficulty swallowing,  or it hurts to swallow.  · You have wheezing or a persistent cough.  · Your symptoms do not improve with treatment.  · You have frequent heartburn for more than 2 weeks.  Get help right away if:  · You suddenly have pain in your arms, neck, jaw, teeth, or back.  · You suddenly feel sweaty, dizzy, or light-headed.  · You have chest pain or shortness of breath.  · You vomit and your vomit looks like blood or coffee grounds.  · Your stool is bloody or black.  These symptoms may represent a serious problem that is an emergency. Do not wait to see if the symptoms will go away. Get medical help right away. Call your local emergency services (911 in the [place] Do not drive yourself to the hospital.  Summary  · Heartburn is a type of pain or discomfort that can happen in the throat or chest. It is often described as a burning pain. It may also cause a bad, acid-like taste in the mouth.  · Avoid certain foods and drinks as told by your health care provider.  · Take over-the-counter and prescription medicines only as told by your health care provider. Do not take aspirin or NSAIDs, such as ibuprofen, unless your health care provider told you to do so.  · Contact a health care provider if your symptoms do not improve or they get worse.  This information is not intended to replace advice given to you by your health care provider. Make sure you discuss any questions you have with your health care provider.  Document Revised: 06/23/2021 Document Reviewed: 06/23/2021  [employer] Patient Education © 2021 [employer]     Fahad Millard, APRN  4/18/2022    Please note that portions of this note may have been completed with a voice recognition program. Efforts were made to edit the dictations, but occasionally words are mistranscribed.

## 2022-04-19 ENCOUNTER — PRIOR AUTHORIZATION (OUTPATIENT)
Dept: GASTROENTEROLOGY | Facility: CLINIC | Age: 37
End: 2022-04-19

## 2022-04-22 ENCOUNTER — LAB (OUTPATIENT)
Dept: LAB | Facility: HOSPITAL | Age: 37
End: 2022-04-22

## 2022-04-22 DIAGNOSIS — R79.89 ELEVATED LIVER FUNCTION TESTS: Chronic | ICD-10-CM

## 2022-04-22 DIAGNOSIS — E66.01 CLASS 3 SEVERE OBESITY DUE TO EXCESS CALORIES WITH SERIOUS COMORBIDITY AND BODY MASS INDEX (BMI) OF 45.0 TO 49.9 IN ADULT: Chronic | ICD-10-CM

## 2022-04-22 DIAGNOSIS — K59.00 CONSTIPATION, UNSPECIFIED CONSTIPATION TYPE: Chronic | ICD-10-CM

## 2022-04-22 DIAGNOSIS — K76.0 FATTY (CHANGE OF) LIVER, NOT ELSEWHERE CLASSIFIED: Chronic | ICD-10-CM

## 2022-04-22 DIAGNOSIS — C84.68 ANAPLASTIC ALK-POSITIVE LARGE CELL LYMPHOMA OF LYMPH NODES OF MULTIPLE REGIONS: ICD-10-CM

## 2022-04-22 DIAGNOSIS — R10.30 LOWER ABDOMINAL PAIN: Chronic | ICD-10-CM

## 2022-04-22 LAB
ALBUMIN SERPL-MCNC: 4.7 G/DL (ref 3.5–5.2)
ALBUMIN/GLOB SERPL: 1.4 G/DL
ALP SERPL-CCNC: 85 U/L (ref 39–117)
ALT SERPL W P-5'-P-CCNC: 44 U/L (ref 1–33)
ANION GAP SERPL CALCULATED.3IONS-SCNC: 12.2 MMOL/L (ref 5–15)
AST SERPL-CCNC: 34 U/L (ref 1–32)
BASOPHILS # BLD AUTO: 0.05 10*3/MM3 (ref 0–0.2)
BASOPHILS NFR BLD AUTO: 0.7 % (ref 0–1.5)
BILIRUB SERPL-MCNC: 0.6 MG/DL (ref 0–1.2)
BUN SERPL-MCNC: 16 MG/DL (ref 6–20)
BUN/CREAT SERPL: 21.3 (ref 7–25)
CALCIUM SPEC-SCNC: 9.7 MG/DL (ref 8.6–10.5)
CERULOPLASMIN SERPL-MCNC: 31 MG/DL (ref 19–39)
CHLORIDE SERPL-SCNC: 103 MMOL/L (ref 98–107)
CO2 SERPL-SCNC: 24.8 MMOL/L (ref 22–29)
CREAT SERPL-MCNC: 0.75 MG/DL (ref 0.57–1)
DEPRECATED RDW RBC AUTO: 45 FL (ref 37–54)
EGFRCR SERPLBLD CKD-EPI 2021: 106 ML/MIN/1.73
EOSINOPHIL # BLD AUTO: 0.07 10*3/MM3 (ref 0–0.4)
EOSINOPHIL NFR BLD AUTO: 1 % (ref 0.3–6.2)
ERYTHROCYTE [DISTWIDTH] IN BLOOD BY AUTOMATED COUNT: 14.3 % (ref 12.3–15.4)
FERRITIN SERPL-MCNC: 371 NG/ML (ref 13–150)
GLOBULIN UR ELPH-MCNC: 3.3 GM/DL
GLUCOSE SERPL-MCNC: 117 MG/DL (ref 65–99)
HBV SURFACE AG SERPL QL IA: NORMAL
HCT VFR BLD AUTO: 41.2 % (ref 34–46.6)
HCV AB SER DONR QL: NORMAL
HGB BLD-MCNC: 13.4 G/DL (ref 12–15.9)
IGA1 MFR SER: 299 MG/DL (ref 70–400)
IMM GRANULOCYTES # BLD AUTO: 0.03 10*3/MM3 (ref 0–0.05)
IMM GRANULOCYTES NFR BLD AUTO: 0.4 % (ref 0–0.5)
INR PPP: 1.02 (ref 0.9–1.1)
IRON 24H UR-MRATE: 91 MCG/DL (ref 37–145)
IRON SATN MFR SERPL: 22 % (ref 20–50)
LDH SERPL-CCNC: 207 U/L (ref 135–214)
LYMPHOCYTES # BLD AUTO: 2 10*3/MM3 (ref 0.7–3.1)
LYMPHOCYTES NFR BLD AUTO: 29.6 % (ref 19.6–45.3)
MCH RBC QN AUTO: 28 PG (ref 26.6–33)
MCHC RBC AUTO-ENTMCNC: 32.5 G/DL (ref 31.5–35.7)
MCV RBC AUTO: 86 FL (ref 79–97)
MONOCYTES # BLD AUTO: 0.47 10*3/MM3 (ref 0.1–0.9)
MONOCYTES NFR BLD AUTO: 7 % (ref 5–12)
NEUTROPHILS NFR BLD AUTO: 4.14 10*3/MM3 (ref 1.7–7)
NEUTROPHILS NFR BLD AUTO: 61.3 % (ref 42.7–76)
NRBC BLD AUTO-RTO: 0 /100 WBC (ref 0–0.2)
PLATELET # BLD AUTO: 208 10*3/MM3 (ref 140–450)
PMV BLD AUTO: 9.1 FL (ref 6–12)
POTASSIUM SERPL-SCNC: 4.2 MMOL/L (ref 3.5–5.2)
PROT SERPL-MCNC: 8 G/DL (ref 6–8.5)
PROTHROMBIN TIME: 13.8 SECONDS (ref 12.5–14.5)
RBC # BLD AUTO: 4.79 10*6/MM3 (ref 3.77–5.28)
SODIUM SERPL-SCNC: 140 MMOL/L (ref 136–145)
TIBC SERPL-MCNC: 410 MCG/DL (ref 298–536)
TRANSFERRIN SERPL-MCNC: 275 MG/DL (ref 200–360)
WBC NRBC COR # BLD: 6.76 10*3/MM3 (ref 3.4–10.8)

## 2022-04-22 PROCEDURE — 83540 ASSAY OF IRON: CPT

## 2022-04-22 PROCEDURE — 86015 ACTIN ANTIBODY EACH: CPT

## 2022-04-22 PROCEDURE — 86381 MITOCHONDRIAL ANTIBODY EACH: CPT

## 2022-04-22 PROCEDURE — 82172 ASSAY OF APOLIPOPROTEIN: CPT

## 2022-04-22 PROCEDURE — 83615 LACTATE (LD) (LDH) ENZYME: CPT

## 2022-04-22 PROCEDURE — 84466 ASSAY OF TRANSFERRIN: CPT

## 2022-04-22 PROCEDURE — 85025 COMPLETE CBC W/AUTO DIFF WBC: CPT

## 2022-04-22 PROCEDURE — 86708 HEPATITIS A ANTIBODY: CPT

## 2022-04-22 PROCEDURE — 84478 ASSAY OF TRIGLYCERIDES: CPT

## 2022-04-22 PROCEDURE — 82728 ASSAY OF FERRITIN: CPT

## 2022-04-22 PROCEDURE — 87340 HEPATITIS B SURFACE AG IA: CPT

## 2022-04-22 PROCEDURE — 36415 COLL VENOUS BLD VENIPUNCTURE: CPT

## 2022-04-22 PROCEDURE — 86364 TISS TRNSGLTMNASE EA IG CLAS: CPT

## 2022-04-22 PROCEDURE — 82390 ASSAY OF CERULOPLASMIN: CPT

## 2022-04-22 PROCEDURE — 86038 ANTINUCLEAR ANTIBODIES: CPT

## 2022-04-22 PROCEDURE — 82977 ASSAY OF GGT: CPT

## 2022-04-22 PROCEDURE — 82784 ASSAY IGA/IGD/IGG/IGM EACH: CPT

## 2022-04-22 PROCEDURE — 86704 HEP B CORE ANTIBODY TOTAL: CPT

## 2022-04-22 PROCEDURE — 85610 PROTHROMBIN TIME: CPT

## 2022-04-22 PROCEDURE — 80053 COMPREHEN METABOLIC PANEL: CPT

## 2022-04-22 PROCEDURE — 83010 ASSAY OF HAPTOGLOBIN QUANT: CPT

## 2022-04-22 PROCEDURE — 83883 ASSAY NEPHELOMETRY NOT SPEC: CPT

## 2022-04-22 PROCEDURE — 86803 HEPATITIS C AB TEST: CPT

## 2022-04-22 PROCEDURE — 82465 ASSAY BLD/SERUM CHOLESTEROL: CPT

## 2022-04-22 PROCEDURE — 86317 IMMUNOASSAY INFECTIOUS AGENT: CPT

## 2022-04-23 LAB
HBV CORE AB SERPL QL IA: NEGATIVE
HBV SURFACE AB SER-ACNC: 4.8 MIU/ML
MITOCHONDRIA M2 IGG SER-ACNC: <20 UNITS (ref 0–20)
SMA IGG SER-ACNC: 4 UNITS (ref 0–19)
TTG IGA SER-ACNC: <2 U/ML (ref 0–3)

## 2022-04-26 LAB
A2 MACROGLOB SERPL-MCNC: 240 MG/DL (ref 110–276)
ALT SERPL W P-5'-P-CCNC: 50 IU/L (ref 0–40)
APO A-I SERPL-MCNC: 107 MG/DL (ref 116–209)
AST SERPL W P-5'-P-CCNC: 41 IU/L (ref 0–40)
BILIRUB SERPL-MCNC: 0.3 MG/DL (ref 0–1.2)
CHOLEST SERPL-MCNC: 226 MG/DL (ref 100–199)
FIBROSIS SCORING:: ABNORMAL
FIBROSIS STAGE SERPL QL: ABNORMAL
GGT SERPL-CCNC: 42 IU/L (ref 0–60)
GLUCOSE SERPL-MCNC: 121 MG/DL (ref 65–99)
HAPTOGLOB SERPL-MCNC: 168 MG/DL (ref 33–278)
INTERPRETATIONS: (REFERENCE): ABNORMAL
LABORATORY COMMENT REPORT: ABNORMAL
LIVER FIBR SCORE SERPL CALC.FIBROSURE: 0.18 (ref 0–0.21)
NASH SCORING (REFERENCE): ABNORMAL
NECROINFLAMMATORY ACT GRADE SERPL QL: ABNORMAL
NECROINFLAMMATORY ACT SCORE SERPL: 0.5
SERVICE CMNT-IMP: ABNORMAL
STEATOSIS GRADE (REFERENCE): ABNORMAL
STEATOSIS GRADING (REFERENCE): ABNORMAL
STEATOSIS SCORE (REFERENCE): 0.78 (ref 0–0.3)
TRIGL SERPL-MCNC: 198 MG/DL (ref 0–149)

## 2022-04-27 LAB
ANA SER QL: POSITIVE
HAV AB SER QL IA: NEGATIVE

## 2022-08-08 ENCOUNTER — OFFICE VISIT (OUTPATIENT)
Dept: INTERNAL MEDICINE | Facility: CLINIC | Age: 37
End: 2022-08-08

## 2022-08-08 VITALS
DIASTOLIC BLOOD PRESSURE: 78 MMHG | WEIGHT: 293 LBS | SYSTOLIC BLOOD PRESSURE: 110 MMHG | HEART RATE: 91 BPM | BODY MASS INDEX: 45.99 KG/M2 | OXYGEN SATURATION: 98 % | HEIGHT: 67 IN | TEMPERATURE: 97.2 F

## 2022-08-08 DIAGNOSIS — G93.1 ANOXIC BRAIN INJURY: ICD-10-CM

## 2022-08-08 DIAGNOSIS — I42.8 NICM (NONISCHEMIC CARDIOMYOPATHY): ICD-10-CM

## 2022-08-08 DIAGNOSIS — M23.51 CHRONIC KNEE INSTABILITY, RIGHT: ICD-10-CM

## 2022-08-08 DIAGNOSIS — I48.0 PAROXYSMAL ATRIAL FIBRILLATION: ICD-10-CM

## 2022-08-08 DIAGNOSIS — G89.29 CHRONIC PAIN OF BOTH KNEES: ICD-10-CM

## 2022-08-08 DIAGNOSIS — Z83.3 FAMILY HISTORY OF DIABETES MELLITUS: ICD-10-CM

## 2022-08-08 DIAGNOSIS — E03.9 HYPOTHYROIDISM, UNSPECIFIED TYPE: ICD-10-CM

## 2022-08-08 DIAGNOSIS — M25.561 CHRONIC PAIN OF BOTH KNEES: ICD-10-CM

## 2022-08-08 DIAGNOSIS — M25.562 CHRONIC PAIN OF BOTH KNEES: ICD-10-CM

## 2022-08-08 DIAGNOSIS — I50.22 CHRONIC SYSTOLIC CONGESTIVE HEART FAILURE: ICD-10-CM

## 2022-08-08 DIAGNOSIS — M23.52 CHRONIC INSTABILITY OF LEFT KNEE: ICD-10-CM

## 2022-08-08 DIAGNOSIS — I10 PRIMARY HYPERTENSION: ICD-10-CM

## 2022-08-08 DIAGNOSIS — Z76.89 ENCOUNTER TO ESTABLISH CARE: Primary | ICD-10-CM

## 2022-08-08 PROCEDURE — 99213 OFFICE O/P EST LOW 20 MIN: CPT | Performed by: NURSE PRACTITIONER

## 2022-08-08 RX ORDER — FUROSEMIDE 40 MG/1
40 TABLET ORAL DAILY
COMMUNITY
Start: 2022-05-11

## 2022-08-08 NOTE — PROGRESS NOTES
"Date: 2022    Name: Johny Hearn  : 1985    Chief Complaint:   Chief Complaint   Patient presents with   • [facility]       HPI:  Johny Hearn is a 37 y.o. female presents to [facility]. Was seeing DREW Gamez, who has left practice.     Johny has a history of anoxic brain injury.  Her  is present via phone call, on speaker phone, as she will likely not remember most of what is said today.  He is homebound, basically lives in his bed.    She has been riding stationary bike in living room.  Has purposefully lost 2 pounds in the past 4 months.  Both knees hurt, lock up on her.  She feels a grinding in her knees with walking, standing up.  Follows Dr. Cirilo Deshpande, pain clinic.  Takes oxycodone HCL 15 mg 3 times a day.    A. fib, chronic systolic heart failure, hypertension.  Sees cardiology, Dr Curtis.  He has cleared her to have bariatric sleeve surgery.  Both she and her  state she is not mentally or emotionally ready to have the surgery.  She takes furosemide, metoprolol, spironolactone, Entresto as prescribed.  Does not monitor blood pressure at home.  Does not eat a particularly heart healthy diet. Denies chest pain, dyspnea, orthopnea, palpitations, headaches, weakness, visual disturbances.    Hypothyroidism: Takes levothyroxine 125 mcg daily.  Does not know when last thyroid labs were drawn. Denies abnormal fatigue, unexpected weight change, temperature intolerance, hair/skin/nail changes, bowel habit changes, palpitations, anxiety, sore throat, hoarseness.       History:    Past Medical History:   Diagnosis Date   • A-fib (HCC)    • Abnormal TSH    • Allergic rhinitis    • Anemia    • Anesthesia     pt reports \"screamed for my  and wouldnt calm down when anesthesia gas used\".   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   • Cancer (HCC)     LYMPHOMA STAGE 3 "   • Cardiac arrest (HCC)     dionisio partum cardiomyopathy   • Cardiomyopathy (HCC)    • CHF (congestive heart failure) (HCC)    • Chronic pain    • Constipation    • Dermatitis     chronic on face   • Fatty liver    • Full dentures     DOESNT WEAR   • GERD (gastroesophageal reflux disease)    • H/O chronic ear infection    • Headaches due to old head trauma    • Heartburn    • Heavy menses    • Hemorrhoids    • Hip pain, left    • Hyperlipidemia    • Hypertension    • Morbid obesity (HCC)    • Short-term memory loss    • Stuttering in conditions classified elsewhere     IF PT GETS TOO NERVOUS OR EXCITED HAS STUTTERING   • 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       Past Surgical History:   Procedure Laterality Date   • CARDIAC CATHETERIZATION N/A 10/22/2018    Procedure: Left Heart Cath;  Surgeon: Zheng Curtis MD;  Location: [facility] CATH INVASIVE LOCATION;  Service: Cardiology   • CARDIAC CATHETERIZATION N/A 10/22/2018    Procedure: Coronary angiography;  Surgeon: Zheng Curtis MD;  Location: [facility] CATH INVASIVE LOCATION;  Service: Cardiology   • CARDIAC CATHETERIZATION N/A 10/22/2018    Procedure: Left ventriculography;  Surgeon: Zheng Curtis MD;  Location: [facility] CATH INVASIVE LOCATION;  Service: Cardiology   •  SECTION     • COLONOSCOPY N/A 2019    Procedure: COLONOSCOPY;  Surgeon: Venkata Wiley MD;  Location: [facility] ENDOSCOPY;  Service: Gastroenterology   • COLONOSCOPY N/A 10/22/2019    Procedure: COLONOSCOPY WITH HOT FORCEP POYLPECTOMY;  Surgeon: Rob Tyson MD;  Location: [facility] ENDOSCOPY;  Service: Gastroenterology   • ENDOMETRIAL ABLATION     • ENDOSCOPY N/A 2019    Procedure: ESOPHAGOGASTRODUODENOSCOPY WITH COLD FORCEP BIOPSY;  Surgeon: Venkata Wiley MD;  Location: [facility] ENDOSCOPY;  Service: Gastroenterology   • ENDOSCOPY N/A 10/22/2019    Procedure: ESOPHAGOGASTRODUODENOSCOPY W/ COLD FORCEP BIOPSY;  Surgeon: Rob Tyson  MD;  Location: [facility] ENDOSCOPY;  Service: Gastroenterology   • FEMORAL LYMPH NODE BIOPSY/EXCISON Left     lt illiac 2017/2018   • HEMORRHOIDECTOMY N/A 2/2/2019    Procedure: HEMORRHOIDECTOMY;  Surgeon: Venkata Wiley MD;  Location: [facility] OR;  Service: General   • PORTACATH PLACEMENT N/A 4/27/2018    Procedure: INSERTION OF PORTACATH right subclavian;  Surgeon: Shelley Brock MD;  Location: [facility] OR;  Service: General   • TUBAL ABDOMINAL LIGATION         Family History   Problem Relation Age of Onset   • Hypertension Father    • Diabetes Father    • Diabetes Paternal Grandmother    • Colon cancer Neg Hx    • Liver cancer Neg Hx    • Rectal cancer Neg Hx    • Stomach cancer Neg Hx        Social History     Socioeconomic History   • Marital status:    Tobacco Use   • Smoking status: Never Smoker   • Smokeless tobacco: Never Used   Vaping Use   • Vaping Use: Never used   Substance and Sexual Activity   • Alcohol use: No   • Drug use: No   • Sexual activity: Defer       Allergies   Allergen Reactions   • Contrast Dye Itching   • Erythromycin Unknown - Low Severity     Pt unsure.   • Macrobid [Nitrofurantoin Monohyd Macro] Unknown - Low Severity     Pt does not know         Current Outpatient Medications:   •  albuterol (PROVENTIL) (2.5 MG/3ML) 0.083% nebulizer solution, Take 2.5 mg by nebulization 4 (Four) Times a Day., Disp: , Rfl: 3  •  albuterol sulfate  (90 Base) MCG/ACT inhaler, Inhale 2 puffs 4 (Four) Times a Day., Disp: , Rfl: 3  •  cetirizine (zyrTEC) 10 MG tablet, Take 10 mg by mouth Daily., Disp: , Rfl:   •  Cholecalciferol (Vitamin D) 50 MCG (2000 UT) tablet, Take 2,000 Units by mouth Daily., Disp: , Rfl:   •  diphenhydrAMINE (BENADRYL) 50 MG tablet, Take 1 tablet by mouth Take As Directed. 1 hour prior to scan, Disp: 1 tablet, Rfl: 0  •  docusate sodium (COLACE) 100 MG capsule, TAKE 1 CAPSULE BY MOUTH ONCE DAILY AS DIRECTED AS NEEDED, Disp: , Rfl:   •  Ferrous Sulfate (IRON) 325 (65 Fe)  "MG tablet, Take 1 tablet by mouth 2 (Two) Times a Day., Disp: , Rfl: 3  •  furosemide (LASIX) 40 MG tablet, Take 40 mg by mouth Daily., Disp: , Rfl:   •  levothyroxine (SYNTHROID, LEVOTHROID) 125 MCG tablet, Take 125 mcg by mouth Daily., Disp: , Rfl: 1  •  metoprolol succinate XL (TOPROL-XL) 100 MG 24 hr tablet, Take 1/2 tablet by mouth Every 12 (Twelve) Hours., Disp: 30 tablet, Rfl: 0  •  multivitamin with minerals tablet tablet, Take 1 tablet by mouth Daily., Disp: , Rfl:   •  Naloxegol Oxalate (Movantik) 25 MG tablet, Take 1 tablet by mouth Every Morning., Disp: 30 tablet, Rfl: 3  •  Omeprazole 20 MG tablet delayed-release, 1 po in the am 30 minutes before breakfast., Disp: 30 each, Rfl: 3  •  oxyCODONE (ROXICODONE) 15 MG immediate release tablet, Take 15 mg by mouth Every 6 (Six) Hours As Needed., Disp: , Rfl:   •  polyethylene glycol (MIRALAX) 17 g packet, DISSOLVE 1 PACKET IN 8 OUNCES OF LIQUID AND DRINK ONCE DAILY, Disp: , Rfl:   •  predniSONE (DELTASONE) 50 MG tablet, Take 1 tablet by mouth Take As Directed. 1 tab 13 hours before scan, 1 tab 7 hours before, and 1 tab 1 hour before scan, Disp: 3 tablet, Rfl: 0  •  sacubitril-valsartan (ENTRESTO) 24-26 MG tablet, Take 1 tablet by mouth Every 12 (Twelve) Hours., Disp: 60 tablet, Rfl: 0  •  sertraline (ZOLOFT) 100 MG tablet, Take 100 mg by mouth Every Night. Total 150 mg daily, Disp: , Rfl:   •  sertraline (ZOLOFT) 50 MG tablet, Take 50 mg by mouth Daily. Total 150 MG daily dose, Disp: , Rfl:   •  spironolactone (ALDACTONE) 25 MG tablet, Take 25 mg by mouth Daily., Disp: , Rfl:     VS:  Vitals:    08/08/22 1119   BP: 110/78   Pulse: 91   Temp: 97.2 °F (36.2 °C)   SpO2: 98%   Weight: (!) 141 kg (311 lb)   Height: 170.2 cm (67\")     Body mass index is 48.71 kg/m².    PE:  Physical Exam  Constitutional:       Appearance: She is morbidly obese. She is not ill-appearing.   HENT:      Head: Normocephalic.      Right Ear: External ear normal.      Left Ear: External " ear normal.   Eyes:      Conjunctiva/sclera: Conjunctivae normal.      Pupils: Pupils are equal, round, and reactive to light.   Cardiovascular:      Rate and Rhythm: Normal rate and regular rhythm.      Pulses:           Radial pulses are 2+ on the right side and 2+ on the left side.        Dorsalis pedis pulses are 2+ on the right side and 2+ on the left side.      Heart sounds: Normal heart sounds.   Pulmonary:      Effort: Pulmonary effort is normal.      Breath sounds: Normal breath sounds.   Musculoskeletal:      Cervical back: Normal range of motion and neck supple.      Right lower leg: No edema.      Left lower leg: No edema.   Skin:     General: Skin is warm.      Capillary Refill: Capillary refill takes less than 2 seconds.   Neurological:      Mental Status: She is alert and oriented to person, place, and time.      Coordination: Coordination normal.      Gait: Gait normal.      Comments: CN II-XII normal.  Casual gait normal.   Psychiatric:         Mood and Affect: Mood normal.         Behavior: Behavior normal.         Thought Content: Thought content normal.         Assessment/Plan:     Diagnoses and all orders for this visit:    1. Encounter to establish care (Primary)    2. Anoxic brain injury (HCC)    3. Paroxysmal atrial fibrillation (HCC)       - Continue prescribed medication.  Follow up with cardiology as scheduled     4. NICM (nonischemic cardiomyopathy) (HCC)        - Continue prescribed medication.  Follow-up with cardiology as scheduled    5. Chronic systolic congestive heart failure (HCC)        - Continue prescribed medications, follow-up with cardiology as scheduled    6. Primary hypertension  -     Comprehensive Metabolic Panel        - Follow heart healthy diet.  Keep sodium intake < 1500 mg per day.  Avoid processed & fast foods.          - Exercise as tolerated, with a goal of 30 minutes of moderate exercise most days. Consider recumbent bike, pool exercises to reduce strain of  knees        - Take medications as prescribed.    7. Hypothyroidism, unspecified type  -     TSH  -     T4, Free    8. Chronic pain of both knees  -     Ambulatory Referral to Orthopedic Surgery    9. Chronic instability of left knee  -     Ambulatory Referral to Orthopedic Surgery    10. Chronic knee instability, right  -     Ambulatory Referral to Orthopedic Surgery    11. Family history of diabetes mellitus  -     Hemoglobin A1c          Return in about 4 weeks (around 9/5/2022) for Annual.

## 2022-08-09 LAB
ALBUMIN SERPL-MCNC: 5.1 G/DL (ref 3.5–5.2)
ALBUMIN/GLOB SERPL: 2.2 G/DL
ALP SERPL-CCNC: 79 U/L (ref 39–117)
ALT SERPL-CCNC: 29 U/L (ref 1–33)
AST SERPL-CCNC: 22 U/L (ref 1–32)
BILIRUB SERPL-MCNC: 0.5 MG/DL (ref 0–1.2)
BUN SERPL-MCNC: 17 MG/DL (ref 6–20)
BUN/CREAT SERPL: 21.5 (ref 7–25)
CALCIUM SERPL-MCNC: 10.1 MG/DL (ref 8.6–10.5)
CHLORIDE SERPL-SCNC: 101 MMOL/L (ref 98–107)
CO2 SERPL-SCNC: 27 MMOL/L (ref 22–29)
CREAT SERPL-MCNC: 0.79 MG/DL (ref 0.57–1)
EGFRCR SERPLBLD CKD-EPI 2021: 98.9 ML/MIN/1.73
GLOBULIN SER CALC-MCNC: 2.3 GM/DL
GLUCOSE SERPL-MCNC: 108 MG/DL (ref 65–99)
HBA1C MFR BLD: 6.3 % (ref 4.8–5.6)
POTASSIUM SERPL-SCNC: 4.2 MMOL/L (ref 3.5–5.2)
PROT SERPL-MCNC: 7.4 G/DL (ref 6–8.5)
SODIUM SERPL-SCNC: 140 MMOL/L (ref 136–145)
T4 FREE SERPL-MCNC: 1.17 NG/DL (ref 0.93–1.7)
TSH SERPL DL<=0.005 MIU/L-ACNC: 2.46 UIU/ML (ref 0.27–4.2)

## 2022-09-02 DIAGNOSIS — M25.561 ARTHRALGIA OF BOTH KNEES: Primary | ICD-10-CM

## 2022-09-02 DIAGNOSIS — M25.562 ARTHRALGIA OF BOTH KNEES: Primary | ICD-10-CM

## 2022-09-13 NOTE — TELEPHONE ENCOUNTER
Incoming Refill Request      Medication requested (name and dose): Polyethylene Glycol (Miralax)    [facility] where [facility]    Additional details provided by patient: This will your first time prescribing since transfer of care.    Best call back number: in chart    Does the patient have less than a 3 day supply:  [x] Yes  [] No    Beth Becerra  09/13/22, 15:05 EDT

## 2022-09-14 NOTE — TELEPHONE ENCOUNTER
Caller: Johny Hearn    Relationship: Self    Best call back number:[phone]     Requested Prescriptions:   Requested Prescriptions     Pending Prescriptions Disp Refills   • polyethylene glycol (MIRALAX) 17 g packet          Pharmacy where request should be sent: [facility] [address] [place] [address] [phone] [facility] [phone] FX     Additional details provided by patient: PATIENT IS OUT OF MEDICATION    Does the patient have less than a 3 day supply:  [x] Yes  [] No    Luz Michael Rep   09/14/22 12:25 EDT

## 2022-09-16 RX ORDER — POLYETHYLENE GLYCOL 3350 17 G/17G
17 POWDER, FOR SOLUTION ORAL DAILY
Qty: 30 PACKET | Refills: 0 | Status: SHIPPED | OUTPATIENT
Start: 2022-09-16 | End: 2022-10-17

## 2022-09-28 ENCOUNTER — OFFICE VISIT (OUTPATIENT)
Dept: INTERNAL MEDICINE | Facility: CLINIC | Age: 37
End: 2022-09-28

## 2022-09-28 VITALS
BODY MASS INDEX: 45.99 KG/M2 | HEART RATE: 92 BPM | WEIGHT: 293 LBS | OXYGEN SATURATION: 98 % | TEMPERATURE: 97.2 F | DIASTOLIC BLOOD PRESSURE: 72 MMHG | HEIGHT: 67 IN | SYSTOLIC BLOOD PRESSURE: 108 MMHG

## 2022-09-28 DIAGNOSIS — E03.9 HYPOTHYROIDISM, UNSPECIFIED TYPE: ICD-10-CM

## 2022-09-28 DIAGNOSIS — C82.00 FOLLICULAR LYMPHOMA GRADE I, UNSPECIFIED BODY REGION: ICD-10-CM

## 2022-09-28 DIAGNOSIS — Z13.220 SCREENING FOR LIPID DISORDERS: ICD-10-CM

## 2022-09-28 DIAGNOSIS — E66.01 CLASS 3 SEVERE OBESITY DUE TO EXCESS CALORIES WITH SERIOUS COMORBIDITY AND BODY MASS INDEX (BMI) OF 45.0 TO 49.9 IN ADULT: ICD-10-CM

## 2022-09-28 DIAGNOSIS — I48.0 PAROXYSMAL ATRIAL FIBRILLATION: ICD-10-CM

## 2022-09-28 DIAGNOSIS — G93.1 ANOXIC BRAIN INJURY: ICD-10-CM

## 2022-09-28 DIAGNOSIS — C84.68 ANAPLASTIC ALK-POSITIVE LARGE CELL LYMPHOMA OF LYMPH NODES OF MULTIPLE REGIONS: ICD-10-CM

## 2022-09-28 DIAGNOSIS — F33.0 MILD EPISODE OF RECURRENT MAJOR DEPRESSIVE DISORDER: ICD-10-CM

## 2022-09-28 DIAGNOSIS — I10 PRIMARY HYPERTENSION: ICD-10-CM

## 2022-09-28 DIAGNOSIS — K59.00 CONSTIPATION, UNSPECIFIED CONSTIPATION TYPE: ICD-10-CM

## 2022-09-28 DIAGNOSIS — G62.9 PERIPHERAL POLYNEUROPATHY: ICD-10-CM

## 2022-09-28 DIAGNOSIS — K21.9 GASTROESOPHAGEAL REFLUX DISEASE WITHOUT ESOPHAGITIS: Chronic | ICD-10-CM

## 2022-09-28 DIAGNOSIS — F41.9 ANXIETY: ICD-10-CM

## 2022-09-28 DIAGNOSIS — Z23 NEED FOR INFLUENZA VACCINATION: ICD-10-CM

## 2022-09-28 DIAGNOSIS — C84.68 ANAPLASTIC ALK-POSITIVE LARGE CELL LYMPHOMA OF LYMPH NODES OF MULTIPLE REGIONS: Primary | ICD-10-CM

## 2022-09-28 DIAGNOSIS — I50.22 CHRONIC SYSTOLIC CONGESTIVE HEART FAILURE: ICD-10-CM

## 2022-09-28 DIAGNOSIS — Z00.00 ANNUAL PHYSICAL EXAM: Primary | ICD-10-CM

## 2022-09-28 DIAGNOSIS — I42.8 NICM (NONISCHEMIC CARDIOMYOPATHY): ICD-10-CM

## 2022-09-28 LAB
CHOLEST SERPL-MCNC: 238 MG/DL (ref 0–200)
HDLC SERPL-MCNC: 39 MG/DL (ref 40–60)
LDLC SERPL CALC-MCNC: 169 MG/DL (ref 0–100)
TRIGL SERPL-MCNC: 161 MG/DL (ref 0–150)
VLDLC SERPL CALC-MCNC: 30 MG/DL (ref 5–40)

## 2022-09-28 PROCEDURE — 3008F BODY MASS INDEX DOCD: CPT | Performed by: NURSE PRACTITIONER

## 2022-09-28 PROCEDURE — 90686 IIV4 VACC NO PRSV 0.5 ML IM: CPT | Performed by: NURSE PRACTITIONER

## 2022-09-28 PROCEDURE — 99395 PREV VISIT EST AGE 18-39: CPT | Performed by: NURSE PRACTITIONER

## 2022-09-28 PROCEDURE — 90471 IMMUNIZATION ADMIN: CPT | Performed by: NURSE PRACTITIONER

## 2022-09-28 PROCEDURE — 2014F MENTAL STATUS ASSESS: CPT | Performed by: NURSE PRACTITIONER

## 2022-09-28 RX ORDER — NICOTINE POLACRILEX 4 MG/1
GUM, CHEWING ORAL
Qty: 90 EACH | Refills: 1 | Status: SHIPPED | OUTPATIENT
Start: 2022-09-28

## 2022-09-28 RX ORDER — CHOLECALCIFEROL (VITAMIN D3) 50 MCG
2000 TABLET ORAL DAILY
Qty: 90 TABLET | Refills: 1 | Status: SHIPPED | OUTPATIENT
Start: 2022-09-28

## 2022-09-28 RX ORDER — PREDNISONE 50 MG/1
50 TABLET ORAL TAKE AS DIRECTED
Qty: 3 TABLET | Refills: 0 | Status: SHIPPED | OUTPATIENT
Start: 2022-09-28

## 2022-09-28 RX ORDER — PNV NO.95/FERROUS FUM/FOLIC AC 28MG-0.8MG
1 TABLET ORAL 2 TIMES DAILY
Qty: 180 TABLET | Refills: 3 | Status: SHIPPED | OUTPATIENT
Start: 2022-09-28

## 2022-09-28 RX ORDER — LEVOTHYROXINE SODIUM 150 UG/1
150 TABLET ORAL DAILY
COMMUNITY
Start: 2022-09-02

## 2022-09-28 RX ORDER — SERTRALINE HYDROCHLORIDE 100 MG/1
100 TABLET, FILM COATED ORAL NIGHTLY
Qty: 90 TABLET | Refills: 1 | Status: SHIPPED | OUTPATIENT
Start: 2022-09-28

## 2022-09-28 RX ORDER — METOPROLOL SUCCINATE 100 MG/1
50 TABLET, EXTENDED RELEASE ORAL EVERY 12 HOURS
Qty: 180 TABLET | Refills: 1 | Status: SHIPPED | OUTPATIENT
Start: 2022-09-28

## 2022-09-28 RX ORDER — CETIRIZINE HYDROCHLORIDE 10 MG/1
10 TABLET ORAL DAILY
Qty: 90 TABLET | Refills: 3 | Status: SHIPPED | OUTPATIENT
Start: 2022-09-28

## 2022-09-30 ENCOUNTER — TELEPHONE (OUTPATIENT)
Dept: INTERNAL MEDICINE | Facility: CLINIC | Age: 37
End: 2022-09-30

## 2022-09-30 NOTE — TELEPHONE ENCOUNTER
Caller: DhirajJohny    Relationship: Self    Best call back number: [phone]     What medication are you requesting: LIPITOR    What are your current symptoms: HIGH CHOLESTEROL    How long have you been experiencing symptoms: NA    Have you had these symptoms before:    [x] Yes  [] No    Have you been treated for these symptoms before:   [x] Yes  [] No    If a prescription is needed, what is your preferred pharmacy and phone number: [address] [phone] [facility] [phone] FX     Additional notes: RECEIVED A PHONE CALL FROM ELOISE PRADO ABOUT STARTING A NEW LIPITOR MEDICATION TO REGULATE HER HIGH CHOLESTEROL.     DR BALJEET ISSA WOULD LIKE A FAXED COPY OF THE PATIENTS CURRENT BLOOD WORK

## 2022-10-13 ENCOUNTER — HOSPITAL ENCOUNTER (OUTPATIENT)
Dept: CT IMAGING | Facility: HOSPITAL | Age: 37
Discharge: HOME | End: 2022-10-13

## 2022-10-13 DIAGNOSIS — C82.00 FOLLICULAR LYMPHOMA GRADE I, UNSPECIFIED BODY REGION: ICD-10-CM

## 2022-10-13 DIAGNOSIS — C84.68 ANAPLASTIC ALK-POSITIVE LARGE CELL LYMPHOMA OF LYMPH NODES OF MULTIPLE REGIONS: ICD-10-CM

## 2022-10-13 PROCEDURE — 25010000002 IOPAMIDOL 61 % SOLUTION: Performed by: INTERNAL MEDICINE

## 2022-10-13 PROCEDURE — 70491 CT SOFT TISSUE NECK W/DYE: CPT

## 2022-10-13 PROCEDURE — 74177 CT ABD & PELVIS W/CONTRAST: CPT

## 2022-10-13 PROCEDURE — 71260 CT THORAX DX C+: CPT

## 2022-10-13 RX ADMIN — IOPAMIDOL 50 ML: 612 INJECTION, SOLUTION INTRAVENOUS at 12:17

## 2022-10-13 RX ADMIN — IOPAMIDOL 100 ML: 612 INJECTION, SOLUTION INTRAVENOUS at 12:17

## 2022-10-17 RX ORDER — POLYETHYLENE GLYCOL 3350 17 G/17G
POWDER, FOR SOLUTION ORAL
Qty: 30 EACH | Refills: 0 | Status: SHIPPED | OUTPATIENT
Start: 2022-10-17 | End: 2022-11-28

## 2022-10-17 NOTE — TELEPHONE ENCOUNTER
Caller: Johny Hearn    Relationship: Self    Best call back number: [phone]    Requested Prescriptions:   Requested Prescriptions     Pending Prescriptions Disp Refills   • polyethylene glycol 17 g packet [Pharmacy Med Name: PEG 3350 17 GM Oral Packet] 30 each 0     Sig: TAKE 17GM BY MOUTH DAILY        Pharmacy where request should be sent: [facility] [address] [place] [address] [phone] [facility] [phone] FX     Additional details provided by patient: PLEASE REFILL- OUT- 90 DAY SUPPLY    Does the patient have less than a 3 day supply:  [x] Yes  [] No    Luz Olea Rep   10/17/22 10:24 EDT

## 2022-10-25 ENCOUNTER — OFFICE VISIT (OUTPATIENT)
Dept: ONCOLOGY | Facility: CLINIC | Age: 37
End: 2022-10-25

## 2022-10-25 VITALS
SYSTOLIC BLOOD PRESSURE: 113 MMHG | WEIGHT: 293 LBS | BODY MASS INDEX: 45.99 KG/M2 | RESPIRATION RATE: 16 BRPM | HEIGHT: 67 IN | OXYGEN SATURATION: 95 % | TEMPERATURE: 98 F | DIASTOLIC BLOOD PRESSURE: 66 MMHG | HEART RATE: 87 BPM

## 2022-10-25 DIAGNOSIS — C84.68 ANAPLASTIC ALK-POSITIVE LARGE CELL LYMPHOMA OF LYMPH NODES OF MULTIPLE REGIONS: Primary | ICD-10-CM

## 2022-10-25 PROCEDURE — 99214 OFFICE O/P EST MOD 30 MIN: CPT | Performed by: NURSE PRACTITIONER

## 2022-10-25 RX ORDER — LIDOCAINE AND PRILOCAINE 25; 25 MG/G; MG/G
1 CREAM TOPICAL AS NEEDED
Qty: 30 G | Refills: 3 | Status: SHIPPED | OUTPATIENT
Start: 2022-10-25

## 2022-10-25 NOTE — PROGRESS NOTES
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 Anaplastic large T-cell lymphoma, ALK positive:  A. Incidentally found LAD on MRI done for low back and hip pain done 9/5/2017.   B. Follow up CAT scan confirmed pelvic adenopathy extending from the distal aortic region through the left iliac region.   C. Status post FNA to left iliac lymph node done 9/29/2017. Pathology revealed benign lymphoid cell groups.   D. Repeat CT done 3/15/2018 revealed increased size of left iliac adenopathy with mild splenomegaly.   E. CT-guided biopsy done on April 19, 2018 showed anaplastic large T-cell lymphoma ALK+  F. whole body PET scan done on May 1, 2018 revealed disease above and below the diaphragm with hypermetabolic active lymph nodes in the supraclavicular area as well as retroperitoneum.  G. Started chemotherapy using CHOP May 2, 2018, status post 6 cycles, completed August 22, 2018.   2. Mild splenomegaly   A. Incidentally found 9/5/2017.  B. Progressive size on repeat imaging done 3/15/2018.  3. Left hip and low back pain  A. Under care of pain management with use of Norco.   4. History of anoxic brain injury following cardiac arrest post-partum.   5. Postpartum cardiomyopathy.   6. Anxiety and depression  7.  Constipation  8.  Chemotherapy used nausea.  9.  Hypertension  10.  Systolic cardiomyopathy:  11.  Ejection fraction dropped from 60% on May 2 2:30 percent on October 19, 2018. EF at 25% on 10/12/2019  12. Obesity  13. Atrial Fibrillation    HISTORY OF PRESENT ILLNESS: The patient is a very pleasant 37 y.o. female past medical history significant for anaplastic large T-cell lymphoma, ALK positive diagnosed in April 19, 2018 after CT-guided core biopsy of left iliac mass.  Whole body PET scan revealed disease above and below the diaphragm.  Bone marrow biopsy done on May 1, 2018, that failed to show any evidence of bone marrow  involvement.  The patient was started on chemotherapy using CHOP May 2, 2018.  She completed 6 cycles of 2018.  The patient is here today for scheduled follow-up visit.     SUBJECTIVE: The patient is here today for follow up by herself. Her  joins on the phone. She says she has some pain in her left breast. She does have fibrocystic breast and says she has been drinking more caffeine lately and that causes her pain in her breast. She says she is taking care of her  now as he is bedridden. She is anxious about scan results.     Past History:  Medical History: has a past medical history of A-fib (HCC), Abnormal TSH, Allergic rhinitis, Anemia, Anesthesia, Anxiety and depression, Arthritis, Black stool, Body piercing, Brain injury, Cancer (HCC), Cardiac arrest (HCC), Cardiomyopathy (HCC), CHF (congestive heart failure) (HCC), Chronic pain, Constipation, Dermatitis, Fatty liver, Full dentures, GERD (gastroesophageal reflux disease), H/O chronic ear infection, Headaches due to old head trauma, Heartburn, Heavy menses, Hemorrhoids, Hip pain, left, Hyperlipidemia, Hypertension, Morbid obesity (HCC), Short-term memory loss, Stuttering in conditions classified elsewhere, and Visual cortex injury.   Surgical History: has a past surgical history that includes Tubal ligation;  section; Endometrial ablation; Femoral Lymph Node Biopsy/Excision (Left); Portacath placement (N/A, 2018); Cardiac catheterization (N/A, 10/22/2018); Cardiac catheterization (N/A, 10/22/2018); Cardiac catheterization (N/A, 10/22/2018); Hemorrhoid surgery (N/A, 2019); Colonoscopy (N/A, 2019); Esophagogastroduodenoscopy (N/A, 2019); Esophagogastroduodenoscopy (N/A, 10/22/2019); and Colonoscopy (N/A, 10/22/2019).   Family History: family history includes Diabetes in her father and paternal grandmother; Hypertension in her father.   Social History: reports that she has never smoked. She has never used smokeless  "tobacco. She reports that she does not drink alcohol and does not use drugs.    (Not in a hospital admission)     Allergies: Contrast dye, Erythromycin, and Macrobid [nitrofurantoin monohyd macro]     Review of Systems   Constitutional: Positive for fatigue.   Musculoskeletal: Positive for myalgias.   Psychiatric/Behavioral: The patient is nervous/anxious.    All other systems reviewed and are negative.      PHYSICAL EXAMINATION:   /66   Pulse 87   Temp 98 °F (36.7 °C) (Temporal)   Resp 16   Ht 170.2 cm (67\")   Wt (!) 142 kg (312 lb)   SpO2 95%   BMI 48.87 kg/m²    Pain Score    10/25/22 1453   PainSc:   5   PainLoc: Generalized                     ECOG Performance Status: 1 - Symptomatic but completely ambulatory      General Appearance:      alert, cooperative, no apparent distress and appears stated age   Lungs:   Clear to auscultation bilaterally; respirations regular, even, and unlabored bilaterally   Heart:  Regular rate and rhythm, no murmurs appreciated   Abdomen:   Soft, non-tender, non-distended and no organomegaly                 No visits with results within 2 Week(s) from this visit.   Latest known visit with results is:   Office Visit on 09/28/2022   Component Date Value Ref Range Status   • Total Cholesterol 09/28/2022 238 (H)  0 - 200 mg/dL Final    Comment: Cholesterol Reference Ranges  (U.S. Department of Health and Human Services ATP III  Classifications)  Desirable          <200 mg/dL  Borderline High    200-239 mg/dL  High Risk          >240 mg/dL  Triglyceride Reference Ranges  (U.S. Department of Health and Human Services ATP III  Classifications)  Normal           <150 mg/dL  Borderline High  150-199 mg/dL  High             200-499 mg/dL  Very High        >500 mg/dL  HDL Reference Ranges  (U.S. Department of Health and Human Services ATP III  Classifications)  Low     <40 mg/dl (major risk factor for CHD)  High    >60 mg/dl ('negative' risk factor for CHD)  LDL Reference " Ranges  (U.S. Department of Health and Human Services ATP III  Classifications)  Optimal          <100 mg/dL  Near Optimal     100-129 mg/dL  Borderline High  130-159 mg/dL  High             160-189 mg/dL  Very High        >189 mg/dL     • Triglycerides 09/28/2022 161 (H)  0 - 150 mg/dL Final   • HDL Cholesterol 09/28/2022 39 (L)  40 - 60 mg/dL Final   • VLDL Cholesterol Lance 09/28/2022 30  5 - 40 mg/dL Final   • LDL Chol Calc (NIH) 09/28/2022 169 (H)  0 - 100 mg/dL Final        CT Soft Tissue Neck With Contrast    Result Date: 10/13/2022  Narrative: PROCEDURE: CT SOFT TISSUE NECK W CONTRAST-  HISTORY: f/u scans - neck pain; C84.68-Anaplastic large cell lymphoma, ALK-positive, lymph nodes of multiple sites; C82.00-Follicular lymphoma grade I, unspecified site  TECHNIQUE: Axial CT with IV contrast administration.  FINDINGS:  No adenopathy or mass lesion is present. Salivary glands are unremarkable. Thyroid gland is normal. Larynx has a normal appearance.      Impression: No mass or adenopathy.   This study was performed with techniques to keep radiation doses as low as reasonably achievable (ALARA). Individualized dose reduction techniques using automated exposure control or adjustment of vA and/or kV according to the patient size were employed.  This report was signed and finalized on 10/13/2022 4:59 PM by Kanu Washington MD.    CT Chest With Contrast Diagnostic    Result Date: 10/13/2022  Narrative: PROCEDURE: CT CHEST W CONTRAST DIAGNOSTIC-  HISTORY: f/u scans; C84.68-Anaplastic large cell lymphoma, ALK-positive, lymph nodes of multiple sites; C82.00-Follicular lymphoma grade I, unspecified site  COMPARISON: 01/14/2022.  TECHNIQUE: Axial CT with IV contrast administration.  FINDINGS:  No acute lung disease is present.  No pleural or pericardial effusion is seen. No adenopathy or mass lesion is present.      Impression: No adenopathy or mass. Stable exam without evidence of active disease involving the thorax.   This  study was performed with techniques to keep radiation doses as low as reasonably achievable (ALARA). Individualized dose reduction techniques using automated exposure control or adjustment of vA and/or kV according to the patient size were employed.  This report was signed and finalized on 10/13/2022 2:57 PM by Kanu Washington MD.    CT Abdomen Pelvis With Contrast    Result Date: 10/13/2022  Narrative: PROCEDURE: CT ABDOMEN PELVIS W CONTRAST-  TECHNIQUE: IV contrast enhanced exam  HISTORY: f/u scans; C84.68-Anaplastic large cell lymphoma, ALK-positive, lymph nodes of multiple sites; C82.00-Follicular lymphoma grade I, unspecified site  COMPARISON: 01/14/2022.  FINDINGS:  ABDOMEN: Splenomegaly is noted, similar from prior exam measuring up to 16.2 cm. Mild hepatomegaly is noted.  Remaining solid abdominal organs are unremarkable.  Gallbladder is contracted.  No bowel obstruction or bowel wall thickening is seen. No mesenteric or retroperitoneal adenopathy is seen.  PELVIS: Pelvic bowel loops are unremarkable. Uterus and ovaries are unremarkable. Numerous bilateral inguinal lymph nodes are seen not enlarged by imaging criteria, stable from prior exam.      Impression: 1. No adenopathy. 2. Stable splenomegaly. 3. Overall stable exam.   This study was performed with techniques to keep radiation doses as low as reasonably achievable (ALARA). Individualized dose reduction techniques using automated exposure control or adjustment of vA and/or kV according to the patient size were employed.  This report was signed and finalized on 10/13/2022 4:59 PM by Kanu Washington MD.      ASSESSMENT: The patient is a very pleasant 37 y.o. female  with anaplastic large T-cell lymphoma, ALK positive      PLAN:    1.Anaplastic large T-cell lymphoma, ALK positive  A.  I did go over scan results from October 2022.  We discussed there is no evidence of recurrence on scans.  No adenopathy.  She does have stable splenomegaly.  B.  We will plan to  repeat scans in 1 year.  We discussed she can call earlier if she has any new adenopathy, fevers in the evenings, or night sweats.  We will send in Benadryl and prednisone for premedication prior to scans.   C.  She had labs checked in August and they were stable overall.  We will plan to check CBC, CMP, and LDH in 6 months with her port flushes.  D.  We will continue every 3-month port flush.  E.  She will follow-up in 6 months.     2.  Atrial fibrillation and heart failure  A.  Her cardiomyopathy could be induced by previous chemotherapy including Adriamycin  B.  She will continue to follow-up with Dr. Curtis from cardiology    3.  Port-A-Cath  A.  We will continue with every 3-month port flushes and 6-month labs.    4.  Constipation  A.  Continue Colace daily    5.  Depression  A.  Continue Zoloft    6.  Obesity  A.  I advised the patient to start eating a healthy diet and try to exercise.  B.  We discussed that obesity was a risk factor for cancer and should try to decrease her BMI      FOLLOW UP: 6 months with labs    Laurel Preston, DREW  10/25/2022

## 2022-11-21 ENCOUNTER — TELEPHONE (OUTPATIENT)
Dept: INTERNAL MEDICINE | Facility: CLINIC | Age: 37
End: 2022-11-21

## 2022-11-21 NOTE — TELEPHONE ENCOUNTER
Caller: Johny Hearn    Relationship: Self    Best call back number: [phone]    What form or medical record are you requesting:     FOOD STAMP OFFICE NEEDS NOTE STATING THAT HER  TAKES CARE OF HER FULL TIME.    Who is requesting this form or medical record from you: FOOD STAMP    How would you like to receive the form or medical records (pick-up, mail, fax):     PICKUP     Timeframe paperwork needed:     AS SOON AS POSSIBLE     Additional notes:     CALL PATIENT WHEN PAPERWORK IS READY

## 2022-11-21 NOTE — TELEPHONE ENCOUNTER
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

## 2022-11-23 ENCOUNTER — TELEMEDICINE (OUTPATIENT)
Dept: INTERNAL MEDICINE | Facility: CLINIC | Age: 37
End: 2022-11-23

## 2022-11-23 DIAGNOSIS — I48.0 PAROXYSMAL ATRIAL FIBRILLATION: ICD-10-CM

## 2022-11-23 DIAGNOSIS — I42.8 NICM (NONISCHEMIC CARDIOMYOPATHY): ICD-10-CM

## 2022-11-23 DIAGNOSIS — I50.23 SYSTOLIC CHF, ACUTE ON CHRONIC: ICD-10-CM

## 2022-11-23 DIAGNOSIS — G93.1 ANOXIC BRAIN INJURY: Primary | ICD-10-CM

## 2022-11-23 PROCEDURE — 99213 OFFICE O/P EST LOW 20 MIN: CPT | Performed by: NURSE PRACTITIONER

## 2022-11-23 NOTE — PROGRESS NOTES
"Mode of Visit: Video  Location of patient: home  You have chosen to receive care through a telehealth visit.  Does the patient consent to use a video/audio connection for your medical care today? Yes  The visit included audio interaction. Patient was unable to participate in visual/video visit as her phone would not allow camera access.      Date: 2022  Name: Johny Hearn  : 1985         Chief Complaint:   Chief Complaint   Patient presents with   • DISCUSS PAPERS FOR CARE         HPI:  Johny Hearn is a 37 y.o. female presents for video visit in regard to request for a letter stating her  \"provides 100% of her care\" to be able to receive food stamps.  Johyn has no physical disability other than fatigue, her  is bedbound. This appointment was scheduled to clarify what paperwork is needed as well as identify the care To provides.    To and Johny receive food stamps.  They are required to have paperwork filled out every 3 months regarding their health status, the fact they care for each other.  Neither are able to work.  Johny has a h/o cardiac arrest during delivery of daughter, resultant anoxic brain injury.  Follows Dr. Curtis, cardiology.  Has A. fib, nonischemic cardiomyopathy, chronic systolic heart failure, hypertension.  Spoke with To, her .      History: The following portions of the patient's history were reviewed and updated as appropriate: allergies, current medications, past medical history, family history, surgical history, social history and problem list.          Assessment/Plan:  Diagnoses and all orders for this visit:    1. Anoxic brain injury (HCC) (Primary)    2. NICM (nonischemic cardiomyopathy) (HCC)    3. Paroxysmal atrial fibrillation (HCC)    4. Systolic CHF, acute on chronic (HCC)    Paperwork necessary for food stamps will be brought to clinic next week.      Return for Next scheduled follow up.  Patient was given instructions " and counseling regarding her condition or for health maintenance advice. Please see specific information pulled into the AVS if appropriate.

## 2022-11-23 NOTE — PROGRESS NOTES
You have chosen to receive care through a telehealth visit.  Do you consent to use a video/audio connection for your medical care today? Yes  Active parties: Rosamaria RUSSELL, Johny Richard CMA and To Hearn

## 2022-11-28 RX ORDER — POLYETHYLENE GLYCOL 3350 17 G/17G
POWDER, FOR SOLUTION ORAL
Qty: 30 EACH | Refills: 0 | Status: SHIPPED | OUTPATIENT
Start: 2022-11-28 | End: 2023-01-10

## 2023-01-10 RX ORDER — POLYETHYLENE GLYCOL 3350 17 G/17G
POWDER, FOR SOLUTION ORAL
Qty: 30 EACH | Refills: 0 | Status: SHIPPED | OUTPATIENT
Start: 2023-01-10 | End: 2023-02-18

## 2023-02-18 RX ORDER — POLYETHYLENE GLYCOL 3350 17 G/17G
POWDER, FOR SOLUTION ORAL
Qty: 30 EACH | Refills: 0 | Status: SHIPPED | OUTPATIENT
Start: 2023-02-18 | End: 2023-03-21

## 2023-03-08 DIAGNOSIS — F41.9 ANXIETY: ICD-10-CM

## 2023-03-08 DIAGNOSIS — F33.0 MILD EPISODE OF RECURRENT MAJOR DEPRESSIVE DISORDER: ICD-10-CM

## 2023-03-21 RX ORDER — POLYETHYLENE GLYCOL 3350 17 G/17G
POWDER, FOR SOLUTION ORAL
Qty: 30 EACH | Refills: 0 | Status: SHIPPED | OUTPATIENT
Start: 2023-03-21

## 2023-03-29 ENCOUNTER — OFFICE VISIT (OUTPATIENT)
Dept: INTERNAL MEDICINE | Facility: CLINIC | Age: 38
End: 2023-03-29
Payer: COMMERCIAL

## 2023-03-29 ENCOUNTER — TELEPHONE (OUTPATIENT)
Dept: INTERNAL MEDICINE | Facility: CLINIC | Age: 38
End: 2023-03-29
Payer: COMMERCIAL

## 2023-03-29 VITALS
BODY MASS INDEX: 45.99 KG/M2 | HEIGHT: 67 IN | WEIGHT: 293 LBS | SYSTOLIC BLOOD PRESSURE: 116 MMHG | OXYGEN SATURATION: 94 % | DIASTOLIC BLOOD PRESSURE: 78 MMHG | HEART RATE: 78 BPM | TEMPERATURE: 96.8 F

## 2023-03-29 DIAGNOSIS — J01.00 ACUTE NON-RECURRENT MAXILLARY SINUSITIS: Primary | ICD-10-CM

## 2023-03-29 LAB
EXPIRATION DATE: NORMAL
FLUAV AG UPPER RESP QL IA.RAPID: NOT DETECTED
FLUBV AG UPPER RESP QL IA.RAPID: NOT DETECTED
INTERNAL CONTROL: NORMAL
Lab: NORMAL
SARS-COV-2 AG UPPER RESP QL IA.RAPID: NOT DETECTED

## 2023-03-29 PROCEDURE — 99213 OFFICE O/P EST LOW 20 MIN: CPT | Performed by: NURSE PRACTITIONER

## 2023-03-29 PROCEDURE — 1160F RVW MEDS BY RX/DR IN RCRD: CPT | Performed by: NURSE PRACTITIONER

## 2023-03-29 PROCEDURE — 87428 SARSCOV & INF VIR A&B AG IA: CPT | Performed by: NURSE PRACTITIONER

## 2023-03-29 PROCEDURE — 1159F MED LIST DOCD IN RCRD: CPT | Performed by: NURSE PRACTITIONER

## 2023-03-29 RX ORDER — DEXTROMETHORPHAN HYDROBROMIDE AND PROMETHAZINE HYDROCHLORIDE 15; 6.25 MG/5ML; MG/5ML
5 SYRUP ORAL NIGHTLY PRN
Qty: 240 ML | Refills: 0 | Status: SHIPPED | OUTPATIENT
Start: 2023-03-29

## 2023-03-29 RX ORDER — CEFDINIR 300 MG/1
300 CAPSULE ORAL 2 TIMES DAILY
Qty: 14 CAPSULE | Refills: 0 | Status: SHIPPED | OUTPATIENT
Start: 2023-03-29 | End: 2023-04-05

## 2023-03-29 NOTE — PROGRESS NOTES
"  Office Visit      Patient Name: Johny Hearn  : 1985   MRN: [id number]   Care Team: Patient Care Team:  Malia Galvan APRN as PCP - General (Family Medicine)  Shelley Brock MD as Consulting Physician (General Surgery)  Cirilo Deshpande II, MD (Pain Medicine)    Chief Complaint  Cough (Congestion/Chest heaviness/More than a weak)    Subjective     Subjective      Johny Hearn presents to [facility] for cough and congestion.   Symptoms started 1 week ago.   Endorses cough, congestion, chest tightness/heaviness, headaches, and sinus pressure.  Denies fever, chills, shortness of breath, chest pain.  Has not tried anything for the symptoms.   She thinks she needs a chest xray to see if she has an upper respiratory infection.     Objective     Objective   Vital Signs:   /78   Pulse 78   Temp 96.8 °F (36 °C)   Ht 170.2 cm (67\")   Wt (!) 142 kg (314 lb)   SpO2 94%   BMI 49.18 kg/m²     Physical Exam  Vitals and nursing note reviewed.   Constitutional:       General: She is not in acute distress.     Appearance: Normal appearance. She is not ill-appearing or toxic-appearing.   HENT:      Right Ear: Tympanic membrane and ear canal normal. No middle ear effusion. Tympanic membrane is not bulging.      Left Ear: Tympanic membrane and ear canal normal.  No middle ear effusion. Tympanic membrane is not bulging.      Nose: Congestion present. No rhinorrhea.      Right Sinus: Maxillary sinus tenderness present. No frontal sinus tenderness.      Left Sinus: Maxillary sinus tenderness present. No frontal sinus tenderness.      Mouth/Throat:      Mouth: Mucous membranes are moist.      Pharynx: No posterior oropharyngeal erythema.   Eyes:      Pupils: Pupils are equal, round, and reactive to light.   Cardiovascular:      Rate and Rhythm: Normal rate and regular rhythm.      Heart sounds: Normal heart sounds. No murmur heard.  Pulmonary:      Effort: Pulmonary " effort is normal. No respiratory distress.      Breath sounds: Normal breath sounds. No wheezing, rhonchi or rales.   Abdominal:      General: Bowel sounds are normal. There is no distension.      Palpations: Abdomen is soft.      Tenderness: There is no abdominal tenderness.   Musculoskeletal:      Cervical back: Neck supple. No tenderness.   Lymphadenopathy:      Head:      Right side of head: No submental, submandibular or tonsillar adenopathy.      Left side of head: No submental, submandibular or tonsillar adenopathy.      Cervical: Cervical adenopathy present.   Skin:     General: Skin is warm and dry.      Findings: No rash.   Neurological:      Mental Status: She is alert.   Psychiatric:         Mood and Affect: Mood normal.         Behavior: Behavior normal.        Assessment / Plan      Assessment & Plan   Problem List Items Addressed This Visit    None  Visit Diagnoses     Acute non-recurrent maxillary sinusitis    -  Primary    Relevant Orders    POCT SARS-CoV-2 Antigen RAE (Completed)    Flu and COVID-negative in the office today.  Reassurance provided, I do not feel chest x-ray is necessary at this time as pulmonary exam is unremarkable.  We will treat for sinusitis with cefdinir twice daily with food, Mucinex as needed, push fluids, rest, and return with any worsening symptoms.      Follow Up   Return if symptoms worsen or fail to improve.  Patient was given instructions and counseling regarding her condition or for health maintenance advice. Please see specific information pulled into the AVS if appropriate.     DREW Cantor  [facility]

## 2023-03-29 NOTE — TELEPHONE ENCOUNTER
Patient called and states she has a cough/congestion and sore throat. She states she would like to be seen today only. She would like to know if someone will work her in. Call her at [phone]. No openings at time of call

## 2023-04-21 RX ORDER — POLYETHYLENE GLYCOL 3350 17 G/17G
POWDER, FOR SOLUTION ORAL
Qty: 30 EACH | Refills: 0 | Status: SHIPPED | OUTPATIENT
Start: 2023-04-21

## 2023-06-01 RX ORDER — POLYETHYLENE GLYCOL 3350 17 G/17G
POWDER, FOR SOLUTION ORAL
Qty: 30 EACH | Refills: 10 | Status: SHIPPED | OUTPATIENT
Start: 2023-06-01

## 2023-06-01 NOTE — TELEPHONE ENCOUNTER
Rx Refill Note  Requested Prescriptions     Pending Prescriptions Disp Refills   • polyethylene glycol 17 g packet [Pharmacy Med Name: PEG [id number] 17 GM Oral Packet] 30 each 0     Sig: MIX 1 PACKET AND TAKE BY MOUTH ONCE DAILY      Last office visit with prescribing clinician: 9/28/2022   Last telemedicine visit with prescribing clinician: Visit date not found   Next office visit with prescribing clinician: Visit date not found     Seen you last in April. Please advise

## 2023-10-16 ENCOUNTER — TELEPHONE (OUTPATIENT)
Dept: GASTROENTEROLOGY | Facility: CLINIC | Age: 38
End: 2023-10-16
Payer: COMMERCIAL

## 2023-10-16 NOTE — TELEPHONE ENCOUNTER
Patient has called office, requesting to be seen earlier for follow up appointment. Patient states that she is having a lot of nausea and trouble with her stomach. Patient is having trouble being able to eat.    Verified current phone number on file for patient.

## 2023-10-18 ENCOUNTER — TELEPHONE (OUTPATIENT)
Dept: ONCOLOGY | Facility: CLINIC | Age: 38
End: 2023-10-18
Payer: COMMERCIAL

## 2023-10-18 DIAGNOSIS — R51.9 ACUTE NONINTRACTABLE HEADACHE, UNSPECIFIED HEADACHE TYPE: ICD-10-CM

## 2023-10-18 DIAGNOSIS — R11.2 NAUSEA AND VOMITING, UNSPECIFIED VOMITING TYPE: ICD-10-CM

## 2023-10-18 DIAGNOSIS — C84.68 ANAPLASTIC ALK-POSITIVE LARGE CELL LYMPHOMA OF LYMPH NODES OF MULTIPLE REGIONS: Primary | ICD-10-CM

## 2023-10-18 NOTE — TELEPHONE ENCOUNTER
Patient called [facility] and asked if we can order a ct of her head as she has been having sharp pains, headaches that are worsening and n/v over the past week.  Discussed with Dr. Rice and ok to add to scans. Message given to [facility].

## 2023-10-19 DIAGNOSIS — C84.68 ANAPLASTIC ALK-POSITIVE LARGE CELL LYMPHOMA OF LYMPH NODES OF MULTIPLE REGIONS: ICD-10-CM

## 2023-10-19 DIAGNOSIS — C82.00 FOLLICULAR LYMPHOMA GRADE I, UNSPECIFIED BODY REGION: ICD-10-CM

## 2023-10-19 RX ORDER — PREDNISONE 50 MG/1
50 TABLET ORAL TAKE AS DIRECTED
Qty: 3 TABLET | Refills: 0 | Status: SHIPPED | OUTPATIENT
Start: 2023-10-19

## 2023-10-20 ENCOUNTER — TELEPHONE (OUTPATIENT)
Dept: ONCOLOGY | Facility: CLINIC | Age: 38
End: 2023-10-20
Payer: COMMERCIAL

## 2023-10-20 DIAGNOSIS — I10 PRIMARY HYPERTENSION: ICD-10-CM

## 2023-10-20 DIAGNOSIS — I48.0 PAROXYSMAL ATRIAL FIBRILLATION: ICD-10-CM

## 2023-10-20 NOTE — TELEPHONE ENCOUNTER
Spoke with Elizabeth and she is going to call us back today if it is not approved to give patient option to do a different day or reschedule it all.

## 2023-10-20 NOTE — TELEPHONE ENCOUNTER
Caller: ADEOLA    Relationship: [facility]    Best call back number: [phone]       Who are you requesting to speak with (clinical staff, provider,  specific staff member): SCHEDULING      What was the call regarding: PATIENT CT OF HEAD ONLY HAS NOT BEEN APPROVED BY INS BUT IS CURRENTLY IN REVIEW. CALLING TO VERIFY IF OK TO RESCHEDULE IF NOT RECEIVED BY 3 PM TODAY.

## 2023-10-21 RX ORDER — FERROUS SULFATE 325(65) MG
1 TABLET ORAL 2 TIMES DAILY
Qty: 60 TABLET | Refills: 0 | OUTPATIENT
Start: 2023-10-21

## 2023-10-21 RX ORDER — METOPROLOL SUCCINATE 100 MG/1
50 TABLET, EXTENDED RELEASE ORAL EVERY 12 HOURS
Qty: 90 TABLET | Refills: 0 | OUTPATIENT
Start: 2023-10-21

## 2023-10-23 ENCOUNTER — HOSPITAL ENCOUNTER (OUTPATIENT)
Dept: CT IMAGING | Facility: HOSPITAL | Age: 38
Discharge: HOME | End: 2023-10-23
Payer: COMMERCIAL

## 2023-10-23 ENCOUNTER — APPOINTMENT (OUTPATIENT)
Dept: CT IMAGING | Facility: HOSPITAL | Age: 38
End: 2023-10-23
Payer: COMMERCIAL

## 2023-10-23 DIAGNOSIS — C84.68 ANAPLASTIC ALK-POSITIVE LARGE CELL LYMPHOMA OF LYMPH NODES OF MULTIPLE REGIONS: ICD-10-CM

## 2023-10-23 PROCEDURE — 71260 CT THORAX DX C+: CPT

## 2023-10-23 PROCEDURE — 25510000001 IOPAMIDOL 61 % SOLUTION: Performed by: NURSE PRACTITIONER

## 2023-10-23 PROCEDURE — 70491 CT SOFT TISSUE NECK W/DYE: CPT

## 2023-10-23 PROCEDURE — 74177 CT ABD & PELVIS W/CONTRAST: CPT

## 2023-10-23 RX ADMIN — IOPAMIDOL 100 ML: 612 INJECTION, SOLUTION INTRAVENOUS at 15:27

## 2023-10-23 RX ADMIN — IOPAMIDOL 50 ML: 612 INJECTION, SOLUTION INTRAVENOUS at 15:26

## 2023-10-24 ENCOUNTER — TELEPHONE (OUTPATIENT)
Dept: ONCOLOGY | Facility: CLINIC | Age: 38
End: 2023-10-24
Payer: COMMERCIAL

## 2023-10-24 NOTE — TELEPHONE ENCOUNTER
Caller:  COORDINATOR    Relationship:     Best call back number: [phone]    What is the best time to reach you: ASAP    Who are you requesting to speak with (clinical staff, provider,  specific staff member): CLINICAL    What was the call regarding: ADEOLA WITH [facility] CALLING TO GIVE THE INFORMATION FOR A PEER TO PEER FOR CT SCAN SCHEDULED FOR 10-30    TRACKING [phone]  PEER TO PEER NUMBER [phone]    WANTING INFO ABOUT HOW LONG THEY HAVE BEEN HAVING THE PROBLEM AND HAS IT GOTTEN WORSE

## 2023-10-24 NOTE — TELEPHONE ENCOUNTER
Spoke with Elizabeth in [facility] and advised Dr. Rice advised to withdraw scan and cancel until after he sees her.  I called and spoke with her spouse and advised we will have to reschedule until after she sees Dr. Rice and send in office notes, etc.  I advised the scan on the 30th would be cancelled.

## 2023-10-27 DIAGNOSIS — C84.68 ANAPLASTIC ALK-POSITIVE LARGE CELL LYMPHOMA OF LYMPH NODES OF MULTIPLE REGIONS: Primary | ICD-10-CM

## 2023-11-01 ENCOUNTER — HOSPITAL ENCOUNTER (OUTPATIENT)
Dept: INFUSION THERAPY | Facility: HOSPITAL | Age: 38
Discharge: HOME | End: 2023-11-01
Admitting: INTERNAL MEDICINE
Payer: COMMERCIAL

## 2023-11-01 ENCOUNTER — OFFICE VISIT (OUTPATIENT)
Dept: ONCOLOGY | Facility: CLINIC | Age: 38
End: 2023-11-01
Payer: COMMERCIAL

## 2023-11-01 VITALS
WEIGHT: 293 LBS | SYSTOLIC BLOOD PRESSURE: 131 MMHG | DIASTOLIC BLOOD PRESSURE: 68 MMHG | HEART RATE: 82 BPM | TEMPERATURE: 97.3 F | HEIGHT: 67 IN | BODY MASS INDEX: 45.99 KG/M2 | OXYGEN SATURATION: 99 % | RESPIRATION RATE: 16 BRPM

## 2023-11-01 DIAGNOSIS — C82.00 FOLLICULAR LYMPHOMA GRADE I, UNSPECIFIED BODY REGION: Primary | ICD-10-CM

## 2023-11-01 DIAGNOSIS — C84.68 ANAPLASTIC ALK-POSITIVE LARGE CELL LYMPHOMA OF LYMPH NODES OF MULTIPLE REGIONS: Primary | ICD-10-CM

## 2023-11-01 DIAGNOSIS — C84.68 ANAPLASTIC ALK-POSITIVE LARGE CELL LYMPHOMA OF LYMPH NODES OF MULTIPLE REGIONS: ICD-10-CM

## 2023-11-01 LAB
ALBUMIN SERPL-MCNC: 4.7 G/DL (ref 3.5–5.2)
ALBUMIN/GLOB SERPL: 1.5 G/DL
ALP SERPL-CCNC: 88 U/L (ref 39–117)
ALT SERPL W P-5'-P-CCNC: 68 U/L (ref 1–33)
ANION GAP SERPL CALCULATED.3IONS-SCNC: 10.4 MMOL/L (ref 5–15)
AST SERPL-CCNC: 34 U/L (ref 1–32)
BASOPHILS # BLD AUTO: 0.05 10*3/MM3 (ref 0–0.2)
BASOPHILS NFR BLD AUTO: 0.5 % (ref 0–1.5)
BILIRUB SERPL-MCNC: 0.5 MG/DL (ref 0–1.2)
BUN SERPL-MCNC: 16 MG/DL (ref 6–20)
BUN/CREAT SERPL: 24.6 (ref 7–25)
CALCIUM SPEC-SCNC: 9.7 MG/DL (ref 8.6–10.5)
CHLORIDE SERPL-SCNC: 101 MMOL/L (ref 98–107)
CO2 SERPL-SCNC: 25.6 MMOL/L (ref 22–29)
CREAT SERPL-MCNC: 0.65 MG/DL (ref 0.57–1)
DEPRECATED RDW RBC AUTO: 41.2 FL (ref 37–54)
EGFRCR SERPLBLD CKD-EPI 2021: 115.7 ML/MIN/1.73
EOSINOPHIL # BLD AUTO: 0.07 10*3/MM3 (ref 0–0.4)
EOSINOPHIL NFR BLD AUTO: 0.8 % (ref 0.3–6.2)
ERYTHROCYTE [DISTWIDTH] IN BLOOD BY AUTOMATED COUNT: 12.8 % (ref 12.3–15.4)
GLOBULIN UR ELPH-MCNC: 3.2 GM/DL
GLUCOSE SERPL-MCNC: 141 MG/DL (ref 65–99)
HCT VFR BLD AUTO: 42 % (ref 34–46.6)
HGB BLD-MCNC: 13.9 G/DL (ref 12–15.9)
IMM GRANULOCYTES # BLD AUTO: 0.06 10*3/MM3 (ref 0–0.05)
IMM GRANULOCYTES NFR BLD AUTO: 0.6 % (ref 0–0.5)
LDH SERPL-CCNC: 181 U/L (ref 135–214)
LYMPHOCYTES # BLD AUTO: 2.11 10*3/MM3 (ref 0.7–3.1)
LYMPHOCYTES NFR BLD AUTO: 22.8 % (ref 19.6–45.3)
MCH RBC QN AUTO: 29 PG (ref 26.6–33)
MCHC RBC AUTO-ENTMCNC: 33.1 G/DL (ref 31.5–35.7)
MCV RBC AUTO: 87.7 FL (ref 79–97)
MONOCYTES # BLD AUTO: 0.58 10*3/MM3 (ref 0.1–0.9)
MONOCYTES NFR BLD AUTO: 6.3 % (ref 5–12)
NEUTROPHILS NFR BLD AUTO: 6.37 10*3/MM3 (ref 1.7–7)
NEUTROPHILS NFR BLD AUTO: 69 % (ref 42.7–76)
NRBC BLD AUTO-RTO: 0 /100 WBC (ref 0–0.2)
PLATELET # BLD AUTO: 235 10*3/MM3 (ref 140–450)
PMV BLD AUTO: 9.1 FL (ref 6–12)
POTASSIUM SERPL-SCNC: 4.2 MMOL/L (ref 3.5–5.2)
PROT SERPL-MCNC: 7.9 G/DL (ref 6–8.5)
RBC # BLD AUTO: 4.79 10*6/MM3 (ref 3.77–5.28)
SODIUM SERPL-SCNC: 137 MMOL/L (ref 136–145)
WBC NRBC COR # BLD: 9.24 10*3/MM3 (ref 3.4–10.8)

## 2023-11-01 PROCEDURE — 36591 DRAW BLOOD OFF VENOUS DEVICE: CPT

## 2023-11-01 PROCEDURE — 1125F AMNT PAIN NOTED PAIN PRSNT: CPT | Performed by: INTERNAL MEDICINE

## 2023-11-01 PROCEDURE — 99214 OFFICE O/P EST MOD 30 MIN: CPT | Performed by: INTERNAL MEDICINE

## 2023-11-01 PROCEDURE — 83615 LACTATE (LD) (LDH) ENZYME: CPT | Performed by: INTERNAL MEDICINE

## 2023-11-01 PROCEDURE — 25010000002 HEPARIN LOCK FLUSH PER 10 UNITS: Performed by: INTERNAL MEDICINE

## 2023-11-01 PROCEDURE — 80053 COMPREHEN METABOLIC PANEL: CPT | Performed by: INTERNAL MEDICINE

## 2023-11-01 PROCEDURE — 85025 COMPLETE CBC W/AUTO DIFF WBC: CPT | Performed by: INTERNAL MEDICINE

## 2023-11-01 RX ORDER — SODIUM CHLORIDE 0.9 % (FLUSH) 0.9 %
10 SYRINGE (ML) INJECTION AS NEEDED
Status: DISCONTINUED | OUTPATIENT
Start: 2023-11-01 | End: 2023-11-03 | Stop reason: HOSPADM

## 2023-11-01 RX ORDER — HEPARIN SODIUM (PORCINE) LOCK FLUSH IV SOLN 100 UNIT/ML 100 UNIT/ML
500 SOLUTION INTRAVENOUS AS NEEDED
Status: DISCONTINUED | OUTPATIENT
Start: 2023-11-01 | End: 2023-11-03 | Stop reason: HOSPADM

## 2023-11-01 RX ADMIN — Medication 10 ML: at 14:30

## 2023-11-01 RX ADMIN — HEPARIN 500 UNITS: 100 SYRINGE at 14:30

## 2023-11-01 NOTE — PROGRESS NOTES
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 Anaplastic large T-cell lymphoma, ALK positive:  A. Incidentally found LAD on MRI done for low back and hip pain done 9/5/2017.   B. Follow up CAT scan confirmed pelvic adenopathy extending from the distal aortic region through the left iliac region.   C. Status post FNA to left iliac lymph node done 9/29/2017. Pathology revealed benign lymphoid cell groups.   D. Repeat CT done 3/15/2018 revealed increased size of left iliac adenopathy with mild splenomegaly.   E. CT-guided biopsy done on April 19, 2018 showed anaplastic large T-cell lymphoma ALK+  F. whole body PET scan done on May 1, 2018 revealed disease above and below the diaphragm with hypermetabolic active lymph nodes in the supraclavicular area as well as retroperitoneum.  G. Started chemotherapy using CHOP May 2, 2018, status post 6 cycles, completed August 22, 2018.   2. Mild splenomegaly   A. Incidentally found 9/5/2017.  B. Progressive size on repeat imaging done 3/15/2018.  3. Left hip and low back pain  A. Under care of pain management with use of Norco.   4. History of anoxic brain injury following cardiac arrest post-partum.   5. Postpartum cardiomyopathy.   6. Anxiety and depression  7.  Constipation  8.  Chemotherapy used nausea.  9.  Hypertension  10.  Systolic cardiomyopathy:  11.  Ejection fraction dropped from 60% on May 2 2:30 percent on October 19, 2018. EF at 25% on 10/12/2019  12. Obesity  13. Atrial Fibrillation    HISTORY OF PRESENT ILLNESS: The patient is a very pleasant 38 y.o. female past medical history significant for anaplastic large T-cell lymphoma, ALK positive diagnosed in April 19, 2018 after CT-guided core biopsy of left iliac mass.  Whole body PET scan revealed disease above and below the diaphragm.  Bone marrow biopsy done on May 1, 2018, that failed to show any evidence of bone marrow  involvement.  The patient was started on chemotherapy using CHOP May 2, 2018.  She completed 6 cycles of 2018.  The patient is here today for scheduled follow-up visit.     SUBJECTIVE: The patient is here today by herself.  Her  joined us over the phone.  She is complaining of dizziness.  Has been having some persistent headaches.    Past History:  Medical History: has a past medical history of A-fib, Abnormal TSH, Allergic rhinitis, Anemia, Anesthesia, Anxiety and depression, Arthritis, Black stool, Body piercing, Brain injury, Cancer, Cardiac arrest, Cardiomyopathy, CHF (congestive heart failure), Chronic pain, Constipation, Dermatitis, Fatty liver, Full dentures, GERD (gastroesophageal reflux disease), H/O chronic ear infection, Headaches due to old head trauma, Heartburn, Heavy menses, Hemorrhoids, Hip pain, left, Hyperlipidemia, Hypertension, Morbid obesity, Short-term memory loss, Stuttering in conditions classified elsewhere, and Visual cortex injury.   Surgical History: has a past surgical history that includes Tubal ligation;  section; Endometrial ablation; Femoral Lymph Node Biopsy/Excision (Left); Portacath placement (N/A, 2018); Cardiac catheterization (N/A, 10/22/2018); Cardiac catheterization (N/A, 10/22/2018); Cardiac catheterization (N/A, 10/22/2018); Hemorrhoid surgery (N/A, 2019); Colonoscopy (N/A, 2019); Esophagogastroduodenoscopy (N/A, 2019); Esophagogastroduodenoscopy (N/A, 10/22/2019); and Colonoscopy (N/A, 10/22/2019).   Family History: family history includes Diabetes in her father and paternal grandmother; Hypertension in her father.   Social History: reports that she has never smoked. She has never used smokeless tobacco. She reports that she does not drink alcohol and does not use drugs.    (Not in a hospital admission)     Allergies: Contrast dye (echo or unknown ct/mr), Erythromycin, and Macrobid [nitrofurantoin monohyd macro]     Review of  Systems   Constitutional:  Positive for fatigue.   Musculoskeletal:  Positive for myalgias.   Neurological:  Positive for dizziness and headaches.   Psychiatric/Behavioral:  The patient is nervous/anxious.        PHYSICAL EXAMINATION:   There were no vitals taken for this visit.   There were no vitals filed for this visit.                    ECOG Performance Status: 1 - Symptomatic but completely ambulatory      General Appearance:      alert, cooperative, no apparent distress and appears stated age   Lungs:   Clear to auscultation bilaterally; respirations regular, even, and unlabored bilaterally   Heart:  Regular rate and rhythm, no murmurs appreciated   Abdomen:   Soft, non-tender, non-distended, and no organomegaly                 No visits with results within 2 Week(s) from this visit.   Latest known visit with results is:   Office Visit on 03/29/2023   Component Date Value Ref Range Status    SARS Antigen 03/29/2023 Not Detected  Not Detected, Presumptive Negative Final    Influenza A Antigen RAE 03/29/2023 Not Detected  Not Detected Final    Influenza B Antigen RAE 03/29/2023 Not Detected  Not Detected Final    Internal Control 03/29/2023 Passed  Passed Final    Lot Number 03/29/2023 [id number]   Final    Expiration Date 03/29/2023 03/16/2024   Final        CT Soft Tissue Neck With Contrast, CT Chest With Contrast Diagnostic, CT Abdomen Pelvis With Contrast    Result Date: 10/23/2023  Narrative: CT OF THE NECK, CHEST, ABDOMEN AND PELVIS WITH CONTRAST  INDICATION: Anaplastic large T-cell lymphoma, ALK positive:; C84.68-Anaplastic large cell lymphoma, ALK-positive, lymph nodes of multiple sites.  TECHNIQUE:  Thin section axial images were obtained from the skull base to the pubic symphysis following IV and oral contrast administration. Multiplanar reconstruction images were obtained from the axial data.  COMPARISON: 10/13/2022  FINDINGS:  NECK: The nasopharynx, oropharynx, epiglottis, and larynx are  unremarkable. There is no cervical lymphadenopathy. The salivary glands and thyroid gland are unremarkable. The paranasal sinuses and mastoid air cells are clear.   CHEST: There is a left Port-A-Cath. There is no axillary, mediastinal, or hilar lymphadenopathy. There is no pleural or pericardial effusion. The lungs are clear.  ABDOMEN: The liver is homogeneous.  The gallbladder is present. The spleen is mildly enlarged at 15 cm. The adrenal glands and pancreas are without acute abnormality.  The kidneys are unremarkable.  There is no mass, hydronephrosis, or perinephric stranding. The abdominal portions of the GI tract are without acute abnormality. There is no abdominal lymphadenopathy or free fluid.  PELVIS: The uterus and adnexa are unremarkable for age. The appendix is not visualized. There are no secondary signs of appendicitis. The pelvic GI tract is without acute abnormality. There is no pelvic lymphadenopathy or ascites.  BONES: No acute osseous abnormality is identified in the neck, chest, abdomen or pelvis.      Impression: 1. No acute process in the neck or chest. No lymphadenopathy or evidence of metastatic disease. 2. Stable splenomegaly. 3. No acute abnormality in the abdomen or pelvis and no lymphadenopathy.    CTDI: 8.57 mGy DLP:2302.28 mGy.cm    This study was performed with techniques to keep radiation doses as low as reasonably achievable (ALARA). Individualized dose reduction techniques using automated exposure control or adjustment of mA and/or kV according to the patient size were employed.    This report was signed and finalized on 10/23/2023 4:12 PM by Elham Carbajal MD.         ASSESSMENT: The patient is a very pleasant 38 y.o. female  with anaplastic large T-cell lymphoma, ALK positive      PLAN:    Anaplastic large T-cell lymphoma, ALK positive  A.  I did go over the scan results with the patient from October 23, 2023.  I reassured there is no evidence of fetal disease or progressive neck  neuropathy.  B.  I will switch her to annual visits at this point.    2.  Atrial fibrillation and heart failure  A.  Her cardiomyopathy could be induced by previous chemotherapy including Adriamycin  B.  She will continue to follow-up with Dr. Curtis from cardiology    3.  Port-A-Cath  A.  We will continue with every 3-month port flushes and 6-month labs.  B.  The patient is not interested in getting her port removed since she has poor IV access.    4.  Constipation  A.  Continue Colace daily    5.  Depression  A.  Continue Zoloft    6.  Obesity  A.  I advised the patient to start eating a healthy diet and try to exercise.  B.  We discussed that obesity was a risk factor for cancer and should try to decrease her BMI    7.  Dizziness with headaches:  A.  I will order CT head to further evaluate her neurologic symptoms.    FOLLOW UP: 1 year with repeat scans.    Carlo Rice MD  11/1/2023

## 2023-11-08 DIAGNOSIS — C84.68 ANAPLASTIC ALK-POSITIVE LARGE CELL LYMPHOMA OF LYMPH NODES OF MULTIPLE REGIONS: Primary | ICD-10-CM

## 2023-11-08 DIAGNOSIS — R51.9 ACUTE NONINTRACTABLE HEADACHE, UNSPECIFIED HEADACHE TYPE: ICD-10-CM

## 2023-11-08 DIAGNOSIS — R11.2 NAUSEA AND VOMITING, UNSPECIFIED VOMITING TYPE: ICD-10-CM

## 2023-11-16 ENCOUNTER — TELEPHONE (OUTPATIENT)
Dept: ONCOLOGY | Facility: CLINIC | Age: 38
End: 2023-11-16
Payer: COMMERCIAL

## 2023-11-16 DIAGNOSIS — C82.00 FOLLICULAR LYMPHOMA GRADE I, UNSPECIFIED BODY REGION: ICD-10-CM

## 2023-11-16 DIAGNOSIS — C84.68 ANAPLASTIC ALK-POSITIVE LARGE CELL LYMPHOMA OF LYMPH NODES OF MULTIPLE REGIONS: ICD-10-CM

## 2023-11-16 RX ORDER — PREDNISONE 50 MG/1
50 TABLET ORAL TAKE AS DIRECTED
Qty: 3 TABLET | Refills: 1 | Status: SHIPPED | OUTPATIENT
Start: 2023-11-16

## 2023-11-16 RX ORDER — LIDOCAINE AND PRILOCAINE 25; 25 MG/G; MG/G
1 CREAM TOPICAL AS NEEDED
Qty: 30 G | Refills: 3 | Status: SHIPPED | OUTPATIENT
Start: 2023-11-16

## 2023-11-16 NOTE — TELEPHONE ENCOUNTER
Pharmacy Name:  [facility]      Pharmacy representative name: [email or web address]    Pharmacy representative phone number: [phone]    What medication are you calling in regards to: DIPHENHYDRAMINE    What question does the pharmacy have: INSURANCE WILL COVER CAPSULE NOT TABLET. OK TO SWITCH?

## 2024-02-12 ENCOUNTER — OFFICE VISIT (OUTPATIENT)
Dept: CARDIOLOGY | Facility: CLINIC | Age: 39
End: 2024-02-12
Payer: COMMERCIAL

## 2024-02-12 VITALS
OXYGEN SATURATION: 97 % | SYSTOLIC BLOOD PRESSURE: 112 MMHG | HEIGHT: 67 IN | BODY MASS INDEX: 45.99 KG/M2 | DIASTOLIC BLOOD PRESSURE: 60 MMHG | WEIGHT: 293 LBS | HEART RATE: 77 BPM

## 2024-02-12 DIAGNOSIS — I48.0 PAROXYSMAL ATRIAL FIBRILLATION: ICD-10-CM

## 2024-02-12 DIAGNOSIS — I50.22 CHRONIC SYSTOLIC CHF (CONGESTIVE HEART FAILURE): Primary | ICD-10-CM

## 2024-02-12 DIAGNOSIS — E66.01 CLASS 3 SEVERE OBESITY DUE TO EXCESS CALORIES WITH SERIOUS COMORBIDITY AND BODY MASS INDEX (BMI) OF 45.0 TO 49.9 IN ADULT: ICD-10-CM

## 2024-02-12 DIAGNOSIS — I10 PRIMARY HYPERTENSION: ICD-10-CM

## 2024-02-12 PROCEDURE — 93000 ELECTROCARDIOGRAM COMPLETE: CPT | Performed by: INTERNAL MEDICINE

## 2024-02-12 PROCEDURE — 99204 OFFICE O/P NEW MOD 45 MIN: CPT | Performed by: INTERNAL MEDICINE

## 2024-02-12 RX ORDER — SPIRONOLACTONE 25 MG/1
25 TABLET ORAL DAILY
Qty: 90 TABLET | Refills: 3 | Status: SHIPPED | OUTPATIENT
Start: 2024-02-12

## 2024-02-12 RX ORDER — METOPROLOL SUCCINATE 100 MG/1
50 TABLET, EXTENDED RELEASE ORAL EVERY 12 HOURS
Qty: 180 TABLET | Refills: 1 | Status: SHIPPED | OUTPATIENT
Start: 2024-02-12

## 2024-02-12 RX ORDER — FLUTICASONE PROPIONATE 50 MCG
2 SPRAY, SUSPENSION (ML) NASAL DAILY
COMMUNITY
Start: 2023-12-21

## 2024-02-12 RX ORDER — FUROSEMIDE 40 MG/1
40 TABLET ORAL DAILY
Qty: 90 TABLET | Refills: 3 | Status: SHIPPED | OUTPATIENT
Start: 2024-02-12

## 2024-02-14 ENCOUNTER — TELEPHONE (OUTPATIENT)
Dept: CARDIOLOGY | Facility: CLINIC | Age: 39
End: 2024-02-14
Payer: COMMERCIAL

## 2024-04-01 ENCOUNTER — HOSPITAL ENCOUNTER (EMERGENCY)
Facility: HOSPITAL | Age: 39
Discharge: HOME | End: 2024-04-01
Attending: EMERGENCY MEDICINE | Admitting: EMERGENCY MEDICINE
Payer: COMMERCIAL

## 2024-04-01 ENCOUNTER — APPOINTMENT (OUTPATIENT)
Dept: GENERAL RADIOLOGY | Facility: HOSPITAL | Age: 39
End: 2024-04-01
Payer: COMMERCIAL

## 2024-04-01 VITALS
RESPIRATION RATE: 20 BRPM | WEIGHT: 293 LBS | TEMPERATURE: 98.1 F | OXYGEN SATURATION: 97 % | HEART RATE: 70 BPM | DIASTOLIC BLOOD PRESSURE: 70 MMHG | HEIGHT: 67 IN | BODY MASS INDEX: 45.99 KG/M2 | SYSTOLIC BLOOD PRESSURE: 146 MMHG

## 2024-04-01 DIAGNOSIS — R06.00 DYSPNEA, UNSPECIFIED TYPE: Primary | ICD-10-CM

## 2024-04-01 LAB
ALBUMIN SERPL-MCNC: 4 G/DL (ref 3.5–5.2)
ALBUMIN/GLOB SERPL: 1.3 G/DL
ALP SERPL-CCNC: 71 U/L (ref 39–117)
ALT SERPL W P-5'-P-CCNC: 32 U/L (ref 1–33)
ANION GAP SERPL CALCULATED.3IONS-SCNC: 16.6 MMOL/L (ref 5–15)
AST SERPL-CCNC: 20 U/L (ref 1–32)
BASOPHILS # BLD AUTO: 0.04 10*3/MM3 (ref 0–0.2)
BASOPHILS NFR BLD AUTO: 0.6 % (ref 0–1.5)
BILIRUB SERPL-MCNC: 0.4 MG/DL (ref 0–1.2)
BUN SERPL-MCNC: 18 MG/DL (ref 6–20)
BUN/CREAT SERPL: 28.6 (ref 7–25)
CALCIUM SPEC-SCNC: 8.8 MG/DL (ref 8.6–10.5)
CHLORIDE SERPL-SCNC: 103 MMOL/L (ref 98–107)
CO2 SERPL-SCNC: 21.4 MMOL/L (ref 22–29)
CREAT SERPL-MCNC: 0.63 MG/DL (ref 0.57–1)
DEPRECATED RDW RBC AUTO: 41 FL (ref 37–54)
EGFRCR SERPLBLD CKD-EPI 2021: 116.6 ML/MIN/1.73
EOSINOPHIL # BLD AUTO: 0.07 10*3/MM3 (ref 0–0.4)
EOSINOPHIL NFR BLD AUTO: 1 % (ref 0.3–6.2)
ERYTHROCYTE [DISTWIDTH] IN BLOOD BY AUTOMATED COUNT: 12.8 % (ref 12.3–15.4)
FLUAV RNA RESP QL NAA+PROBE: NOT DETECTED
FLUBV RNA RESP QL NAA+PROBE: NOT DETECTED
GLOBULIN UR ELPH-MCNC: 3.1 GM/DL
GLUCOSE SERPL-MCNC: 126 MG/DL (ref 65–99)
HCT VFR BLD AUTO: 37.7 % (ref 34–46.6)
HGB BLD-MCNC: 12.7 G/DL (ref 12–15.9)
HOLD SPECIMEN: NORMAL
HOLD SPECIMEN: NORMAL
IMM GRANULOCYTES # BLD AUTO: 0.04 10*3/MM3 (ref 0–0.05)
IMM GRANULOCYTES NFR BLD AUTO: 0.6 % (ref 0–0.5)
LYMPHOCYTES # BLD AUTO: 2.37 10*3/MM3 (ref 0.7–3.1)
LYMPHOCYTES NFR BLD AUTO: 34.2 % (ref 19.6–45.3)
MCH RBC QN AUTO: 29.7 PG (ref 26.6–33)
MCHC RBC AUTO-ENTMCNC: 33.7 G/DL (ref 31.5–35.7)
MCV RBC AUTO: 88.1 FL (ref 79–97)
MONOCYTES # BLD AUTO: 0.36 10*3/MM3 (ref 0.1–0.9)
MONOCYTES NFR BLD AUTO: 5.2 % (ref 5–12)
NEUTROPHILS NFR BLD AUTO: 4.05 10*3/MM3 (ref 1.7–7)
NEUTROPHILS NFR BLD AUTO: 58.4 % (ref 42.7–76)
NRBC BLD AUTO-RTO: 0 /100 WBC (ref 0–0.2)
NT-PROBNP SERPL-MCNC: 3658 PG/ML (ref 0–450)
PLATELET # BLD AUTO: 171 10*3/MM3 (ref 140–450)
PMV BLD AUTO: 8.8 FL (ref 6–12)
POTASSIUM SERPL-SCNC: 3.6 MMOL/L (ref 3.5–5.2)
PROT SERPL-MCNC: 7.1 G/DL (ref 6–8.5)
RBC # BLD AUTO: 4.28 10*6/MM3 (ref 3.77–5.28)
SARS-COV-2 RNA RESP QL NAA+PROBE: NOT DETECTED
SODIUM SERPL-SCNC: 141 MMOL/L (ref 136–145)
WBC NRBC COR # BLD AUTO: 6.93 10*3/MM3 (ref 3.4–10.8)
WHOLE BLOOD HOLD COAG: NORMAL
WHOLE BLOOD HOLD SPECIMEN: NORMAL

## 2024-04-01 PROCEDURE — 85025 COMPLETE CBC W/AUTO DIFF WBC: CPT

## 2024-04-01 PROCEDURE — 93005 ELECTROCARDIOGRAM TRACING: CPT

## 2024-04-01 PROCEDURE — 71045 X-RAY EXAM CHEST 1 VIEW: CPT

## 2024-04-01 PROCEDURE — 99284 EMERGENCY DEPT VISIT MOD MDM: CPT

## 2024-04-01 PROCEDURE — 87636 SARSCOV2 & INF A&B AMP PRB: CPT | Performed by: EMERGENCY MEDICINE

## 2024-04-01 PROCEDURE — 25010000002 HEPARIN LOCK FLUSH PER 10 UNITS: Performed by: EMERGENCY MEDICINE

## 2024-04-01 PROCEDURE — 83880 ASSAY OF NATRIURETIC PEPTIDE: CPT

## 2024-04-01 PROCEDURE — 80053 COMPREHEN METABOLIC PANEL: CPT

## 2024-04-01 RX ORDER — HEPARIN SODIUM (PORCINE) LOCK FLUSH IV SOLN 100 UNIT/ML 100 UNIT/ML
300 SOLUTION INTRAVENOUS ONCE
Status: COMPLETED | OUTPATIENT
Start: 2024-04-01 | End: 2024-04-01

## 2024-04-01 RX ORDER — SODIUM CHLORIDE 0.9 % (FLUSH) 0.9 %
10 SYRINGE (ML) INJECTION AS NEEDED
Status: DISCONTINUED | OUTPATIENT
Start: 2024-04-01 | End: 2024-04-01 | Stop reason: HOSPADM

## 2024-04-01 RX ADMIN — HEPARIN 300 UNITS: 100 SYRINGE at 07:51

## 2024-04-01 NOTE — ED PROVIDER NOTES
" EMERGENCY DEPARTMENT ENCOUNTER    Pt Name: Johny Hearn  MRN: [id number]  Pt :   1985  Room Number:  02SF/02  Date of encounter:  2024  PCP: Debbie Watts, DREW  ED Provider: Ish Renee MD    Historian: Patient      HPI:  Chief Complaint   Patient presents with    Anxiety    Shortness of Breath          Context: Johny Hearn is a 38 y.o. female who presents to the ED c/o shortness of breath, reports pain worse for couple of days, acutely this morning she woke up feeling short of breath.  Reports coughing up thick sputum.  Denies fevers or sick contacts.  Denies leg swelling, recent travel.  Does have a history of lymphoma as well as a peripartum cardiac arrest.  She follows with cardiology.  Recently had repeat echocardiogram.  Denies chest pain currently.      PAST MEDICAL HISTORY  Past Medical History:   Diagnosis Date    A-fib     Abnormal TSH     Allergic rhinitis     Anemia     Anesthesia     pt reports \"screamed for my  and wouldnt calm down when anesthesia gas used\".    Anxiety and depression     Arthritis     Black stool     Body piercing     Brain injury     due to cardiac arrest    Cancer     LYMPHOMA STAGE 3    Cardiac arrest     dionisio partum cardiomyopathy    Cardiomyopathy     CHF (congestive heart failure)     Chronic pain     Constipation     Dermatitis     chronic on face    Fatty liver     Full dentures     DOESNT WEAR    GERD (gastroesophageal reflux disease)     H/O chronic ear infection     Headaches due to old head trauma     Heartburn     Heavy menses     Hemorrhoids     Hip pain, left     Hyperlipidemia     Hypertension     Morbid obesity     Short-term memory loss     Stuttering in conditions classified elsewhere     IF PT GETS TOO NERVOUS OR EXCITED HAS STUTTERING    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         PAST SURGICAL HISTORY  Past Surgical History:   Procedure Laterality Date    CARDIAC CATHETERIZATION N/A 10/22/2018 "    Procedure: Left Heart Cath;  Surgeon: Zheng Curtis MD;  Location: [facility] CATH INVASIVE LOCATION;  Service: Cardiology    CARDIAC CATHETERIZATION N/A 10/22/2018    Procedure: Coronary angiography;  Surgeon: Zheng Curtis MD;  Location: [facility] CATH INVASIVE LOCATION;  Service: Cardiology    CARDIAC CATHETERIZATION N/A 10/22/2018    Procedure: Left ventriculography;  Surgeon: Zheng Curtis MD;  Location: [facility] CATH INVASIVE LOCATION;  Service: Cardiology     SECTION      COLONOSCOPY N/A 2019    Procedure: COLONOSCOPY;  Surgeon: Venkata Wiley MD;  Location: [facility] ENDOSCOPY;  Service: Gastroenterology    COLONOSCOPY N/A 10/22/2019    Procedure: COLONOSCOPY WITH HOT FORCEP POYLPECTOMY;  Surgeon: Rob Tyson MD;  Location: [facility] ENDOSCOPY;  Service: Gastroenterology    ENDOMETRIAL ABLATION      ENDOSCOPY N/A 2019    Procedure: ESOPHAGOGASTRODUODENOSCOPY WITH COLD FORCEP BIOPSY;  Surgeon: Venkata Wiley MD;  Location: [facility] ENDOSCOPY;  Service: Gastroenterology    ENDOSCOPY N/A 10/22/2019    Procedure: ESOPHAGOGASTRODUODENOSCOPY W/ COLD FORCEP BIOPSY;  Surgeon: Rob Tyson MD;  Location: [facility] ENDOSCOPY;  Service: Gastroenterology    FEMORAL LYMPH NODE BIOPSY/EXCISON Left     lt [facility]     HEMORRHOIDECTOMY N/A 2019    Procedure: HEMORRHOIDECTOMY;  Surgeon: Venkata Wiley MD;  Location: [facility] OR;  Service: General    PORTACATH PLACEMENT N/A 2018    Procedure: INSERTION OF PORTACATH right subclavian;  Surgeon: Shelley Brock MD;  Location: [facility] OR;  Service: General    TUBAL ABDOMINAL LIGATION           FAMILY HISTORY  Family History   Problem Relation Age of Onset    Hypertension Father     Diabetes Father     Diabetes Paternal Grandmother     Colon cancer Neg Hx     Liver cancer Neg Hx     Rectal cancer Neg Hx     Stomach cancer Neg Hx          SOCIAL HISTORY  Social History     Socioeconomic History    Marital status:     Tobacco Use    Smoking status: Never     Passive exposure: Past    Smokeless tobacco: Never   Vaping Use    Vaping status: Never Used   Substance and Sexual Activity    Alcohol use: No    Drug use: No    Sexual activity: Defer         ALLERGIES  Contrast dye (echo or unknown ct/mr), Erythromycin, and Macrobid [nitrofurantoin monohyd macro]        REVIEW OF SYSTEMS  Review of Systems   Constitutional:  Negative for chills and fever.   HENT:  Negative for sore throat and trouble swallowing.    Eyes:  Negative for pain and redness.   Respiratory:  Positive for cough and shortness of breath.    Cardiovascular:  Negative for chest pain and leg swelling.   Gastrointestinal:  Negative for abdominal pain, nausea and vomiting.   Genitourinary:  Negative for dysuria and urgency.   Musculoskeletal:  Negative for back pain and neck pain.   Skin:  Negative for rash and wound.   Neurological:  Negative for dizziness and weakness.        All systems reviewed and negative except for those discussed in HPI.       PHYSICAL EXAM    I have reviewed the triage vital signs and nursing notes.    ED Triage Vitals [04/01/24 0624]   Temp Heart Rate Resp BP SpO2   98 °F (36.7 °C) 71 20 (!) 123/104 100 %      Temp src Heart Rate Source Patient Position BP Location FiO2 (%)   Oral Monitor Sitting -- --       Physical Exam  Constitutional:       Appearance: Normal appearance. She is not ill-appearing.   HENT:      Head: Normocephalic and atraumatic.      Right Ear: External ear normal.      Left Ear: External ear normal.      Nose: Nose normal.      Mouth/Throat:      Mouth: Mucous membranes are moist.      Pharynx: Oropharynx is clear.   Eyes:      Extraocular Movements: Extraocular movements intact.      Conjunctiva/sclera: Conjunctivae normal.      Pupils: Pupils are equal, round, and reactive to light.   Cardiovascular:      Rate and Rhythm: Normal rate and regular rhythm.      Pulses:           Radial pulses are 2+ on the right  side and 2+ on the left side.   Pulmonary:      Effort: Pulmonary effort is normal.      Breath sounds: Normal breath sounds.   Abdominal:      General: There is no distension.      Palpations: Abdomen is soft.      Tenderness: There is no abdominal tenderness.   Musculoskeletal:         General: No tenderness or deformity. Normal range of motion.      Cervical back: Normal range of motion and neck supple.      Right lower leg: No edema.      Left lower leg: No edema.   Skin:     General: Skin is warm and dry.      Capillary Refill: Capillary refill takes less than 2 seconds.   Neurological:      General: No focal deficit present.      Mental Status: She is alert and oriented to person, place, and time.            LAB RESULTS  Recent Results (from the past 24 hour(s))   Comprehensive Metabolic Panel    Collection Time: 04/01/24  6:51 AM    Specimen: Blood   Result Value Ref Range    Glucose 126 (H) 65 - 99 mg/dL    BUN 18 6 - 20 mg/dL    Creatinine 0.63 0.57 - 1.00 mg/dL    Sodium 141 136 - 145 mmol/L    Potassium 3.6 3.5 - 5.2 mmol/L    Chloride 103 98 - 107 mmol/L    CO2 21.4 (L) 22.0 - 29.0 mmol/L    Calcium 8.8 8.6 - 10.5 mg/dL    Total Protein 7.1 6.0 - 8.5 g/dL    Albumin 4.0 3.5 - 5.2 g/dL    ALT (SGPT) 32 1 - 33 U/L    AST (SGOT) 20 1 - 32 U/L    Alkaline Phosphatase 71 39 - 117 U/L    Total Bilirubin 0.4 0.0 - 1.2 mg/dL    Globulin 3.1 gm/dL    A/G Ratio 1.3 g/dL    BUN/Creatinine Ratio 28.6 (H) 7.0 - 25.0    Anion Gap 16.6 (H) 5.0 - 15.0 mmol/L    eGFR 116.6 >60.0 mL/min/1.73   BNP    Collection Time: 04/01/24  6:51 AM    Specimen: Blood   Result Value Ref Range    proBNP 3,658.0 (H) 0.0 - 450.0 pg/mL   CBC Auto Differential    Collection Time: 04/01/24  6:51 AM    Specimen: Blood   Result Value Ref Range    WBC 6.93 3.40 - 10.80 10*3/mm3    RBC 4.28 3.77 - 5.28 10*6/mm3    Hemoglobin 12.7 12.0 - 15.9 g/dL    Hematocrit 37.7 34.0 - 46.6 %    MCV 88.1 79.0 - 97.0 fL    MCH 29.7 26.6 - 33.0 pg    MCHC 33.7  31.5 - 35.7 g/dL    RDW 12.8 12.3 - 15.4 %    RDW-SD 41.0 37.0 - 54.0 fl    MPV 8.8 6.0 - 12.0 fL    Platelets 171 140 - 450 10*3/mm3    Neutrophil % 58.4 42.7 - 76.0 %    Lymphocyte % 34.2 19.6 - 45.3 %    Monocyte % 5.2 5.0 - 12.0 %    Eosinophil % 1.0 0.3 - 6.2 %    Basophil % 0.6 0.0 - 1.5 %    Immature Grans % 0.6 (H) 0.0 - 0.5 %    Neutrophils, Absolute 4.05 1.70 - 7.00 10*3/mm3    Lymphocytes, Absolute 2.37 0.70 - 3.10 10*3/mm3    Monocytes, Absolute 0.36 0.10 - 0.90 10*3/mm3    Eosinophils, Absolute 0.07 0.00 - 0.40 10*3/mm3    Basophils, Absolute 0.04 0.00 - 0.20 10*3/mm3    Immature Grans, Absolute 0.04 0.00 - 0.05 10*3/mm3    nRBC 0.0 0.0 - 0.2 /100 WBC   COVID-19 and FLU A/B PCR, 1 HR TAT - Swab, Nasopharynx    Collection Time: 04/01/24  6:53 AM    Specimen: Nasopharynx; Swab   Result Value Ref Range    COVID19 Not Detected Not Detected - Ref. Range    Influenza A PCR Not Detected Not Detected    Influenza B PCR Not Detected Not Detected       If labs were ordered, I independently reviewed the results and considered them in treating the patient.        RADIOLOGY  XR Chest 1 View    Result Date: 4/1/2024  PROCEDURE: XR CHEST 1 VW-  HISTORY: SOA Triage Protocol  COMPARISON: June 29, 2023.  FINDINGS: The heart is mildly enlarged, stable from prior. Right subclavian port is in stable position.. The lungs are clear. The mediastinum is unremarkable. There is no pneumothorax.  There are no acute osseous abnormalities.      Stable chest..      This report was signed and finalized on 4/1/2024 7:15 AM by Malou Marcelo MD.           PROCEDURES    Procedures    Interpretations    O2 Sat: The patients oxygen saturation was 100% on Room Air.  This was independently interpreted by me as Normal    EKG: I reviewed and independently interpreted the EKG as sinus rhythm rate 75 bpm, normal axis, borderline high QT interval 494, no ST elevation, no T wave inversions    Cardiac Monitoring: I reviewed and independently  interpreted the Rhythm Strip as Normal Sinus rhythm rate of 71    Radiology: I ordered and independently reviewed the above noted radiographic studies.  I viewed images of Chest Xray which showed  No intrapulmonary process  per my independent interpretation. See radiologist's dictation for official interpretation.       MEDICATIONS GIVEN IN ER    Medications   sodium chloride 0.9 % flush 10 mL (has no administration in time range)         MEDICAL DECISION MAKING, PROGRESS, and CONSULTS    All labs, if obtained, have been independently reviewed by me.  All radiology studies, if obtained, have been reviewed by me and the radiologist dictating the report.  All EKG's, if obtained, have been independently viewed and interpreted by me      Discussion below represents my analysis of pertinent findings related to patient's condition, differential diagnosis, treatment plan and final disposition.      Differential diagnosis:    38-year-old female presented to the ED with complaint of shortness of breath, she does have productive cough, I suspect the patient likely has viral process such as COVID or flu, certainly could have a bacterial pneumonia although she has no fever, she is not hypoxic here.  Patient has extensive medical history including cardiac arrest with well treated chronic systolic heart failure.  She certainly could have heart failure exacerbation although she has no rales, and again she is not hypoxic.  Will obtain labs including BNP, chest x-ray, COVID swab, flu swab.    Additional Sources:  External (non-ED) record review:  Cardiology clinic notes  recent echocardiogram 2/24 - EF 45%      Orders placed during this visit:  Orders Placed This Encounter   Procedures    COVID-19 and FLU A/B PCR, 1 HR TAT - Swab, Nasopharynx    XR Chest 1 View    [place] Draw    Comprehensive Metabolic Panel    BNP    CBC Auto Differential    NPO Diet NPO Type: Strict NPO    Undress & Gown    Continuous Pulse Oximetry    Vital  Signs    Oxygen Therapy- Nasal Cannula; Titrate 1-6 LPM Per SpO2; 90 - 95%    ECG 12 Lead ED Triage Standing Order; SOA    Insert Peripheral IV    CBC & Differential    Green Top (Gel)    Lavender Top    Gold Top - SST    Light Blue Top         Additional orders considered but not ordered:  Labs: Cardiac workup, patient denies any chest pain currently    ED Course:    Consultants:  None    ED Course as of 04/01/24 0728 Mon Apr 01, 2024   0720 XR Chest 1 View  CXR negative for pneumonia, no pulmonary edema [CS]   0720 COVID-19 and FLU A/B PCR, 1 HR TAT - Swab, Nasopharynx  Covid/Flu negative [CS]   0723 proBNP(!): 3,658.0  BNP is elevated, however improved from last several draws, and patient has negative CXR. Does not appear to be acute heart failure [CS]   0726 Workup is largely benign, patient resting comfortably, saturating well, she ambulated without any difficulty breathing, will discharge the patient home. [CS]      ED Course User Index  [CS] Ish Renee MD           After my consideration of clinical presentation and any laboratory/radiology studies obtained, I discussed the findings with the patient/patient representative who is in agreement with the treatment plan and the final disposition. Risks and benefits of discharge were discussed.     AS OF 07:28 EDT VITALS:    BP - 129/88  HR - 75  TEMP - 98 °F (36.7 °C) (Oral)  O2 SATS - 96%    I reviewed the patients prescription monitoring report if available prior to discharge    DIAGNOSIS  Final diagnoses:   Dyspnea, unspecified type         DISPOSITION  ED Disposition       ED Disposition   Discharge    Condition   Stable    Comment   --                   Please note that portions of this document were completed with voice recognition software.        Ish Renee MD  04/01/24 0728       Ish Renee MD  04/01/24 0767

## 2024-05-21 ENCOUNTER — TELEPHONE (OUTPATIENT)
Dept: CARDIOLOGY | Facility: CLINIC | Age: 39
End: 2024-05-21
Payer: COMMERCIAL

## 2024-05-21 ENCOUNTER — OFFICE VISIT (OUTPATIENT)
Dept: INTERNAL MEDICINE | Facility: CLINIC | Age: 39
End: 2024-05-21
Payer: COMMERCIAL

## 2024-05-21 VITALS
HEART RATE: 68 BPM | SYSTOLIC BLOOD PRESSURE: 101 MMHG | OXYGEN SATURATION: 96 % | TEMPERATURE: 97 F | WEIGHT: 287 LBS | BODY MASS INDEX: 45.04 KG/M2 | DIASTOLIC BLOOD PRESSURE: 58 MMHG | HEIGHT: 67 IN

## 2024-05-21 DIAGNOSIS — R73.01 IMPAIRED FASTING GLUCOSE: ICD-10-CM

## 2024-05-21 DIAGNOSIS — Z76.89 ENCOUNTER TO ESTABLISH CARE: ICD-10-CM

## 2024-05-21 DIAGNOSIS — M25.561 CHRONIC PAIN OF BOTH KNEES: ICD-10-CM

## 2024-05-21 DIAGNOSIS — I42.8 NICM (NONISCHEMIC CARDIOMYOPATHY): ICD-10-CM

## 2024-05-21 DIAGNOSIS — M25.551 CHRONIC PAIN OF BOTH HIPS: ICD-10-CM

## 2024-05-21 DIAGNOSIS — K76.0 FATTY (CHANGE OF) LIVER, NOT ELSEWHERE CLASSIFIED: ICD-10-CM

## 2024-05-21 DIAGNOSIS — I50.22 CHRONIC SYSTOLIC CHF (CONGESTIVE HEART FAILURE): ICD-10-CM

## 2024-05-21 DIAGNOSIS — D50.9 IRON DEFICIENCY ANEMIA, UNSPECIFIED IRON DEFICIENCY ANEMIA TYPE: ICD-10-CM

## 2024-05-21 DIAGNOSIS — G89.29 CHRONIC PAIN OF BOTH HIPS: ICD-10-CM

## 2024-05-21 DIAGNOSIS — Z85.72 HISTORY OF NON-HODGKIN'S LYMPHOMA: ICD-10-CM

## 2024-05-21 DIAGNOSIS — E66.01 MORBID (SEVERE) OBESITY DUE TO EXCESS CALORIES: ICD-10-CM

## 2024-05-21 DIAGNOSIS — Z00.00 ANNUAL PHYSICAL EXAM: Primary | ICD-10-CM

## 2024-05-21 DIAGNOSIS — G89.29 CHRONIC MIDLINE LOW BACK PAIN WITHOUT SCIATICA: ICD-10-CM

## 2024-05-21 DIAGNOSIS — G89.29 CHRONIC PAIN OF BOTH KNEES: ICD-10-CM

## 2024-05-21 DIAGNOSIS — E55.9 VITAMIN D DEFICIENCY: ICD-10-CM

## 2024-05-21 DIAGNOSIS — E03.9 ACQUIRED HYPOTHYROIDISM: ICD-10-CM

## 2024-05-21 DIAGNOSIS — M54.50 CHRONIC MIDLINE LOW BACK PAIN WITHOUT SCIATICA: ICD-10-CM

## 2024-05-21 DIAGNOSIS — Z12.4 CERVICAL CANCER SCREENING: ICD-10-CM

## 2024-05-21 DIAGNOSIS — M25.552 CHRONIC PAIN OF BOTH HIPS: ICD-10-CM

## 2024-05-21 DIAGNOSIS — I48.0 PAROXYSMAL ATRIAL FIBRILLATION: ICD-10-CM

## 2024-05-21 DIAGNOSIS — G47.33 OBSTRUCTIVE APNEA: ICD-10-CM

## 2024-05-21 DIAGNOSIS — M25.562 CHRONIC PAIN OF BOTH KNEES: ICD-10-CM

## 2024-05-21 DIAGNOSIS — R06.09 DYSPNEA ON EXERTION: ICD-10-CM

## 2024-05-21 PROCEDURE — 2014F MENTAL STATUS ASSESS: CPT | Performed by: NURSE PRACTITIONER

## 2024-05-21 PROCEDURE — 99395 PREV VISIT EST AGE 18-39: CPT | Performed by: NURSE PRACTITIONER

## 2024-05-21 PROCEDURE — 1125F AMNT PAIN NOTED PAIN PRSNT: CPT | Performed by: NURSE PRACTITIONER

## 2024-05-21 RX ORDER — METOPROLOL SUCCINATE 100 MG/1
50 TABLET, EXTENDED RELEASE ORAL DAILY
COMMUNITY

## 2024-05-21 RX ORDER — LEVOTHYROXINE SODIUM 0.03 MG/1
25 TABLET ORAL DAILY
COMMUNITY

## 2024-05-21 RX ORDER — POLYETHYLENE GLYCOL 3350 17 G/17G
17 POWDER, FOR SOLUTION ORAL DAILY
COMMUNITY

## 2024-05-21 NOTE — TELEPHONE ENCOUNTER
Patient and daughter presented to the office today with some confusion over the patients Metoprolol dose. Patient was currently on Metoprolol 100 mg 0.5 tablet BID prescribed by our office. Dr. Watts has prescribed the patient metoprolol 25 mg BID. Patient has been taking both and stated she has been having low /56 at times. The patient stated she has been having a really hard time cutting the 100 mg tablets and voiced this to her PCP. She thinks this has led to the new dose. Can you please verify what the appropriate dose you would like the patient to continue taking at this time. Patient is going to hold her meds until she gets clearance.

## 2024-05-21 NOTE — PROGRESS NOTES
Chief Complaint   Patient presents with    Establish Care     With [facility] today. Former patient of Rosamaria Galvan but has been seeing a different provider since October, here to re-establish with our office.     Knee Pain     Bilateral, tend to pop    referral     For pain management, was seeing someone in [place] but needs one located in [place]    Abstract     Lost her  earlier this month     Annual Exam     Subjective       History of Present Illness  38-year-old female presents for evaluation of multiple medical concerns. She is accompanied by her mother.  Patient was living with her .  Her  recently passed within the past month and she is now living with her mother.  She is legally blind so she does require assistance with her care.  Their goal is to get her an apartment on her own and have her live independently in the future.    Seeking a referral to a local pain management specialist since moving, having previously consulted with Dr. Deshpande in [place]. She reports experiencing pain in her hips, knees, back, for which she is prescribed Roxicodone 15 mg immediate-release. Her next pain management appointment is scheduled for 07/2024.  She has enough medication currently to get her through until July. Suffers from chronic knee pain, which occasionally intensifies, leading to falls. Despite owning a medical brace, she has not undergone any imaging of her knees. She reports popping and grinding sensations in her knees upon standing, and difficulty ascending stairs. The patient has a known history of arthritis in her hips and back.  Patient is obese which complicates joint pain.  She has recently been eating healthier since moving in with her mom and losing some weight.  Her diet used to include a lot of frozen foods.    She follows cardiology, Dr. Price, for history of CHF, A-fib, nonischemic cardiomyopathy.  She takes her medication as prescribed.  She is slightly hypotensive today.  She is  taking metoprolol 50 mg extended release, her previous PCP also prescribed an additional metoprolol 25 mg immediate release twice a day in April due to blood pressure being uncontrolled.  She is going to stop at the cardiology office to discuss the medication and see if she needs to stop the immediate release.  She denies any chest pain, dizziness, lightheadedness, visual changes, headache.  She does have chronic dependent edema and she takes Lasix.  Mother has a blood pressure cuff at home to watch her blood pressure.    Patient's mother requests that the patient's iron levels be checked. The patient was previously on an iron supplement, which she discontinued recently and they are unsure if she needs to continue this or not.  History of rectal bleeding and heavy periods.  She got an ablation so she has not had a menstrual cycle since then.  She has not had any rectal bleeding for over 2 years.    Hypothyroidism; taking levothyroxine 25 mcg daily    History of vitamin D deficiency, she takes a vitamin D supplement.    History of non-Hodgkin's lymphoma and has been cancer-free for 5 to 6 years.  Hematology/oncology once a year.      Unsure of last Pap smear.  Patient is requesting a referral to gynecology.    Occasionally experiences shortness of breath and has been prescribed an inhaler, which provides relief. However, she has difficulty with the administration of the inhaler and may need a chamber to go with it.  She has never been diagnosed with asthma, COPD.  Non-smoker.  She does not recall if she wheezes but reports tightness with exertion which is relieved by her albuterol.  Never had pulmonary function test.      The following portions of the patient's history were reviewed and updated as appropriate: allergies, current medications, past family history, past medical history, past social history, past surgical history and problem list.    Objective   /58   Pulse 68   Temp 97 °F (36.1 °C)   Ht 170.2  "cm (67.01\")   Wt 130 kg (287 lb)   SpO2 96%   BMI 44.94 kg/m²   Body mass index is 44.94 kg/m².  Class 3 Severe Obesity (BMI >=40). Obesity-related health conditions include the following: obstructive sleep apnea, hypertension, coronary heart disease, diabetes mellitus, dyslipidemias, GERD, and peripheral vascular disease. Obesity is improving with lifestyle modifications. BMI is is above average; BMI management plan is completed. We discussed low calorie, low carb based diet program, portion control, increasing exercise, and Information on healthy weight added to patient's after visit summary.     Physical Exam  Vitals and nursing note reviewed.   Constitutional:       General: She is not in acute distress.     Appearance: Normal appearance. She is morbidly obese.   HENT:      Head: Normocephalic and atraumatic.      Right Ear: External ear normal.      Left Ear: External ear normal.      Nose: Nose normal.      Mouth/Throat:      Lips: Pink.   Eyes:      General: No scleral icterus.     Extraocular Movements: Extraocular movements intact.      Pupils: Pupils are equal, round, and reactive to light.   Cardiovascular:      Rate and Rhythm: Normal rate and regular rhythm.      Heart sounds: Normal heart sounds.   Pulmonary:      Effort: Pulmonary effort is normal.      Breath sounds: Normal breath sounds.   Abdominal:      General: Bowel sounds are normal.      Palpations: Abdomen is soft.      Tenderness: There is no abdominal tenderness.   Musculoskeletal:         General: Normal range of motion.      Cervical back: Normal range of motion.      Right lower leg: Edema present.      Left lower leg: Edema present.      Comments: Dependent nonpitting edema   Skin:     General: Skin is warm and dry.      Coloration: Skin is pale.      Findings: No rash.   Neurological:      General: No focal deficit present.      Mental Status: She is alert and oriented to person, place, and time.      Sensory: No sensory deficit.    "   Motor: No weakness.      Coordination: Coordination normal.      Gait: Gait normal.   Psychiatric:         Mood and Affect: Mood normal.         Behavior: Behavior normal.         Thought Content: Thought content normal.         Judgment: Judgment normal.           Assessment & Plan   Johny Hearn is here today and the following problems have been addressed:      Diagnoses and all orders for this visit:    1. Annual physical exam (Primary)    2. Encounter to establish care  -     CBC (No Diff)  -     Comprehensive Metabolic Panel  -     Lipid Panel  -     TSH Rfx On Abnormal To Free T4  -     Hemoglobin A1c    3. Chronic systolic CHF (congestive heart failure)  -     CBC (No Diff)  -     Comprehensive Metabolic Panel  -     Lipid Panel  -     TSH Rfx On Abnormal To Free T4  - Euvolemic on exam.  Continue medication as prescribed and follow-up with cardiology regularly.    4. Paroxysmal atrial fibrillation  -     CBC (No Diff)  -     Comprehensive Metabolic Panel  -     Lipid Panel  -     TSH Rfx On Abnormal To Free T4  - Stable. She is slightly hypotensive today so she is going to stop by the cardiology office, she is taking 2 metoprolol's, 1 immediate and 1 extended release.  I recommend that she likely needs to just take the extended release from her cardiologist and hold the other but she will contact cardiology and see what they say.    5. NICM (nonischemic cardiomyopathy)  -     CBC (No Diff)  -     Comprehensive Metabolic Panel  -     Lipid Panel  -     TSH Rfx On Abnormal To Free T4    6. Impaired fasting glucose  -     Comprehensive Metabolic Panel  -     Hemoglobin A1c  - Update A1c and fasting glucose    7. Acquired hypothyroidism  -     TSH Rfx On Abnormal To Free T4  - Update TSH, continue levothyroxine on empty stomach in the morning 30 to 60 minutes before meal and 2 hours before apart from other medications or vitamins    8. Fatty (change of) liver, not elsewhere classified  -     CBC (No  Diff)  -     Comprehensive Metabolic Panel  -     Lipid Panel  -Continue working on weight loss, exercise, low-fat diet     9. Iron deficiency anemia, unspecified iron deficiency anemia type  -     Iron and TIBC  -     Ferritin  - Patient recently stopped iron, history of GELACIO, will reassess iron deficiency    10. History of non-Hodgkin's lymphoma  -     CBC (No Diff)  -     Comprehensive Metabolic Panel  - Continue follow-ups with oncology once a year as scheduled    11. Vitamin D deficiency  -     Vitamin D 25 hydroxy  - Continue vitamin D as prescribed, try to get 15-20 minutes of sunlight daily    12. Chronic pain of both hips  -     Ambulatory Referral to Pain Management  - Referral to pain management locally, patient wants to reestablish with someone in [place] since her recent move here.  She has enough medication to get her through until July.  Discussed referrals can take a couple of weeks, she will be contacted by their office to schedule.    13. Chronic midline low back pain without sciatica  -     Ambulatory Referral to Pain Management    14. Chronic pain of both knees  -     Ambulatory Referral to Pain Management  -     XR Knee Bilateral AP Standing  - X-ray ordered.  If significant arthritis will refer to orthopedics to discuss injections.  Work on weight loss as this will help with overall joint pain.  - Recommend rest, ice, elevate, diclofenac gel as needed, Tylenol arthritis as needed, knee braces    15. Obstructive apnea  -     Ambulatory Referral to Pulmonology  - Referral to establish with pulmonology    16. Dyspnea on exertion  -     Ambulatory Referral to Pulmonology  - Referral to establish with pulmonology for pulmonary function test to rule out asthma, COPD  - Follow-up with cardiologist as scheduled  - Be seen emergently if any chest pain or worsening dyspnea, significant weight gain, signs of fluid overload with history of CHF     17. Cervical cancer screening  -     Ambulatory Referral to  Gynecology  - Referral to gynecology, recommend Paps every 3-5 years with HPV testing    18. Morbid (severe) obesity due to excess calories  -     CBC (No Diff)  -     Comprehensive Metabolic Panel  -     Lipid Panel  -     TSH Rfx On Abnormal To Free T4  -     Hemoglobin A1c  - Continue to work on healthy diet habits, exercise regularly      Follow Up   6 months   Patient was given instructions and counseling regarding her condition or for health maintenance advice. Please see specific information pulled into the AVS if appropriate.     [facility] RUSSELL  [facility]      Patient or patient representative verbalized consent for the use of Ambient Listening during the visit with  DREW Hernandez for chart documentation. 5/21/2024  10:40 EDT

## 2024-05-22 ENCOUNTER — PATIENT MESSAGE (OUTPATIENT)
Dept: INTERNAL MEDICINE | Facility: CLINIC | Age: 39
End: 2024-05-22
Payer: COMMERCIAL

## 2024-05-22 ENCOUNTER — HOSPITAL ENCOUNTER (OUTPATIENT)
Dept: GENERAL RADIOLOGY | Facility: HOSPITAL | Age: 39
Discharge: HOME | End: 2024-05-22
Admitting: NURSE PRACTITIONER
Payer: COMMERCIAL

## 2024-05-22 LAB
25(OH)D3+25(OH)D2 SERPL-MCNC: 30.5 NG/ML (ref 30–100)
ALBUMIN SERPL-MCNC: 4.6 G/DL (ref 3.5–5.2)
ALBUMIN/GLOB SERPL: 2.1 G/DL
ALP SERPL-CCNC: 74 U/L (ref 39–117)
ALT SERPL-CCNC: 24 U/L (ref 1–33)
AST SERPL-CCNC: 18 U/L (ref 1–32)
BILIRUB SERPL-MCNC: 0.5 MG/DL (ref 0–1.2)
BUN SERPL-MCNC: 19 MG/DL (ref 6–20)
BUN/CREAT SERPL: 26 (ref 7–25)
CALCIUM SERPL-MCNC: 9.3 MG/DL (ref 8.6–10.5)
CHLORIDE SERPL-SCNC: 103 MMOL/L (ref 98–107)
CHOLEST SERPL-MCNC: 211 MG/DL (ref 0–200)
CO2 SERPL-SCNC: 25.8 MMOL/L (ref 22–29)
CREAT SERPL-MCNC: 0.73 MG/DL (ref 0.57–1)
EGFRCR SERPLBLD CKD-EPI 2021: 108.1 ML/MIN/1.73
ERYTHROCYTE [DISTWIDTH] IN BLOOD BY AUTOMATED COUNT: 13 % (ref 12.3–15.4)
FERRITIN SERPL-MCNC: 949 NG/ML (ref 13–150)
GLOBULIN SER CALC-MCNC: 2.2 GM/DL
GLUCOSE SERPL-MCNC: 106 MG/DL (ref 65–99)
HBA1C MFR BLD: 6.1 % (ref 4.8–5.6)
HCT VFR BLD AUTO: 38.3 % (ref 34–46.6)
HDLC SERPL-MCNC: 39 MG/DL (ref 40–60)
HGB BLD-MCNC: 12.5 G/DL (ref 12–15.9)
IRON SATN MFR SERPL: 25 % (ref 20–50)
IRON SERPL-MCNC: 88 MCG/DL (ref 37–145)
LDLC SERPL CALC-MCNC: 143 MG/DL (ref 0–100)
MCH RBC QN AUTO: 28.6 PG (ref 26.6–33)
MCHC RBC AUTO-ENTMCNC: 32.6 G/DL (ref 31.5–35.7)
MCV RBC AUTO: 87.6 FL (ref 79–97)
PLATELET # BLD AUTO: 190 10*3/MM3 (ref 140–450)
POTASSIUM SERPL-SCNC: 3.9 MMOL/L (ref 3.5–5.2)
PROT SERPL-MCNC: 6.8 G/DL (ref 6–8.5)
RBC # BLD AUTO: 4.37 10*6/MM3 (ref 3.77–5.28)
SODIUM SERPL-SCNC: 141 MMOL/L (ref 136–145)
TIBC SERPL-MCNC: 350 MCG/DL
TRIGL SERPL-MCNC: 159 MG/DL (ref 0–150)
TSH SERPL DL<=0.005 MIU/L-ACNC: 2.43 UIU/ML (ref 0.27–4.2)
UIBC SERPL-MCNC: 262 MCG/DL (ref 112–346)
VLDLC SERPL CALC-MCNC: 29 MG/DL (ref 5–40)
WBC # BLD AUTO: 6.03 10*3/MM3 (ref 3.4–10.8)

## 2024-05-22 PROCEDURE — 73565 X-RAY EXAM OF KNEES: CPT

## 2024-05-23 DIAGNOSIS — M17.0 ARTHRITIS OF BOTH KNEES: Primary | ICD-10-CM

## 2024-05-23 NOTE — TELEPHONE ENCOUNTER
Spoke with Cleo and advised her per verbal with Dr. Price patient can continue on the Metoprolol 25 mg BID.

## 2024-05-23 NOTE — PROGRESS NOTES
Mild to moderate osteoarthritis is noted. Referral to orthopedics is placed to discuss treatment options

## 2024-05-23 NOTE — TELEPHONE ENCOUNTER
Can you confirm what dose of Zoloft she is taking and fix this in her medication list? Thank you.

## 2024-05-23 NOTE — PROGRESS NOTES
Glucose 106, A1c 6.1%; continue working on healthy diet/exercise and weight loss. Eliminate sugars. Consider metformin if continues to be elevated.  Ferritin is elevated. With history of lymphoma will send labs to oncologist to review. Iron panel normal no signs of iron overload. Can stop iron altogether.   CBC normal  TSH normal, continue same dose  Vitamin D low end normal get 15-20 minutes sunlight daily and take 8083-1462 IU vitamin D3 every day

## 2024-06-06 ENCOUNTER — OFFICE VISIT (OUTPATIENT)
Dept: ORTHOPEDIC SURGERY | Facility: CLINIC | Age: 39
End: 2024-06-06
Payer: COMMERCIAL

## 2024-06-06 ENCOUNTER — HOSPITAL ENCOUNTER (EMERGENCY)
Facility: HOSPITAL | Age: 39
Discharge: HOME | End: 2024-06-06
Attending: STUDENT IN AN ORGANIZED HEALTH CARE EDUCATION/TRAINING PROGRAM
Payer: COMMERCIAL

## 2024-06-06 VITALS
HEIGHT: 66 IN | WEIGHT: 283 LBS | OXYGEN SATURATION: 97 % | SYSTOLIC BLOOD PRESSURE: 120 MMHG | DIASTOLIC BLOOD PRESSURE: 90 MMHG | HEART RATE: 90 BPM | RESPIRATION RATE: 20 BRPM | TEMPERATURE: 98.2 F | BODY MASS INDEX: 45.48 KG/M2

## 2024-06-06 VITALS — TEMPERATURE: 98.4 F | BODY MASS INDEX: 45.3 KG/M2 | HEIGHT: 67 IN | WEIGHT: 288.6 LBS

## 2024-06-06 DIAGNOSIS — M22.2X2 PATELLOFEMORAL PAIN SYNDROME OF BOTH KNEES: Primary | ICD-10-CM

## 2024-06-06 DIAGNOSIS — J33.9 NASAL POLYP: ICD-10-CM

## 2024-06-06 DIAGNOSIS — M22.2X1 PATELLOFEMORAL PAIN SYNDROME OF BOTH KNEES: Primary | ICD-10-CM

## 2024-06-06 DIAGNOSIS — M17.0 BILATERAL PRIMARY OSTEOARTHRITIS OF KNEE: ICD-10-CM

## 2024-06-06 DIAGNOSIS — R04.0 EPISTAXIS: Primary | ICD-10-CM

## 2024-06-06 LAB
HCT VFR BLD AUTO: 38.6 % (ref 34–46.6)
HGB BLD-MCNC: 12.9 G/DL (ref 12–15.9)

## 2024-06-06 PROCEDURE — 85018 HEMOGLOBIN: CPT | Performed by: PHYSICIAN ASSISTANT

## 2024-06-06 PROCEDURE — 99283 EMERGENCY DEPT VISIT LOW MDM: CPT

## 2024-06-06 PROCEDURE — 85014 HEMATOCRIT: CPT | Performed by: PHYSICIAN ASSISTANT

## 2024-06-06 RX ORDER — OXYMETAZOLINE HYDROCHLORIDE 0.05 G/100ML
2 SPRAY NASAL ONCE
Status: COMPLETED | OUTPATIENT
Start: 2024-06-06 | End: 2024-06-06

## 2024-06-06 RX ADMIN — NASAL DECONGESTANT 2 SPRAY: 0.05 SPRAY NASAL at 20:01

## 2024-06-06 NOTE — PROGRESS NOTES
Office Note     Name: Johny Hearn    : 1985     MRN: [id number]     Chief Complaint  Pain of the Left Knee (States she has been having pain for years, she is unsure how long. No known injury.) and Pain of the Right Knee    Subjective     History of Present Illness:  Johny Hearn is a 38 y.o. female presenting today for evaluation of chronic bilateral knee pain ongoing for many years but has been progressively worse in the past few months.  Patient states her pain is mostly pronounced around the kneecaps and worse in the right knee than the left knee.  Her symptoms are exacerbated by prolonged walking and standing, ascending and descending stairs.  She notes occasional buckling especially of her right knee when descending stairs due to pain.  She denies any previous injuries or surgeries to the affected area.  She presents today with her mother.  Mother states that the patient has a short-term memory deficit due to an anoxic brain injury that occurred shortly after giving birth and will most likely forget the details of her visit today which is why her mother accompanies her for her medical visits.  Patient's history also significant for morbid obesity with a BMI of 45.19 and poor visual acuity secondary to anoxic brain injury.  Patient lives at home alone and is largely independent.    Review of Systems   Constitutional:  Negative for fever.   HENT:  Negative for dental problem and voice change.    Eyes:  Negative for visual disturbance.   Respiratory:  Negative for shortness of breath.    Cardiovascular:  Negative for chest pain.   Gastrointestinal:  Negative for abdominal pain.   Genitourinary:  Negative for dysuria.   Musculoskeletal:  Positive for arthralgias (bilateral knee). Negative for gait problem and joint swelling.   Skin:  Negative for rash.   Neurological:  Negative for speech difficulty.   Hematological:  Does not bruise/bleed easily.   Psychiatric/Behavioral:  Negative for  "confusion.         Past Medical History:   Diagnosis Date    A-fib     Abnormal TSH     Allergic rhinitis     Anemia     Anesthesia     pt reports \"screamed for my  and wouldnt calm down when anesthesia gas used\".    Anxiety and depression     Arthritis     Black stool     Body piercing     Brain injury     due to cardiac arrest    Cancer     LYMPHOMA STAGE 3    Cardiac arrest     dionisio partum cardiomyopathy    Cardiomyopathy     CHF (congestive heart failure)     Chronic pain     Constipation     Dermatitis     chronic on face    Fatty liver     Full dentures     DOESNT WEAR    GERD (gastroesophageal reflux disease)     H/O chronic ear infection     Headaches due to old head trauma     Heartburn     Heavy menses     Hemorrhoids     Hip pain, left     Hyperlipidemia     Hypertension     Morbid obesity     Short-term memory loss     Stuttering in conditions classified elsewhere     IF PT GETS TOO NERVOUS OR EXCITED HAS STUTTERING    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        Past Surgical History:   Procedure Laterality Date    CARDIAC CATHETERIZATION N/A 10/22/2018    Procedure: Left Heart Cath;  Surgeon: Zheng Curtis MD;  Location: [facility] CATH INVASIVE LOCATION;  Service: Cardiology    CARDIAC CATHETERIZATION N/A 10/22/2018    Procedure: Coronary angiography;  Surgeon: Zheng Curtis MD;  Location: [facility] CATH INVASIVE LOCATION;  Service: Cardiology    CARDIAC CATHETERIZATION N/A 10/22/2018    Procedure: Left ventriculography;  Surgeon: Zheng Curtis MD;  Location: [facility] CATH INVASIVE LOCATION;  Service: Cardiology     SECTION      COLONOSCOPY N/A 2019    Procedure: COLONOSCOPY;  Surgeon: Venkata Wiley MD;  Location: [facility] ENDOSCOPY;  Service: Gastroenterology    COLONOSCOPY N/A 10/22/2019    Procedure: COLONOSCOPY WITH HOT FORCEP POYLPECTOMY;  Surgeon: Rob Tyson MD;  Location: [facility] ENDOSCOPY;  Service: Gastroenterology    " ENDOMETRIAL ABLATION      ENDOSCOPY N/A 2/1/2019    Procedure: ESOPHAGOGASTRODUODENOSCOPY WITH COLD FORCEP BIOPSY;  Surgeon: Venkata Wiley MD;  Location: [facility] ENDOSCOPY;  Service: Gastroenterology    ENDOSCOPY N/A 10/22/2019    Procedure: ESOPHAGOGASTRODUODENOSCOPY W/ COLD FORCEP BIOPSY;  Surgeon: Rob Tyson MD;  Location: [facility] ENDOSCOPY;  Service: Gastroenterology    FEMORAL LYMPH NODE BIOPSY/EXCISON Left     lt illiac 2017/2018    HEMORRHOIDECTOMY N/A 2/2/2019    Procedure: HEMORRHOIDECTOMY;  Surgeon: Venkata Wiley MD;  Location: [facility] OR;  Service: General    PORTACATH PLACEMENT N/A 4/27/2018    Procedure: INSERTION OF PORTACATH right subclavian;  Surgeon: Shelley Brock MD;  Location: [facility] OR;  Service: General    TUBAL ABDOMINAL LIGATION         Family History   Problem Relation Age of Onset    Hypertension Father     Diabetes Father     Diabetes Paternal Grandmother     Colon cancer Neg Hx     Liver cancer Neg Hx     Rectal cancer Neg Hx     Stomach cancer Neg Hx        Social History     Socioeconomic History    Marital status:    Tobacco Use    Smoking status: Never     Passive exposure: Past    Smokeless tobacco: Never   Vaping Use    Vaping status: Never Used   Substance and Sexual Activity    Alcohol use: No    Drug use: No    Sexual activity: Defer         Current Outpatient Medications:     cetirizine (zyrTEC) 10 MG tablet, Take 1 tablet by mouth Daily. (Patient not taking: Reported on 6/6/2024), Disp: 90 tablet, Rfl: 3    Cholecalciferol (Vitamin D) 50 MCG (2000 UT) tablet, Take 2,000 Units by mouth Daily., Disp: 90 tablet, Rfl: 1    furosemide (LASIX) 40 MG tablet, Take 1 tablet by mouth Daily., Disp: 90 tablet, Rfl: 3    levothyroxine (SYNTHROID, LEVOTHROID) 25 MCG tablet, Take 1 tablet by mouth Daily., Disp: , Rfl:     metoprolol tartrate (LOPRESSOR) 25 MG tablet, Take 1 tablet by mouth Every 12 (Twelve) Hours. (Patient not taking: Reported on 6/6/2024), Disp: , Rfl:  "    oxyCODONE (ROXICODONE) 15 MG immediate release tablet, Take 1 tablet by mouth Every 6 (Six) Hours As Needed., Disp: , Rfl:     polyethylene glycol (PEG 3350) 17 GM/SCOOP powder, Take 17 g by mouth Daily., Disp: , Rfl:     sacubitril-valsartan (ENTRESTO) 24-26 MG tablet, Take 1 tablet by mouth Every 12 (Twelve) Hours., Disp: 180 tablet, Rfl: 3    sertraline (ZOLOFT) 100 MG tablet, Take 1 tablet by mouth Every Night. Total 150 mg daily, Disp: 90 tablet, Rfl: 1    sertraline (ZOLOFT) 50 MG tablet, Take 3 tablets by mouth Daily. (Patient taking differently: Take 1 tablet by mouth Daily.), Disp: 270 tablet, Rfl: 3    spironolactone (ALDACTONE) 25 MG tablet, Take 1 tablet by mouth Daily., Disp: 90 tablet, Rfl: 3    Allergies   Allergen Reactions    Contrast Dye (Echo Or Unknown Ct/Mr) Itching    Erythromycin Unknown - Low Severity     Pt unsure.    Macrobid [Nitrofurantoin Monohyd Macro] Unknown - Low Severity     Pt does not know           Objective   Temp 98.4 °F (36.9 °C)   Ht 170.2 cm (67.01\")   Wt 131 kg (288 lb 9.6 oz)   BMI 45.19 kg/m²            Physical Exam  Musculoskeletal:      Right knee: No effusion.      Instability Tests: Medial Kristopher test negative and lateral Kristopher test negative.      Left knee: No effusion.      Instability Tests: Medial Kristopher test negative and lateral Kristopher test negative.       Right Knee Exam     Muscle Strength   The patient has normal right knee strength.    Tenderness   The patient is experiencing tenderness in the patella.    Range of Motion   The patient has normal right knee ROM.    Tests   Kristopher:  Medial - negative Lateral - negative  Varus: negative Valgus: negative  Lachman:  Anterior - negative    Posterior - negative  Drawer:  Anterior - negative    Posterior - negative    Other   Erythema: absent  Sensation: normal  Pulse: present  Swelling: none  Effusion: no effusion present      Left Knee Exam     Muscle Strength   The patient has normal left knee " strength.    Tenderness   The patient is experiencing tenderness in the patella.    Range of Motion   The patient has normal left knee ROM.    Tests   Kristopher:  Medial - negative Lateral - negative  Varus: negative Valgus: negative  Lachman:  Anterior - negative    Posterior - negative  Drawer:  Anterior - negative     Posterior - negative    Other   Erythema: absent  Sensation: normal  Pulse: present  Swelling: none  Effusion: no effusion present           Extremity DVT signs are negative by clinical screen.     Independent Review of Radiographic Studies:    Independent review of 2 view plain films of both knees exam done on 5/22/2024 for the evaluation of pain, no comparison views available.  There are no acute osseous abnormalities noted.  There are degenerative changes mostly pronounced in the medial compartments bilaterally consistent with Kellgren-Win grade 2.    Procedures    Assessment and Plan   Diagnoses and all orders for this visit:    1. Patellofemoral pain syndrome of both knees (Primary)  -     Ambulatory Referral to Physical Therapy    2. Bilateral primary osteoarthritis of knee  -     Ambulatory Referral to Physical Therapy       Discussion of orthopedic goals  Orthopedic activities reviewed and patient expressed appreciation  Regular exercise as tolerated  Risk, benefits, and merits of treatment alternatives reviewed with the patient and questions answered  Patient guided on mobility and conditioning exercises  Ice, heat, and/or modalities as beneficial  Elevate leg for residual swelling  Call or notify for any adverse effect from injection therapy  Physical therapy referral given  Counseling on diet, nutrition, fitness exercise, and weight reduction goals    Recommendations/Plan:  Exercise, medications, injections, other patient advice, and return appointment as noted.  Referral: Physical and Occupational Therapy referral.  Patient is encouraged to call or return for any issues or  concerns.    Patient was given a cortisone injection into both knees today for symptomatic relief.  She was also given a course of physical therapy for bilateral knee rehabilitation and strengthening as well as help developing an exercise plan.  I did encourage the patient to remain active however avoid aggravating activities.  I advised daily low-impact exercise of at least 30 minutes/day.  Also advised ice and heat and over-the-counter analgesics as needed for knee pain.  Advised her to follow-up with me after her physical therapy if she continues to have significant pain.    Return if symptoms worsen or fail to improve.  Patient agreeable to call or return sooner for any concerns.

## 2024-06-06 NOTE — ED PROVIDER NOTES
" EMERGENCY DEPARTMENT ENCOUNTER    Pt Name: Johny Hearn  MRN: [id number]  Pt :   1985  Room Number:    Date of encounter:  2024  PCP: Sapphire Mohan APRN  ED Provider: Dariusz Montanez PA-C    Historian: Patient and Parent      HPI:  Chief Complaint   Patient presents with    Nose Bleed          Context: Johny Hearn is a 38 y.o. female who presents to the ED c/o epistaxis.  Patient states 2 days ago had a right-sided nosebleed.  Direct pressure.  Today about 1815 hrs. had onset of a nosebleed again.  Nasal clamp applied by nursing staff prior to my evaluation.  Patient states she has pulled a large clot on the right side of her nose and bleeding resumed just prior to arrival.  Denies any trauma.  No history of epistaxis.      PAST MEDICAL HISTORY  Past Medical History:   Diagnosis Date    A-fib     Abnormal TSH     Allergic rhinitis     Anemia     Anesthesia     pt reports \"screamed for my  and wouldnt calm down when anesthesia gas used\".    Anxiety and depression     Arthritis     Black stool     Body piercing     Brain injury     due to cardiac arrest    Cancer     LYMPHOMA STAGE 3    Cardiac arrest     dionisio partum cardiomyopathy    Cardiomyopathy     CHF (congestive heart failure)     Chronic pain     Constipation     Dermatitis     chronic on face    Fatty liver     Full dentures     DOESNT WEAR    GERD (gastroesophageal reflux disease)     H/O chronic ear infection     Headaches due to old head trauma     Heartburn     Heavy menses     Hemorrhoids     Hip pain, left     Hyperlipidemia     Hypertension     Morbid obesity     Short-term memory loss     Stuttering in conditions classified elsewhere     IF PT GETS TOO NERVOUS OR EXCITED HAS STUTTERING    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         PAST SURGICAL HISTORY  Past Surgical History:   Procedure Laterality Date    CARDIAC CATHETERIZATION N/A 10/22/2018    Procedure: Left Heart Cath;  " Surgeon: Zheng Curtis MD;  Location: [facility] CATH INVASIVE LOCATION;  Service: Cardiology    CARDIAC CATHETERIZATION N/A 10/22/2018    Procedure: Coronary angiography;  Surgeon: Zheng Curtis MD;  Location: [facility] CATH INVASIVE LOCATION;  Service: Cardiology    CARDIAC CATHETERIZATION N/A 10/22/2018    Procedure: Left ventriculography;  Surgeon: Zheng Curtis MD;  Location: [facility] CATH INVASIVE LOCATION;  Service: Cardiology     SECTION      COLONOSCOPY N/A 2019    Procedure: COLONOSCOPY;  Surgeon: Venkata Wiley MD;  Location: [facility] ENDOSCOPY;  Service: Gastroenterology    COLONOSCOPY N/A 10/22/2019    Procedure: COLONOSCOPY WITH HOT FORCEP POYLPECTOMY;  Surgeon: Rob Tyson MD;  Location: [facility] ENDOSCOPY;  Service: Gastroenterology    ENDOMETRIAL ABLATION      ENDOSCOPY N/A 2019    Procedure: ESOPHAGOGASTRODUODENOSCOPY WITH COLD FORCEP BIOPSY;  Surgeon: Venkata Wiley MD;  Location: [facility] ENDOSCOPY;  Service: Gastroenterology    ENDOSCOPY N/A 10/22/2019    Procedure: ESOPHAGOGASTRODUODENOSCOPY W/ COLD FORCEP BIOPSY;  Surgeon: Rob Tyson MD;  Location: [facility] ENDOSCOPY;  Service: Gastroenterology    FEMORAL LYMPH NODE BIOPSY/EXCISON Left     lt [facility]     HEMORRHOIDECTOMY N/A 2019    Procedure: HEMORRHOIDECTOMY;  Surgeon: Venkata Wiley MD;  Location: [facility] OR;  Service: General    PORTACATH PLACEMENT N/A 2018    Procedure: INSERTION OF PORTACATH right subclavian;  Surgeon: Shelley Brock MD;  Location: [facility] OR;  Service: General    TUBAL ABDOMINAL LIGATION           FAMILY HISTORY  Family History   Problem Relation Age of Onset    Hypertension Father     Diabetes Father     Diabetes Paternal Grandmother     Colon cancer Neg Hx     Liver cancer Neg Hx     Rectal cancer Neg Hx     Stomach cancer Neg Hx          SOCIAL HISTORY  Social History     Socioeconomic History    Marital status:    Tobacco Use    Smoking  status: Never     Passive exposure: Past    Smokeless tobacco: Never   Vaping Use    Vaping status: Never Used   Substance and Sexual Activity    Alcohol use: No    Drug use: No    Sexual activity: Defer         ALLERGIES  Contrast dye (echo or unknown ct/mr), Erythromycin, and Macrobid [nitrofurantoin monohyd macro]        REVIEW OF SYSTEMS  Review of Systems   Constitutional: Negative.    HENT:  Positive for nosebleeds.    Eyes: Negative.    Respiratory: Negative.     Cardiovascular: Negative.    Gastrointestinal: Negative.    Genitourinary: Negative.    Musculoskeletal: Negative.    Skin: Negative.    Neurological: Negative.    Psychiatric/Behavioral: Negative.          All systems reviewed and negative except for those discussed in HPI.       PHYSICAL EXAM    I have reviewed the triage vital signs and nursing notes.    ED Triage Vitals [06/06/24 1855]   Temp Heart Rate Resp BP SpO2   98.8 °F (37.1 °C) 83 22 140/70 99 %      Temp src Heart Rate Source Patient Position BP Location FiO2 (%)   Oral Monitor Sitting Left arm --       Physical Exam  Vitals and nursing note reviewed.   Constitutional:       General: She is not in acute distress.     Appearance: Normal appearance. She is obese. She is not ill-appearing, toxic-appearing or diaphoretic.   HENT:      Head: Normocephalic and atraumatic.      Nose:      Comments: Polyp in the right medial nostril without evidence of active bleeding.  Normal left nostril.     Mouth/Throat:      Mouth: Mucous membranes are moist.      Comments: Small amount of blood on the tongue and posterior pharynx but no evidence of active bleeding.  Eyes:      Extraocular Movements: Extraocular movements intact.   Cardiovascular:      Rate and Rhythm: Normal rate.   Pulmonary:      Effort: Pulmonary effort is normal.   Abdominal:      General: Abdomen is flat.   Musculoskeletal:         General: Normal range of motion.      Cervical back: Normal range of motion.   Skin:     General: Skin  is warm and dry.   Neurological:      General: No focal deficit present.      Mental Status: She is alert. Mental status is at baseline.   Psychiatric:         Mood and Affect: Mood normal.         Behavior: Behavior normal.            LAB RESULTS  Recent Results (from the past 24 hour(s))   Hemoglobin & Hematocrit, Blood    Collection Time: 06/06/24  8:33 PM    Specimen: Hand, Left; Blood   Result Value Ref Range    Hemoglobin 12.9 12.0 - 15.9 g/dL    Hematocrit 38.6 34.0 - 46.6 %       If labs were ordered, I independently reviewed the results and considered them in treating the patient.        RADIOLOGY  No Radiology Exams Resulted Within Past 24 Hours        PROCEDURES  Visualized bleeding nasal polyp in the right nostril.  Afrin applied and pressure with nasal clamp for about 20 minutes, after removal no active bleeding noted.  No clot remains.      Interpretations    O2 Sat: The patients oxygen saturation was 99% on Room Air.  This was independently interpreted by me as Normal    MEDICATIONS GIVEN IN ER    Medications   oxymetazoline (AFRIN) nasal spray 2 spray (2 sprays Nasal Given 6/6/24 2001)         MEDICAL DECISION MAKING, PROGRESS, and CONSULTS    All labs, if obtained, have been independently reviewed by me.  All radiology studies, if obtained, have been reviewed by me and the radiologist dictating the report.  All EKG's, if obtained, have been independently viewed and interpreted by me      Discussion below represents my analysis of pertinent findings related to patient's condition, differential diagnosis, treatment plan and final disposition.      Differential diagnosis:    Anterior nosebleed, posterior nosebleeding among others    Additional Sources:  None      Orders placed during this visit:  Orders Placed This Encounter   Procedures    Hemoglobin & Hematocrit, Blood         Additional orders considered but not ordered:  None    ED Course:    Consultants:  None    ED Course as of 06/06/24 2050    Thu Jun 06, 2024 2031 Discussed Dr. Weaver.  Will have follow-up with ENT.  Patient requesting hemoglobin hematocrit to ensure no significant blood loss. [TM]   2050 No further bleeding this time.  H&H stable. [TM]      ED Course User Index  [TM] Dariusz Montanez PA-C           After my consideration of clinical presentation and any laboratory/radiology studies obtained, I discussed the findings with the patient/patient representative who is in agreement with the treatment plan and the final disposition. Risks and benefits of discharge were discussed.     AS OF 20:50 EDT VITALS:    BP - 140/70  HR - 83  TEMP - 98.8 °F (37.1 °C) (Oral)  O2 SATS - 99%    I reviewed the patients prescription monitoring report if available prior to discharge    DIAGNOSIS  Final diagnoses:   None         DISPOSITION  ED Disposition       None                Please note that portions of this document were completed with voice recognition software.        Dariusz Montanez PA-C  06/06/24 2050

## 2024-06-13 RX ORDER — POLYETHYLENE GLYCOL 3350 17 G/17G
POWDER, FOR SOLUTION ORAL
Qty: 30 EACH | Refills: 0 | OUTPATIENT
Start: 2024-06-13

## 2024-06-27 RX ORDER — POLYETHYLENE GLYCOL 3350 17 G/17G
17 POWDER, FOR SOLUTION ORAL DAILY
Qty: 850 G | Refills: 5 | Status: SHIPPED | OUTPATIENT
Start: 2024-06-27

## 2024-06-27 NOTE — TELEPHONE ENCOUNTER
Rx Refill Note  Requested Prescriptions     Pending Prescriptions Disp Refills    polyethylene glycol (PEG 3350) 17 GM/SCOOP powder       Sig: Take 17 g by mouth Daily.      Last office visit with prescribing clinician: 5/21/2024   Last telemedicine visit with prescribing clinician: Visit date not found   Next office visit with prescribing clinician: Visit date not found                         Would you like a call back once the refill request has been completed: [] Yes [] No    If the office needs to give you a call back, can they leave a voicemail: [] Yes [] No    Anny Garsia MA  06/27/24, 09:19 EDT

## 2024-07-16 ENCOUNTER — OFFICE VISIT (OUTPATIENT)
Dept: OBSTETRICS AND GYNECOLOGY | Facility: CLINIC | Age: 39
End: 2024-07-16
Payer: COMMERCIAL

## 2024-07-16 ENCOUNTER — OFFICE VISIT (OUTPATIENT)
Dept: INTERNAL MEDICINE | Facility: CLINIC | Age: 39
End: 2024-07-16
Payer: COMMERCIAL

## 2024-07-16 VITALS
WEIGHT: 278.5 LBS | DIASTOLIC BLOOD PRESSURE: 80 MMHG | SYSTOLIC BLOOD PRESSURE: 120 MMHG | BODY MASS INDEX: 44.76 KG/M2 | HEIGHT: 66 IN

## 2024-07-16 VITALS
BODY MASS INDEX: 45.16 KG/M2 | HEIGHT: 66 IN | OXYGEN SATURATION: 97 % | WEIGHT: 281 LBS | SYSTOLIC BLOOD PRESSURE: 118 MMHG | HEART RATE: 82 BPM | TEMPERATURE: 97.1 F | DIASTOLIC BLOOD PRESSURE: 80 MMHG

## 2024-07-16 DIAGNOSIS — Z01.419 WELL WOMAN EXAM: Primary | ICD-10-CM

## 2024-07-16 DIAGNOSIS — L60.0 INGROWN TOENAIL: Primary | ICD-10-CM

## 2024-07-16 DIAGNOSIS — L70.0 ACNE VULGARIS: ICD-10-CM

## 2024-07-16 DIAGNOSIS — L73.9 FOLLICULITIS: ICD-10-CM

## 2024-07-16 DIAGNOSIS — R23.2 HOT FLASHES: ICD-10-CM

## 2024-07-16 DIAGNOSIS — Z12.39 SCREENING FOR BREAST CANCER USING NON-MAMMOGRAM MODALITY: ICD-10-CM

## 2024-07-16 DIAGNOSIS — F41.9 ANXIETY: ICD-10-CM

## 2024-07-16 DIAGNOSIS — F33.0 MILD EPISODE OF RECURRENT MAJOR DEPRESSIVE DISORDER: ICD-10-CM

## 2024-07-16 PROCEDURE — 99214 OFFICE O/P EST MOD 30 MIN: CPT | Performed by: NURSE PRACTITIONER

## 2024-07-16 PROCEDURE — 1125F AMNT PAIN NOTED PAIN PRSNT: CPT | Performed by: NURSE PRACTITIONER

## 2024-07-16 RX ORDER — BENZOYL PEROXIDE 10 G/100G
1 GEL TOPICAL DAILY
Qty: 90 G | Refills: 0 | Status: SHIPPED | OUTPATIENT
Start: 2024-07-16

## 2024-07-16 RX ORDER — CLINDAMYCIN PHOSPHATE 10 MG/G
1 GEL TOPICAL 2 TIMES DAILY
Qty: 60 G | Refills: 0 | Status: SHIPPED | OUTPATIENT
Start: 2024-07-16

## 2024-07-16 RX ORDER — SULFAMETHOXAZOLE AND TRIMETHOPRIM 800; 160 MG/1; MG/1
1 TABLET ORAL 2 TIMES DAILY
Qty: 10 TABLET | Refills: 0 | Status: SHIPPED | OUTPATIENT
Start: 2024-07-16

## 2024-07-16 NOTE — PROGRESS NOTES
"Annual Well Woman Visit    Subjective   Chief Complaint   Patient presents with    Gynecologic Exam     Pap done today. C/O hair bumps, and boils.     Johny Hearn is a 39 y.o.  presenting to be seen for an annual well woman visit. She reports concerns today for folliculitis, with 1-2 lesions in particular being large/ persistent and possibly needing drainage. She is s/p endometrial ablation and does not have monthly cycles. She does report occasional hot flashes and night sweats. She has had a BTL. She denies concerns for abnormal vaginal discharge. She reports mild stress urinary incontinence but not severe.    OB Hx:   OB History    Para Term  AB Living   1 1 1     1   SAB IAB Ectopic Molar Multiple Live Births             1      # Outcome Date GA Lbr Fernandez/2nd Weight Sex Type Anes PTL Lv   1 Term 03 42w0d  3827 g (8 lb 7 oz) F CS-LTranv   CRISPIN      Contraception: s/p BTL  Pap smear: > 10 years ago, denies h/o abonrmal  Mammogram: N/A  Colonoscopy: 2019  DEXA Scan: N/A    Past Medical History:   Diagnosis Date    A-fib     Abnormal TSH     Allergic rhinitis     Anemia     Anesthesia     pt reports \"screamed for my  and wouldnt calm down when anesthesia gas used\".    Anxiety and depression     Arthritis     Black stool     Body piercing     Brain injury     due to cardiac arrest    Cancer     LYMPHOMA STAGE 3    Cardiac arrest     dionisio partum cardiomyopathy    Cardiomyopathy     CHF (congestive heart failure)     Chronic pain     Constipation     Dermatitis     chronic on face    Fatty liver     Full dentures     DOESNT WEAR    GERD (gastroesophageal reflux disease)     H/O chronic ear infection     Headaches due to old head trauma     Heartburn     Heavy menses     Hemorrhoids     Hip pain, left     Hyperlipidemia     Hypertension     Morbid obesity     Short-term memory loss     Stuttering in conditions classified elsewhere     IF PT GETS TOO NERVOUS OR EXCITED HAS STUTTERING "    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     Past Surgical History:   Procedure Laterality Date    CARDIAC CATHETERIZATION N/A 10/22/2018    Procedure: Left Heart Cath;  Surgeon: Zheng Curtis MD;  Location: [facility] CATH INVASIVE LOCATION;  Service: Cardiology    CARDIAC CATHETERIZATION N/A 10/22/2018    Procedure: Coronary angiography;  Surgeon: Zheng Curtis MD;  Location: [facility] CATH INVASIVE LOCATION;  Service: Cardiology    CARDIAC CATHETERIZATION N/A 10/22/2018    Procedure: Left ventriculography;  Surgeon: Zheng Curtis MD;  Location: [facility] CATH INVASIVE LOCATION;  Service: Cardiology     SECTION      COLONOSCOPY N/A 2019    Procedure: COLONOSCOPY;  Surgeon: Venkata Wiley MD;  Location: [facility] ENDOSCOPY;  Service: Gastroenterology    COLONOSCOPY N/A 10/22/2019    Procedure: COLONOSCOPY WITH HOT FORCEP POYLPECTOMY;  Surgeon: Rob Tyson MD;  Location: [facility] ENDOSCOPY;  Service: Gastroenterology    ENDOMETRIAL ABLATION      ENDOSCOPY N/A 2019    Procedure: ESOPHAGOGASTRODUODENOSCOPY WITH COLD FORCEP BIOPSY;  Surgeon: Venkata Wiley MD;  Location: [facility] ENDOSCOPY;  Service: Gastroenterology    ENDOSCOPY N/A 10/22/2019    Procedure: ESOPHAGOGASTRODUODENOSCOPY W/ COLD FORCEP BIOPSY;  Surgeon: Rob Tyson MD;  Location: [facility] ENDOSCOPY;  Service: Gastroenterology    FEMORAL LYMPH NODE BIOPSY/EXCISON Left     lt [facility]     HEMORRHOIDECTOMY N/A 2019    Procedure: HEMORRHOIDECTOMY;  Surgeon: Venkata Wiley MD;  Location: [facility] OR;  Service: General    PORTACATH PLACEMENT N/A 2018    Procedure: INSERTION OF PORTACATH right subclavian;  Surgeon: Shelley Brock MD;  Location: [facility] OR;  Service: General    TUBAL ABDOMINAL LIGATION       Family History   Problem Relation Age of Onset    Hypertension Father     Diabetes Father     Diabetes Paternal Grandmother     Cardiomyopathy Paternal Grandmother     Brain  cancer Maternal Grandmother     Colon cancer Neg Hx     Liver cancer Neg Hx     Rectal cancer Neg Hx     Stomach cancer Neg Hx      Social History     Tobacco Use    Smoking status: Never     Passive exposure: Past    Smokeless tobacco: Never   Vaping Use    Vaping status: Never Used   Substance Use Topics    Alcohol use: No    Drug use: No     (Not in a hospital admission)    Contrast dye (echo or unknown ct/mr), Erythromycin, and Macrobid [nitrofurantoin monohyd macro]  Current Outpatient Medications on File Prior to Visit   Medication Sig Dispense Refill    Cholecalciferol (Vitamin D) 50 MCG (2000 UT) tablet Take 2,000 Units by mouth Daily. 90 tablet 1    furosemide (LASIX) 40 MG tablet Take 1 tablet by mouth Daily. 90 tablet 3    levothyroxine (SYNTHROID, LEVOTHROID) 25 MCG tablet Take 1 tablet by mouth Daily.      polyethylene glycol (PEG 3350) 17 GM/SCOOP powder Take 17 g by mouth Daily. 850 g 5    sacubitril-valsartan (ENTRESTO) 24-26 MG tablet Take 1 tablet by mouth Every 12 (Twelve) Hours. 180 tablet 3    sertraline (ZOLOFT) 100 MG tablet Take 1 tablet by mouth Every Night. Total 150 mg daily 90 tablet 1    spironolactone (ALDACTONE) 25 MG tablet Take 1 tablet by mouth Daily. 90 tablet 3    [DISCONTINUED] sertraline (ZOLOFT) 50 MG tablet Take 3 tablets by mouth Daily. (Patient taking differently: Take 1 tablet by mouth Daily.) 270 tablet 3    [DISCONTINUED] cetirizine (zyrTEC) 10 MG tablet Take 1 tablet by mouth Daily. (Patient not taking: Reported on 6/6/2024) 90 tablet 3    [DISCONTINUED] metoprolol tartrate (LOPRESSOR) 25 MG tablet Take 1 tablet by mouth Every 12 (Twelve) Hours. (Patient not taking: Reported on 6/6/2024)      [DISCONTINUED] oxyCODONE (ROXICODONE) 15 MG immediate release tablet Take 1 tablet by mouth Every 6 (Six) Hours As Needed.       No current facility-administered medications on file prior to visit.     Social History    Tobacco Use      Smoking status: Never        Passive exposure:  "Past      Smokeless tobacco: Never    Review of Systems  Pertinent items are noted in HPI, all other systems were reviewed and negative     Objective   /80   Ht 167.6 cm (66\")   Wt 126 kg (278 lb 8 oz)   BMI 44.95 kg/m²     Physical Exam:  General Appearance: alert, interactive, and NAD  Breasts:   Examined in supine position  Symmetric without hard/ fixed masses or skin dimpling. Tenderness bilaterally along inferior-medial edges of breasts.  Nipples normal without inversion, lesions or discharge  There are no palpable axillary nodes, though there is tenderness bilaterally.  Pelvis:  Pelvic: Clinical staff was present for exam  External genitalia: scattered scars/ lesions consistent with folliculitis in varying stages of healing. Prominent 1x2 cm lesion on right mons pubis, mildly tender, with small fluid collection. Red to violaceous in color. See separate procedure note for I&D. Otherwise normal appearance of the external genitalia including Bartholin's and Cerulean's glands  :  urethral meatus normal  Vagina:  normal pink mucosa without lesions  Cervix:  normal appearance     Assessment & Plan    Annual well woman exam with age appropriate screening      Diagnosis Plan   1. Well woman exam  LIQUID-BASED PAP SMEAR WITH HPV GENOTYPING REGARDLESS OF INTERPRETATION ([facility]A,COR,MAD)      2. Screening for breast cancer using non-mammogram modality        3. Hot flashes  Follicle Stimulating Hormone      4. Folliculitis  sulfamethoxazole-trimethoprim (Bactrim DS) 800-160 MG per tablet         Medications ordered: bactrim     Procedures performed: I&D (see separate procedure note)    - Mammogram: Will be due to begin annual screening next year  - Pap screening guidelines reviewed; pap smear completed today  - Healthy diet and exercise encouraged  - Contraception: s/p BTL  - Screening: GC/CZ on Pap  - Folliculitis: recommend ice to decrease inflammation/ warm compresses to drain as indicated. Bactrim sent due to " persistent lesion w/ attempted drainage today. Consider dermatology eval due to recurrent folliculitis that previously responded well to doxycycline. Return precautions reviewed.    Follow up for annual visit or sooner with any concerns.    More Velez MD  Obstetrics and Gynecology  [facility]

## 2024-07-16 NOTE — PROGRESS NOTES
"Chief Complaint   Patient presents with    Ingrown Toenail     Left foot big toe     Subjective     History of Present Illness  The patient presents for evaluation of ingrown toenails. She is accompanied by mom.   She reports experiencing ingrown toenails on both feet. Despite attempts to alleviate the discomfort by cutting them straight across, she found them too painful. She has not consulted with a podiatrist.    She is seeking a cream for her acne. Initially, it was suspected to be rosacea. Prior to her chemotherapy, she was doxycycline for over a year, both of which cleared up her acne.      The following portions of the patient's history were reviewed and updated as appropriate: allergies, current medications, past family history, past medical history, past social history, past surgical history and problem list.      Objective   /80   Pulse 82   Temp 97.1 °F (36.2 °C) (Infrared)   Ht 167.6 cm (65.98\")   Wt 127 kg (281 lb)   SpO2 97%   BMI 45.38 kg/m²   Body mass index is 45.38 kg/m².       Physical Exam  Vitals and nursing note reviewed.   Constitutional:       Appearance: Normal appearance.   HENT:      Right Ear: External ear normal.      Left Ear: External ear normal.   Eyes:      Extraocular Movements: Extraocular movements intact.   Cardiovascular:      Rate and Rhythm: Normal rate.   Pulmonary:      Effort: Pulmonary effort is normal.      Breath sounds: Normal breath sounds.   Musculoskeletal:         General: Normal range of motion.      Cervical back: Normal range of motion.   Feet:      Right foot:      Toenail Condition: Right toenails are ingrown.      Left foot:      Toenail Condition: Left toenails are ingrown.   Skin:     General: Skin is dry.      Findings: Acne present.   Neurological:      Mental Status: She is alert and oriented to person, place, and time.   Psychiatric:         Mood and Affect: Mood normal.         Assessment & Plan   Johny Hearn is here today and the " following problems have been addressed:      Diagnoses and all orders for this visit:    1. Ingrown toenail (Primary)  -     Ambulatory Referral to Podiatry    2. Acne vulgaris  -     benzoyl peroxide 10 % gel; Apply 1 Application topically to the appropriate area as directed Daily.  Dispense: 90 g; Refill: 0  -     clindamycin 1 % gel; Apply 1 Application topically to the appropriate area as directed 2 (Two) Times a Day.  Dispense: 60 g; Refill:       Referral to podiatry.  Warm Epsom salt soaks for both toes.  There are no signs of infection currently.    Recommend benzoyl peroxide and clindamycin gel to face, wash with nonscented face wash and use nonscented moisturizer.    Follow Up   PRN  Patient was given instructions and counseling regarding her condition or for health maintenance advice. Please see specific information pulled into the AVS if appropriate.     Sapphire RUSSELL  [facility]      Patient or patient representative verbalized consent for the use of Ambient Listening during the visit with  DREW Hernandez for chart documentation. 7/16/2024  15:15 EDT

## 2024-07-16 NOTE — PROGRESS NOTES
I&D Abscess    Date of procedure:  7/16/2024    Risks and benefits discussed? Yes  All questions answered? Yes  Consents given by The patient  Written consent obtained? Yes    Local anesthesia used:  Yes - 2 cc's of plain lidocaine    Procedure:    The patient was in the dorsal lithotomy position. The area was cleansed with betadine. 1% lidocaine without epinephrine was infused locally. The abscess was then incised and drained sharply with a #11 blade. Minimal drainage was expressed and appeared mostly serosanguinous. Minimal blood loss occurred and was well controlled with pressure. There were no complications and the patient tolerated the procedure well.    Post procedure Instructions: The patient was informed to call the office if bleeding, pain, swelling, redness around the site, drainage, or fever and chills. Patient to go to the ER if after hours or call the office.      More Velez MD   Obstetrics and Gynecology  [facility]

## 2024-07-18 ENCOUNTER — PATIENT ROUNDING (BHMG ONLY) (OUTPATIENT)
Dept: OBSTETRICS AND GYNECOLOGY | Facility: CLINIC | Age: 39
End: 2024-07-18
Payer: COMMERCIAL

## 2024-07-18 NOTE — PROGRESS NOTES
July 18, 2024    Hello, may I speak with Johny Hearn?    My name is Christen      I am  with [facility]  [address], [address]  [place] [address] [phone].    Before we get started may I verify your date of birth? 1985    I am calling to officially welcome you to our practice and ask about your recent visit. Is this a good time to talk? yes    Tell me about your visit with us. What things went well?  Patient states that she really liked Dr Velez. She states that she was very nice.       We're always looking for ways to make our patients' experiences even better. Do you have recommendations on ways we may improve?  no    Overall were you satisfied with your first visit to our practice? yes       I appreciate you taking the time to speak with me today. Is there anything else I can do for you? no      Thank you, and have a great day.

## 2024-07-19 LAB — REF LAB TEST METHOD: NORMAL

## 2024-07-20 DIAGNOSIS — F41.9 ANXIETY: ICD-10-CM

## 2024-07-20 DIAGNOSIS — F33.0 MILD EPISODE OF RECURRENT MAJOR DEPRESSIVE DISORDER: ICD-10-CM

## 2024-07-22 RX ORDER — LEVOTHYROXINE SODIUM 0.03 MG/1
25 TABLET ORAL DAILY
Qty: 90 TABLET | Refills: 1 | Status: SHIPPED | OUTPATIENT
Start: 2024-07-22

## 2024-07-22 RX ORDER — SPIRONOLACTONE 25 MG/1
25 TABLET ORAL DAILY
Qty: 90 TABLET | Refills: 0 | Status: SHIPPED | OUTPATIENT
Start: 2024-07-22

## 2024-07-22 RX ORDER — POLYETHYLENE GLYCOL 3350 17 G/17G
17 POWDER, FOR SOLUTION ORAL DAILY
Qty: 850 G | Refills: 5 | Status: SHIPPED | OUTPATIENT
Start: 2024-07-22

## 2024-07-22 RX ORDER — FUROSEMIDE 40 MG/1
40 TABLET ORAL DAILY
Qty: 90 TABLET | Refills: 0 | Status: SHIPPED | OUTPATIENT
Start: 2024-07-22

## 2024-07-22 RX ORDER — SERTRALINE HYDROCHLORIDE 100 MG/1
100 TABLET, FILM COATED ORAL NIGHTLY
Qty: 90 TABLET | Refills: 1 | OUTPATIENT
Start: 2024-07-22

## 2024-07-23 DIAGNOSIS — F33.0 MILD EPISODE OF RECURRENT MAJOR DEPRESSIVE DISORDER: ICD-10-CM

## 2024-07-23 DIAGNOSIS — F41.9 ANXIETY: ICD-10-CM

## 2024-07-23 RX ORDER — SERTRALINE HYDROCHLORIDE 100 MG/1
100 TABLET, FILM COATED ORAL NIGHTLY
Qty: 90 TABLET | Refills: 1 | Status: SHIPPED | OUTPATIENT
Start: 2024-07-23

## 2024-07-23 NOTE — TELEPHONE ENCOUNTER
Rx Refill Note  Requested Prescriptions     Pending Prescriptions Disp Refills    polyethylene glycol (PEG 3350) 17 GM/SCOOP powder 850 g 5     Sig: Take 17 g by mouth Daily.    sertraline (ZOLOFT) 50 MG tablet       Sig: Take 1 tablet by mouth Every Night.      Last office visit with prescribing clinician: 7/16/2024   Last telemedicine visit with prescribing clinician: Visit date not found   Next office visit with prescribing clinician: Visit date not found                         Would you like a call back once the refill request has been completed: [] Yes [] No    If the office needs to give you a call back, can they leave a voicemail: [] Yes [] No    Anny Garsia MA  07/23/24, 10:14 EDT

## 2024-07-25 RX ORDER — POLYETHYLENE GLYCOL 3350 17 G/17G
17 POWDER, FOR SOLUTION ORAL DAILY
Qty: 850 G | Refills: 5 | OUTPATIENT
Start: 2024-07-25

## 2024-07-29 DIAGNOSIS — F41.9 ANXIETY: ICD-10-CM

## 2024-07-29 DIAGNOSIS — F33.0 MILD EPISODE OF RECURRENT MAJOR DEPRESSIVE DISORDER: ICD-10-CM

## 2024-08-13 ENCOUNTER — OFFICE VISIT (OUTPATIENT)
Dept: CARDIOLOGY | Facility: CLINIC | Age: 39
End: 2024-08-13
Payer: COMMERCIAL

## 2024-08-13 VITALS
HEART RATE: 77 BPM | OXYGEN SATURATION: 98 % | HEIGHT: 66 IN | WEIGHT: 277.4 LBS | BODY MASS INDEX: 44.58 KG/M2 | DIASTOLIC BLOOD PRESSURE: 82 MMHG | SYSTOLIC BLOOD PRESSURE: 110 MMHG | RESPIRATION RATE: 18 BRPM

## 2024-08-13 DIAGNOSIS — I48.0 PAROXYSMAL ATRIAL FIBRILLATION: ICD-10-CM

## 2024-08-13 DIAGNOSIS — I10 PRIMARY HYPERTENSION: ICD-10-CM

## 2024-08-13 DIAGNOSIS — E66.01 CLASS 3 SEVERE OBESITY DUE TO EXCESS CALORIES WITH SERIOUS COMORBIDITY AND BODY MASS INDEX (BMI) OF 45.0 TO 49.9 IN ADULT: ICD-10-CM

## 2024-08-13 DIAGNOSIS — I50.22 CHRONIC SYSTOLIC CHF (CONGESTIVE HEART FAILURE): Primary | ICD-10-CM

## 2024-08-13 PROCEDURE — 99214 OFFICE O/P EST MOD 30 MIN: CPT | Performed by: INTERNAL MEDICINE

## 2024-08-13 RX ORDER — METOPROLOL SUCCINATE 25 MG/1
25 TABLET, EXTENDED RELEASE ORAL DAILY
Qty: 90 TABLET | Refills: 3 | Status: SHIPPED | OUTPATIENT
Start: 2024-08-13

## 2024-08-13 NOTE — PROGRESS NOTES
[facility] Cardiology Office Follow Up Note    Johny Hearn  [id number]  2024    Primary Care Provider: Sapphire Mohan APRN    Chief Complaint: Routine follow-up    History of Present Illness:   Mrs. Johny Hearn is a 39 y.o. female who presents to the Cardiology Clinic for routine follow-up.  The patient has a past medical history significant for prior non-Hodgkin's lymphoma with prior chemotherapy, RUFINA, and obesity with a BMI 44.  She has a past cardiac history significant for chronic systolic heart failure secondary to idiopathic cardiomyopathy.  She also has a reported history of paroxysmal atrial fibrillation.  Today, the patient reports she has been doing generally well from a cardiac standpoint.  She has not had any recent difficulty with lower extremity edema, orthopnea, or PND.  She does report episodes of dizziness and lightheadedness with low blood pressures, which has led to the discontinuation of her metoprolol.  She is continuing to tolerate Entresto and Aldactone.  He has been taking her Lasix on a daily basis, however does report periods where she feels dehydrated.  No other specific complaints today.     Past Cardiac Testin. Last Coronary Angio:               1.  Angiographically normal coronary arteries  2. Prior Stress Testing: None  3. Last Echo:   1.  10/19               1.  Moderate left ventricular dilation with severely reduced LVEF 25%               2.  Severe left atrial enlargement               3.  Moderate to severe mitral regurgitation   2.    1.  Normal left ventricular size with mildly reduced LV systolic function, LVEF 45-50%.  2.  Grade 1 diastolic dysfunction.  3.  Normal right ventricular size and systolic function.  4.  Mild left atrial dilation.  5.  Mild mitral regurgitation.  4. Prior Holter Monitor: None       Review of Systems:   Review of Systems   Constitutional:  Negative for activity change, appetite change,  chills, diaphoresis, fatigue, fever, unexpected weight gain and unexpected weight loss.   Eyes:  Negative for blurred vision and double vision.   Respiratory:  Negative for cough, chest tightness, shortness of breath and wheezing.    Cardiovascular:  Negative for chest pain, palpitations and leg swelling.   Gastrointestinal:  Negative for abdominal pain, anal bleeding, blood in stool and GERD.   Endocrine: Negative for cold intolerance and heat intolerance.   Genitourinary:  Negative for hematuria.   Neurological:  Positive for dizziness and light-headedness. Negative for syncope and weakness.   Hematological:  Does not bruise/bleed easily.   Psychiatric/Behavioral:  Negative for depressed mood and stress. The patient is not nervous/anxious.        I have reviewed and/or updated the patient's past medical, past surgical, family, social history, problem list and allergies as appropriate.     Medications:     Current Outpatient Medications:     benzoyl peroxide 10 % gel, Apply 1 Application topically to the appropriate area as directed Daily., Disp: 90 g, Rfl: 0    Cholecalciferol (Vitamin D) 50 MCG (2000 UT) tablet, Take 2,000 Units by mouth Daily., Disp: 90 tablet, Rfl: 1    clindamycin 1 % gel, Apply 1 Application topically to the appropriate area as directed 2 (Two) Times a Day., Disp: 60 g, Rfl: 0    furosemide (LASIX) 40 MG tablet, Take 1 tablet by mouth Daily., Disp: 90 tablet, Rfl: 0    levothyroxine (SYNTHROID, LEVOTHROID) 25 MCG tablet, Take 1 tablet by mouth Daily., Disp: 90 tablet, Rfl: 1    polyethylene glycol (PEG 3350) 17 GM/SCOOP powder, Take 17 g by mouth Daily., Disp: 850 g, Rfl: 5    sacubitril-valsartan (ENTRESTO) 24-26 MG tablet, Take 1 tablet by mouth Every 12 (Twelve) Hours., Disp: 180 tablet, Rfl: 3    sertraline (ZOLOFT) 100 MG tablet, Take 1 tablet by mouth Every Night. Total 150 mg daily, Disp: 90 tablet, Rfl: 1    sertraline (ZOLOFT) 50 MG tablet, Take 1 tablet by mouth Every Night. With  "100 mg to equal 150 mg daily., Disp: 90 tablet, Rfl: 1    spironolactone (ALDACTONE) 25 MG tablet, Take 1 tablet by mouth Daily., Disp: 90 tablet, Rfl: 0    metoprolol succinate XL (TOPROL-XL) 25 MG 24 hr tablet, Take 1 tablet by mouth Daily., Disp: 90 tablet, Rfl: 3    sulfamethoxazole-trimethoprim (Bactrim DS) 800-160 MG per tablet, Take 1 tablet by mouth 2 (Two) Times a Day., Disp: 10 tablet, Rfl: 0    Physical Exam:  Vital Signs:   Vitals:    08/13/24 1138   BP: 110/82   BP Location: Right arm   Patient Position: Sitting   Cuff Size: Adult   Pulse: 77   Resp: 18   SpO2: 98%   Weight: 126 kg (277 lb 6.4 oz)   Height: 167.6 cm (66\")       Physical Exam  Constitutional:       General: She is not in acute distress.     Appearance: She is well-developed. She is obese. She is not diaphoretic.   HENT:      Head: Normocephalic and atraumatic.   Eyes:      General: No scleral icterus.     Pupils: Pupils are equal, round, and reactive to light.   Neck:      Trachea: No tracheal deviation.   Cardiovascular:      Rate and Rhythm: Normal rate and regular rhythm.      Heart sounds: Normal heart sounds. No murmur heard.     No friction rub. No gallop.      Comments: Normal JVD.  Pulmonary:      Effort: Pulmonary effort is normal. No respiratory distress.      Breath sounds: Normal breath sounds. No stridor. No wheezing or rales.   Chest:      Chest wall: No tenderness.   Abdominal:      General: Bowel sounds are normal. There is no distension.      Palpations: Abdomen is soft.      Tenderness: There is no abdominal tenderness. There is no guarding or rebound.   Musculoskeletal:         General: No swelling. Normal range of motion.      Cervical back: Neck supple. No tenderness.   Lymphadenopathy:      Cervical: No cervical adenopathy.   Skin:     General: Skin is warm and dry.      Findings: No erythema.   Neurological:      General: No focal deficit present.      Mental Status: She is alert and oriented to person, place, and " time.   Psychiatric:         Mood and Affect: Mood normal.         Behavior: Behavior normal.         Results Review:   I reviewed the patient's new clinical results.        Assessment / Plan:     1. Chronic systolic CHF (congestive heart failure)   -- History of idiopathic nonischemic cardiomyopathy, LVEF 45-50% on most recent echocardiogram  -- Remains well compensated and euvolemic today  -- Continue Entresto and Aldactone  -- Restart low-dose metoprolol (would consider discontinuation of Aldactone if difficulty with hypotension)  --Advised daily weights with Lasix as needed for weight gain of more than 3 pounds in 3 days     2. Paroxysmal atrial fibrillation  -- Reported history of atrial fibrillation, however no documented A-fib on available records  --Previously unable to tolerate outpatient cardiac monitoring  --Restart metoprolol for rate control  --Given atrial fibrillation has not been documented, defer anticoagulation     3. Primary hypertension  -- BP well-controlled     4. Class 3 severe obesity with body mass index (BMI) of 44  -- Encouraged continued efforts at weight loss      Follow Up:   Return in about 3 months (around 11/13/2024).      Thank you for allowing me to participate in the care of your patient. Please to not hesitate to contact me with additional questions or concerns.     FAN Price MD  Interventional Cardiology   08/13/2024  11:37 EDT

## 2024-09-17 RX ORDER — CHOLECALCIFEROL (VITAMIN D3) 50 MCG
2000 TABLET ORAL DAILY
Qty: 90 TABLET | Refills: 1 | Status: SHIPPED | OUTPATIENT
Start: 2024-09-17

## 2024-09-19 ENCOUNTER — PATIENT MESSAGE (OUTPATIENT)
Dept: INTERNAL MEDICINE | Facility: CLINIC | Age: 39
End: 2024-09-19
Payer: COMMERCIAL

## 2024-10-01 ENCOUNTER — FLU SHOT (OUTPATIENT)
Dept: INTERNAL MEDICINE | Facility: CLINIC | Age: 39
End: 2024-10-01
Payer: COMMERCIAL

## 2024-10-01 DIAGNOSIS — Z23 NEED FOR INFLUENZA VACCINATION: Primary | ICD-10-CM

## 2024-10-01 PROCEDURE — 90471 IMMUNIZATION ADMIN: CPT | Performed by: NURSE PRACTITIONER

## 2024-10-01 PROCEDURE — 90656 IIV3 VACC NO PRSV 0.5 ML IM: CPT | Performed by: NURSE PRACTITIONER

## 2024-10-28 RX ORDER — PREDNISONE 50 MG/1
TABLET ORAL
Qty: 3 TABLET | Refills: 0 | Status: SHIPPED | OUTPATIENT
Start: 2024-10-28

## 2024-10-31 ENCOUNTER — HOSPITAL ENCOUNTER (OUTPATIENT)
Dept: CT IMAGING | Facility: HOSPITAL | Age: 39
Discharge: HOME | End: 2024-10-31
Payer: COMMERCIAL

## 2024-10-31 DIAGNOSIS — C84.68 ANAPLASTIC ALK-POSITIVE LARGE CELL LYMPHOMA OF LYMPH NODES OF MULTIPLE REGIONS: ICD-10-CM

## 2024-10-31 PROCEDURE — 71260 CT THORAX DX C+: CPT

## 2024-10-31 PROCEDURE — 25510000001 IOPAMIDOL 61 % SOLUTION: Performed by: INTERNAL MEDICINE

## 2024-10-31 PROCEDURE — 70491 CT SOFT TISSUE NECK W/DYE: CPT

## 2024-10-31 PROCEDURE — 25010000002 HEPARIN LOCK FLUSH PER 10 UNITS: Performed by: RADIOLOGY

## 2024-10-31 PROCEDURE — 74177 CT ABD & PELVIS W/CONTRAST: CPT

## 2024-10-31 RX ORDER — IOPAMIDOL 612 MG/ML
100 INJECTION, SOLUTION INTRAVASCULAR
Status: COMPLETED | OUTPATIENT
Start: 2024-10-31 | End: 2024-10-31

## 2024-10-31 RX ORDER — IOPAMIDOL 612 MG/ML
50 INJECTION, SOLUTION INTRAVASCULAR
Status: COMPLETED | OUTPATIENT
Start: 2024-10-31 | End: 2024-10-31

## 2024-10-31 RX ORDER — HEPARIN SODIUM (PORCINE) LOCK FLUSH IV SOLN 100 UNIT/ML 100 UNIT/ML
500 SOLUTION INTRAVENOUS ONCE
Status: COMPLETED | OUTPATIENT
Start: 2024-10-31 | End: 2024-10-31

## 2024-10-31 RX ADMIN — SODIUM CHLORIDE, PRESERVATIVE FREE 500 UNITS: 5 INJECTION INTRAVENOUS at 08:15

## 2024-10-31 RX ADMIN — IOPAMIDOL 100 ML: 612 INJECTION, SOLUTION INTRAVENOUS at 08:08

## 2024-10-31 RX ADMIN — IOPAMIDOL 50 ML: 612 INJECTION, SOLUTION INTRAVENOUS at 08:09

## 2024-11-01 ENCOUNTER — OFFICE VISIT (OUTPATIENT)
Dept: ONCOLOGY | Facility: CLINIC | Age: 39
End: 2024-11-01
Payer: COMMERCIAL

## 2024-11-01 ENCOUNTER — HOSPITAL ENCOUNTER (OUTPATIENT)
Facility: HOSPITAL | Age: 39
Discharge: HOME | End: 2024-11-01
Payer: COMMERCIAL

## 2024-11-01 VITALS
RESPIRATION RATE: 18 BRPM | TEMPERATURE: 97.3 F | BODY MASS INDEX: 44.2 KG/M2 | SYSTOLIC BLOOD PRESSURE: 118 MMHG | HEART RATE: 69 BPM | OXYGEN SATURATION: 99 % | WEIGHT: 275 LBS | DIASTOLIC BLOOD PRESSURE: 61 MMHG | HEIGHT: 66 IN

## 2024-11-01 DIAGNOSIS — C82.00 FOLLICULAR LYMPHOMA GRADE I, UNSPECIFIED BODY REGION: Primary | ICD-10-CM

## 2024-11-01 DIAGNOSIS — C84.68 ANAPLASTIC ALK-POSITIVE LARGE CELL LYMPHOMA OF LYMPH NODES OF MULTIPLE REGIONS: ICD-10-CM

## 2024-11-01 DIAGNOSIS — C84.68 ANAPLASTIC ALK-POSITIVE LARGE CELL LYMPHOMA OF LYMPH NODES OF MULTIPLE REGIONS: Primary | ICD-10-CM

## 2024-11-01 LAB
ALBUMIN SERPL-MCNC: 4.6 G/DL (ref 3.5–5.2)
ALBUMIN/GLOB SERPL: 1.6 G/DL
ALP SERPL-CCNC: 81 U/L (ref 39–117)
ALT SERPL W P-5'-P-CCNC: 32 U/L (ref 1–33)
ANION GAP SERPL CALCULATED.3IONS-SCNC: 12.7 MMOL/L (ref 5–15)
AST SERPL-CCNC: 20 U/L (ref 1–32)
BASOPHILS # BLD AUTO: 0.04 10*3/MM3 (ref 0–0.2)
BASOPHILS NFR BLD AUTO: 0.4 % (ref 0–1.5)
BILIRUB SERPL-MCNC: 0.2 MG/DL (ref 0–1.2)
BUN SERPL-MCNC: 27 MG/DL (ref 6–20)
BUN/CREAT SERPL: 38.6 (ref 7–25)
CALCIUM SPEC-SCNC: 9.1 MG/DL (ref 8.6–10.5)
CHLORIDE SERPL-SCNC: 103 MMOL/L (ref 98–107)
CO2 SERPL-SCNC: 24.3 MMOL/L (ref 22–29)
CREAT SERPL-MCNC: 0.7 MG/DL (ref 0.57–1)
DEPRECATED RDW RBC AUTO: 42.1 FL (ref 37–54)
EGFRCR SERPLBLD CKD-EPI 2021: 113 ML/MIN/1.73
EOSINOPHIL # BLD AUTO: 0.05 10*3/MM3 (ref 0–0.4)
EOSINOPHIL NFR BLD AUTO: 0.5 % (ref 0.3–6.2)
ERYTHROCYTE [DISTWIDTH] IN BLOOD BY AUTOMATED COUNT: 12.9 % (ref 12.3–15.4)
GLOBULIN UR ELPH-MCNC: 2.8 GM/DL
GLUCOSE SERPL-MCNC: 92 MG/DL (ref 65–99)
HCT VFR BLD AUTO: 36.2 % (ref 34–46.6)
HGB BLD-MCNC: 11.9 G/DL (ref 12–15.9)
IMM GRANULOCYTES # BLD AUTO: 0.05 10*3/MM3 (ref 0–0.05)
IMM GRANULOCYTES NFR BLD AUTO: 0.5 % (ref 0–0.5)
LDH SERPL-CCNC: 159 U/L (ref 135–214)
LYMPHOCYTES # BLD AUTO: 3.61 10*3/MM3 (ref 0.7–3.1)
LYMPHOCYTES NFR BLD AUTO: 34.4 % (ref 19.6–45.3)
MCH RBC QN AUTO: 29.2 PG (ref 26.6–33)
MCHC RBC AUTO-ENTMCNC: 32.9 G/DL (ref 31.5–35.7)
MCV RBC AUTO: 88.7 FL (ref 79–97)
MONOCYTES # BLD AUTO: 0.59 10*3/MM3 (ref 0.1–0.9)
MONOCYTES NFR BLD AUTO: 5.6 % (ref 5–12)
NEUTROPHILS NFR BLD AUTO: 58.6 % (ref 42.7–76)
NEUTROPHILS NFR BLD AUTO: 6.14 10*3/MM3 (ref 1.7–7)
NRBC BLD AUTO-RTO: 0 /100 WBC (ref 0–0.2)
PLATELET # BLD AUTO: 202 10*3/MM3 (ref 140–450)
PMV BLD AUTO: 9.5 FL (ref 6–12)
POTASSIUM SERPL-SCNC: 3.4 MMOL/L (ref 3.5–5.2)
PROT SERPL-MCNC: 7.4 G/DL (ref 6–8.5)
RBC # BLD AUTO: 4.08 10*6/MM3 (ref 3.77–5.28)
SODIUM SERPL-SCNC: 140 MMOL/L (ref 136–145)
WBC NRBC COR # BLD AUTO: 10.48 10*3/MM3 (ref 3.4–10.8)

## 2024-11-01 PROCEDURE — 99214 OFFICE O/P EST MOD 30 MIN: CPT | Performed by: NURSE PRACTITIONER

## 2024-11-01 PROCEDURE — 1159F MED LIST DOCD IN RCRD: CPT | Performed by: NURSE PRACTITIONER

## 2024-11-01 PROCEDURE — 1160F RVW MEDS BY RX/DR IN RCRD: CPT | Performed by: NURSE PRACTITIONER

## 2024-11-01 PROCEDURE — 83615 LACTATE (LD) (LDH) ENZYME: CPT | Performed by: NURSE PRACTITIONER

## 2024-11-01 PROCEDURE — 80053 COMPREHEN METABOLIC PANEL: CPT | Performed by: NURSE PRACTITIONER

## 2024-11-01 PROCEDURE — 1126F AMNT PAIN NOTED NONE PRSNT: CPT | Performed by: NURSE PRACTITIONER

## 2024-11-01 PROCEDURE — 85025 COMPLETE CBC W/AUTO DIFF WBC: CPT | Performed by: NURSE PRACTITIONER

## 2024-11-01 PROCEDURE — 36415 COLL VENOUS BLD VENIPUNCTURE: CPT | Performed by: NURSE PRACTITIONER

## 2024-11-01 PROCEDURE — 25010000002 HEPARIN LOCK FLUSH PER 10 UNITS: Performed by: NURSE PRACTITIONER

## 2024-11-01 PROCEDURE — 96523 IRRIG DRUG DELIVERY DEVICE: CPT

## 2024-11-01 RX ORDER — SODIUM CHLORIDE 0.9 % (FLUSH) 0.9 %
10 SYRINGE (ML) INJECTION AS NEEDED
OUTPATIENT
Start: 2024-11-01

## 2024-11-01 RX ORDER — SODIUM CHLORIDE 0.9 % (FLUSH) 0.9 %
10 SYRINGE (ML) INJECTION AS NEEDED
Status: DISCONTINUED | OUTPATIENT
Start: 2024-11-01 | End: 2024-11-02 | Stop reason: HOSPADM

## 2024-11-01 RX ORDER — HEPARIN SODIUM (PORCINE) LOCK FLUSH IV SOLN 100 UNIT/ML 100 UNIT/ML
500 SOLUTION INTRAVENOUS AS NEEDED
Status: DISCONTINUED | OUTPATIENT
Start: 2024-11-01 | End: 2024-11-02 | Stop reason: HOSPADM

## 2024-11-01 RX ORDER — HEPARIN SODIUM (PORCINE) LOCK FLUSH IV SOLN 100 UNIT/ML 100 UNIT/ML
500 SOLUTION INTRAVENOUS AS NEEDED
OUTPATIENT
Start: 2024-11-01

## 2024-11-01 RX ADMIN — Medication 10 ML: at 11:39

## 2024-11-01 RX ADMIN — HEPARIN 500 UNITS: 100 SYRINGE at 11:39

## 2024-11-01 NOTE — PROGRESS NOTES
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 Anaplastic large T-cell lymphoma, ALK positive:  A. Incidentally found LAD on MRI done for low back and hip pain done 9/5/2017.   B. Follow up CAT scan confirmed pelvic adenopathy extending from the distal aortic region through the left iliac region.   C. Status post FNA to left iliac lymph node done 9/29/2017. Pathology revealed benign lymphoid cell groups.   D. Repeat CT done 3/15/2018 revealed increased size of left iliac adenopathy with mild splenomegaly.   E. CT-guided biopsy done on April 19, 2018 showed anaplastic large T-cell lymphoma ALK+  F. whole body PET scan done on May 1, 2018 revealed disease above and below the diaphragm with hypermetabolic active lymph nodes in the supraclavicular area as well as retroperitoneum.  G. Started chemotherapy using CHOP May 2, 2018, status post 6 cycles, completed August 22, 2018.   2. Mild splenomegaly   A. Incidentally found 9/5/2017.  B. Progressive size on repeat imaging done 3/15/2018.  3. Left hip and low back pain  A. Under care of pain management with use of Norco.   4. History of anoxic brain injury following cardiac arrest post-partum.   5. Postpartum cardiomyopathy.   6. Anxiety and depression  7.  Constipation  8.  Chemotherapy used nausea.  9.  Hypertension  10.  Systolic cardiomyopathy:  11.  Ejection fraction dropped from 60% on May 2 2:30 percent on October 19, 2018. EF at 25% on 10/12/2019  12. Obesity  13. Atrial Fibrillation    HISTORY OF PRESENT ILLNESS: The patient is a very pleasant 39 y.o. female past medical history significant for anaplastic large T-cell lymphoma, ALK positive diagnosed in April 19, 2018 after CT-guided core biopsy of left iliac mass.  Whole body PET scan revealed disease above and below the diaphragm.  Bone marrow biopsy done on May 1, 2018, that failed to show any evidence of bone marrow  involvement.  The patient was started on chemotherapy using CHOP May 2, 2018.  She completed 6 cycles of 2018.  The patient is here today for scheduled follow-up visit.     SUBJECTIVE: The patient is here today with her mom.  Her  passed away.  Her mom is helping establish her home.  She is living by herself and cooking by herself.  She says overall she is doing well and actually she is very happy.  She is staying active.  She is losing weight.  She is doing this by eating healthy.  She is cut out all her fast food.      Past History:  Medical History: has a past medical history of A-fib, Abnormal TSH, Allergic rhinitis, Anemia, Anesthesia, Anxiety and depression, Arthritis, Black stool, Body piercing, Brain injury, Cancer, Cardiac arrest, Cardiomyopathy, CHF (congestive heart failure), Chronic pain, Constipation, Dermatitis, Fatty liver, Full dentures, GERD (gastroesophageal reflux disease), H/O chronic ear infection, Headaches due to old head trauma, Heartburn, Heavy menses, Hemorrhoids, Hip pain, left, Hyperlipidemia, Hypertension, Morbid obesity, Short-term memory loss, Stuttering in conditions classified elsewhere, and Visual cortex injury.   Surgical History: has a past surgical history that includes Tubal ligation;  section; Endometrial ablation; Femoral Lymph Node Biopsy/Excision (Left); Portacath placement (N/A, 2018); Cardiac catheterization (N/A, 10/22/2018); Cardiac catheterization (N/A, 10/22/2018); Cardiac catheterization (N/A, 10/22/2018); Hemorrhoid surgery (N/A, 2019); Colonoscopy (N/A, 2019); Esophagogastroduodenoscopy (N/A, 2019); Esophagogastroduodenoscopy (N/A, 10/22/2019); and Colonoscopy (N/A, 10/22/2019).   Family History: family history includes Brain cancer in her maternal grandmother; Cardiomyopathy in her paternal grandmother; Diabetes in her father and paternal grandmother; Hypertension in her father.   Social History: reports that she has never  "smoked. She has been exposed to tobacco smoke. She has never used smokeless tobacco. She reports that she does not drink alcohol and does not use drugs.    (Not in a hospital admission)     Allergies: Contrast dye (echo or unknown ct/mr), Erythromycin, and Macrobid [nitrofurantoin monohyd macro]     Review of Systems   Constitutional:  Positive for fatigue.   Musculoskeletal:  Positive for myalgias.   Neurological:  Positive for dizziness and headaches.   Psychiatric/Behavioral:  The patient is nervous/anxious.        PHYSICAL EXAMINATION:   /61   Pulse 69   Temp 97.3 °F (36.3 °C)   Resp 18   Ht 167.6 cm (65.98\")   Wt 125 kg (275 lb)   SpO2 99%   BMI 44.41 kg/m²    Pain Score    24 1036   PainSc: 0-No pain          ECOG score: 1            ECOG Performance Status: 1 - Symptomatic but completely ambulatory      General Appearance:      alert, cooperative, no apparent distress and appears stated age   Lungs:   Clear to auscultation bilaterally; respirations regular, even, and unlabored bilaterally   Heart:  Regular rate and rhythm, no murmurs appreciated   Abdomen:   Soft, non-tender, and non-distended                 No visits with results within 2 Week(s) from this visit.   Latest known visit with results is:   Office Visit on 2024   Component Date Value Ref Range Status    Reference Lab Report 2024    Final                    Value:[facility]  [address] [place] [address]  Phone: [phone] or [phone]  Fax: [phone]  Antoine Franco M.D., Medical Director    PATIENT NAME                           LABORATORY NO.  [id number]                   [id number]  [id number]                         AGE              SEX  SSN           CLIENT REF #  [facility]                39      1985  F    [id number]   [id number]    [address] [facility] 3  REQUESTING M.D.     ATTENDING MDESTINI.     COPY TO.  [address]        "                     [place] [address]                 DATE COLLECTED      DATE RECEIVED      DATE REPORTED  07/16/2024 07/16/2024 07/19/2024    ThinPrep Pap with Cytyc Imaging    DIAGNOSIS:  Negative for intraepithelial lesion or malignancy    Multiple factors can influence accuracy of Pap tests; therefore, screening at  regular intervals is necessary for early cancer detection.      SPECIMEN                           ADEQUACY:  SATISFACTORY FOR EVALUATION  Transformation zone is present.  Sparse cellularity, minimal number of squamous cells available for evaluation.  SOURCE OF SPECIMEN:  CERVICAL  SLIDES:  1  CLINICAL HISTORY:  Well woman exam  Hot flashes  Folliculitis    HPV  HR-HPV POOL: Negative    The Aptima HPV assay is an in vitro nucleic acid amplification test for the  qualitative detection of E6/E7 viral messenger RNA from 14 high risk types of  HPV in cervical specimens. The high risk HPV types detected include: 16, 18,  31, 33, 35, 39, 45, 51, 52, 56, 58, 59, 66, 68    Chlamydia / Gonorrhea  CHLAMYDIA TRACHOMATIS: Negative  NEISSERIA GONORRHOEAE: Negative    The Aptima Combo 2 assay is a target amplification nucleic acid probe test that  utilizes target capture for the in vitro qualitative detection and differentiation of  ribosomal RNA from Chlamydia trachomatis and Neisseria gonorrhoeae to aid  in the diagnosis of chlamydial and gonococcal disease using the [place]                           system.    CYTOTECHNOLOGIST:      YESI SHETTY (ASCP)    CPT CODES:  88463, 75619, 19990, 87689          CT Chest With Contrast Diagnostic    Result Date: 10/31/2024  Narrative: PROCEDURE: CT CHEST W CONTRAST DIAGNOSTIC-  HISTORY: f/u scans; C84.68-Anaplastic large cell lymphoma, ALK-positive, lymph nodes of multiple sites  COMPARISON: October 2023.  PROCEDURE: Multiple axial CT images were obtained from the thoracic inlet through the upper abdomen following the administration of  intravenous contrast.  FINDINGS: Soft tissue windows demonstrate no adenopathy within the chest. The heart is normal in size. The aorta is normal in caliber. There is no pericardial or pleural effusion. There is evidence of prior granulomatous disease. There is no suspicious nodule. A right subclavian chest port is stable. Bone windows reveal no acute osseous abnormalities.      Impression: No acute intrathoracic process.   This study was performed with techniques to keep radiation doses as low as reasonably achievable (ALARA). Individualized dose reduction techniques using automated exposure control or adjustment of mA and/or kV according to the patient size were employed.     This study was performed with techniques to keep radiation doses as low as reasonably achievable (ALARA). Individualized dose reduction techniques using automated exposure control or adjustment of mA and/or kV according to the patient size were employed.     Images were reviewed, interpreted, and dictated by Dr. Malou Marcelo MD Transcribed by Kamila Farias PA-C.  This report was signed and finalized on 10/31/2024 9:42 AM by Malou Marcelo MD.      CT Abdomen Pelvis With Contrast    Result Date: 10/31/2024  Narrative: PROCEDURE: CT ABDOMEN PELVIS W CONTRAST-  HISTORY:  f/u scans; C84.68-Anaplastic large cell lymphoma, ALK-positive, lymph nodes of multiple sites  COMPARISON: October 2023.  TECHNIQUE: Multiple axial CT images were obtained from the lung bases through the pubic symphysis following the administration of Isovue 300 contrast.  FINDINGS:  ABDOMEN: The liver is enlarged measuring 20.5 cm and is unchanged from prior. The gallbladder is present. There is no CT visualized gallstone. The spleen is enlarged measuring 15.4 cm. No adrenal mass is present. The pancreas is normal. The kidneys demonstrate a small right renal cyst that appears stable from prior. The aorta is normal in caliber. There is no free fluid or adenopathy. The abdominal  portions of the GI tract are unremarkable with no evidence of obstruction.  PELVIS: The uterus is midline. The appendix is not identified. The urinary bladder is unremarkable. There is no significant free fluid or adenopathy.      Impression: Stable hepatosplenomegaly.   This study was performed with techniques to keep radiation doses as low as reasonably achievable (ALARA). Individualized dose reduction techniques using automated exposure control or adjustment of mA and/or kV according to the patient size were employed.      Images were reviewed, interpreted, and dictated by Dr. Malou Marcelo MD Transcribed by Kamila Farias PA-C.  This report was signed and finalized on 10/31/2024 9:42 AM by Malou Marcelo MD.      CT Soft Tissue Neck With Contrast    Result Date: 10/31/2024  Narrative: PROCEDURE: CT SOFT TISSUE NECK W CONTRAST-  HISTORY: f/u scans; C84.68-Anaplastic large cell lymphoma, ALK-positive, lymph nodes of multiple sites  COMPARISON: October 2023.  TECHNIQUE: Axial images were obtained from the skull base through the upper chest following the administration of Isovue 300. Coronal images were reformatted. .  FINDINGS: There is no evidence of a mucosal lesion within the nasopharynx, oropharynx or hypopharynx. The trachea is midline. The thyroid gland is unremarkable. There is no adenopathy. There is mild prominence to the palatine tonsils. This appears stable from prior. There is stable calcification in the right tonsil. The visualized lung apices are clear. Bone windows reveal no lytic or destructive lesions.       Impression: No acute process with no change from prior.   This study was performed with techniques to keep radiation doses as low as reasonably achievable (ALARA). Individualized dose reduction techniques using automated exposure control or adjustment of mA and/or kV according to the patient size were employed.     Images were reviewed, interpreted, and dictated by Dr. Malou Marcelo MD Transcribed  by Kamila Farias PA-C.  This report was signed and finalized on 10/31/2024 9:09 AM by Malou Marcelo MD.         ASSESSMENT: The patient is a very pleasant 39 y.o. female  with anaplastic large T-cell lymphoma, ALK positive      PLAN:    Anaplastic large T-cell lymphoma, ALK positive  A.  I did go over the scan results with the patient from October 23, 2023.  I reassured there is no evidence of fetal disease or progressive neck neuropathy.  B.  I will continue her annual visits at this point.  C. No recent labs. We will check CBC and CMP today.     2.  Atrial fibrillation and heart failure  A.  Her cardiomyopathy could be induced by previous chemotherapy including Adriamycin  B.  She will continue to follow-up with Dr. Curtis from cardiology    3.  Port-A-Cath  A.  We will continue with every 3-month port flushes and 6-month labs.  B.  The patient is not interested in getting her port removed since she has poor IV access.    4.  Constipation  A.  Continue Colace daily    5.  Depression  A.  Continue Zoloft    6.  Obesity  A.  She is starting to eat healthy and exercise.  Her mom is helping her lose weight.  She is lost I advised the patient to start eating a healthy diet and try to exercise.  B.  We discussed that obesity was a risk factor for cancer and should try to decrease her BMI    7.  Dizziness with headaches:  A.  Improved    FOLLOW UP: 1 year with repeat scans.    Laurel Preston, APRN  11/1/2024

## 2024-11-19 ENCOUNTER — OFFICE VISIT (OUTPATIENT)
Dept: CARDIOLOGY | Facility: CLINIC | Age: 39
End: 2024-11-19
Payer: COMMERCIAL

## 2024-11-19 VITALS
WEIGHT: 278.2 LBS | SYSTOLIC BLOOD PRESSURE: 114 MMHG | OXYGEN SATURATION: 98 % | DIASTOLIC BLOOD PRESSURE: 70 MMHG | HEART RATE: 88 BPM | HEIGHT: 67 IN | BODY MASS INDEX: 43.66 KG/M2

## 2024-11-19 DIAGNOSIS — E66.01 CLASS 3 SEVERE OBESITY DUE TO EXCESS CALORIES WITH SERIOUS COMORBIDITY AND BODY MASS INDEX (BMI) OF 45.0 TO 49.9 IN ADULT: ICD-10-CM

## 2024-11-19 DIAGNOSIS — R00.2 PALPITATIONS: ICD-10-CM

## 2024-11-19 DIAGNOSIS — I50.22 CHRONIC SYSTOLIC CHF (CONGESTIVE HEART FAILURE): Primary | ICD-10-CM

## 2024-11-19 DIAGNOSIS — I10 PRIMARY HYPERTENSION: ICD-10-CM

## 2024-11-19 DIAGNOSIS — E66.813 CLASS 3 SEVERE OBESITY DUE TO EXCESS CALORIES WITH SERIOUS COMORBIDITY AND BODY MASS INDEX (BMI) OF 45.0 TO 49.9 IN ADULT: ICD-10-CM

## 2024-11-19 PROCEDURE — 99214 OFFICE O/P EST MOD 30 MIN: CPT | Performed by: INTERNAL MEDICINE

## 2024-11-19 PROCEDURE — 93000 ELECTROCARDIOGRAM COMPLETE: CPT | Performed by: INTERNAL MEDICINE

## 2024-11-19 NOTE — PROGRESS NOTES
"             [facility] Cardiology Office Follow Up Note    Johny Hearn  [id number]  2024    Primary Care Provider: Sapphire Mohan APRN    Chief Complaint: Routine follow-up    History of Present Illness:   Mrs. Johny Hearn is a 39 y.o. female who presents to the Cardiology Clinic for routine follow-up.  The patient has a past medical history significant for prior non-Hodgkin's lymphoma with prior chemotherapy, RUFINA, and obesity with a BMI 44.  She has a past cardiac history significant for chronic systolic heart failure secondary to idiopathic cardiomyopathy.  She also has a possible history of paroxysmal atrial fibrillation, though atrial fibrillation does not appear to have been previously documented.  Today, the patient continues to do well from a cardiac standpoint.  She has not had any significant episodes of volume overload.  She has been taking Lasix as needed for weight gain.  No current orthopnea or PND.  No significant lower extremity swelling.  She does report occasional episodes of palpitations, described as a brief \"fluttering\" sensation.  No sustained episodes.  No other specific complaints today.     Past Cardiac Testin. Last Coronary Angio:               1.  Angiographically normal coronary arteries  2. Prior Stress Testing: None  3. Last Echo:   1.  10/19                          1.  Moderate left ventricular dilation with severely reduced LVEF 25%                          2.  Severe left atrial enlargement                          3.  Moderate to severe mitral regurgitation              2.    1.  Normal left ventricular size with mildly reduced LV systolic function, LVEF 45-50%.  2.  Grade 1 diastolic dysfunction.  3.  Normal right ventricular size and systolic function.  4.  Mild left atrial dilation.  5.  Mild mitral regurgitation.  4. Prior Holter Monitor: None    Review of Systems:   Review of Systems   Constitutional:  Negative for activity " change, appetite change, chills, diaphoresis, fatigue, fever, unexpected weight gain and unexpected weight loss.   Eyes:  Negative for blurred vision and double vision.   Respiratory:  Negative for cough, chest tightness, shortness of breath and wheezing.    Cardiovascular:  Positive for palpitations. Negative for chest pain and leg swelling.   Gastrointestinal:  Negative for abdominal pain, anal bleeding, blood in stool and GERD.   Endocrine: Negative for cold intolerance and heat intolerance.   Genitourinary:  Negative for hematuria.   Neurological:  Negative for dizziness, syncope, weakness and light-headedness.   Hematological:  Does not bruise/bleed easily.   Psychiatric/Behavioral:  Negative for depressed mood and stress. The patient is not nervous/anxious.        I have reviewed and/or updated the patient's past medical, past surgical, family, social history, problem list and allergies as appropriate.     Medications:     Current Outpatient Medications:     benzoyl peroxide 10 % gel, Apply 1 Application topically to the appropriate area as directed Daily., Disp: 90 g, Rfl: 0    Cholecalciferol (Vitamin D) 50 MCG (2000 UT) tablet, Take 1 tablet by mouth Daily., Disp: 90 tablet, Rfl: 1    clindamycin 1 % gel, Apply 1 Application topically to the appropriate area as directed 2 (Two) Times a Day., Disp: 60 g, Rfl: 0    levothyroxine (SYNTHROID, LEVOTHROID) 25 MCG tablet, Take 1 tablet by mouth Daily., Disp: 90 tablet, Rfl: 1    metoprolol succinate XL (TOPROL-XL) 25 MG 24 hr tablet, Take 1 tablet by mouth Daily., Disp: 90 tablet, Rfl: 3    sacubitril-valsartan (ENTRESTO) 24-26 MG tablet, Take 1 tablet by mouth Every 12 (Twelve) Hours., Disp: 180 tablet, Rfl: 3    sertraline (ZOLOFT) 100 MG tablet, Take 1 tablet by mouth Every Night. Total 150 mg daily, Disp: 90 tablet, Rfl: 1    sertraline (ZOLOFT) 50 MG tablet, Take 1 tablet by mouth Every Night. With 100 mg to equal 150 mg daily., Disp: 90 tablet, Rfl: 1     "spironolactone (ALDACTONE) 25 MG tablet, Take 1 tablet by mouth Daily., Disp: 90 tablet, Rfl: 0    diphenhydrAMINE (BENADRYL) 50 MG tablet, Take 1 tablet by mouth 1 hour prior to scan, Disp: 1 tablet, Rfl: 0    predniSONE (DELTASONE) 50 MG tablet, Take one tablet by mouth 13 hours, 7 hours, and 1 hour prior to scans., Disp: 3 tablet, Rfl: 0    Physical Exam:  Vital Signs:   Vitals:    11/19/24 0911   BP: 114/70   BP Location: Left arm   Patient Position: Sitting   Cuff Size: Adult   Pulse: 88   SpO2: 98%   Weight: 126 kg (278 lb 3.2 oz)   Height: 170.2 cm (67\")       Physical Exam  Constitutional:       General: She is not in acute distress.     Appearance: She is well-developed. She is obese. She is not diaphoretic.   HENT:      Head: Normocephalic and atraumatic.   Eyes:      General: No scleral icterus.     Pupils: Pupils are equal, round, and reactive to light.   Neck:      Trachea: No tracheal deviation.   Cardiovascular:      Rate and Rhythm: Normal rate and regular rhythm.      Heart sounds: Normal heart sounds. No murmur heard.     No friction rub. No gallop.      Comments: Normal JVD.    Pulmonary:      Effort: Pulmonary effort is normal. No respiratory distress.      Breath sounds: Normal breath sounds. No stridor. No wheezing or rales.   Chest:      Chest wall: No tenderness.   Abdominal:      General: Bowel sounds are normal. There is no distension.      Palpations: Abdomen is soft.      Tenderness: There is no abdominal tenderness. There is no guarding or rebound.   Musculoskeletal:         General: No swelling. Normal range of motion.      Cervical back: Neck supple. No tenderness.   Lymphadenopathy:      Cervical: No cervical adenopathy.   Skin:     General: Skin is warm and dry.      Findings: No erythema.   Neurological:      General: No focal deficit present.      Mental Status: She is alert and oriented to person, place, and time.   Psychiatric:         Mood and Affect: Mood normal.         " Behavior: Behavior normal.         Results Review:   I reviewed the patient's new clinical results.      ECG 12 Lead    Date/Time: 11/19/2024 10:11 AM  Performed by: Villa Price MD    Authorized by: Villa Price MD  Comparison: not compared with previous ECG   Previous ECG: no previous ECG available  Rhythm: sinus rhythm  Rate: normal  Conduction: 1st degree AV block  QRS axis: normal  Other findings: left ventricular hypertrophy    Clinical impression: abnormal EKG           Assessment / Plan:     1. Chronic systolic CHF (congestive heart failure)   -- History of idiopathic nonischemic cardiomyopathy, LVEF 45-50% on most recent echocardiogram  -- Euvolemic and well compensated today  -- Continue Entresto, metoprolol, Aldactone  --Advised daily weights with Lasix as needed for weight gain of more than 3 pounds in 3 days     2. Paroxysmal atrial fibrillation  -- Reported history of atrial fibrillation, however no documented A-fib on available records  -- Given occasional episodes of palpitations, will proceed with 48-hour Holter monitor to further assess for possible PAF  --Given PAF has not been documented, defer anticoagulation for now     3. Primary hypertension  -- Remains adequately controlled     4. Class 3 severe obesity with body mass index (BMI) of 44  -- Encouraged continued efforts at weight loss    Follow Up:   Return in about 1 year (around 11/19/2025).      Thank you for allowing me to participate in the care of your patient. Please to not hesitate to contact me with additional questions or concerns.     FAN Price MD  Interventional Cardiology   11/19/2024  09:15 EST

## 2025-01-01 ENCOUNTER — TELEPHONE (OUTPATIENT)
Dept: ONCOLOGY | Facility: CLINIC | Age: 40
End: 2025-01-01
Payer: COMMERCIAL

## 2025-01-01 ENCOUNTER — REFERRAL TRIAGE (OUTPATIENT)
Age: 40
End: 2025-01-01
Payer: COMMERCIAL

## 2025-01-01 ENCOUNTER — PATIENT OUTREACH (OUTPATIENT)
Age: 40
End: 2025-01-01
Payer: COMMERCIAL

## 2025-01-01 ENCOUNTER — OFFICE VISIT (OUTPATIENT)
Dept: INTERNAL MEDICINE | Facility: CLINIC | Age: 40
End: 2025-01-01
Payer: COMMERCIAL

## 2025-01-01 ENCOUNTER — PRIOR AUTHORIZATION (OUTPATIENT)
Dept: INTERNAL MEDICINE | Facility: CLINIC | Age: 40
End: 2025-01-01
Payer: COMMERCIAL

## 2025-01-01 VITALS
HEIGHT: 67 IN | WEIGHT: 278 LBS | SYSTOLIC BLOOD PRESSURE: 132 MMHG | DIASTOLIC BLOOD PRESSURE: 84 MMHG | OXYGEN SATURATION: 100 % | TEMPERATURE: 97.3 F | BODY MASS INDEX: 43.63 KG/M2 | HEART RATE: 68 BPM

## 2025-01-01 DIAGNOSIS — R41.3 MEMORY DISTURBANCE: ICD-10-CM

## 2025-01-01 DIAGNOSIS — R73.03 PREDIABETES: ICD-10-CM

## 2025-01-01 DIAGNOSIS — R09.89 TENDERNESS OF LYMPH NODE: ICD-10-CM

## 2025-01-01 DIAGNOSIS — G89.29 CHRONIC MIDLINE LOW BACK PAIN WITHOUT SCIATICA: ICD-10-CM

## 2025-01-01 DIAGNOSIS — I10 PRIMARY HYPERTENSION: Primary | ICD-10-CM

## 2025-01-01 DIAGNOSIS — E03.9 ACQUIRED HYPOTHYROIDISM: ICD-10-CM

## 2025-01-01 DIAGNOSIS — M54.50 CHRONIC MIDLINE LOW BACK PAIN WITHOUT SCIATICA: ICD-10-CM

## 2025-01-01 DIAGNOSIS — M25.561 CHRONIC PAIN OF BOTH KNEES: ICD-10-CM

## 2025-01-01 DIAGNOSIS — Z74.8 ASSISTANCE WITH TRANSPORTATION: ICD-10-CM

## 2025-01-01 DIAGNOSIS — E66.01 MORBID (SEVERE) OBESITY DUE TO EXCESS CALORIES: ICD-10-CM

## 2025-01-01 DIAGNOSIS — J30.2 SEASONAL ALLERGIES: ICD-10-CM

## 2025-01-01 DIAGNOSIS — M25.562 CHRONIC PAIN OF BOTH KNEES: ICD-10-CM

## 2025-01-01 DIAGNOSIS — G89.29 CHRONIC PAIN OF BOTH KNEES: ICD-10-CM

## 2025-01-01 DIAGNOSIS — G47.33 OSA ON CPAP: ICD-10-CM

## 2025-01-01 LAB
ALBUMIN SERPL-MCNC: 4.7 G/DL (ref 3.5–5.2)
ALBUMIN/GLOB SERPL: 2 G/DL
ALP SERPL-CCNC: 82 U/L (ref 39–117)
ALT SERPL-CCNC: 35 U/L (ref 1–33)
AST SERPL-CCNC: 25 U/L (ref 1–32)
BILIRUB SERPL-MCNC: 0.3 MG/DL (ref 0–1.2)
BUN SERPL-MCNC: 17 MG/DL (ref 6–20)
BUN/CREAT SERPL: 23 (ref 7–25)
CALCIUM SERPL-MCNC: 9.8 MG/DL (ref 8.6–10.5)
CHLORIDE SERPL-SCNC: 103 MMOL/L (ref 98–107)
CO2 SERPL-SCNC: 30 MMOL/L (ref 22–29)
CREAT SERPL-MCNC: 0.74 MG/DL (ref 0.57–1)
EGFRCR SERPLBLD CKD-EPI 2021: 105.7 ML/MIN/1.73
ERYTHROCYTE [DISTWIDTH] IN BLOOD BY AUTOMATED COUNT: 13 % (ref 12.3–15.4)
GLOBULIN SER CALC-MCNC: 2.4 GM/DL
GLUCOSE SERPL-MCNC: 106 MG/DL (ref 65–99)
HBA1C MFR BLD: 5.8 % (ref 4.8–5.6)
HCT VFR BLD AUTO: 37.3 % (ref 34–46.6)
HGB BLD-MCNC: 12 G/DL (ref 12–15.9)
MCH RBC QN AUTO: 28.6 PG (ref 26.6–33)
MCHC RBC AUTO-ENTMCNC: 32.2 G/DL (ref 31.5–35.7)
MCV RBC AUTO: 89 FL (ref 79–97)
PLATELET # BLD AUTO: 214 10*3/MM3 (ref 140–450)
POTASSIUM SERPL-SCNC: 4.9 MMOL/L (ref 3.5–5.2)
PROT SERPL-MCNC: 7.1 G/DL (ref 6–8.5)
RBC # BLD AUTO: 4.19 10*6/MM3 (ref 3.77–5.28)
SODIUM SERPL-SCNC: 142 MMOL/L (ref 136–145)
TSH SERPL DL<=0.005 MIU/L-ACNC: 4.22 UIU/ML (ref 0.27–4.2)
WBC # BLD AUTO: 6.23 10*3/MM3 (ref 3.4–10.8)

## 2025-01-01 RX ORDER — POLYETHYLENE GLYCOL 3350 17 G/17G
POWDER, FOR SOLUTION ORAL
COMMUNITY
Start: 2025-01-01 | End: 2025-01-01 | Stop reason: SDUPTHER

## 2025-01-01 RX ORDER — PREDNISONE 50 MG/1
50 TABLET ORAL TAKE AS DIRECTED
Qty: 3 TABLET | Refills: 0 | Status: SHIPPED | OUTPATIENT
Start: 2025-01-01 | End: 2025-05-11

## 2025-01-01 RX ORDER — POLYETHYLENE GLYCOL 3350 17 G/17G
17 POWDER, FOR SOLUTION ORAL DAILY
Qty: 116 G | Refills: 1 | Status: SHIPPED | OUTPATIENT
Start: 2025-01-01 | End: 2025-05-11

## 2025-01-01 RX ORDER — SENNOSIDES 8.6 MG
1300 CAPSULE ORAL EVERY 8 HOURS PRN
Qty: 180 TABLET | Refills: 3 | Status: SHIPPED | OUTPATIENT
Start: 2025-01-01 | End: 2025-05-11

## 2025-01-01 RX ORDER — CETIRIZINE HYDROCHLORIDE 10 MG/1
10 TABLET ORAL NIGHTLY
Qty: 90 TABLET | Refills: 3 | Status: SHIPPED | OUTPATIENT
Start: 2025-01-01 | End: 2025-05-11

## 2025-01-01 RX ORDER — DIPHENHYDRAMINE HCL 25 MG
50 CAPSULE ORAL TAKE AS DIRECTED
Qty: 2 CAPSULE | Refills: 0 | Status: SHIPPED | OUTPATIENT
Start: 2025-01-01 | End: 2025-05-11

## 2025-01-20 RX ORDER — LEVOTHYROXINE SODIUM 25 UG/1
25 TABLET ORAL DAILY
Qty: 90 TABLET | Refills: 1 | Status: SHIPPED | OUTPATIENT
Start: 2025-01-20

## 2025-03-19 RX ORDER — CHOLECALCIFEROL (VITAMIN D3) 50 MCG
TABLET ORAL DAILY
Qty: 90 TABLET | Refills: 0 | OUTPATIENT
Start: 2025-03-19

## 2025-03-19 RX ORDER — CHOLECALCIFEROL (VITAMIN D3) 50 MCG
2000 TABLET ORAL DAILY
Qty: 90 TABLET | Refills: 1 | Status: SHIPPED | OUTPATIENT
Start: 2025-03-19

## 2025-04-20 DIAGNOSIS — I50.22 CHRONIC SYSTOLIC CHF (CONGESTIVE HEART FAILURE): Primary | ICD-10-CM

## 2025-04-20 DIAGNOSIS — F33.0 MILD EPISODE OF RECURRENT MAJOR DEPRESSIVE DISORDER: ICD-10-CM

## 2025-04-20 DIAGNOSIS — F41.9 ANXIETY: ICD-10-CM

## 2025-04-21 ENCOUNTER — PATIENT MESSAGE (OUTPATIENT)
Dept: INTERNAL MEDICINE | Facility: CLINIC | Age: 40
End: 2025-04-21
Payer: COMMERCIAL

## 2025-04-21 RX ORDER — SERTRALINE HYDROCHLORIDE 100 MG/1
TABLET, FILM COATED ORAL
Qty: 90 TABLET | Refills: 0 | Status: SHIPPED | OUTPATIENT
Start: 2025-04-21

## 2025-04-23 RX ORDER — SPIRONOLACTONE 25 MG/1
25 TABLET ORAL DAILY
OUTPATIENT
Start: 2025-04-23

## 2025-04-23 RX ORDER — SPIRONOLACTONE 25 MG/1
25 TABLET ORAL DAILY
Qty: 90 TABLET | Refills: 0 | Status: SHIPPED | OUTPATIENT
Start: 2025-04-23

## 2025-04-23 NOTE — TELEPHONE ENCOUNTER
Advised pt and she will have the lab work done, she had questions about a medication that she states she has to take 1/2 a tablet of. She thought it was her Metoprolol but was unsure. I reviewed her med list and we have that as 1 tablet qday. She states her mother has her medications at her home and fills a prefilled pill pack for her so she was unsure if that was the medication in question. I reviewed her med list and advised that it may be the Zoloft that she is referring to and gave her the providers information so she can reach out to them.

## 2025-04-23 NOTE — TELEPHONE ENCOUNTER
Needs BMP before additional refills can be sent to ensure it's safe to continue this medication per refill protocol.  Ordered.    Please advise patient of this.

## 2025-05-05 NOTE — PROGRESS NOTES
"    Office Visit      Date: 2025   Patient Name: Johny Hearn  : 1985   MRN: [id number]     Chief Complaint:    Chief Complaint   Patient presents with    Abstract     Referral to . Discuss getting labs    Weight Check     Discuss weight loss options     Knee Pain     Bilateral        History of Present Illness: Johny Hearn is a 39 y.o. female lymph node pain, weight management, knee and back pain, memory issues, sleep apnea, and allergies.    History of Present Illness  She has been experiencing pain in her left anterior cervical node, axillary, left inguinal for the past 2 to 3 weeks. No reported illness but has been having a slight sore throat she believes from drainage and allergies. She reports no night sweats or unintentional weight loss but admits to persistent fatigue not worsening than her baseline. She is planning on scheduling an appointment with hematology/oncology for sooner follow up, has hx of lymphoma in remission     She expresses interest in pharmacological intervention for weight loss, with surgery as a last resort. She has a history of \"5 cardiac arrests\".  Trouble exercising due to chronic knee pain and back pain but she attempts to use an exercise bike.  If the weight loss medication proves ineffective, she would like a referral to bariatric surgery surgery. She has been prediabetic in the past and requesting labs.     She reports knee pain, which is exacerbated by exercise bike use, resulting in popping sounds and subsequent stiffness. She has previously undergone physical therapy for her knees and back. She has tried Tylenol without success, not sure if she has ever tried Tylenol arthritis, not supposed to take NSAIDs.  She was requesting a referral to a local orthopedic provider.    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 She has " "previously received home-based cognitive therapy, which included memory techniques and drills.    She has been diagnosed with sleep apnea and has been using a CPAP machine for the past 5 to 6 years. She is requesting a refill of her CPAP supplies.  Is not established with pulmonologist.    She reports significant drainage in the back of her throat and used to take sertraline for allergies.    Needs help with transportation.  Her mom is no longer helping her due to a \"difference in opinions\" but states she thinks she would help her if she really needed it.  She is independent but does have visual impairment.  She lives in a handicap assessable apartment.        Subjective      Review of Systems:   Pertinent ROS noted in HPI.     I have reviewed the patients family history, social history, past medical history, past surgical history and have updated it as appropriate.     Medications:     Current Outpatient Medications:     polyethylene glycol (MIRALAX) 17 GM/SCOOP powder, Take 17 g by mouth Daily., Disp: 116 g, Rfl: 1    acetaminophen (Tylenol 8 Hour) 650 MG 8 hr tablet, Take 2 tablets by mouth Every 8 (Eight) Hours As Needed for Mild Pain, Moderate Pain or Headache., Disp: 180 tablet, Rfl: 3    cetirizine (zyrTEC) 10 MG tablet, Take 1 tablet by mouth Every Night., Disp: 90 tablet, Rfl: 3    Cholecalciferol (Vitamin D) 50 MCG (2000 UT) tablet, Take 1 tablet by mouth Daily., Disp: 90 tablet, Rfl: 1    levothyroxine (SYNTHROID, LEVOTHROID) 25 MCG tablet, Take 1 tablet by mouth once daily, Disp: 90 tablet, Rfl: 1    metoprolol succinate XL (TOPROL-XL) 25 MG 24 hr tablet, Take 1 tablet by mouth Daily., Disp: 90 tablet, Rfl: 3    sacubitril-valsartan (ENTRESTO) 24-26 MG tablet, Take 1 tablet by mouth Every 12 (Twelve) Hours., Disp: 180 tablet, Rfl: 3    sertraline (ZOLOFT) 100 MG tablet, TAKE 1 TABLET BY MOUTH NIGHTLY (150  MG  DAILY), Disp: 90 tablet, Rfl: 0    sertraline (ZOLOFT) 50 MG tablet, TAKE 1 TABLET BY MOUTH " "NIGHTLY WITH  100  MG  TO  EQUAL  150MG  DAILY, Disp: 90 tablet, Rfl: 0    spironolactone (ALDACTONE) 25 MG tablet, Take 1 tablet by mouth once daily, Disp: 90 tablet, Rfl: 0    Allergies:   Allergies   Allergen Reactions    Contrast Dye (Echo Or Unknown Ct/Mr) Itching    Erythromycin Unknown - Low Severity     Pt unsure.    Macrobid [Nitrofurantoin Monohyd Macro] Unknown - Low Severity     Pt does not know       Objective     Physical Exam  Vitals and nursing note reviewed.   Constitutional:       Appearance: Normal appearance. She is morbidly obese.   HENT:      Right Ear: External ear normal.      Left Ear: External ear normal.   Eyes:      Extraocular Movements: Extraocular movements intact.   Cardiovascular:      Rate and Rhythm: Normal rate and regular rhythm.   Pulmonary:      Effort: Pulmonary effort is normal.      Breath sounds: Normal breath sounds.   Musculoskeletal:         General: Normal range of motion.      Cervical back: Normal range of motion.   Lymphadenopathy:      Cervical: Cervical adenopathy (small mobile tender) present.      Lower Body: Left inguinal adenopathy (small mobile tender) present.   Skin:     General: Skin is dry.   Neurological:      Mental Status: She is alert and oriented to person, place, and time.   Psychiatric:         Mood and Affect: Mood normal.         Vital Signs:   Vitals:    05/05/25 0800   BP: 132/84   Pulse: 68   Temp: 97.3 °F (36.3 °C)   SpO2: 100%   Weight: 126 kg (278 lb)   Height: 170.2 cm (67\")     Body mass index is 43.54 kg/m².    Assessment / Plan      Assessment/Plan:   Diagnoses and all orders for this visit:    1. Primary hypertension (Primary)  -     CBC (No Diff)  -     Comprehensive Metabolic Panel  -     TSH Rfx On Abnormal To Free T4  -     Hemoglobin A1c    2. Prediabetes  -     Comprehensive Metabolic Panel  -     Hemoglobin A1c    3. Acquired hypothyroidism  -     TSH Rfx On Abnormal To Free T4    4. RUFINA on CPAP  -     Ambulatory Referral to " Pulmonology    5. Chronic pain of both knees  -     Ambulatory Referral to Orthopedic Surgery  -     acetaminophen (Tylenol 8 Hour) 650 MG 8 hr tablet; Take 2 tablets by mouth Every 8 (Eight) Hours As Needed for Mild Pain, Moderate Pain or Headache.  Dispense: 180 tablet; Refill: 3    6. Chronic midline low back pain without sciatica  -     Ambulatory Referral to Orthopedic Surgery  -     acetaminophen (Tylenol 8 Hour) 650 MG 8 hr tablet; Take 2 tablets by mouth Every 8 (Eight) Hours As Needed for Mild Pain, Moderate Pain or Headache.  Dispense: 180 tablet; Refill: 3    7. Assistance with transportation  -     Ambulatory Referral to Case Management    8. Tenderness of lymph node  -     CBC (No Diff)    9. Memory disturbance  -     Ambulatory Referral to Neuropsychology    10. Seasonal allergies  -     cetirizine (zyrTEC) 10 MG tablet; Take 1 tablet by mouth Every Night.  Dispense: 90 tablet; Refill: 3    11. Morbid (severe) obesity due to excess calories  -     CBC (No Diff)  -     Comprehensive Metabolic Panel  -     TSH Rfx On Abnormal To Free T4  -     Hemoglobin A1c    Other orders  -     polyethylene glycol (MIRALAX) 17 GM/SCOOP powder; Take 17 g by mouth Daily.  Dispense: 116 g; Refill: 1       Assessment & Plan  Lymphadenopathy  - Palpable lymph nodes were detected in the neck and groin regions, small, mobile, which could be reactive in nature.  - Given the patient's medical history of lymphoma, it is prudent to err on the side of caution.  - She has been advised to consult with her oncologist regarding the painful and reactive lymph nodes and request sooner appointment than October   - A complete blood count (CBC) will be ordered    Morbid obesity   - Recheck A1c, TSH  - She is interested in oral medications for weight loss or GLP-1. Insurance does not typically cover oral medications but can trial off-label medication such as metformin, Topamax, Wellbutrin, naltrexone. Body mass index is 43.54 kg/m². Has  multiple comorbidities. No personal or family history of MTC, MEN, gastroparesis, pancreatitis. Aware of black box warning. If A1c >6.5% initiate Ozempic. If <6.5% can consider Wegovy due to history of MI. Recommend to eat in a calorie deficit, cardiovascular exercise 30 minutes 5 days per week and weight bearing exercises.     Chronic knee and back pain   - She has mild-to-moderate osteoarthritis in her knees.  - Weight loss is advised   - A referral to orthopedics will be made.  - In the interim, she can take Tylenol Arthritis 650 mg, 2 tablets every 8 hours as needed for pain, rest, ice, compression wrap, elevation     Memory loss  - A referral neuropsychology was made     Sleep apnea  - A referral to Pulmonology will be made for CPAP supplies.    Allergies  - A prescription for cetirizine 10 mg will be provided.  - If this proves ineffective, alternative treatments will be considered.    Social issues  - A referral to social work will be made to assist with transportation and other social needs.      Follow Up:   SHAD RUSSELL  [facility] [place]

## 2025-05-06 NOTE — TELEPHONE ENCOUNTER
----- Message from Maximus BELL sent at 5/5/2025  4:13 PM EDT -----  PATIENT WAS AT HER PRIMARY TODAY AND DR WANTED HER TO CALL AND LET YOU ALL KNOW THAT HER NECK AND GROIN AREA LYMPH NODES ARE SWOLLEN AND DIDN'T FEEL QUITE [id number]

## 2025-05-06 NOTE — TELEPHONE ENCOUNTER
Spoke with Dr. Rice regarding patient's problems. He would like to see her with scans prior sometime next week. Attempted to call patient and left a message for her to call back.

## 2025-05-08 NOTE — OUTREACH NOTE
SW attempted contact with [employer]. SW will attempt again in a couple of business days.     Zoila SOSA -   Ambulatory Case Management    5/8/2025, 10:42 EDT

## 2025-05-08 NOTE — TELEPHONE ENCOUNTER
Your request for prior authorization was denied, but an Electronic Appeal is available for your patient. Complete the questions in the Appeal section at the bottom of this page to pursue the appeal. For assistance, contact our support team at [phone].    Message from plan: This request has not been approved. Based on the information we received, you did not meet the specific rule(s) needed to approve this request. In some cases, the requested drug or alternatives offered may have other guideline rules that need to be met. Our guideline named WEGOVY requires the following rule(s) be met for approval: The member is at least 45 years of age or older.This is why your request is denied. Please work with your doctor to try a different medication or get us more information if it will allow us to approve this request. A written notification letter will follow with additional details

## 2025-05-08 NOTE — TELEPHONE ENCOUNTER
PA renewed with DX of Fatty Liver with Inflammation not caused by Alcohol Use and Heart Attack. (Key: [id number])

## 2025-05-09 ENCOUNTER — TELEPHONE (OUTPATIENT)
Dept: ONCOLOGY | Facility: CLINIC | Age: 40
End: 2025-05-09
Payer: COMMERCIAL

## 2025-05-09 NOTE — TELEPHONE ENCOUNTER
"  “Please be informed that patient has passed. Patient has been marked  in the system. The date of death is: 25\".    Caller: Cleo Luna    Relationship: Mother    Best call back number: [phone]    Did the patient have surgery within 30 days of their passing (Y/N):     "

## (undated) DEVICE — Device: Brand: DEFENDO AIR/WATER/SUCTION AND BIOPSY VALVE

## (undated) DEVICE — GLIDESHEATH ACCESS KIT HYDROPHILIC COATED INTRODUCER SHEATH: Brand: GLIDESHEATH

## (undated) DEVICE — SUT GUT CHRM 3/0 SH 27IN G122H

## (undated) DEVICE — NDL HYPO ECLPS SFTY 25G 1 1/2IN

## (undated) DEVICE — GLV SURG SENSICARE W/ALOE PF LF 7.5 STRL

## (undated) DEVICE — DRSNG PAD ABD 8X10IN STRL

## (undated) DEVICE — GLV SURG SENSICARE W/ALOE PF LF 8.5 STRL

## (undated) DEVICE — SOL IRRIG NACL 9PCT 1000ML BTL

## (undated) DEVICE — PAD,ABDOMINAL,5"X9",STERILE,LF,1/PK: Brand: MEDLINE INDUSTRIES, INC.

## (undated) DEVICE — NDL HYPO ECLPS SFTY 22G 1 1/2IN

## (undated) DEVICE — KT ORCA VLV SXN AIR/H2O W/SEAL 1P/U STRL

## (undated) DEVICE — RADIFOCUS OPTITORQUE ANGIOGRAPHIC CATHETER: Brand: OPTITORQUE

## (undated) DEVICE — SYR LL TP 10ML STRL

## (undated) DEVICE — SYR LUERLOK 5CC

## (undated) DEVICE — TUBING, SUCTION, 1/4" X 12', STRAIGHT: Brand: MEDLINE

## (undated) DEVICE — DECANT BG O JET

## (undated) DEVICE — SUT VIC 3/0 SH 27IN J416H

## (undated) DEVICE — Device

## (undated) DEVICE — SYR LUER SLPTP 50ML

## (undated) DEVICE — SUCTION CANISTER, 1500CC, RIGID: Brand: DEROYAL

## (undated) DEVICE — COUNT NDL FOAM STRIP W/MAG 60CT

## (undated) DEVICE — SYR LUERLOK 20CC

## (undated) DEVICE — CONMED SCOPE SAVER BITE BLOCK, 20X27 MM: Brand: SCOPE SAVER

## (undated) DEVICE — GAUZE,SPONGE,4"X4",16PLY,XRAY,STRL,LF: Brand: MEDLINE

## (undated) DEVICE — RICH MAJOR PROCEDURE: Brand: MEDLINE INDUSTRIES, INC.

## (undated) DEVICE — UNDYED BRAIDED (POLYGLACTIN 910), SYNTHETIC ABSORBABLE SUTURE: Brand: COATED VICRYL

## (undated) DEVICE — INTENDED FOR TISSUE SEPARATION, AND OTHER PROCEDURES THAT REQUIRE A SHARP SURGICAL BLADE TO PUNCTURE OR CUT.: Brand: BARD-PARKER ® CARBON RIB-BACK BLADES

## (undated) DEVICE — ANGIOGRAPHIC CATHETER: Brand: EXPO™

## (undated) DEVICE — SPNG GZ WOVN 4X4IN 12PLY 10/BX STRL

## (undated) DEVICE — ELECTRD PAD DEFIB A/

## (undated) DEVICE — GOWN,PREVENTION PLUS,XLARGE,STERILE: Brand: MEDLINE

## (undated) DEVICE — SPNG LAP 18X18IN LF STRL PK/5

## (undated) DEVICE — JELLY,LUBE,STERILE,FLIP TOP,TUBE,2-OZ: Brand: MEDLINE

## (undated) DEVICE — TP SXN YANKR BULB STRL

## (undated) DEVICE — LUBE JELLY PK/2.75GM STRL BX/144

## (undated) DEVICE — FRCP BIOP RADLJAW4 HOT 2.2X240 BX40

## (undated) DEVICE — CUFF SCD HEMOFORCE SEQ CALF STD MD

## (undated) DEVICE — TR BAND RADIAL ARTERY COMPRESSION DEVICE: Brand: TR BAND

## (undated) DEVICE — PTFE CTD NEEDLE EXT INS 2.75': Brand: MEDLINE

## (undated) DEVICE — DRSNG WND BORDR/ADHS NONADHR/GZ LF 4X4IN STRL

## (undated) DEVICE — NDL HYPO ECLPS SFTY 18G 1 1/2IN

## (undated) DEVICE — SOL IRRIG H2O 1000ML STRL

## (undated) DEVICE — FLEXIBLE YANKAUER,MEDIUM TIP, NO VACUUM CONTROL: Brand: ARGYLE

## (undated) DEVICE — PAD GRND REM POLYHESIVE A/ DISP

## (undated) DEVICE — SOL NACL 0.9PCT 1000ML

## (undated) DEVICE — BLD CLIP UNIV SURG GRY

## (undated) DEVICE — STRIP,CLOSURE,WOUND,MEDI-STRIP,1/2X4: Brand: MEDLINE

## (undated) DEVICE — IRRIGATOR BULB ASEPTO 60CC STRL

## (undated) DEVICE — CLAVICLE STRAP: Brand: DEROYAL

## (undated) DEVICE — ENDOSCOPY PORT CONNECTOR FOR OLYMPUS® SCOPES: Brand: ERBE

## (undated) DEVICE — HYBRID TUBING/CAP SET FOR OLYMPUS® SCOPES: Brand: ERBE

## (undated) DEVICE — ENDOGATOR AUXILIARY WATER JET CONNECTOR: Brand: ENDOGATOR

## (undated) DEVICE — PANTY KNIT MATERN 2XL

## (undated) DEVICE — GW EMR FIX EXCHG J STD .035 3MM 260CM

## (undated) DEVICE — MEDI-VAC NON-CONDUCTIVE SUCTION TUBING: Brand: CARDINAL HEALTH

## (undated) DEVICE — PENCL E/S HNDSWCH PUSHBTN HOLSTR 10FT

## (undated) DEVICE — FRCP BIOP COLD ENDOJAW ALLGTR W/NDL 2.8X2300MM BLU

## (undated) DEVICE — 2000CC GUARDIAN II: Brand: GUARDIAN

## (undated) DEVICE — RICH MINOR LITHOTOMY: Brand: MEDLINE INDUSTRIES, INC.